# Patient Record
Sex: MALE | Race: WHITE | NOT HISPANIC OR LATINO | Employment: OTHER
[De-identification: names, ages, dates, MRNs, and addresses within clinical notes are randomized per-mention and may not be internally consistent; named-entity substitution may affect disease eponyms.]

---

## 2017-01-06 ENCOUNTER — SURGERY (OUTPATIENT)
Age: 72
End: 2017-01-06

## 2017-01-06 PROBLEM — G89.29 CHRONIC PAIN: Status: ACTIVE | Noted: 2017-01-06

## 2017-01-06 RX ADMIN — TRIAMCINOLONE ACETONIDE 40 MG: 40 INJECTION, SUSPENSION INTRA-ARTICULAR; INTRAMUSCULAR at 09:01

## 2017-01-06 RX ADMIN — FENTANYL CITRATE 25 MCG: 50 INJECTION, SOLUTION INTRAMUSCULAR; INTRAVENOUS at 09:01

## 2017-01-06 RX ADMIN — BUPIVACAINE HYDROCHLORIDE 10 ML: 2.5 INJECTION, SOLUTION EPIDURAL; INFILTRATION; INTRACAUDAL; PERINEURAL at 09:01

## 2017-01-06 RX ADMIN — MIDAZOLAM HYDROCHLORIDE 1 MG: 1 INJECTION, SOLUTION INTRAMUSCULAR; INTRAVENOUS at 09:01

## 2017-01-06 RX ADMIN — LIDOCAINE HYDROCHLORIDE 20 ML: 10 INJECTION, SOLUTION INFILTRATION; PERINEURAL at 09:01

## 2017-01-10 ENCOUNTER — PATIENT MESSAGE (OUTPATIENT)
Dept: HEMATOLOGY/ONCOLOGY | Facility: CLINIC | Age: 72
End: 2017-01-10

## 2017-01-10 RX ORDER — TAMSULOSIN HYDROCHLORIDE 0.4 MG/1
CAPSULE ORAL
Qty: 90 CAPSULE | Refills: 0 | Status: SHIPPED | OUTPATIENT
Start: 2017-01-10 | End: 2017-04-07 | Stop reason: SDUPTHER

## 2017-01-11 ENCOUNTER — PATIENT MESSAGE (OUTPATIENT)
Dept: HEMATOLOGY/ONCOLOGY | Facility: CLINIC | Age: 72
End: 2017-01-11

## 2017-01-11 ENCOUNTER — ANESTHESIA EVENT (OUTPATIENT)
Dept: SURGERY | Facility: HOSPITAL | Age: 72
End: 2017-01-11
Payer: MEDICARE

## 2017-01-11 DIAGNOSIS — G89.29 CHRONIC LOW BACK PAIN WITH SCIATICA, SCIATICA LATERALITY UNSPECIFIED, UNSPECIFIED BACK PAIN LATERALITY: ICD-10-CM

## 2017-01-11 DIAGNOSIS — M54.40 CHRONIC LOW BACK PAIN WITH SCIATICA, SCIATICA LATERALITY UNSPECIFIED, UNSPECIFIED BACK PAIN LATERALITY: ICD-10-CM

## 2017-01-11 RX ORDER — HYDROCODONE BITARTRATE AND ACETAMINOPHEN 5; 325 MG/1; MG/1
2 TABLET ORAL EVERY 6 HOURS PRN
Qty: 100 TABLET | Refills: 0 | Status: SHIPPED | OUTPATIENT
Start: 2017-01-11 | End: 2017-03-09 | Stop reason: DRUGHIGH

## 2017-01-12 ENCOUNTER — ANESTHESIA (OUTPATIENT)
Dept: SURGERY | Facility: HOSPITAL | Age: 72
End: 2017-01-12
Payer: MEDICARE

## 2017-01-12 PROBLEM — H25.10 SENILE NUCLEAR SCLEROSIS: Status: ACTIVE | Noted: 2017-01-12

## 2017-01-12 PROCEDURE — D9220A PRA ANESTHESIA: Mod: CRNA,,, | Performed by: NURSE ANESTHETIST, CERTIFIED REGISTERED

## 2017-01-12 PROCEDURE — 25000003 PHARM REV CODE 250: Performed by: NURSE ANESTHETIST, CERTIFIED REGISTERED

## 2017-01-12 PROCEDURE — 63600175 PHARM REV CODE 636 W HCPCS: Performed by: NURSE ANESTHETIST, CERTIFIED REGISTERED

## 2017-01-12 PROCEDURE — D9220A PRA ANESTHESIA: Mod: ANES,,, | Performed by: ANESTHESIOLOGY

## 2017-01-12 RX ORDER — MIDAZOLAM HYDROCHLORIDE 1 MG/ML
INJECTION, SOLUTION INTRAMUSCULAR; INTRAVENOUS
Status: DISCONTINUED | OUTPATIENT
Start: 2017-01-12 | End: 2017-01-12

## 2017-01-12 RX ORDER — SODIUM CHLORIDE, SODIUM LACTATE, POTASSIUM CHLORIDE, CALCIUM CHLORIDE 600; 310; 30; 20 MG/100ML; MG/100ML; MG/100ML; MG/100ML
INJECTION, SOLUTION INTRAVENOUS CONTINUOUS PRN
Status: DISCONTINUED | OUTPATIENT
Start: 2017-01-12 | End: 2017-01-12

## 2017-01-12 RX ADMIN — SODIUM CHLORIDE, SODIUM LACTATE, POTASSIUM CHLORIDE, AND CALCIUM CHLORIDE: .6; .31; .03; .02 INJECTION, SOLUTION INTRAVENOUS at 09:01

## 2017-01-12 RX ADMIN — MIDAZOLAM HYDROCHLORIDE 1 MG: 1 INJECTION, SOLUTION INTRAMUSCULAR; INTRAVENOUS at 09:01

## 2017-01-12 NOTE — ANESTHESIA PREPROCEDURE EVALUATION
01/12/2017  Royce Francis is a 71 y.o., male.    [facility] Anesthesia Evaluation    I have reviewed the Patient Summary Reports.    I have reviewed the Nursing Notes.      Review of Systems  Anesthesia Hx:  No problems with previous Anesthesia Denies Hx of Anesthetic complications  History of prior surgery of interest to airway management or planning: Denies Family Hx of Anesthesia complications.   Denies Personal Hx of Anesthesia complications.   Social:  Former Smoker, Alcohol Use    Hematology/Oncology:  Hematology Normal   Oncology Normal     EENT/Dental:EENT/Dental Normal   Cardiovascular:   Exercise tolerance: good Hypertension, well controlled CAD   CHF Stable.   Pulmonary:   Sleep Apnea    Renal/:  Renal/ Normal     Hepatic/GI:  Hepatic/GI Normal    Musculoskeletal:   Arthritis     Neurological:   Neuromuscular Disease,    Endocrine:  Endocrine Normal    Dermatological:  Skin Normal    Psych:  Psychiatric Normal           Physical Exam  General:  Well nourished    Airway/Jaw/Neck:  Airway Findings: Mouth Opening: Normal Tongue: Normal  General Airway Assessment: Adult  Mallampati: III  Improves to II with phonation.  TM Distance: Normal, at least 6 cm        Eyes/Ears/Nose:  EYES/EARS/NOSE FINDINGS: Normal   Dental:  DENTAL FINDINGS: Normal   Chest/Lungs:  Chest/Lungs Clear    Heart/Vascular:  Heart Findings: Normal Heart murmur: negative Vascular Findings: Normal    Abdomen:  Abdomen Findings: Normal    Musculoskeletal:  Musculoskeletal Findings: Normal   Skin:  Skin Findings: Normal    Mental Status:  Mental Status Findings:  Cooperative, Alert and Oriented         Anesthesia Plan  Type of Anesthesia, risks & benefits discussed:  Anesthesia Type:  MAC  Patient's Preference:   Intra-op Monitoring Plan:   Intra-op Monitoring Plan Comments:   Post Op Pain Control Plan:   Post Op Pain Control Plan  Comments:   Induction:   IV  Beta Blocker:  Patient is on a Beta-Blocker and has received one dose within the past 24 hours (No further documentation required).       Informed Consent: Patient understands risks and agrees with Anesthesia plan.  Questions answered. Anesthesia consent signed with patient.  ASA Score: 4     Day of Surgery Review of History & Physical:  There are no significant changes.  H&P update referred to the provider.     Anesthesia Plan Notes: Discussed risks and benefits with patient. Patient agrees to proceed with plan. All questions answered. The Patient is Ready For Surgery.  NPO after midnight.        Ready For Surgery From Anesthesia Perspective.

## 2017-01-12 NOTE — ANESTHESIA POSTPROCEDURE EVALUATION
"Anesthesia Post Evaluation    Patient: Royce Francis    Procedure(s) Performed: Procedure(s) (LRB):  PHACOEMULSIFICATION-ASPIRATION-CATARACT (Left)  INSERTION-INTRAOCULAR LENS (IOL) (Left)    Final Anesthesia Type: MAC  Patient location during evaluation: OPS  Patient participation: Yes- Able to Participate  Level of consciousness: awake  Post-procedure vital signs: reviewed and stable  Pain management: adequate  Airway patency: patent  PONV status at discharge: No PONV  Anesthetic complications: no      Cardiovascular status: blood pressure returned to baseline  Respiratory status: unassisted, spontaneous ventilation and room air  Hydration status: euvolemic  Follow-up not needed.        Visit Vitals    BP (!) 140/70    Pulse 64    Temp 37 °C (98.6 °F) (Oral)    Resp 14    Ht 5' 10.5" (1.791 m)    Wt 87.1 kg (192 lb)    SpO2 95%    BMI 27.16 kg/m2       Pain/Masoud Score: No Data Recorded      "

## 2017-01-12 NOTE — TRANSFER OF CARE
"Anesthesia Transfer of Care Note    Patient: Royce Francis    Procedure(s) Performed: Procedure(s) (LRB):  PHACOEMULSIFICATION-ASPIRATION-CATARACT (Left)  INSERTION-INTRAOCULAR LENS (IOL) (Left)    Patient location: [facility]    Anesthesia Type: MAC    Transport from OR: Transported from OR on room air with adequate spontaneous ventilation    Post pain: adequate analgesia    Post assessment: no apparent anesthetic complications and tolerated procedure well    Post vital signs: stable    Level of consciousness: awake, alert and oriented    Nausea/Vomiting: no nausea/vomiting    Complications: none          Last vitals:   Visit Vitals    BP (!) 140/70    Pulse 64    Temp 37 °C (98.6 °F) (Oral)    Resp 14    Ht 5' 10.5" (1.791 m)    Wt 87.1 kg (192 lb)    SpO2 95%    BMI 27.16 kg/m2     "

## 2017-01-13 ENCOUNTER — OFFICE VISIT (OUTPATIENT)
Dept: OPHTHALMOLOGY | Facility: CLINIC | Age: 72
End: 2017-01-13
Payer: MEDICARE

## 2017-01-13 VITALS — HEART RATE: 59 BPM | SYSTOLIC BLOOD PRESSURE: 144 MMHG | DIASTOLIC BLOOD PRESSURE: 79 MMHG

## 2017-01-13 DIAGNOSIS — Z96.1 PSEUDOPHAKIA: ICD-10-CM

## 2017-01-13 DIAGNOSIS — Z98.890 POST-OPERATIVE STATE: Primary | ICD-10-CM

## 2017-01-13 PROCEDURE — 99024 POSTOP FOLLOW-UP VISIT: CPT | Mod: S$GLB,,, | Performed by: OPHTHALMOLOGY

## 2017-01-13 PROCEDURE — 99999 PR PBB SHADOW E&M-EST. PATIENT-LVL II: CPT | Mod: PBBFAC,,, | Performed by: OPHTHALMOLOGY

## 2017-01-13 NOTE — MR AVS SNAPSHOT
[facility]  [address] [facility] [facility]  [place] [address]  Phone: [phone]  Fax: [phone]                  Royce Francis   2017 9:30 AM   Office Visit    Description:  Male : 1945   Provider:  Denilson Yanes MD   Department:  [facility]           Reason for Visit     Post-op Evaluation           Diagnoses this Visit        Comments    Post-operative state    -  Primary     Pseudophakia                To Do List           Future Appointments        Provider Department Dept Phone    2017 10:00 AM CELESTE Torres [facility] [place] [facility] [phone]    2017 9:00 AM Denilson Yanes MD [facility] [phone]    2017 10:30 AM Denilson Yanes MD [facility] [phone]    2017 10:30 AM Unruly Leos MD [facility] [phone]      Your Future Surgeries/Procedures     2017   Surgery with Unruly Leos MD   [facility] ([facility] [place])    [address] [place] [id number] [phone]              Goals (5 Years of Data)     None      Follow-Up and Disposition     Return in about 1 week (around 2017) for Post-op Left eye.      [facility]     [facility] Care [facility] -  Assistance  Registered nurses in the [facility] provide clinical advisement, health education, appointment booking, and other advisory services.  Call for this free service at [phone].             Medications           Message regarding Medications     Verify the changes and/or additions to your medication regime listed below are the same as discussed with your clinician today.  If any of these changes or additions are incorrect, please notify your healthcare provider.             Verify that the below list of medications is an accurate representation of the medications you are currently taking.  If none reported, the list  may be blank. If incorrect, please contact your healthcare provider. Carry this list with you in case of emergency.           Current Medications     acetaminophen (TYLENOL EXTRA STRENGTH) 500 MG tablet Take 500 mg by mouth every 6 (six) hours as needed for Pain.    ascorbic acid, vitamin C, (VITAMIN C) 1000 MG tablet Take 1,000 mg by mouth once daily.    carisoprodol (SOMA) 350 MG tablet 1 bid, prn, muscle cramps    cyanocobalamin (VITAMIN B-12) 1000 MCG tablet Take 100 mcg by mouth once daily.    difluprednate (DUREZOL) 0.05 % Drop ophthalmic solution Place 1 drop into the left eye 4 (four) times daily. For 30 days    enalapril (VASOTEC) 5 MG tablet Take 1 tablet (5 mg total) by mouth once daily.    esomeprazole (NEXIUM) 20 MG capsule Take 20 mg by mouth before breakfast.    fish oil-omega-3 fatty acids 300-1,000 mg capsule Take 2 g by mouth once daily.    flurazepam (DALMANE) 30 MG capsule Take 30 mg by mouth nightly as needed for Insomnia.    flurazepam (DALMANE) 30 MG capsule TAKE 1 CAPSULE(30 MG) BY MOUTH EVERY EVENING AS NEEDED FOR INSOMNIA    fluticasone (FLONASE) 50 mcg/actuation nasal spray SPRAY ONCE IN EACH NOSTRIL ONCE DAILY    hydrocodone-acetaminophen 5-325mg (NORCO) 5-325 mg per tablet Take 2 tablets by mouth every 6 (six) hours as needed for Pain.    ILEVRO 0.3 % DrpS Place 1 drop into both eyes once daily. For 30 days    lactobacillus rhamnosus GG (CULTURELLE) 10 billion cell capsule Take 1 capsule by mouth once daily.    lidocaine-prilocaine (EMLA) cream     loperamide (IMODIUM A-D) 2 mg Tab Take 2 mg by mouth 4 (four) times daily as needed.    metoprolol tartrate (LOPRESSOR) 50 MG tablet Take 0.5 tablets (25 mg total) by mouth 2 (two) times daily.    mirtazapine (REMERON) 15 MG tablet Take 1 tablet (15 mg total) by mouth every evening.    multivitamin capsule Take 1 capsule by mouth once daily.    NON FORMULARY MEDICATION 300 mg once daily. Desiccated Liver    ofloxacin (OCUFLOX) 0.3 % ophthalmic  solution Place 1 drop into the left eye 4 (four) times daily.    pravastatin (PRAVACHOL) 20 MG tablet Take 1 tablet (20 mg total) by mouth once daily.    tamsulosin (FLOMAX) 0.4 mg Cp24 TAKE ONE CAPSULE BY MOUTH DAILY    valacyclovir (VALTREX) 500 MG tablet Take 1 tablet (500 mg total) by mouth once daily.           Clinical Reference Information           Vital Signs - Last Recorded  Most recent update: 1/13/2017  9:26 AM by Josette Hanna MA    BP Pulse                (!) 144/79 (BP Location: Left arm, Patient Position: Sitting, BP Method: Automatic) (!) 59          Blood Pressure          Most Recent Value    BP  (!)  144/79      Allergies as of 1/13/2017     Iodine And Iodide Containing Products      Immunizations Administered on Date of Encounter - 1/13/2017     None

## 2017-01-14 NOTE — PROGRESS NOTES
Subjective:       Patient ID: Royce Francis is a 71 y.o. male.    Chief Complaint: Post-op Evaluation (1 day )    HPI  Review of Systems    Objective:      Physical Exam    Assessment:       1. Post-operative state    2. Pseudophakia        Plan:       S/p CE OS- Doing well.           CPM OS.  RTC 1 wk.

## 2017-01-16 ENCOUNTER — OFFICE VISIT (OUTPATIENT)
Dept: PAIN MEDICINE | Facility: CLINIC | Age: 72
End: 2017-01-16
Payer: MEDICARE

## 2017-01-16 VITALS
HEIGHT: 70 IN | SYSTOLIC BLOOD PRESSURE: 130 MMHG | TEMPERATURE: 98 F | HEART RATE: 69 BPM | DIASTOLIC BLOOD PRESSURE: 58 MMHG | WEIGHT: 196 LBS | BODY MASS INDEX: 28.06 KG/M2

## 2017-01-16 DIAGNOSIS — M54.16 LUMBAR RADICULOPATHY: ICD-10-CM

## 2017-01-16 DIAGNOSIS — M46.1 SACROILIAC INFLAMMATION: ICD-10-CM

## 2017-01-16 DIAGNOSIS — M47.819 FACET ARTHROPATHY: ICD-10-CM

## 2017-01-16 DIAGNOSIS — M46.1 SACROILIITIS: ICD-10-CM

## 2017-01-16 DIAGNOSIS — M17.0 PRIMARY OSTEOARTHRITIS OF BOTH KNEES: Primary | ICD-10-CM

## 2017-01-16 PROCEDURE — 1159F MED LIST DOCD IN RCRD: CPT | Mod: S$GLB,,, | Performed by: NURSE PRACTITIONER

## 2017-01-16 PROCEDURE — 3075F SYST BP GE 130 - 139MM HG: CPT | Mod: S$GLB,,, | Performed by: NURSE PRACTITIONER

## 2017-01-16 PROCEDURE — 1157F ADVNC CARE PLAN IN RCRD: CPT | Mod: S$GLB,,, | Performed by: NURSE PRACTITIONER

## 2017-01-16 PROCEDURE — 1125F AMNT PAIN NOTED PAIN PRSNT: CPT | Mod: S$GLB,,, | Performed by: NURSE PRACTITIONER

## 2017-01-16 PROCEDURE — 99499 UNLISTED E&M SERVICE: CPT | Mod: S$GLB,,, | Performed by: NURSE PRACTITIONER

## 2017-01-16 PROCEDURE — 99999 PR PBB SHADOW E&M-EST. PATIENT-LVL III: CPT | Mod: PBBFAC,,, | Performed by: NURSE PRACTITIONER

## 2017-01-16 PROCEDURE — 3078F DIAST BP <80 MM HG: CPT | Mod: S$GLB,,, | Performed by: NURSE PRACTITIONER

## 2017-01-16 PROCEDURE — 1160F RVW MEDS BY RX/DR IN RCRD: CPT | Mod: S$GLB,,, | Performed by: NURSE PRACTITIONER

## 2017-01-16 PROCEDURE — 99213 OFFICE O/P EST LOW 20 MIN: CPT | Mod: S$GLB,,, | Performed by: NURSE PRACTITIONER

## 2017-01-16 NOTE — MR AVS SNAPSHOT
[facility]  [address]  [place] [address]  Phone: [phone]  Fax: [phone]                  Royce Francis   2017 10:00 AM   Office Visit    Description:  Male : 1945   Provider:  CELESTE Torres   Department:  [facility]           Reason for Visit     Low-back Pain                To Do List           Future Appointments        Provider Department Dept Phone    2017 9:00 AM Denilson Yanes MD [facility] [phone]    2017 10:30 AM Denilson Yanes MD [facility] [phone]    2017 10:30 AM Unruly Leos MD [facility] [phone]      Your Future Surgeries/Procedures     2017   Surgery with Unruly Leos MD   [facility] ([facility])    [address] [facility] [id number] [phone]              Goals (5 Years of Data)     None      [facility]     [facility] Care [facility] -  Assistance  Registered nurses in the [facility] provide clinical advisement, health education, appointment booking, and other advisory services.  Call for this free service at [phone].             Medications           Message regarding Medications     Verify the changes and/or additions to your medication regime listed below are the same as discussed with your clinician today.  If any of these changes or additions are incorrect, please notify your healthcare provider.             Verify that the below list of medications is an accurate representation of the medications you are currently taking.  If none reported, the list may be blank. If incorrect, please contact your healthcare provider. Carry this list with you in case of emergency.           Current Medications     acetaminophen (TYLENOL EXTRA STRENGTH) 500 MG tablet Take 500 mg by mouth every 6 (six) hours as needed for Pain.    ascorbic acid,  vitamin C, (VITAMIN C) 1000 MG tablet Take 1,000 mg by mouth once daily.    carisoprodol (SOMA) 350 MG tablet 1 bid, prn, muscle cramps    cyanocobalamin (VITAMIN B-12) 1000 MCG tablet Take 100 mcg by mouth once daily.    difluprednate (DUREZOL) 0.05 % Drop ophthalmic solution Place 1 drop into the left eye 4 (four) times daily. For 30 days    enalapril (VASOTEC) 5 MG tablet Take 1 tablet (5 mg total) by mouth once daily.    esomeprazole (NEXIUM) 20 MG capsule Take 20 mg by mouth before breakfast.    fish oil-omega-3 fatty acids 300-1,000 mg capsule Take 2 g by mouth once daily.    flurazepam (DALMANE) 30 MG capsule Take 30 mg by mouth nightly as needed for Insomnia.    flurazepam (DALMANE) 30 MG capsule TAKE 1 CAPSULE(30 MG) BY MOUTH EVERY EVENING AS NEEDED FOR INSOMNIA    fluticasone (FLONASE) 50 mcg/actuation nasal spray SPRAY ONCE IN EACH NOSTRIL ONCE DAILY    hydrocodone-acetaminophen 5-325mg (NORCO) 5-325 mg per tablet Take 2 tablets by mouth every 6 (six) hours as needed for Pain.    ILEVRO 0.3 % DrpS Place 1 drop into both eyes once daily. For 30 days    lactobacillus rhamnosus GG (CULTURELLE) 10 billion cell capsule Take 1 capsule by mouth once daily.    lidocaine-prilocaine (EMLA) cream     loperamide (IMODIUM A-D) 2 mg Tab Take 2 mg by mouth 4 (four) times daily as needed.    metoprolol tartrate (LOPRESSOR) 50 MG tablet Take 0.5 tablets (25 mg total) by mouth 2 (two) times daily.    mirtazapine (REMERON) 15 MG tablet Take 1 tablet (15 mg total) by mouth every evening.    multivitamin capsule Take 1 capsule by mouth once daily.    NON FORMULARY MEDICATION 300 mg once daily. Desiccated Liver    ofloxacin (OCUFLOX) 0.3 % ophthalmic solution Place 1 drop into the left eye 4 (four) times daily.    pravastatin (PRAVACHOL) 20 MG tablet Take 1 tablet (20 mg total) by mouth once daily.    tamsulosin (FLOMAX) 0.4 mg Cp24 TAKE ONE CAPSULE BY MOUTH DAILY    valacyclovir (VALTREX) 500 MG tablet Take 1 tablet (500 mg  "total) by mouth once daily.           Clinical Reference Information           Vital Signs - Last Recorded  Most recent update: 1/16/2017  9:59 AM by Meena Zhu MA    BP Pulse Temp Ht Wt BMI    (!) 130/58 69 98 °F (36.7 °C) (Oral) 5' 10" (1.778 m) 88.9 kg (196 lb) 28.12 kg/m2      Blood Pressure          Most Recent Value    BP  (!)  130/58      Allergies as of 1/16/2017     Iodine And Iodide Containing Products      Immunizations Administered on Date of Encounter - 1/16/2017     None      "

## 2017-01-16 NOTE — PROGRESS NOTES
Chronic patient Established Note (Follow up visit)      SUBJECTIVE:    Royce Francis presents to the clinic for a follow-up appointment for lower back and bilateral knee pain.  He is s/p bilateral Synvisc One in the office on 12/14/16 with 80% pain relief.  He is also s/p left L2,3,4,5 RFA on 1/6/17 with significant relief so far.  He is scheduled for the right side on 1/27/17.  He also has chronic bilateral foot pain secondary to multiple rounds of chemotherapy resulting in peripheral neuropathy. He does not have any leg pain. He has a hx of non-Hodgkins lymphoma which has recurred appx 6 times in different locations.  He is followed by Dr. Lucia at [facility]. Since the last visit, Royce Francis states the pain has been improving.  Current pain intensity is 2/10.  The patient denies any bowel/bladder incontinence or symptoms of saddle paresthesia.        Pain Disability Index Review:  Last 3 PDI Scores 1/16/2017 11/18/2016 10/20/2016   Pain Disability Index (PDI) 17 44 26       Pain Medications:    - Opioids: Norco ( Hydrocodone/Acetaminophen)- Dr Lucia    Opioid Contract: no     report:  Reviewed and consistent with medication use as prescribed.    Pain Procedures:   1/16/17 Left L2,3,4,5 RFA- significant relief  12/14/16 Bilateral Synvisc One- significant relief  3/28/16- Bilateral L4-5 AND L5-S1 Facet injection  8/28/15- Bilateral L4-5 and L5-S1 Facet injection  6/5/15- Caudal DARIO with Racz Catheter   4/10/15- Bilateral L4-5 TF DARIO  2/20/15- Left L3-4-5 RFA  2/13/15- right L3-4-5 RFA  1/16/15- bilateral L3-4-5 MBB  9/12/14- bilateral L3-4-5 MBB  8/15/14- Bilateral L4-5 TF DARIO    Physical Therapy/Home Exercise: per self    Imaging:     Thoracic XRAY 4/21/16  Narrative   AP and lateral radiographs of the thoracic spine were obtained.  There is mild scoliosis of the thoracolumbar spine.  Posterior vertebral alignment appears satisfactory.  Vertebral body heights appear well-maintained.  There are  degenerative changes with small anterior osteophytes noted throughout the thoracic spine.  No abnormal paraspinal masses are evident.   Impression     Thoracic spondylosis.  Mild thoracolumbar scoliosis.  No evidence for acute fracture, bone destruction, or subluxation.     Lumbar MRI 7/28/14  Narrative   MRI LUMBAR SPINE    TECHNIQUE: MRI lumbar spine was performed on a 1.5T magnet. The following sequences were obtained: Localizer; sagittal T1, T2, STIR; axial T1 and T2. After administration of 20 mL Omniscan, axial and sagittal T1-weighted sequences were attained.    COMPARISON: Not available.    FINDINGS:    There are 5 lumbar vertebrae.  There is lumbar levoscoliosis.  There is grade 1 anterolisthesis of L4 on L5.  Vertebral body heights are maintained.  Degenerative endplate changes are noted.  No evidence for infiltrative process.  There is multilevel   severe, asymmetric loss of disk height. Conus terminates at L1 and appears unremarkable. Limited evaluation of posterior abdominal structures is unremarkable.  Paraspinal musculature is within normal limits.  Evaluation of sacroiliac joints is   unremarkable.  S3 Tarlov cyst noted.    L1-L2: There is mild bilateral facet arthrosis and circumferential disk bulge resulting in mild spinal canal stenosis and mild right neural foraminal narrowing.    L2-L3: There is a compression disk bulge and moderate bilateral facet arthrosis resulting in moderate spinal canal stenosis and mild bilateral neural foraminal narrowing.    L3-L4: There is mild bilateral facet arthrosis and circumferential disk bulge resulting in mild left neural foraminal narrowing.    L4-L5: There is moderate circumferential disk bulge and bilateral facet arthrosis resulting in moderate spinal canal stenosis and moderate left neural foraminal narrowing.    L5-S1: There is severe right facet arthrosis and mild circumferential disk bulge resulting in moderate right neural foraminal narrowing.    Impression    No marrow infiltrative process. Multilevel degenerative changes with moderate spinal canal stenosis as detailed above       Thoracic XRAY 4/21/16  Narrative   AP and lateral radiographs of the thoracic spine were obtained.  There is mild scoliosis of the thoracolumbar spine.  Posterior vertebral alignment appears satisfactory.  Vertebral body heights appear well-maintained.  There are degenerative changes with small anterior osteophytes noted throughout the thoracic spine.  No abnormal paraspinal masses are evident.   Impression     Thoracic spondylosis.  Mild thoracolumbar scoliosis.  No evidence for acute fracture       Bilateral Knee XRAYs 3/10/16  Narrative   AP, lateral, and sunrise views of both knees were obtained.  The bones are intact.  There is no evidence for acute fracture or bone destruction.  There is chondrocalcinosis present.  There is moderate narrowing of the medial compartments of the femorotibial joints bilaterally.  There is spurring of the tibial spines with osteophytes at the femorotibial and patellofemoral joints.  There is mild soft tissue prominence in a right suprapatellar location and a small right-sided joint effusion cannot be excluded.  Atherosclerotic calcification is present within the arteries of the distal thighs and proximal calf.   Impression     Prominent symmetric degenerative changes of the femorotibial and patellofemoral joints with moderate narrowing of the medial compartments of the femorotibial joints.  Chondrocalcinosis noted bilaterally.  Possible right suprapatellar joint effusion.           Allergies:   Review of patient's allergies indicates:   Allergen Reactions    Iodine and iodide containing products Itching     Contrast dye       Current Medications:   Current Outpatient Prescriptions   Medication Sig Dispense Refill    acetaminophen (TYLENOL EXTRA STRENGTH) 500 MG tablet Take 500 mg by mouth every 6 (six) hours as needed for Pain.      ascorbic  acid, vitamin C, (VITAMIN C) 1000 MG tablet Take 1,000 mg by mouth once daily.      carisoprodol (SOMA) 350 MG tablet 1 bid, prn, muscle cramps 60 tablet 0    cyanocobalamin (VITAMIN B-12) 1000 MCG tablet Take 100 mcg by mouth once daily.      difluprednate (DUREZOL) 0.05 % Drop ophthalmic solution Place 1 drop into the left eye 4 (four) times daily. For 30 days 5 mL 1    enalapril (VASOTEC) 5 MG tablet Take 1 tablet (5 mg total) by mouth once daily. 90 tablet 3    esomeprazole (NEXIUM) 20 MG capsule Take 20 mg by mouth before breakfast.      fish oil-omega-3 fatty acids 300-1,000 mg capsule Take 2 g by mouth once daily.      flurazepam (DALMANE) 30 MG capsule Take 30 mg by mouth nightly as needed for Insomnia.      flurazepam (DALMANE) 30 MG capsule TAKE 1 CAPSULE(30 MG) BY MOUTH EVERY EVENING AS NEEDED FOR INSOMNIA 90 capsule 1    fluticasone (FLONASE) 50 mcg/actuation nasal spray SPRAY ONCE IN EACH NOSTRIL ONCE DAILY 48 g 5    hydrocodone-acetaminophen 5-325mg (NORCO) 5-325 mg per tablet Take 2 tablets by mouth every 6 (six) hours as needed for Pain. 100 tablet 0    ILEVRO 0.3 % DrpS Place 1 drop into both eyes once daily. For 30 days 3 mL 1    lactobacillus rhamnosus GG (CULTURELLE) 10 billion cell capsule Take 1 capsule by mouth once daily.      lidocaine-prilocaine (EMLA) cream       loperamide (IMODIUM A-D) 2 mg Tab Take 2 mg by mouth 4 (four) times daily as needed.      metoprolol tartrate (LOPRESSOR) 50 MG tablet Take 0.5 tablets (25 mg total) by mouth 2 (two) times daily. 180 tablet 3    mirtazapine (REMERON) 15 MG tablet Take 1 tablet (15 mg total) by mouth every evening. 90 tablet 3    multivitamin capsule Take 1 capsule by mouth once daily.      NON FORMULARY MEDICATION 300 mg once daily. Desiccated Liver      ofloxacin (OCUFLOX) 0.3 % ophthalmic solution Place 1 drop into the left eye 4 (four) times daily. 5 mL 1    pravastatin (PRAVACHOL) 20 MG tablet Take 1 tablet (20 mg total) by  mouth once daily. 90 tablet 3    tamsulosin (FLOMAX) 0.4 mg Cp24 TAKE ONE CAPSULE BY MOUTH DAILY 90 capsule 0    valacyclovir (VALTREX) 500 MG tablet Take 1 tablet (500 mg total) by mouth once daily. 90 tablet 3     No current facility-administered medications for this visit.        REVIEW OF SYSTEMS:    GENERAL:  No weight loss, malaise or fevers.  HEENT:  Negative for frequent or significant headaches.  NECK:  Negative for lumps, goiter, pain and significant neck swelling.  RESPIRATORY:  Negative for cough, wheezing or shortness of breath.  CARDIOVASCULAR:  Negative for chest pain, leg swelling or palpitations. H/O CHF and cardiomyopathy.  GI:  Negative for abdominal discomfort, blood in stools or black stools or change in bowel habits.  MUSCULOSKELETAL:  See HPI.  SKIN:  Negative for lesions, rash, and itching.  PSYCH:  Negative for sleep disturbance, mood disorder and recent psychosocial stressors.  HEMATOLOGY/LYMPHOLOGY:  Negative for prolonged bleeding, bruising easily or swollen nodes. H/O lymphoma and squamous cell carcinoma.  NEURO:   No history of headaches, syncope, paralysis, seizures or tremors.  All other reviewed and negative other than HPI.    Past Medical History:  Past Medical History   Diagnosis Date    Allergy     Anemia     Arthritis     Basal cell carcinoma      excised from upper back    Cancer     Cardiomyopathy due to chemotherapy     Cataract     CHF (congestive heart failure)     Coronary artery disease      D1    Encounter for blood transfusion     Eye injuries 20 yrs     ou fb several x's    Hyperlipidemia     Hypertension     Hypertensive heart disease with heart failure 1/31/2013    Lymphoma 1996    Obstructive sleep apnea on CPAP     SCC (squamous cell carcinoma) excised 12/2015     R proximal forearm    Skin disease     Sleep apnea     SQUAMOUS CELL CARCINOMA excised 2/14/13     L forearm    Squamous cell carcinoma excised 8/4/2016     IN SITU, Left thigh MOHS     Squamous cell carcinoma excised 8/25/2016     left helix, MOHS       Past Surgical History:  Past Surgical History   Procedure Laterality Date    Kidney stone surgery      Small intestine surgery  09/2013    Cataract extraction       od dr lincoln    Abdominal surgery      Tonsillectomy      Eye surgery       Rt cataract       Family History:  Family History   Problem Relation Age of Onset    Cataracts Mother     Hypertension Mother     Cataracts Father     Hypertension Father     Cancer Father     Hypertension Maternal Grandmother     Hypertension Maternal Grandfather     Hypertension Paternal Grandmother     Hypertension Paternal Grandfather     No Known Problems Sister     No Known Problems Brother     No Known Problems Maternal Aunt     No Known Problems Maternal Uncle     No Known Problems Paternal Aunt     No Known Problems Paternal Uncle     Amblyopia Neg Hx     Blindness Neg Hx     Diabetes Neg Hx     Glaucoma Neg Hx     Macular degeneration Neg Hx     Retinal detachment Neg Hx     Strabismus Neg Hx     Stroke Neg Hx     Thyroid disease Neg Hx     Melanoma Neg Hx     Heart disease Neg Hx     Celiac disease Neg Hx     Cirrhosis Neg Hx     Colon cancer Neg Hx     Colon polyps Neg Hx     Crohn's disease Neg Hx     Cystic fibrosis Neg Hx     Esophageal cancer Neg Hx     Hemochromatosis Neg Hx     Inflammatory bowel disease Neg Hx     Irritable bowel syndrome Neg Hx     Liver cancer Neg Hx     Liver disease Neg Hx     Rectal cancer Neg Hx     Stomach cancer Neg Hx     Ulcerative colitis Neg Hx     Burt's disease Neg Hx        Social History:  Social History     Social History    Marital status:      Spouse name: N/A    Number of children: N/A    Years of education: N/A     Occupational History    retired      Social History Main Topics    Smoking status: Former Smoker     Quit date: 1960    Smokeless tobacco: Never Used    Alcohol use Yes       "Comment: socially    Drug use: No    Sexual activity: Yes     Partners: Female     Other Topics Concern    Not on file     Social History Narrative       OBJECTIVE:    Visit Vitals    BP (!) 130/58    Pulse 69    Temp 98 °F (36.7 °C) (Oral)    Ht 5' 10" (1.778 m)    Wt 88.9 kg (196 lb)    BMI 28.12 kg/m2       PHYSICAL EXAMINATION:    General appearance: Well appearing, in no acute distress, alert and oriented x3.  Psych:  Mood and affect appropriate.  Skin: Skin color, texture, turgor normal, no rashes or lesions, in both upper and lower body.  Head/face:  Atraumatic, normocephalic. No palpable lymph nodes  Cor: RRR  Pulm: CTA  GI: Abdomen soft and non-tender.  Back: Straight leg raising in the sitting and supine positions is negative to radicular pain. Pain with palpation to lumbar facet joints on the right.  Limited lumbar extension with pain reproduction.  Bilateral facet loading, R>L.  Painful palpation to bilateral SI joints. RAHEEM is positive bilaterally.  Extremities: Peripheral joint ROM is full and pain free without obvious instability or laxity in all four extremities. No deformities, edema, or skin discoloration. Good capillary refill.  Musculoskeletal: Medial joint line tenderness to bilateral knees.  No pain with extension of bilateral knees.  Bilateral upper and lower extremity strength is normal and symmetric.  No atrophy or tone abnormalities are noted.  Neuro: Bilateral upper and lower extremity coordination and muscle stretch reflexes are physiologic and symmetric.  Plantar response are downgoing. Decreased sensation to bilateral feet at digits 2-5.  Gait: Antalgic- ambulating with cane.    ASSESSMENT: 71 y.o. year old male with lower back and BLE pain, consistent with the following diagnoses:     1. Primary osteoarthritis of both knees     2. Facet arthropathy     3. Sacroiliac inflammation     4. Lumbar radiculopathy     5. Sacroiliitis           PLAN:     - Previous imaging was " reviewed and discussed with the patient today.     -  He has an appointment with Dr. Leos next month and would like to further discuss SCS at this time.    - He will keep appointment for right L2,3,4,5 RFA on 1/27/17.    - The patient will continue a home exercise routine to help with pain and strengthening.        The above plan and management options were discussed at length with patient. Patient is in agreement with the above and verbalized understanding.    Francisca Chen  01/16/2017

## 2017-01-20 ENCOUNTER — OFFICE VISIT (OUTPATIENT)
Dept: OPHTHALMOLOGY | Facility: CLINIC | Age: 72
End: 2017-01-20
Payer: MEDICARE

## 2017-01-20 DIAGNOSIS — Z98.890 POST-OPERATIVE STATE: Primary | ICD-10-CM

## 2017-01-20 DIAGNOSIS — Z96.1 PSEUDOPHAKIA: ICD-10-CM

## 2017-01-20 PROCEDURE — 99024 POSTOP FOLLOW-UP VISIT: CPT | Mod: S$GLB,,, | Performed by: OPHTHALMOLOGY

## 2017-01-20 PROCEDURE — 99999 PR PBB SHADOW E&M-EST. PATIENT-LVL II: CPT | Mod: PBBFAC,,, | Performed by: OPHTHALMOLOGY

## 2017-01-20 NOTE — PROGRESS NOTES
Subjective:       Patient ID: Royce Francis is a 71 y.o. male.    Chief Complaint: Post-op Evaluation (1 week po os )    HPI  Review of Systems    Objective:      Physical Exam    Assessment:       1. Post-operative state    2. Pseudophakia        Plan:       S/p CE OS- Doing well.             CPM OS.  RTC 3 wks.

## 2017-01-20 NOTE — MR AVS SNAPSHOT
[facility]  [address]  [place] [address]  Phone: [phone]  Fax: [phone]                  Royce Francis   2017 9:00 AM   Office Visit    Description:  Male : 1945   Provider:  Denilson Yanes MD   Department:  [facility]           Reason for Visit     Post-op Evaluation           Diagnoses this Visit        Comments    Post-operative state    -  Primary     Pseudophakia                To Do List           Future Appointments        Provider Department Dept Phone    2017 10:30 AM Denilson Yanes MD [facility] [phone]    2017 10:30 AM Unruly Leos MD [facility] [phone]      Your Future Surgeries/Procedures     2017   Surgery with Unruly Leos MD   [facility]    [address] [address] [address] [phone]              Goals (5 Years of Data)     None      Follow-Up and Disposition     Return in about 3 weeks (around 2/10/2017) for Post-op Left eye.      [facility]     [facility] Nurse Care [facility] -  Assistance  Registered nurses in the [facility] provide clinical advisement, health education, appointment booking, and other advisory services.  Call for this free service at [phone].             Medications           Message regarding Medications     Verify the changes and/or additions to your medication regime listed below are the same as discussed with your clinician today.  If any of these changes or additions are incorrect, please notify your healthcare provider.             Verify that the below list of medications is an accurate representation of the medications you are currently taking.  If none reported, the list may be blank. If incorrect, please contact your healthcare provider. Carry this list with you in case of emergency.           Current Medications     acetaminophen (TYLENOL  EXTRA STRENGTH) 500 MG tablet Take 500 mg by mouth every 6 (six) hours as needed for Pain.    ascorbic acid, vitamin C, (VITAMIN C) 1000 MG tablet Take 1,000 mg by mouth once daily.    carisoprodol (SOMA) 350 MG tablet 1 bid, prn, muscle cramps    cyanocobalamin (VITAMIN B-12) 1000 MCG tablet Take 100 mcg by mouth once daily.    difluprednate (DUREZOL) 0.05 % Drop ophthalmic solution Place 1 drop into the left eye 4 (four) times daily. For 30 days    enalapril (VASOTEC) 5 MG tablet Take 1 tablet (5 mg total) by mouth once daily.    esomeprazole (NEXIUM) 20 MG capsule Take 20 mg by mouth before breakfast.    fish oil-omega-3 fatty acids 300-1,000 mg capsule Take 2 g by mouth once daily.    flurazepam (DALMANE) 30 MG capsule Take 30 mg by mouth nightly as needed for Insomnia.    flurazepam (DALMANE) 30 MG capsule TAKE 1 CAPSULE(30 MG) BY MOUTH EVERY EVENING AS NEEDED FOR INSOMNIA    fluticasone (FLONASE) 50 mcg/actuation nasal spray SPRAY ONCE IN EACH NOSTRIL ONCE DAILY    hydrocodone-acetaminophen 5-325mg (NORCO) 5-325 mg per tablet Take 2 tablets by mouth every 6 (six) hours as needed for Pain.    ILEVRO 0.3 % DrpS Place 1 drop into both eyes once daily. For 30 days    lactobacillus rhamnosus GG (CULTURELLE) 10 billion cell capsule Take 1 capsule by mouth once daily.    lidocaine-prilocaine (EMLA) cream     loperamide (IMODIUM A-D) 2 mg Tab Take 2 mg by mouth 4 (four) times daily as needed.    metoprolol tartrate (LOPRESSOR) 50 MG tablet Take 0.5 tablets (25 mg total) by mouth 2 (two) times daily.    mirtazapine (REMERON) 15 MG tablet Take 1 tablet (15 mg total) by mouth every evening.    multivitamin capsule Take 1 capsule by mouth once daily.    NON FORMULARY MEDICATION 300 mg once daily. Desiccated Liver    pravastatin (PRAVACHOL) 20 MG tablet Take 1 tablet (20 mg total) by mouth once daily.    tamsulosin (FLOMAX) 0.4 mg Cp24 TAKE ONE CAPSULE BY MOUTH DAILY    valacyclovir (VALTREX) 500 MG tablet Take 1 tablet  (500 mg total) by mouth once daily.    fish oil-omega-3 fatty acids 300-1,000 mg capsule Take 2 g by mouth once daily.           Clinical Reference Information           Allergies as of 1/20/2017     Iodine And Iodide Containing Products      Immunizations Administered on Date of Encounter - 1/20/2017     None

## 2017-01-25 ENCOUNTER — PATIENT MESSAGE (OUTPATIENT)
Dept: HEMATOLOGY/ONCOLOGY | Facility: CLINIC | Age: 72
End: 2017-01-25

## 2017-01-25 DIAGNOSIS — G47.00 INSOMNIA, UNSPECIFIED TYPE: Primary | ICD-10-CM

## 2017-01-25 RX ORDER — FLURAZEPAM HYDROCHLORIDE 30 MG/1
CAPSULE ORAL
Qty: 90 CAPSULE | Refills: 1 | Status: SHIPPED | OUTPATIENT
Start: 2017-01-25 | End: 2017-07-28 | Stop reason: SDUPTHER

## 2017-01-25 RX ORDER — OMEPRAZOLE 20 MG/1
CAPSULE, DELAYED RELEASE ORAL
Qty: 180 CAPSULE | Refills: 0 | Status: SHIPPED | OUTPATIENT
Start: 2017-01-25 | End: 2017-05-18

## 2017-01-26 ENCOUNTER — PATIENT MESSAGE (OUTPATIENT)
Dept: HEMATOLOGY/ONCOLOGY | Facility: CLINIC | Age: 72
End: 2017-01-26

## 2017-01-27 ENCOUNTER — SURGERY (OUTPATIENT)
Age: 72
End: 2017-01-27

## 2017-01-27 RX ADMIN — FENTANYL CITRATE 50 MCG: 50 INJECTION, SOLUTION INTRAMUSCULAR; INTRAVENOUS at 10:01

## 2017-01-27 RX ADMIN — MIDAZOLAM HYDROCHLORIDE 1 MG: 1 INJECTION, SOLUTION INTRAMUSCULAR; INTRAVENOUS at 10:01

## 2017-01-27 RX ADMIN — BUPIVACAINE HYDROCHLORIDE 10 ML: 2.5 INJECTION, SOLUTION EPIDURAL; INFILTRATION; INTRACAUDAL; PERINEURAL at 10:01

## 2017-01-27 RX ADMIN — LIDOCAINE HYDROCHLORIDE 10 ML: 10 INJECTION, SOLUTION INFILTRATION; PERINEURAL at 10:01

## 2017-01-27 RX ADMIN — TRIAMCINOLONE ACETONIDE 40 MG: 40 INJECTION, SUSPENSION INTRA-ARTICULAR; INTRAMUSCULAR at 10:01

## 2017-01-31 ENCOUNTER — PATIENT MESSAGE (OUTPATIENT)
Dept: HEMATOLOGY/ONCOLOGY | Facility: CLINIC | Age: 72
End: 2017-01-31

## 2017-02-09 ENCOUNTER — OFFICE VISIT (OUTPATIENT)
Dept: OPHTHALMOLOGY | Facility: CLINIC | Age: 72
End: 2017-02-09
Payer: MEDICARE

## 2017-02-09 DIAGNOSIS — Z98.890 POST-OPERATIVE STATE: Primary | ICD-10-CM

## 2017-02-09 DIAGNOSIS — Z96.1 PSEUDOPHAKIA: ICD-10-CM

## 2017-02-09 PROCEDURE — 99024 POSTOP FOLLOW-UP VISIT: CPT | Mod: S$GLB,,, | Performed by: OPHTHALMOLOGY

## 2017-02-09 PROCEDURE — 99999 PR PBB SHADOW E&M-EST. PATIENT-LVL II: CPT | Mod: PBBFAC,,, | Performed by: OPHTHALMOLOGY

## 2017-02-09 NOTE — MR AVS SNAPSHOT
[facility]  [address] [facility]  [place] [address]  Phone: [phone]  Fax: [phone]                  Royce Francis   2017 10:30 AM   Office Visit    Description:  Male : 1945   Provider:  Denilson Yanes MD   Department:  [facility]           Reason for Visit     Post-op Evaluation           Diagnoses this Visit        Comments    Post-operative state    -  Primary     Pseudophakia                To Do List           Future Appointments        Provider Department Dept Phone    2017 10:30 AM Unruly Leos MD [facility] [phone]    2017 10:00 AM Francia Hart MD [facility] [phone]      Goals (5 Years of Data)     None      Follow-Up and Disposition     Return in about 6 months (around 2017) for Dr Ibarra, F/U S/P CE OS..      [facility]     [facility] -  Assistance  Registered nurses in the [facility] provide clinical advisement, health education, appointment booking, and other advisory services.  Call for this free service at [phone].             Medications           Message regarding Medications     Verify the changes and/or additions to your medication regime listed below are the same as discussed with your clinician today.  If any of these changes or additions are incorrect, please notify your healthcare provider.        STOP taking these medications     ILEVRO 0.3 % DrpS Place 1 drop into both eyes once daily. For 30 days           Verify that the below list of medications is an accurate representation of the medications you are currently taking.  If none reported, the list may be blank. If incorrect, please contact your healthcare provider. Carry this list with you in case of emergency.           Current Medications     acetaminophen (TYLENOL EXTRA STRENGTH) 500 MG tablet Take 500 mg by mouth every 6 (six) hours as needed for Pain.    ascorbic  acid, vitamin C, (VITAMIN C) 1000 MG tablet Take 1,000 mg by mouth once daily.    carisoprodol (SOMA) 350 MG tablet 1 bid, prn, muscle cramps    cyanocobalamin (VITAMIN B-12) 1000 MCG tablet Take 100 mcg by mouth once daily.    difluprednate (DUREZOL) 0.05 % Drop ophthalmic solution Place 1 drop into the left eye 4 (four) times daily. For 30 days    enalapril (VASOTEC) 5 MG tablet Take 1 tablet (5 mg total) by mouth once daily.    esomeprazole (NEXIUM) 20 MG capsule Take 20 mg by mouth before breakfast.    fish oil-omega-3 fatty acids 300-1,000 mg capsule Take 2 g by mouth once daily.    flurazepam (DALMANE) 30 MG capsule Take 30 mg by mouth nightly as needed for Insomnia.    flurazepam (DALMANE) 30 MG capsule TAKE 1 CAPSULE(30 MG) BY MOUTH EVERY EVENING AS NEEDED FOR INSOMNIA    fluticasone (FLONASE) 50 mcg/actuation nasal spray SPRAY ONCE IN EACH NOSTRIL ONCE DAILY    hydrocodone-acetaminophen 5-325mg (NORCO) 5-325 mg per tablet Take 2 tablets by mouth every 6 (six) hours as needed for Pain.    lactobacillus rhamnosus GG (CULTURELLE) 10 billion cell capsule Take 1 capsule by mouth once daily.    lidocaine-prilocaine (EMLA) cream     loperamide (IMODIUM A-D) 2 mg Tab Take 2 mg by mouth 4 (four) times daily as needed.    metoprolol tartrate (LOPRESSOR) 50 MG tablet Take 0.5 tablets (25 mg total) by mouth 2 (two) times daily.    mirtazapine (REMERON) 15 MG tablet Take 1 tablet (15 mg total) by mouth every evening.    multivitamin capsule Take 1 capsule by mouth once daily.    NON FORMULARY MEDICATION 300 mg once daily. Desiccated Liver    omeprazole (PRILOSEC) 20 MG capsule TAKE 2 CAPSULES BY MOUTH EVERY DAY AS NEEDED    pravastatin (PRAVACHOL) 20 MG tablet Take 1 tablet (20 mg total) by mouth once daily.    tamsulosin (FLOMAX) 0.4 mg Cp24 TAKE ONE CAPSULE BY MOUTH DAILY    valacyclovir (VALTREX) 500 MG tablet Take 1 tablet (500 mg total) by mouth once daily.           Clinical Reference Information            Allergies as of 2/9/2017     Iodine And Iodide Containing Products      Immunizations Administered on Date of Encounter - 2/9/2017     None      Language Assistance Services     ATTENTION: Language assistance services are available, free of charge. Please call [phone].      ATENCIÓN: Si sunitha alvarez, tiene a roberson disposición servicios gratuitos de asistencia lingüística. Llame al [phone].     CHÚ Ý: N?u b?n nói Ti?ng Vi?t, có các d?ch v? h? tr? ngôn ng? mi?n phí dành cho b?n. G?i s? [phone].         [facility] complies with applicable Federal civil rights laws and does not discriminate on the basis of race, color, national origin, age, disability, or sex.

## 2017-02-09 NOTE — PROGRESS NOTES
Subjective:       Patient ID: Royce Francis is a 71 y.o. male.    Chief Complaint: Post-op Evaluation (3 week PO OS. )    HPI  Review of Systems    Objective:      Physical Exam    Assessment:       1. Post-operative state    2. Pseudophakia        Plan:       S/p CE OS- Doing well.           Readers.  RTC Dr Ibarra in 6 mos.

## 2017-02-23 ENCOUNTER — PATIENT MESSAGE (OUTPATIENT)
Dept: PAIN MEDICINE | Facility: CLINIC | Age: 72
End: 2017-02-23

## 2017-02-23 ENCOUNTER — PATIENT MESSAGE (OUTPATIENT)
Dept: HEMATOLOGY/ONCOLOGY | Facility: CLINIC | Age: 72
End: 2017-02-23

## 2017-02-24 ENCOUNTER — PATIENT MESSAGE (OUTPATIENT)
Dept: HEMATOLOGY/ONCOLOGY | Facility: CLINIC | Age: 72
End: 2017-02-24

## 2017-02-24 DIAGNOSIS — B02.8 HERPES ZOSTER COMPLICATED: ICD-10-CM

## 2017-02-24 RX ORDER — VALACYCLOVIR HYDROCHLORIDE 500 MG/1
500 TABLET, FILM COATED ORAL DAILY
Qty: 90 TABLET | Refills: 3 | Status: SHIPPED | OUTPATIENT
Start: 2017-02-24 | End: 2017-09-05 | Stop reason: SDUPTHER

## 2017-03-09 ENCOUNTER — OFFICE VISIT (OUTPATIENT)
Dept: HEMATOLOGY/ONCOLOGY | Facility: CLINIC | Age: 72
End: 2017-03-09
Payer: MEDICARE

## 2017-03-09 ENCOUNTER — LAB VISIT (OUTPATIENT)
Dept: LAB | Facility: HOSPITAL | Age: 72
End: 2017-03-09
Attending: INTERNAL MEDICINE
Payer: MEDICARE

## 2017-03-09 VITALS
SYSTOLIC BLOOD PRESSURE: 118 MMHG | WEIGHT: 195.75 LBS | DIASTOLIC BLOOD PRESSURE: 76 MMHG | HEIGHT: 70 IN | BODY MASS INDEX: 28.02 KG/M2 | HEART RATE: 72 BPM

## 2017-03-09 DIAGNOSIS — C83.30 LARGE CELL (DIFFUSE) NON-HODGKIN'S LYMPHOMA: Primary | ICD-10-CM

## 2017-03-09 DIAGNOSIS — D46.9 MYELODYSPLASTIC SYNDROME: ICD-10-CM

## 2017-03-09 DIAGNOSIS — G89.4 CHRONIC PAIN SYNDROME: ICD-10-CM

## 2017-03-09 DIAGNOSIS — R19.7 DIARRHEA, UNSPECIFIED TYPE: ICD-10-CM

## 2017-03-09 DIAGNOSIS — C82.99 NODULAR LYMPHOMA OF EXTRANODAL AND/OR SOLID ORGAN SITE: ICD-10-CM

## 2017-03-09 LAB
ALBUMIN SERPL BCP-MCNC: 4 G/DL
ALP SERPL-CCNC: 83 U/L
ALT SERPL W/O P-5'-P-CCNC: 13 U/L
ANION GAP SERPL CALC-SCNC: 11 MMOL/L
AST SERPL-CCNC: 17 U/L
BASOPHILS # BLD AUTO: 0.01 K/UL
BASOPHILS NFR BLD: 0.2 %
BILIRUB SERPL-MCNC: 0.7 MG/DL
BUN SERPL-MCNC: 18 MG/DL
CALCIUM SERPL-MCNC: 9.6 MG/DL
CHLORIDE SERPL-SCNC: 101 MMOL/L
CO2 SERPL-SCNC: 27 MMOL/L
CREAT SERPL-MCNC: 1.1 MG/DL
DIFFERENTIAL METHOD: ABNORMAL
EOSINOPHIL # BLD AUTO: 0 K/UL
EOSINOPHIL NFR BLD: 0.9 %
ERYTHROCYTE [DISTWIDTH] IN BLOOD BY AUTOMATED COUNT: 13.6 %
EST. GFR  (AFRICAN AMERICAN): >60 ML/MIN/1.73 M^2
EST. GFR  (NON AFRICAN AMERICAN): >60 ML/MIN/1.73 M^2
GLUCOSE SERPL-MCNC: 110 MG/DL
HCT VFR BLD AUTO: 40.8 %
HGB BLD-MCNC: 13.2 G/DL
LDH SERPL L TO P-CCNC: 197 U/L
LYMPHOCYTES # BLD AUTO: 2 K/UL
LYMPHOCYTES NFR BLD: 46 %
MCH RBC QN AUTO: 30.3 PG
MCHC RBC AUTO-ENTMCNC: 32.4 %
MCV RBC AUTO: 94 FL
MONOCYTES # BLD AUTO: 0.4 K/UL
MONOCYTES NFR BLD: 9.8 %
NEUTROPHILS # BLD AUTO: 1.9 K/UL
NEUTROPHILS NFR BLD: 42.9 %
PLATELET # BLD AUTO: 106 K/UL
PMV BLD AUTO: 9.4 FL
POTASSIUM SERPL-SCNC: 4.4 MMOL/L
PROT SERPL-MCNC: 6.5 G/DL
RBC # BLD AUTO: 4.36 M/UL
SODIUM SERPL-SCNC: 139 MMOL/L
WBC # BLD AUTO: 4.41 K/UL

## 2017-03-09 PROCEDURE — 99214 OFFICE O/P EST MOD 30 MIN: CPT | Mod: S$GLB,,, | Performed by: INTERNAL MEDICINE

## 2017-03-09 PROCEDURE — 1157F ADVNC CARE PLAN IN RCRD: CPT | Mod: S$GLB,,, | Performed by: INTERNAL MEDICINE

## 2017-03-09 PROCEDURE — 85025 COMPLETE CBC W/AUTO DIFF WBC: CPT

## 2017-03-09 PROCEDURE — 99499 UNLISTED E&M SERVICE: CPT | Mod: S$GLB,,, | Performed by: INTERNAL MEDICINE

## 2017-03-09 PROCEDURE — 36415 COLL VENOUS BLD VENIPUNCTURE: CPT

## 2017-03-09 PROCEDURE — 1159F MED LIST DOCD IN RCRD: CPT | Mod: S$GLB,,, | Performed by: INTERNAL MEDICINE

## 2017-03-09 PROCEDURE — 80053 COMPREHEN METABOLIC PANEL: CPT

## 2017-03-09 PROCEDURE — 3078F DIAST BP <80 MM HG: CPT | Mod: S$GLB,,, | Performed by: INTERNAL MEDICINE

## 2017-03-09 PROCEDURE — 1126F AMNT PAIN NOTED NONE PRSNT: CPT | Mod: S$GLB,,, | Performed by: INTERNAL MEDICINE

## 2017-03-09 PROCEDURE — 83615 LACTATE (LD) (LDH) ENZYME: CPT

## 2017-03-09 PROCEDURE — 1160F RVW MEDS BY RX/DR IN RCRD: CPT | Mod: S$GLB,,, | Performed by: INTERNAL MEDICINE

## 2017-03-09 PROCEDURE — 3074F SYST BP LT 130 MM HG: CPT | Mod: S$GLB,,, | Performed by: INTERNAL MEDICINE

## 2017-03-09 PROCEDURE — 99999 PR PBB SHADOW E&M-EST. PATIENT-LVL III: CPT | Mod: PBBFAC,,, | Performed by: INTERNAL MEDICINE

## 2017-03-09 RX ORDER — HYDROCODONE BITARTRATE AND ACETAMINOPHEN 5; 325 MG/1; MG/1
2 TABLET ORAL EVERY 6 HOURS PRN
Status: DISCONTINUED | OUTPATIENT
Start: 2017-03-10 | End: 2017-03-13

## 2017-03-09 RX ORDER — DIPHENOXYLATE HYDROCHLORIDE AND ATROPINE SULFATE 2.5; .025 MG/1; MG/1
1 TABLET ORAL 4 TIMES DAILY PRN
Qty: 50 TABLET | Refills: 3 | Status: SHIPPED | OUTPATIENT
Start: 2017-03-09 | End: 2017-03-13 | Stop reason: SDUPTHER

## 2017-03-09 NOTE — PROGRESS NOTES
HISTORY OF PRESENT ILLNESS:  Mr. 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  I have recorded the chronology of these treatments in my note of   01/13/2014.    His last treatment for large cell lymphoma was in 2013, when he received   gemcitabine, Rituxan and high-dose dexamethasone.  While taking this drug   regimen, he developed signs of peritonitis and in September 2013, he had   resection of a segment of small intestine.  There was a 1.5 cm area of   ulceration, but no lymphoma was detected.  A PET scan performed after this   chemotherapy regimen showed no evidence of persistent lymphoma, although there   continued to be some enlargement of his spleen.    He had an episode of pneumonia in November 2014, and developed pancytopenia.  A   bone marrow examination at that time revealed dyserythropoiesis and   dysgranulopoiesis.  The cytogenetic analysis of the bone marrow had 10 of 20   metaphases with a 20q deletion and the FISH study confirmed the 20q deletion in   33% of cells.  In addition, 19% of the cells had 4 copies of 7q31.  These   results suggest the presence of 2 abnormal clones, a 7q clone related to   lymphoma and a 20q clone indicating an increased risk of a myelodysplastic   disorder.  Fortunately, his blood counts improved and they have remained   satisfactory, although he usually has thrombocytopenia and sometimes has mild   anemia.    Since I last saw him on 12/07/2016, he has had no infections.  He is not having   fevers or sweats.    He has a long history of diarrhea, which has been bothering him somewhat more   recently.  He takes Imodium at least twice daily.  He previously took   cholestyramine, but found that it caused bloating and so he discontinued it.  He   still has some Lomotil at home and he is going to try  that now.  I have told   him that if this becomes an increasingly severe problem, I recommend that he be   seen again by a gastroenterologist.    He continues to have chronic pains.  He has had numerous procedures in the Pain   Management Department for back pain and radicular pain in his legs.  He also has   neuropathic symptoms probably related to various chemotherapy drugs.  He has   not obtained meaningful relief with Neurontin, Lyrica, Cymbalta or amitriptyline   or they have caused troublesome side effects.  He is taking hydrocodone   5/acetaminophen 325 once or sometimes twice a day and this produces substantial   pain relief.    He has a long history of cardiomyopathy, probably related to anthracycline   therapy many years ago.  His last echocardiogram had an ejection fraction of   45%-50% and grade I diastolic dysfunction.    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  He also   frequently has muscle cramps in his legs.    Other problems include coronary artery disease, a rotator cuff tear,   hyperlipidemia, hypertension and obstructive sleep apnea.    ADDITIONAL PAST HISTORY, SYSTEM REVIEW, SOCIAL HISTORY AND FAMILY HISTORY:  Have   been reviewed with the patient and his wife and updated in the electronic   record.    PHYSICAL EXAMINATION:  GENERAL APPEARANCE:  Well-developed, well-nourished man, in no distress.  EYES:  No jaundice or pallor.  SINUSES:  No tenderness.  NOSE:  Clear nares.  MOUTH AND THROAT:  Good dentition.  No mucosal lesions.  NECK:  Faint right carotid bruit.  No thyroid abnormalities.  No masses.  LYMPH NODES:  No enlarged cervical, axillary or inguinal nodes.  CHEST AND LUNGS:  Normal respiratory effort.  Clear to auscultation and   percussion.  HEART:  Regular rate and rhythm without murmur or gallop.  ABDOMEN:  Soft without masses or tenderness.  No hepatosplenomegaly.  Ventral    hernia.  EXTREMITIES:  No edema or cyanosis.  PERIPHERAL VASCULATURE:  Good popliteal pulses.  Decreased pulses in the feet   and ankles.  SKIN:  No suspicious lesions in the areas examined.  NEUROLOGIC:  He is fully oriented.  Mental status is good.  His gait is somewhat   unsteady apparently as a consequence of back pain.    LABORATORY STUDIES:  Blood counts include hemoglobin 13.2, WBC 4410 with a   normal differential and platelets 106,000.  The entire chemistry panel has not   been reported.  The LDH is 197.    IMPRESSION:  1.  Non-Hodgkin's lymphoma, which has recurred in both follicular and diffuse   patterns.  Currently without symptoms or signs of this disease.  2.  Probable myelodysplastic disorder.  3.  Lumbar spondylosis.  4.  Peripheral neuropathy.  5.  Cardiomyopathy.  6.  Intermittent diarrhea.    RECOMMENDATIONS:  1.  I explained that he can take Imodium as often as every 3 hours and he may   want to try Lomotil again.  2.  We had a long discussion about his pain management.  Since he gets relief   with hydrocodone, I have suggested that he take this on a more frequent basis.    He may take one of two hydrocodone 5 mg as often as every 6 hours.  3.  Return visit ([facility]) appointment in 3 months with CBC, CMP and LDH.    Mr. Francis had many questions and concerns about his pain problems, diarrhea   and lymphoma and most of his 30-minute appointment today was devoted to   counseling him about these matters and discussing potential treatments.      AWB/HN  dd: 03/09/2017 08:54:30 (CST)  td: 03/09/2017 16:14:01 (CST)  Doc ID   #[id number]  Job ID #[id number]    CC:

## 2017-03-09 NOTE — MR AVS SNAPSHOT
[facility]  [address]  [place] [address]  Phone: [phone]                  Royce Francis   3/9/2017 8:30 AM   Appointment    Description:  Male : 1945   Provider:  Ye Lucia Jr., MD   Department:  [facility]                To Do List           Future Appointments        Provider Department Dept Phone    3/9/2017 8:30 AM Ye Lucia Jr., MD [facility]cology [phone]    3/22/2017 2:45 PM Unruly Leos MD [facility] Pain Management [phone]    2017 10:00 AM Francia Hart MD [facility] Sleep Clinic [phone]      Goals (5 Years of Data)     None      [facility]     [facility] On Call Nurse Care Line -  Assistance  Registered nurses in the [facility] On Call Center provide clinical advisement, health education, appointment booking, and other advisory services.  Call for this free service at [phone].             Medications           Message regarding Medications     Verify the changes and/or additions to your medication regime listed below are the same as discussed with your clinician today.  If any of these changes or additions are incorrect, please notify your healthcare provider.             Verify that the below list of medications is an accurate representation of the medications you are currently taking.  If none reported, the list may be blank. If incorrect, please contact your healthcare provider. Carry this list with you in case of emergency.           Current Medications     acetaminophen (TYLENOL EXTRA STRENGTH) 500 MG tablet Take 500 mg by mouth every 6 (six) hours as needed for Pain.    ascorbic acid, vitamin C, (VITAMIN C) 1000 MG tablet Take 1,000 mg by mouth once daily.    carisoprodol (SOMA) 350 MG tablet 1 bid, prn, muscle cramps    cyanocobalamin (VITAMIN B-12) 1000 MCG tablet Take 100 mcg by mouth once daily.    enalapril (VASOTEC) 5 MG tablet Take 1 tablet (5 mg total) by  mouth once daily.    esomeprazole (NEXIUM) 20 MG capsule Take 20 mg by mouth before breakfast.    fish oil-omega-3 fatty acids 300-1,000 mg capsule Take 2 g by mouth once daily.    flurazepam (DALMANE) 30 MG capsule Take 30 mg by mouth nightly as needed for Insomnia.    flurazepam (DALMANE) 30 MG capsule TAKE 1 CAPSULE(30 MG) BY MOUTH EVERY EVENING AS NEEDED FOR INSOMNIA    fluticasone (FLONASE) 50 mcg/actuation nasal spray SPRAY ONCE IN EACH NOSTRIL ONCE DAILY    hydrocodone-acetaminophen 5-325mg (NORCO) 5-325 mg per tablet Take 2 tablets by mouth every 6 (six) hours as needed for Pain.    lactobacillus rhamnosus GG (CULTURELLE) 10 billion cell capsule Take 1 capsule by mouth once daily.    lidocaine-prilocaine (EMLA) cream     loperamide (IMODIUM A-D) 2 mg Tab Take 2 mg by mouth 4 (four) times daily as needed.    metoprolol tartrate (LOPRESSOR) 50 MG tablet Take 0.5 tablets (25 mg total) by mouth 2 (two) times daily.    mirtazapine (REMERON) 15 MG tablet Take 1 tablet (15 mg total) by mouth every evening.    multivitamin capsule Take 1 capsule by mouth once daily.    NON FORMULARY MEDICATION 300 mg once daily. Desiccated Liver    omeprazole (PRILOSEC) 20 MG capsule TAKE 2 CAPSULES BY MOUTH EVERY DAY AS NEEDED    pravastatin (PRAVACHOL) 20 MG tablet Take 1 tablet (20 mg total) by mouth once daily.    tamsulosin (FLOMAX) 0.4 mg Cp24 TAKE ONE CAPSULE BY MOUTH DAILY    valacyclovir (VALTREX) 500 MG tablet Take 1 tablet (500 mg total) by mouth once daily.           Clinical Reference Information           Allergies as of 3/9/2017     Iodine And Iodide Containing Products      Immunizations Administered on Date of Encounter - 3/9/2017     None      Language Assistance Services     ATTENTION: Language assistance services are available, free of charge. Please call [phone].      ATENCIÓN: Si vladimirla kim, tiene a roberson disposición servicios gratuitos de asistencia lingüística. Llame al [phone].     DINESH Ý: N?u  b?n nói Ti?ng Vi?t, có các d?ch v? h? tr? ngôn ng? mi?n phí dành cho b?n. G?i s? [phone].         [facility] complies with applicable Federal civil rights laws and does not discriminate on the basis of race, color, national origin, age, disability, or sex.

## 2017-03-10 ENCOUNTER — PATIENT MESSAGE (OUTPATIENT)
Dept: HEMATOLOGY/ONCOLOGY | Facility: CLINIC | Age: 72
End: 2017-03-10

## 2017-03-13 ENCOUNTER — PATIENT MESSAGE (OUTPATIENT)
Dept: HEMATOLOGY/ONCOLOGY | Facility: CLINIC | Age: 72
End: 2017-03-13

## 2017-03-13 DIAGNOSIS — R19.7 DIARRHEA, UNSPECIFIED TYPE: ICD-10-CM

## 2017-03-13 DIAGNOSIS — M54.9 BACK PAIN, UNSPECIFIED BACK LOCATION, UNSPECIFIED BACK PAIN LATERALITY, UNSPECIFIED CHRONICITY: Primary | ICD-10-CM

## 2017-03-13 DIAGNOSIS — M54.9 BACK PAIN, UNSPECIFIED BACK LOCATION, UNSPECIFIED BACK PAIN LATERALITY, UNSPECIFIED CHRONICITY: ICD-10-CM

## 2017-03-13 RX ORDER — HYDROCODONE BITARTRATE AND ACETAMINOPHEN 5; 325 MG/1; MG/1
TABLET ORAL
Qty: 90 TABLET | Refills: 0 | Status: SHIPPED | OUTPATIENT
Start: 2017-03-13 | End: 2017-05-24 | Stop reason: SDUPTHER

## 2017-03-13 RX ORDER — HYDROCODONE BITARTRATE AND ACETAMINOPHEN 5; 325 MG/1; MG/1
TABLET ORAL
Qty: 90 TABLET | Refills: 0 | Status: SHIPPED | OUTPATIENT
Start: 2017-03-13 | End: 2017-03-13 | Stop reason: SDUPTHER

## 2017-03-13 RX ORDER — DIPHENOXYLATE HYDROCHLORIDE AND ATROPINE SULFATE 2.5; .025 MG/1; MG/1
1 TABLET ORAL 4 TIMES DAILY PRN
Qty: 50 TABLET | Refills: 3 | Status: SHIPPED | OUTPATIENT
Start: 2017-03-13 | End: 2017-03-23

## 2017-04-06 ENCOUNTER — OFFICE VISIT (OUTPATIENT)
Dept: SPINE | Facility: CLINIC | Age: 72
End: 2017-04-06
Attending: ANESTHESIOLOGY
Payer: MEDICARE

## 2017-04-06 ENCOUNTER — OFFICE VISIT (OUTPATIENT)
Dept: SLEEP MEDICINE | Facility: CLINIC | Age: 72
End: 2017-04-06
Payer: MEDICARE

## 2017-04-06 VITALS
SYSTOLIC BLOOD PRESSURE: 112 MMHG | HEART RATE: 67 BPM | WEIGHT: 195.56 LBS | HEIGHT: 70 IN | BODY MASS INDEX: 28 KG/M2 | DIASTOLIC BLOOD PRESSURE: 64 MMHG

## 2017-04-06 VITALS
TEMPERATURE: 99 F | BODY MASS INDEX: 27.92 KG/M2 | SYSTOLIC BLOOD PRESSURE: 143 MMHG | DIASTOLIC BLOOD PRESSURE: 63 MMHG | HEIGHT: 70 IN | WEIGHT: 195 LBS | HEART RATE: 63 BPM

## 2017-04-06 DIAGNOSIS — G89.4 CHRONIC PAIN SYNDROME: Primary | ICD-10-CM

## 2017-04-06 DIAGNOSIS — M48.00 SPINAL STENOSIS, UNSPECIFIED SPINAL REGION: ICD-10-CM

## 2017-04-06 DIAGNOSIS — M47.819 ARTHROPATHY OF FACET JOINTS AT MULTIPLE LEVELS: ICD-10-CM

## 2017-04-06 DIAGNOSIS — G57.90 MONONEURITIS LOWER LIMB, UNSPECIFIED LATERALITY: ICD-10-CM

## 2017-04-06 DIAGNOSIS — G47.33 OSA (OBSTRUCTIVE SLEEP APNEA): Primary | ICD-10-CM

## 2017-04-06 DIAGNOSIS — M54.15 RADICULOPATHY OF THORACOLUMBAR REGION: ICD-10-CM

## 2017-04-06 DIAGNOSIS — M47.819 SPONDYLOSIS WITHOUT MYELOPATHY: ICD-10-CM

## 2017-04-06 PROCEDURE — 99214 OFFICE O/P EST MOD 30 MIN: CPT | Mod: S$GLB,,, | Performed by: PSYCHIATRY & NEUROLOGY

## 2017-04-06 PROCEDURE — 3078F DIAST BP <80 MM HG: CPT | Mod: S$GLB,,, | Performed by: ANESTHESIOLOGY

## 2017-04-06 PROCEDURE — 99499 UNLISTED E&M SERVICE: CPT | Mod: S$GLB,,, | Performed by: ANESTHESIOLOGY

## 2017-04-06 PROCEDURE — 3074F SYST BP LT 130 MM HG: CPT | Mod: S$GLB,,, | Performed by: ANESTHESIOLOGY

## 2017-04-06 PROCEDURE — 1125F AMNT PAIN NOTED PAIN PRSNT: CPT | Mod: S$GLB,,, | Performed by: ANESTHESIOLOGY

## 2017-04-06 PROCEDURE — 3078F DIAST BP <80 MM HG: CPT | Mod: S$GLB,,, | Performed by: PSYCHIATRY & NEUROLOGY

## 2017-04-06 PROCEDURE — 1157F ADVNC CARE PLAN IN RCRD: CPT | Mod: S$GLB,,, | Performed by: ANESTHESIOLOGY

## 2017-04-06 PROCEDURE — 1160F RVW MEDS BY RX/DR IN RCRD: CPT | Mod: S$GLB,,, | Performed by: ANESTHESIOLOGY

## 2017-04-06 PROCEDURE — 99999 PR PBB SHADOW E&M-EST. PATIENT-LVL II: CPT | Mod: PBBFAC,,, | Performed by: PSYCHIATRY & NEUROLOGY

## 2017-04-06 PROCEDURE — 99999 PR PBB SHADOW E&M-EST. PATIENT-LVL III: CPT | Mod: PBBFAC,,, | Performed by: ANESTHESIOLOGY

## 2017-04-06 PROCEDURE — 1159F MED LIST DOCD IN RCRD: CPT | Mod: S$GLB,,, | Performed by: ANESTHESIOLOGY

## 2017-04-06 PROCEDURE — 1126F AMNT PAIN NOTED NONE PRSNT: CPT | Mod: S$GLB,,, | Performed by: PSYCHIATRY & NEUROLOGY

## 2017-04-06 PROCEDURE — 1157F ADVNC CARE PLAN IN RCRD: CPT | Mod: S$GLB,,, | Performed by: PSYCHIATRY & NEUROLOGY

## 2017-04-06 PROCEDURE — 99214 OFFICE O/P EST MOD 30 MIN: CPT | Mod: S$GLB,,, | Performed by: ANESTHESIOLOGY

## 2017-04-06 PROCEDURE — 3074F SYST BP LT 130 MM HG: CPT | Mod: S$GLB,,, | Performed by: PSYCHIATRY & NEUROLOGY

## 2017-04-06 PROCEDURE — 1160F RVW MEDS BY RX/DR IN RCRD: CPT | Mod: S$GLB,,, | Performed by: PSYCHIATRY & NEUROLOGY

## 2017-04-06 PROCEDURE — 99499 UNLISTED E&M SERVICE: CPT | Mod: S$GLB,,, | Performed by: PSYCHIATRY & NEUROLOGY

## 2017-04-06 PROCEDURE — 1159F MED LIST DOCD IN RCRD: CPT | Mod: S$GLB,,, | Performed by: PSYCHIATRY & NEUROLOGY

## 2017-04-06 NOTE — MR AVS SNAPSHOT
[facility]  [address], [address]  [facility] [address]  Phone: [phone]  Fax: [phone]                  Royce Francis   2017 11:15 AM   Office Visit    Description:  Male : 1945   Provider:  Unruly Leos MD   Department:  [facility]                To Do List           Goals (5 Years of Data)     None      [facility]     [facility] On Call Nurse Care Line -  Assistance  Unless otherwise directed by your provider, please contact [facility], our nurse care line that is available for  assistance.     Registered nurses in the [facility] On Call Center provide: appointment scheduling, clinical advisement, health education, and other advisory services.  Call: [phone] (toll free)               Medications           Message regarding Medications     Verify the changes and/or additions to your medication regime listed below are the same as discussed with your clinician today.  If any of these changes or additions are incorrect, please notify your healthcare provider.             Verify that the below list of medications is an accurate representation of the medications you are currently taking.  If none reported, the list may be blank. If incorrect, please contact your healthcare provider. Carry this list with you in case of emergency.           Current Medications     ascorbic acid, vitamin C, (VITAMIN C) 1000 MG tablet Take 1,000 mg by mouth once daily.    carisoprodol (SOMA) 350 MG tablet 1 bid, prn, muscle cramps    cyanocobalamin (VITAMIN B-12) 1000 MCG tablet Take 100 mcg by mouth once daily.    enalapril (VASOTEC) 5 MG tablet Take 1 tablet (5 mg total) by mouth once daily.    esomeprazole (NEXIUM) 20 MG capsule Take 20 mg by mouth before breakfast.    fish oil-omega-3 fatty acids 300-1,000 mg capsule Take 2 g by mouth once daily.    flurazepam (DALMANE) 30 MG capsule Take 30 mg by mouth nightly as needed for Insomnia.     "flurazepam (DALMANE) 30 MG capsule TAKE 1 CAPSULE(30 MG) BY MOUTH EVERY EVENING AS NEEDED FOR INSOMNIA    fluticasone (FLONASE) 50 mcg/actuation nasal spray SPRAY ONCE IN EACH NOSTRIL ONCE DAILY    hydrocodone-acetaminophen 5-325mg (NORCO) 5-325 mg per tablet 1-2 tablets every 4 hours as needed for pain    lactobacillus rhamnosus GG (CULTURELLE) 10 billion cell capsule Take 1 capsule by mouth once daily.    lidocaine-prilocaine (EMLA) cream     loperamide (IMODIUM A-D) 2 mg Tab Take 2 mg by mouth 4 (four) times daily as needed.    metoprolol tartrate (LOPRESSOR) 50 MG tablet Take 0.5 tablets (25 mg total) by mouth 2 (two) times daily.    mirtazapine (REMERON) 15 MG tablet Take 1 tablet (15 mg total) by mouth every evening.    multivitamin capsule Take 1 capsule by mouth once daily.    NON FORMULARY MEDICATION 300 mg once daily. Desiccated Liver    omeprazole (PRILOSEC) 20 MG capsule TAKE 2 CAPSULES BY MOUTH EVERY DAY AS NEEDED    pravastatin (PRAVACHOL) 20 MG tablet Take 1 tablet (20 mg total) by mouth once daily.    tamsulosin (FLOMAX) 0.4 mg Cp24 TAKE ONE CAPSULE BY MOUTH DAILY    valacyclovir (VALTREX) 500 MG tablet Take 1 tablet (500 mg total) by mouth once daily.           Clinical Reference Information           Your Vitals Were     BP Pulse Temp Height Weight BMI    143/63 63 98.5 °F (36.9 °C) 5' 10" (1.778 m) 88.5 kg (195 lb) 27.98 kg/m2      Blood Pressure          Most Recent Value    BP  (!)  143/63      Allergies as of 4/6/2017     Iodine And Iodide Containing Products      Immunizations Administered on Date of Encounter - 4/6/2017     None      Language Assistance Services     ATTENTION: Language assistance services are available, free of charge. Please call [phone].      ATENCIÓN: Si vladimirla kim, tiene a roberson disposición servicios gratuitos de asistencia lingüística. Llame al [phone].     CHÚ Ý: N?u b?n nói Ti?ng Vi?t, có các d?ch v? h? tr? ngôn ng? mi?n phí dành cho b?n. G?i s? " [phone].         [facility] complies with applicable Federal civil rights laws and does not discriminate on the basis of race, color, national origin, age, disability, or sex.

## 2017-04-06 NOTE — PROGRESS NOTES
Royce Francis a 69year-old  man returns today for the management of SALINAS diagnosed in 2003. He continues to use cpap set at 10cm. Since last visit. REduced pressure having less mask leaks. He also is shaving more often. Not as sleepy as he was at last visit.  He got a new machine and mask 4/30/15. He likes his forma mask and is using jazmine red chin strap with tape to reduce air leaks in past. He continues to report resolution of snoring , air gasping and daytime sleepiness since using therapy. Thinks he is sleepy just the past month (high ESS today) due to meds for shoulder pain .     ESS=16 ( 23)(13)     Previously he tried a nasal, nasal pillows and other full face  masaks, and found his current mask to be mostconvenient.    PSG/ SPLIT night study  in 6/04 showed severe SALINAS with the AHI of 44.9/hr/hour and SaO2 minimum of 81%. Effective control of respiratory events was achieved at 9 cm H2O.    CPAP titration in 7/2009 showed effective control of respiratory events at 12 cm H2O including supine REM sleep.(186#)        Machine condition: good , 30d avg 9:00h/n. 30/30d>4h. LIONEL 1.1, 1-2 (7-8)% leak. Manometer 12.1cm      He also mentioned difficulty falling and staying asleep since chemotherapy - currently takes dalmane/remeron    INTERVAL HISTORY:    04/06/2017:  The patient has not presented any new complaints since the previous visit. Still reports mask leak. Reports not getting good seal/ Still 12 cm. Requested.    Therapy Event Summary Date Range: 11/18/2015 - 12/17/2015     Hide      Compliance Summary  Apnea Indices  Ventilator Statistics    Days with Device Usage:  30 days  Average AHI:  1.8  Average Breath Rate:  N/A    Percentage of Days >=4 Hours:  100.0%  Average OA Index:  0.8  Average % Patient Triggered Breaths:  N/A    Average Usage (Days Used):  9 hrs. 9 mins. 49 secs.  Average CA Index:  0.2  Average Tidal Volume:  N/A    Average Usage (All Days):  9 hrs. 9 mins. 49 secs.    Average Minute Vent:   "N/A        Large Leak  Periodic Breathing     Average Time in Large Leak:  1 hrs. 3 mins. 6 secs.  Average % of Night in PB:  2.0%     Average % of Night in Large Leak:  11.5%                      REVIEW OF SYSTEMS:  REVIEW OF SYSTEMS:  Sleep related symptoms as per HPI; stable weight ; Denies nasal congestion; Positive dyspnea on exertion; Occasional palpitations; Denies acid reflux; Positive nocturnal polyuria; Denies headaches; Denies mood disturbance; Denies anemia; ;Positive for dizziness, Positive for muscle ache (muscle cramps in his thighs). Otherwise, a balance of 10 systems reviewed is negative.        PHYSICAL EXAM:  /64 (BP Location: Left arm, Patient Position: Sitting, BP Method: Automatic)  Pulse 67  Ht 5' 10" (1.778 m)  Wt 88.7 kg (195 lb 8.8 oz)  BMI 28.06 kg/m2  GENERAL: Normal development, well groomed, W/N    ASSESSMENT:    1. Obstructive sleep apnea (SALINAS), severe, remains cpap adherent, having improvement of symptoms. Meeting medicare compliance. Having mask interface issues/leaks, which have improved since last seen. Less sleepy. Doing well on emperic lower pressure 10cm. Needs new supplies.. He has medical co-morbidities of congestive heart failure and elevated blood pressure, which can be worsened by SALINAS.     2. RLS secondary to peripheral neuropathy. He is currently gertting compound cream.    3. Insomnia NEC, improved sleep latency (other provider)      PLAN:    SALINAS:    Continue CPAP 10cm  Increased to 13 cm on patients request.  Will reorder supplies + chin strap through [facility] - would like to switch from [facility] to [facility] (used their services before).      Insomnia:    continue Dolmane 30 mg and Mitrazapine 15 mg by Dr. Lucia - not feeling dizzy or drowsy.    Education:   During our discussion today, we talked about the pathophysiology of obstructive sleep apnea as well as the potential ramifications of untreated sleep apnea, which could include daytime sleepiness, hypertension, " heart disease and/or stroke.     Precautions: The patient was advised to abstain from driving should he feel sleepy or drowsy.    RTC 12-mos monitoring, sooner if needed.

## 2017-04-06 NOTE — PROGRESS NOTES
Chronic patient Established Note (Follow up visit)      SUBJECTIVE:    Royce Francis presents to the clinic for a follow-up appointment for low back pain and knee pain . Since the last visit, Royce Francis states the pain has been stable. Current pain intensity is 4/10.  Patient is here today follow-up after his last procedure  Patient states that he feel that nothing control his pain consistently and every procedure he got help limit but he is looking for sustained relief    Pain Disability Index Review:  Last 3 PDI Scores 4/6/2017 1/16/2017 11/18/2016   Pain Disability Index (PDI) 28 17 44       Pain Medications:    - Opioids: Lorcet (Hydrocodone/Acetaminophen)  - Adjuvant Medications: Lidocaine cream    Opioid Contract: no     report:  Reviewed and consistent with medication use as prescribed.    Pain Procedures:  right  L2-3-4-5 FACET MEDIAL BRANCH NERVE RADIOFREQUENCY NEUROTOMY (lumbar) 1/27/17, 1/16/17 Left L2,3,4,5 RFA- significant relief  12/14/16 Bilateral Synvisc One- significant relief  3/28/16- Bilateral L4-5 AND L5-S1 Facet injection  8/28/15- Bilateral L4-5 and L5-S1 Facet injection  6/5/15- Caudal DARIO with Racz Catheter   4/10/15- Bilateral L4-5 TF DARIO  2/20/15- Left L3-4-5 RFA  2/13/15- right L3-4-5 RFA  1/16/15- bilateral L3-4-5 MBB  9/12/14- bilateral L3-4-5 MBB  8/15/14- Bilateral L4-5 TF DARIO     Physical Therapy/Home Exercise: yes    Imaging:Thoracic XRAY 4/21/16  Narrative   AP and lateral radiographs of the thoracic spine were obtained.  There is mild scoliosis of the thoracolumbar spine.  Posterior vertebral alignment appears satisfactory.  Vertebral body heights appear well-maintained.  There are degenerative changes with small anterior osteophytes noted throughout the thoracic spine.  No abnormal paraspinal masses are evident.   Impression     Thoracic spondylosis.  Mild thoracolumbar scoliosis.  No evidence for acute fracture, bone destruction, or subluxation.      Lumbar MRI  7/28/14  Narrative   MRI LUMBAR SPINE    TECHNIQUE: MRI lumbar spine was performed on a 1.5T magnet. The following sequences were obtained: Localizer; sagittal T1, T2, STIR; axial T1 and T2. After administration of 20 mL Omniscan, axial and sagittal T1-weighted sequences were attained.    COMPARISON: Not available.    FINDINGS:    There are 5 lumbar vertebrae.  There is lumbar levoscoliosis.  There is grade 1 anterolisthesis of L4 on L5.  Vertebral body heights are maintained.  Degenerative endplate changes are noted.  No evidence for infiltrative process.  There is multilevel   severe, asymmetric loss of disk height. Conus terminates at L1 and appears unremarkable. Limited evaluation of posterior abdominal structures is unremarkable.  Paraspinal musculature is within normal limits.  Evaluation of sacroiliac joints is   unremarkable.  S3 Tarlov cyst noted.    L1-L2: There is mild bilateral facet arthrosis and circumferential disk bulge resulting in mild spinal canal stenosis and mild right neural foraminal narrowing.    L2-L3: There is a compression disk bulge and moderate bilateral facet arthrosis resulting in moderate spinal canal stenosis and mild bilateral neural foraminal narrowing.    L3-L4: There is mild bilateral facet arthrosis and circumferential disk bulge resulting in mild left neural foraminal narrowing.    L4-L5: There is moderate circumferential disk bulge and bilateral facet arthrosis resulting in moderate spinal canal stenosis and moderate left neural foraminal narrowing.    L5-S1: There is severe right facet arthrosis and mild circumferential disk bulge resulting in moderate right neural foraminal narrowing.   Impression    No marrow infiltrative process. Multilevel degenerative changes with moderate spinal canal stenosis as detailed above         Thoracic XRAY 4/21/16  Narrative   AP and lateral radiographs of the thoracic spine were obtained.  There is mild scoliosis of the thoracolumbar spine.   Posterior vertebral alignment appears satisfactory.  Vertebral body heights appear well-maintained.  There are degenerative changes with small anterior osteophytes noted throughout the thoracic spine.  No abnormal paraspinal masses are evident.   Impression     Thoracic spondylosis.  Mild thoracolumbar scoliosis.  No evidence for acute fracture         Bilateral Knee XRAYs 3/10/16  Narrative   AP, lateral, and sunrise views of both knees were obtained.  The bones are intact.  There is no evidence for acute fracture or bone destruction.  There is chondrocalcinosis present.  There is moderate narrowing of the medial compartments of the femorotibial joints bilaterally.  There is spurring of the tibial spines with osteophytes at the femorotibial and patellofemoral joints.  There is mild soft tissue prominence in a right suprapatellar location and a small right-sided joint effusion cannot be excluded.  Atherosclerotic calcification is present within the arteries of the distal thighs and proximal calf.   Impression     Prominent symmetric degenerative changes of the femorotibial and patellofemoral joints with moderate narrowing of the medial compartments of the femorotibial joints.  Chondrocalcinosis noted bilaterally.  Possible right suprapatellar joint effusion.         Allergies:   Review of patient's allergies indicates:   Allergen Reactions    Iodine and iodide containing products Itching     Contrast dye       Current Medications:   Current Outpatient Prescriptions   Medication Sig Dispense Refill    ascorbic acid, vitamin C, (VITAMIN C) 1000 MG tablet Take 1,000 mg by mouth once daily.      carisoprodol (SOMA) 350 MG tablet 1 bid, prn, muscle cramps 60 tablet 0    cyanocobalamin (VITAMIN B-12) 1000 MCG tablet Take 100 mcg by mouth once daily.      enalapril (VASOTEC) 5 MG tablet Take 1 tablet (5 mg total) by mouth once daily. 90 tablet 3    esomeprazole (NEXIUM) 20 MG capsule Take 20 mg by mouth before  breakfast.      fish oil-omega-3 fatty acids 300-1,000 mg capsule Take 2 g by mouth once daily.      flurazepam (DALMANE) 30 MG capsule Take 30 mg by mouth nightly as needed for Insomnia.      flurazepam (DALMANE) 30 MG capsule TAKE 1 CAPSULE(30 MG) BY MOUTH EVERY EVENING AS NEEDED FOR INSOMNIA 90 capsule 1    fluticasone (FLONASE) 50 mcg/actuation nasal spray SPRAY ONCE IN EACH NOSTRIL ONCE DAILY 48 g 5    hydrocodone-acetaminophen 5-325mg (NORCO) 5-325 mg per tablet 1-2 tablets every 4 hours as needed for pain 90 tablet 0    lactobacillus rhamnosus GG (CULTURELLE) 10 billion cell capsule Take 1 capsule by mouth once daily.      lidocaine-prilocaine (EMLA) cream       loperamide (IMODIUM A-D) 2 mg Tab Take 2 mg by mouth 4 (four) times daily as needed.      metoprolol tartrate (LOPRESSOR) 50 MG tablet Take 0.5 tablets (25 mg total) by mouth 2 (two) times daily. 180 tablet 3    mirtazapine (REMERON) 15 MG tablet Take 1 tablet (15 mg total) by mouth every evening. 90 tablet 3    multivitamin capsule Take 1 capsule by mouth once daily.      NON FORMULARY MEDICATION 300 mg once daily. Desiccated Liver      omeprazole (PRILOSEC) 20 MG capsule TAKE 2 CAPSULES BY MOUTH EVERY DAY AS NEEDED 180 capsule 0    pravastatin (PRAVACHOL) 20 MG tablet Take 1 tablet (20 mg total) by mouth once daily. 90 tablet 3    tamsulosin (FLOMAX) 0.4 mg Cp24 TAKE ONE CAPSULE BY MOUTH DAILY 90 capsule 0    valacyclovir (VALTREX) 500 MG tablet Take 1 tablet (500 mg total) by mouth once daily. 90 tablet 3     No current facility-administered medications for this visit.        REVIEW OF SYSTEMS:    GENERAL:  No weight loss, malaise or fevers.  HEENT:  Negative for frequent or significant headaches.  NECK:  Negative for lumps, goiter, pain and significant neck swelling.  RESPIRATORY:  Negative for cough, wheezing or shortness of breath.  CARDIOVASCULAR:  Negative for chest pain, leg swelling or palpitations.  GI:  Negative for  abdominal discomfort, blood in stools or black stools or change in bowel habits.  MUSCULOSKELETAL:  See HPI.  SKIN:  Negative for lesions, rash, and itching.  PSYCH:  Positive for sleep disturbance, mood disorder and recent psychosocial stressors.  HEMATOLOGY/LYMPHOLOGY:  Negative for prolonged bleeding, bruising easily or swollen nodes.  NEURO:   No history of headaches, syncope, paralysis, seizures or tremors.  All other reviewed and negative other than HPI.    Past Medical History:  Past Medical History:   Diagnosis Date    Allergy     Anemia     Arthritis     Basal cell carcinoma     excised from upper back    Cancer     Cardiomyopathy due to chemotherapy     Cataract     CHF (congestive heart failure)     Coronary artery disease     D1    Encounter for blood transfusion     Eye injuries 20 yrs    ou fb several x's    Hyperlipidemia     Hypertension     Hypertensive heart disease with heart failure 1/31/2013    Lymphoma 1996    Obstructive sleep apnea on CPAP     SCC (squamous cell carcinoma) excised 12/2015    R proximal forearm    Skin disease     Sleep apnea     SQUAMOUS CELL CARCINOMA excised 2/14/13    L forearm    Squamous cell carcinoma excised 8/4/2016    IN SITU, Left thigh MOHS    Squamous cell carcinoma excised 8/25/2016    left helix, MOHS       Past Surgical History:  Past Surgical History:   Procedure Laterality Date    ABDOMINAL SURGERY      CATARACT EXTRACTION      od dr yanes    CATARACT EXTRACTION W/  INTRAOCULAR LENS IMPLANT Left 01/12/2017    Dr. Yanes    EYE SURGERY      Rt cataract    KIDNEY STONE SURGERY      SMALL INTESTINE SURGERY  09/2013    TONSILLECTOMY         Family History:  Family History   Problem Relation Age of Onset    Cataracts Mother     Hypertension Mother     Cataracts Father     Hypertension Father     Cancer Father     Hypertension Maternal Grandmother     Hypertension Maternal Grandfather     Hypertension Paternal  "Grandmother     Hypertension Paternal Grandfather     No Known Problems Sister     No Known Problems Brother     No Known Problems Maternal Aunt     No Known Problems Maternal Uncle     No Known Problems Paternal Aunt     No Known Problems Paternal Uncle     Amblyopia Neg Hx     Blindness Neg Hx     Diabetes Neg Hx     Glaucoma Neg Hx     Macular degeneration Neg Hx     Retinal detachment Neg Hx     Strabismus Neg Hx     Stroke Neg Hx     Thyroid disease Neg Hx     Melanoma Neg Hx     Heart disease Neg Hx     Celiac disease Neg Hx     Cirrhosis Neg Hx     Colon cancer Neg Hx     Colon polyps Neg Hx     Crohn's disease Neg Hx     Cystic fibrosis Neg Hx     Esophageal cancer Neg Hx     Hemochromatosis Neg Hx     Inflammatory bowel disease Neg Hx     Irritable bowel syndrome Neg Hx     Liver cancer Neg Hx     Liver disease Neg Hx     Rectal cancer Neg Hx     Stomach cancer Neg Hx     Ulcerative colitis Neg Hx     Burt's disease Neg Hx        Social History:  Social History     Social History    Marital status:      Spouse name: N/A    Number of children: N/A    Years of education: N/A     Occupational History    retired      Social History Main Topics    Smoking status: Former Smoker     Quit date: 1960    Smokeless tobacco: Never Used    Alcohol use Yes      Comment: socially    Drug use: No    Sexual activity: Yes     Partners: Female     Other Topics Concern    None     Social History Narrative       OBJECTIVE:    Ht 5' 10" (1.778 m)  Wt 88.5 kg (195 lb)  BMI 27.98 kg/m2    PHYSICAL EXAMINATION:    General appearance: Well appearing, in no acute distress, alert and oriented x3.  Psych: Mood and affect appropriate.  Skin: Skin color, texture, turgor normal, no rashes or lesions, in both upper and lower body.  Head/face: Atraumatic, normocephalic. No palpable lymph nodes  Cor: RRR  Pulm: CTA  GI: Abdomen soft and non-tender.  Back: Straight leg raising in the " sitting and supine positions is negative to radicular pain. Pain with palpation to lumbar facet joints. Limited lumbar extension with pain reproduction. Bilateral facet loading. Painful palpation to bilateral SI joints. RAHEEM is positive bilaterally.  Extremities: Peripheral joint ROM is full and pain free without obvious instability or laxity in all four extremities. No deformities, edema, or skin discoloration. Good capillary refill.  Musculoskeletal: Medial joint line tenderness to bilateral knees. Painful extension of bilateral knees. Bilateral upper and lower extremity strength is normal and symmetric. No atrophy or tone abnormalities are noted.  Neuro: Bilateral upper and lower extremity coordination and muscle stretch reflexes are physiologic and symmetric. Plantar response are downgoing. Decreased sensation to bilateral feet at digits 2-5.  Gait: Antalgic- ambulating with cane.    ASSESSMENT: 71 y.o. year old male with low back and bilateral lower extremity pain, consistent with      1. Chronic pain syndrome  Ambulatory consult to Psychology   2. Mononeuritis lower limb, unspecified laterality  Ambulatory consult to Psychology   3. Radiculopathy of thoracolumbar region  Ambulatory consult to Psychology   4. Arthropathy of facet joints at multiple levels  Ambulatory consult to Psychology   5. Spondylosis without myelopathy  Ambulatory consult to Psychology   6. Spinal stenosis, unspecified spinal region  Ambulatory consult to Psychology         PLAN:     - I have stressed the importance of physical activity and a home exercise plan to help with pain and improve health.  - Patient can continue with medications for now since they are providing benefits, using them appropriately, and without side effects.  - In the future, the patient could be a candidate for Spinal Cord Stimulation to help with his pain.  - RTC 4-6 weeks   - Counseled patient regarding the importance of activity modification, constant sleeping  habits and physical therapy.    The above plan and management options were discussed at length with patient. Patient is in agreement with the above and verbalized understanding.    Unruly Leos MD  04/06/2017

## 2017-04-07 DIAGNOSIS — N40.0 BENIGN PROSTATIC HYPERPLASIA, PRESENCE OF LOWER URINARY TRACT SYMPTOMS UNSPECIFIED, UNSPECIFIED MORPHOLOGY: Primary | ICD-10-CM

## 2017-04-07 RX ORDER — TAMSULOSIN HYDROCHLORIDE 0.4 MG/1
1 CAPSULE ORAL DAILY
Qty: 90 CAPSULE | Refills: 0 | Status: SHIPPED | OUTPATIENT
Start: 2017-04-07 | End: 2017-07-26 | Stop reason: SDUPTHER

## 2017-04-07 NOTE — TELEPHONE ENCOUNTER
Message sent to patient thru portal to contact clinic to choose a new provider since Dr. Yang has retired.

## 2017-04-18 ENCOUNTER — PATIENT MESSAGE (OUTPATIENT)
Dept: SPINE | Facility: CLINIC | Age: 72
End: 2017-04-18

## 2017-04-18 ENCOUNTER — PATIENT MESSAGE (OUTPATIENT)
Dept: HEMATOLOGY/ONCOLOGY | Facility: CLINIC | Age: 72
End: 2017-04-18

## 2017-04-25 ENCOUNTER — PATIENT MESSAGE (OUTPATIENT)
Dept: SPINE | Facility: CLINIC | Age: 72
End: 2017-04-25

## 2017-04-26 ENCOUNTER — TELEPHONE (OUTPATIENT)
Dept: FAMILY MEDICINE | Facility: CLINIC | Age: 72
End: 2017-04-26

## 2017-04-26 ENCOUNTER — PATIENT MESSAGE (OUTPATIENT)
Dept: HEMATOLOGY/ONCOLOGY | Facility: CLINIC | Age: 72
End: 2017-04-26

## 2017-04-26 NOTE — TELEPHONE ENCOUNTER
----- Message from Fred Escobedo sent at 4/26/2017  3:25 PM CDT -----  Contact: Wife  REFILL: carisoprodol (SOMA) 350 MG tablet

## 2017-04-27 DIAGNOSIS — M62.838 MUSCLE SPASM: Primary | ICD-10-CM

## 2017-04-27 RX ORDER — CARISOPRODOL 350 MG/1
350 TABLET ORAL 4 TIMES DAILY PRN
Qty: 60 TABLET | Refills: 0 | Status: SHIPPED | OUTPATIENT
Start: 2017-04-27 | End: 2017-05-07

## 2017-05-08 ENCOUNTER — PATIENT MESSAGE (OUTPATIENT)
Dept: HEMATOLOGY/ONCOLOGY | Facility: CLINIC | Age: 72
End: 2017-05-08

## 2017-05-11 ENCOUNTER — TELEPHONE (OUTPATIENT)
Dept: PSYCHIATRY | Facility: CLINIC | Age: 72
End: 2017-05-11

## 2017-05-11 ENCOUNTER — TELEPHONE (OUTPATIENT)
Dept: PAIN MEDICINE | Facility: CLINIC | Age: 72
End: 2017-05-11

## 2017-05-11 ENCOUNTER — OFFICE VISIT (OUTPATIENT)
Dept: PSYCHIATRY | Facility: CLINIC | Age: 72
End: 2017-05-11
Payer: MEDICARE

## 2017-05-11 DIAGNOSIS — I25.10 CORONARY ARTERY DISEASE INVOLVING NATIVE CORONARY ARTERY OF NATIVE HEART WITHOUT ANGINA PECTORIS: Chronic | ICD-10-CM

## 2017-05-11 DIAGNOSIS — M51.36 DDD (DEGENERATIVE DISC DISEASE), LUMBAR: ICD-10-CM

## 2017-05-11 DIAGNOSIS — Z01.818 PREOP EXAMINATION: Primary | ICD-10-CM

## 2017-05-11 DIAGNOSIS — I10 ESSENTIAL HYPERTENSION: ICD-10-CM

## 2017-05-11 DIAGNOSIS — T45.1X5A CARDIOMYOPATHY DUE TO CHEMOTHERAPY: Chronic | ICD-10-CM

## 2017-05-11 DIAGNOSIS — M96.1 FAILED BACK SYNDROME: Primary | ICD-10-CM

## 2017-05-11 DIAGNOSIS — I42.7 CARDIOMYOPATHY DUE TO CHEMOTHERAPY: Chronic | ICD-10-CM

## 2017-05-11 DIAGNOSIS — G47.33 OSA ON CPAP: Chronic | ICD-10-CM

## 2017-05-11 DIAGNOSIS — E78.5 HYPERLIPIDEMIA, UNSPECIFIED HYPERLIPIDEMIA TYPE: Chronic | ICD-10-CM

## 2017-05-11 PROCEDURE — 96101 PR PSYCHOLOGIC TESTING BY PSYCH/PHYS: CPT | Mod: S$GLB,,, | Performed by: PSYCHOLOGIST

## 2017-05-11 PROCEDURE — 96102 PR PSYCHOLOGIC TESTING BY TECHNICIAN: CPT | Mod: 59,S$GLB,, | Performed by: PSYCHOLOGIST

## 2017-05-11 PROCEDURE — 90791 PSYCH DIAGNOSTIC EVALUATION: CPT | Mod: S$GLB,,, | Performed by: PSYCHOLOGIST

## 2017-05-11 NOTE — TELEPHONE ENCOUNTER
Spoke with patient regarding scheduling procedure. Procedure instructions for a SCS trial on 5/23/17 with the arrival time of at 7 AM given. Patient verbalized understanding.

## 2017-05-11 NOTE — TELEPHONE ENCOUNTER
Psychological Testing and Psychiatry Initial Visit reports are now available in the Notes tab of Chart Review in Epic.  These reports are confidential.     There are no overt psychological contraindications for the spinal cord stimulator procedure.    Please do not hesitate to contact me with any questions or concerns.      Best Regards,    Soco Brooks, Ph.D.  Clinical Psychologist

## 2017-05-11 NOTE — PSYCH TESTING
[facility] [address] [place]  [address]  [phone]    REPORT OF PSYCHOLOGICAL TESTING    NAME:   Royce Francis  OC #: [id number]  :  1945    REFERRED BY:  Unruly Leos M.D.    EVALUATED BY:  Soco Brooks, Ph.D., Clinical Psychologist  Jose A Torres M.S., Psychometrician    DATES OF EVALUATION: 05/10/2017, 2017    EVALUATION PROCEDURES AND TIMES:  Conducted by Psychologist:  Interpretation and report of test data  Integration of information from diagnostic interview, medical record, and testing data  Conducted by Technician:  [place] Multiphasic Personality Oxdtkzuve-3-Gamhqdjpdoyp Form (MMPI-2-RF)  [facility] Behavioral Medicine Diagnostic (MBMD)  [place] Depression Inventory - II (BDI-II)  CPT Codes and Time:  95455 - 2 hours; 36399 - 2 hours    EVALUATION FINDINGS:  The diagnostic interview revealed that Mr. Royce Francis is a 71-year-old White  male referred for Psychological Evaluation prior to surgical implantation of a spinal cord stimulator to address chronic pain. He reported he has chronic pain in his back and feet (due to neuropathy).  He has had six different cancers and 18 different chemotherapies.  He has tried medications, injections, and physical therapy without receiving sufficient relief.  He reports relief from back pain with pain medications; however, he does not experience relief from neuropathy.  His activities are greatly limited. 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  He considers himself a hard worker and dislikes being idle, but finds that movement irritates his back problem.  He is now interested in a trial of the spinal cord stimulator device.    Mr. Francis denied prior psychiatric illness or current adjustment problems.  He was pleasant and cooperative in interview and appeared to be in good spirits.  Initially Mr. Francis reported some irritability due to an unpleasant experience during  testing yesterday; however, he was receptive during this clinical interview and appreciated rapport-building.    The medical record also revealed the following diagnoses:  SALINAS, Peripheral neuropathy, Lumbar radiculopathy, Hearing loss, Chronic pain, Chronic bronchitis, Cardiomyopathy, Coronary artery disease, Hypertension, Lymphoma, Hyperlipidemia, DDD, Primary osteoarthritis of both knees.    TEST DATA:  Psychological Testing data revealed that he was fully cooperative and engaged in the assessment process.  Of note, Mr. Francis noted that he has difficulty reading and stated that it was challenging for him to attend to all of the test items.  Still, he reports that he provided his best possible effort.  Effort on all tests was satisfactory to produce interpretable results.    Multiple somatic complaints including vague neurological complaints  Mr. Francis produced an interpretable MMPI-2-RF profile. Of note, validity scale results suggest Mr. Francis tended to portray himself in an overly positive and well-adjusted presentation, which may indicate an underestimation of problems assessed by this test.  Still, this type of responding is not an uncommon style for candidates to assume given the demands of the testing situation.  Although caution has been noted, his responses should be considered a relatively accurate reflection of his current psychological functioning.  He is likely to experience physical health concerns in response to stress, and may report vague neurological and somatic complaints.  He reports poor health and feeling weak or tired, and may complain of sleep disturbance, fatigue, low energy, or sexual dysfunction.    The MBMD indicated that Mr. Francis is not reporting any significant psychiatric distress at this time.  He may be reluctant to disclose emotions, which may lead to tension-related ailments.  Still, he makes reasonable efforts to follow proposed treatment regimens and is open to  information.  He is generally cooperative and responsive during treatment.  He reports an inability to manage on his own (decrease ability to function independently) and pain-related problems.  He may experience a somewhat slower recovery due to these issues and inability to exercise.  (Of note, Mr. Francis reports that he does not believe he has difficulty disclosing emotions.  He did not appear to have difficulty discussing personal information or emotions during the clinical interview.)    The BDI-II revealed the presence of minimal depression with a total score of 7.     DIAGNOSTIC IMPRESSIONS:    ICD-10-CM ICD-9-CM   1. Preop examination Z01.818 V72.84   2. Cardiomyopathy due to chemotherapy I42.7 425.9    T45.1X5A E933.1   3. DDD (degenerative disc disease), lumbar M51.36 722.52   4. Coronary artery disease involving native coronary artery of native heart without angina pectoris I25.10 414.01   5. SALINAS on CPAP G47.33 327.23    Z99.89 V46.8   6. Hyperlipidemia, unspecified hyperlipidemia type E78.5 272.4   7. Essential hypertension I10 401.9     SUMMARY AND RECOMMENDATIONS:  Mr. Francis has no prior psychiatric history.  He denied current psychiatric symptoms in the interview, and test data did not suggest any psychiatric disorder.  Although he reports feeling hesitant about any back surgery, he notes that he trusts Dr. Leos and is hopeful about improving his pain.  This evaluation revealed NO contraindications to the spinal cord stimulator from a psychological perspective.        Interpretation and report and coding were completed on 05/11/2017.

## 2017-05-12 ENCOUNTER — PATIENT MESSAGE (OUTPATIENT)
Dept: HEMATOLOGY/ONCOLOGY | Facility: CLINIC | Age: 72
End: 2017-05-12

## 2017-05-12 DIAGNOSIS — F32.A DEPRESSION, UNSPECIFIED DEPRESSION TYPE: ICD-10-CM

## 2017-05-12 RX ORDER — MIRTAZAPINE 15 MG/1
15 TABLET, FILM COATED ORAL NIGHTLY
Qty: 90 TABLET | Refills: 3 | Status: SHIPPED | OUTPATIENT
Start: 2017-05-12

## 2017-05-15 ENCOUNTER — TELEPHONE (OUTPATIENT)
Dept: GASTROENTEROLOGY | Facility: CLINIC | Age: 72
End: 2017-05-15

## 2017-05-15 NOTE — TELEPHONE ENCOUNTER
----- Message from Melissa Rick sent at 5/15/2017  8:14 AM CDT -----  Contact: [place] pt wife#[phone]  Pt is experiencing diarrhea and pain under breast bone. Pt wants sooner appt. Please call

## 2017-05-15 NOTE — TELEPHONE ENCOUNTER
Returned patient's wife call. She is requesting a sooner appointment with Dr. Richard. Patient advised Dr. Richard does not have any appointments available before 5/31.     Patient's  placed on waiting list. She verbalize understanding.

## 2017-05-18 ENCOUNTER — OFFICE VISIT (OUTPATIENT)
Dept: GASTROENTEROLOGY | Facility: CLINIC | Age: 72
End: 2017-05-18
Payer: MEDICARE

## 2017-05-18 ENCOUNTER — LAB VISIT (OUTPATIENT)
Dept: LAB | Facility: HOSPITAL | Age: 72
End: 2017-05-18
Attending: INTERNAL MEDICINE
Payer: MEDICARE

## 2017-05-18 VITALS
HEART RATE: 64 BPM | BODY MASS INDEX: 27.84 KG/M2 | HEIGHT: 70 IN | SYSTOLIC BLOOD PRESSURE: 121 MMHG | WEIGHT: 194.44 LBS | DIASTOLIC BLOOD PRESSURE: 68 MMHG

## 2017-05-18 DIAGNOSIS — Z90.49 HISTORY OF RESECTION OF SMALL BOWEL: ICD-10-CM

## 2017-05-18 DIAGNOSIS — K52.9 CHRONIC DIARRHEA: Primary | ICD-10-CM

## 2017-05-18 DIAGNOSIS — K52.9 CHRONIC DIARRHEA: ICD-10-CM

## 2017-05-18 DIAGNOSIS — K58.0 IRRITABLE BOWEL SYNDROME WITH DIARRHEA: ICD-10-CM

## 2017-05-18 PROCEDURE — 1157F ADVNC CARE PLAN IN RCRD: CPT | Mod: 8P,S$GLB,, | Performed by: INTERNAL MEDICINE

## 2017-05-18 PROCEDURE — 1159F MED LIST DOCD IN RCRD: CPT | Mod: S$GLB,,, | Performed by: INTERNAL MEDICINE

## 2017-05-18 PROCEDURE — 83516 IMMUNOASSAY NONANTIBODY: CPT

## 2017-05-18 PROCEDURE — 99214 OFFICE O/P EST MOD 30 MIN: CPT | Mod: S$GLB,,, | Performed by: INTERNAL MEDICINE

## 2017-05-18 PROCEDURE — 1125F AMNT PAIN NOTED PAIN PRSNT: CPT | Mod: S$GLB,,, | Performed by: INTERNAL MEDICINE

## 2017-05-18 PROCEDURE — 99499 UNLISTED E&M SERVICE: CPT | Mod: S$GLB,,, | Performed by: INTERNAL MEDICINE

## 2017-05-18 PROCEDURE — 99999 PR PBB SHADOW E&M-EST. PATIENT-LVL III: CPT | Mod: PBBFAC,,, | Performed by: INTERNAL MEDICINE

## 2017-05-18 PROCEDURE — 36415 COLL VENOUS BLD VENIPUNCTURE: CPT

## 2017-05-18 NOTE — MR AVS SNAPSHOT
[facility]  [address]  [facility] [address]  Phone: [phone]  Fax: [phone]                  Royce Francis   2017 11:00 AM   Office Visit    Description:  Male : 1945   Provider:  Nino Richard MD   Department:  [facility]           Reason for Visit     Diarrhea           Diagnoses this Visit        Comments    Chronic diarrhea    -  Primary     Irritable bowel syndrome with diarrhea         History of resection of small bowel                To Do List           Future Appointments        Provider Department Dept Phone    2017 11:55 AM LAB, APPOINTMENT [place] [facility] [phone]    2017 9:00 AM Francisca Chen, [facility] - Pain Management [phone]    2017 2:00 PM LAB, HEMONC CANCER BLDG [facility] [phone]    2017 3:00 PM MD Sukhdev Suazo Jr.son - Hematology Oncology [phone]      Your Future Surgeries/Procedures     May 23, 2017   Surgery with Unruly Leos MD   [facility] ([facility])    [address] [place] [id number] [phone]              Goals (5 Years of Data)     None       These Medications        Disp Refills Start End    rifAXIMin (XIFAXAN) 550 mg Tab 42 tablet 0 2017    Take 1 tablet (550 mg total) by mouth 3 (three) times daily. - Oral    [facility] [address] [place] [address] AT [facility] Ph #: [phone]         [facility]     [facility] -  Assistance  Unless otherwise directed by your provider, please contact [facility], our nurse care line that is available for  assistance.     Registered nurses in the [facility] provide: appointment scheduling, clinical advisement, health education, and other advisory services.  Call: [phone] (toll free)                Medications           Message regarding Medications     Verify the changes and/or additions to your medication regime listed below are the same as discussed with your clinician today.  If any of these changes or additions are incorrect, please notify your healthcare provider.        START taking these NEW medications        Refills    rifAXIMin (XIFAXAN) 550 mg Tab 0    Sig: Take 1 tablet (550 mg total) by mouth 3 (three) times daily.    Class: Normal    Route: Oral      STOP taking these medications     pravastatin (PRAVACHOL) 20 MG tablet Take 1 tablet (20 mg total) by mouth once daily.    omeprazole (PRILOSEC) 20 MG capsule TAKE 2 CAPSULES BY MOUTH EVERY DAY AS NEEDED           Verify that the below list of medications is an accurate representation of the medications you are currently taking.  If none reported, the list may be blank. If incorrect, please contact your healthcare provider. Carry this list with you in case of emergency.           Current Medications     ascorbic acid, vitamin C, (VITAMIN C) 1000 MG tablet Take 1,000 mg by mouth once daily.    cyanocobalamin (VITAMIN B-12) 1000 MCG tablet Take 100 mcg by mouth once daily.    enalapril (VASOTEC) 5 MG tablet Take 1 tablet (5 mg total) by mouth once daily.    esomeprazole (NEXIUM) 20 MG capsule Take 20 mg by mouth before breakfast.    fish oil-omega-3 fatty acids 300-1,000 mg capsule Take 2 g by mouth once daily.    flurazepam (DALMANE) 30 MG capsule Take 30 mg by mouth nightly as needed for Insomnia.    fluticasone (FLONASE) 50 mcg/actuation nasal spray SPRAY ONCE IN EACH NOSTRIL ONCE DAILY    hydrocodone-acetaminophen 5-325mg (NORCO) 5-325 mg per tablet 1-2 tablets every 4 hours as needed for pain    lactobacillus rhamnosus GG (CULTURELLE) 10 billion cell capsule Take 1 capsule by mouth once daily.    lidocaine-prilocaine (EMLA) cream     loperamide (IMODIUM A-D) 2 mg Tab Take 2 mg by mouth 4 (four) times daily as needed.    metoprolol tartrate  "(LOPRESSOR) 50 MG tablet Take 0.5 tablets (25 mg total) by mouth 2 (two) times daily.    mirtazapine (REMERON) 15 MG tablet Take 1 tablet (15 mg total) by mouth every evening.    multivitamin capsule Take 1 capsule by mouth once daily.    NON FORMULARY MEDICATION 300 mg once daily. Desiccated Liver    tamsulosin (FLOMAX) 0.4 mg Cp24 Take 1 capsule (0.4 mg total) by mouth once daily.    valacyclovir (VALTREX) 500 MG tablet Take 1 tablet (500 mg total) by mouth once daily.    flurazepam (DALMANE) 30 MG capsule TAKE 1 CAPSULE(30 MG) BY MOUTH EVERY EVENING AS NEEDED FOR INSOMNIA    rifAXIMin (XIFAXAN) 550 mg Tab Take 1 tablet (550 mg total) by mouth 3 (three) times daily.           Clinical Reference Information           Your Vitals Were     BP Pulse Height Weight BMI    121/68 64 5' 10" (1.778 m) 88.2 kg (194 lb 7.1 oz) 27.9 kg/m2      Blood Pressure          Most Recent Value    BP  121/68      Allergies as of 5/18/2017     Iodine And Iodide Containing Products      Immunizations Administered on Date of Encounter - 5/18/2017     None      Orders Placed During Today's Visit     Future Labs/Procedures Expected by Expires    Fecal fat, qualitative  5/18/2017 7/17/2018    Pancreatic elastase, fecal  5/18/2017 7/17/2018    TISSUE TRANSGLUTAMINASE (TTG), IGA  5/18/2017 7/17/2018      Language Assistance Services     ATTENTION: Language assistance services are available, free of charge. Please call [phone].      ATENCIÓN: Si sunitha alvarez, tiene a roberson disposición servicios gratuitos de asistencia lingüística. Llame al [phone].     DINESH Ý: N?u b?n nói Ti?ng Vi?t, có các d?ch v? h? tr? ngôn ng? mi?n phí dành cho b?n. G?i s? [phone].         [facility] complies with applicable Federal civil rights laws and does not discriminate on the basis of race, color, national origin, age, disability, or sex.        "

## 2017-05-19 ENCOUNTER — TELEPHONE (OUTPATIENT)
Dept: PAIN MEDICINE | Facility: CLINIC | Age: 72
End: 2017-05-19

## 2017-05-22 ENCOUNTER — LAB VISIT (OUTPATIENT)
Dept: LAB | Facility: HOSPITAL | Age: 72
End: 2017-05-22
Attending: INTERNAL MEDICINE
Payer: MEDICARE

## 2017-05-22 DIAGNOSIS — K52.9 CHRONIC DIARRHEA: ICD-10-CM

## 2017-05-22 LAB — TTG IGA SER IA-ACNC: 2 UNITS

## 2017-05-22 PROCEDURE — 89125 SPECIMEN FAT STAIN: CPT

## 2017-05-22 PROCEDURE — 82656 EL-1 FECAL QUAL/SEMIQ: CPT

## 2017-05-23 ENCOUNTER — SURGERY (OUTPATIENT)
Age: 72
End: 2017-05-23

## 2017-05-23 ENCOUNTER — HOSPITAL ENCOUNTER (OUTPATIENT)
Facility: HOSPITAL | Age: 72
Discharge: HOME | End: 2017-05-23
Attending: ANESTHESIOLOGY | Admitting: ANESTHESIOLOGY
Payer: MEDICARE

## 2017-05-23 VITALS
DIASTOLIC BLOOD PRESSURE: 57 MMHG | SYSTOLIC BLOOD PRESSURE: 117 MMHG | WEIGHT: 194 LBS | RESPIRATION RATE: 18 BRPM | HEIGHT: 70 IN | TEMPERATURE: 98 F | HEART RATE: 56 BPM | OXYGEN SATURATION: 93 % | BODY MASS INDEX: 27.77 KG/M2

## 2017-05-23 DIAGNOSIS — G62.0 DRUG-INDUCED POLYNEUROPATHY: Primary | ICD-10-CM

## 2017-05-23 DIAGNOSIS — G89.4 CHRONIC PAIN SYNDROME: ICD-10-CM

## 2017-05-23 DIAGNOSIS — M54.16 LUMBAR RADICULOPATHY: ICD-10-CM

## 2017-05-23 DIAGNOSIS — G89.29 CHRONIC PAIN: ICD-10-CM

## 2017-05-23 DIAGNOSIS — G57.90 MONONEURITIS LOWER LIMB, UNSPECIFIED LATERALITY: ICD-10-CM

## 2017-05-23 LAB — FAT STL SUDAN IV STN: NORMAL

## 2017-05-23 PROCEDURE — C1897 LEAD, NEUROSTIM TEST KIT: HCPCS | Performed by: ANESTHESIOLOGY

## 2017-05-23 PROCEDURE — 63600175 PHARM REV CODE 636 W HCPCS: Performed by: ANESTHESIOLOGY

## 2017-05-23 PROCEDURE — 95971 ALYS SMPL SP/PN NPGT W/PRGRM: CPT | Mod: 59,,, | Performed by: ANESTHESIOLOGY

## 2017-05-23 PROCEDURE — C1778 LEAD, NEUROSTIMULATOR: HCPCS | Performed by: ANESTHESIOLOGY

## 2017-05-23 PROCEDURE — 25000003 PHARM REV CODE 250: Performed by: ANESTHESIOLOGY

## 2017-05-23 PROCEDURE — 63650 IMPLANT NEUROELECTRODES: CPT | Performed by: ANESTHESIOLOGY

## 2017-05-23 PROCEDURE — 63650 IMPLANT NEUROELECTRODES: CPT | Mod: ,,, | Performed by: ANESTHESIOLOGY

## 2017-05-23 RX ORDER — CEFAZOLIN SODIUM 2 G/50ML
2 SOLUTION INTRAVENOUS ONCE
Status: COMPLETED | OUTPATIENT
Start: 2017-05-23 | End: 2017-05-23

## 2017-05-23 RX ORDER — SODIUM CHLORIDE 9 MG/ML
500 INJECTION, SOLUTION INTRAVENOUS CONTINUOUS
Status: CANCELLED | OUTPATIENT
Start: 2017-05-23

## 2017-05-23 RX ORDER — CEFAZOLIN SODIUM 2 G/50ML
2 SOLUTION INTRAVENOUS ONCE
Status: CANCELLED | OUTPATIENT
Start: 2017-05-23

## 2017-05-23 RX ORDER — LIDOCAINE HYDROCHLORIDE 10 MG/ML
INJECTION, SOLUTION EPIDURAL; INFILTRATION; INTRACAUDAL; PERINEURAL
Status: DISCONTINUED | OUTPATIENT
Start: 2017-05-23 | End: 2017-05-23 | Stop reason: HOSPADM

## 2017-05-23 RX ORDER — FENTANYL CITRATE 50 UG/ML
INJECTION, SOLUTION INTRAMUSCULAR; INTRAVENOUS
Status: DISCONTINUED | OUTPATIENT
Start: 2017-05-23 | End: 2017-05-23 | Stop reason: HOSPADM

## 2017-05-23 RX ORDER — BUPIVACAINE HYDROCHLORIDE 2.5 MG/ML
INJECTION, SOLUTION EPIDURAL; INFILTRATION; INTRACAUDAL
Status: DISCONTINUED | OUTPATIENT
Start: 2017-05-23 | End: 2017-05-23 | Stop reason: HOSPADM

## 2017-05-23 RX ORDER — MIDAZOLAM HYDROCHLORIDE 1 MG/ML
INJECTION INTRAMUSCULAR; INTRAVENOUS
Status: DISCONTINUED | OUTPATIENT
Start: 2017-05-23 | End: 2017-05-23 | Stop reason: HOSPADM

## 2017-05-23 RX ADMIN — DEXTROSE 2 G: 50 INJECTION, SOLUTION INTRAVENOUS at 08:05

## 2017-05-23 RX ADMIN — MIDAZOLAM HYDROCHLORIDE 1 MG: 1 INJECTION INTRAMUSCULAR; INTRAVENOUS at 08:05

## 2017-05-23 RX ADMIN — BUPIVACAINE HYDROCHLORIDE 5 ML: 2.5 INJECTION, SOLUTION EPIDURAL; INFILTRATION; INTRACAUDAL; PERINEURAL at 08:05

## 2017-05-23 RX ADMIN — FENTANYL CITRATE 50 MCG: 50 INJECTION, SOLUTION INTRAMUSCULAR; INTRAVENOUS at 08:05

## 2017-05-23 RX ADMIN — LIDOCAINE HYDROCHLORIDE 5 ML: 10 INJECTION, SOLUTION EPIDURAL; INFILTRATION; INTRACAUDAL; PERINEURAL at 08:05

## 2017-05-23 NOTE — OP NOTE
Patient Name: Royce Francis  MRN: [id number]    INFORMED CONSENT: The procedure, risks, benefits and options were discussed with patient. There are no contraindications to the procedure. The patient expressed understanding and agreed to proceed. The personnel performing the procedure was discussed. I verify that I personally obtained Royce's consent prior to the start of the procedure and the signed consent can be found on the patient's chart.        Procedure Date: 5/11/2017  Anesthesia:   local  Pre Procedure diagnosis:    1. Drug-induced polyneuropathy    2. Lumbar radiculopathy    3. Chronic pain syndrome    4. Mononeuritis lower limb, unspecified laterality    5. Chronic pain      Post-Procedure diagnosis:   Same    Sedation: Yes - Fentanyl 100 mcg and Midazolam 2 mg    PROCEDURE:thoracolumbar SPINAL CORD STIMULATOR TRIAL (nevro)    PREOPERATIVE ANTIBIOTICS: 1 g ancef IV given 30 minutes prior to procedure start, see anesthesia record for time.  DESCRIPTION OF PROCEDURE:  The patient was brought to the fluoroscopy suite. IV access was obtained prior to the procedure. The patient was positioned prone on the fluoroscopy table. Continuous hemodynamic monitoring was initiated including blood pressure and pulse oximetry. IV sedation was administered incrementally to allow the patient to remain comfortable and conversant throughout the procedure. The lumbar spine area was prepped with chlorhexidine  three times and draped in a sterile fashion. Skin anesthesia was achieved using 5 cc of lidocaine 1%. A 14 3 1/2 inch Tuohy needle was inserted at the T12-L1 interspace using a paramedian approach. The needle was slowly advanced in a 45-degree angle down towards the ligamentum flavum.  Once the ligamentum flavum was reached, a loss of resistance syringe was attached.  The needle was slowly advanced into the dorsal epidural space.  After loss of resistance was noted, negative aspiration for blood or CSF was confirmed .  A 8 contact percutaneous lead was inserted through the needle and slowly advanced in the epidural space under fluoroscopy. The lead was eventually placed at tip of T8. A second lead was then placed in a similar fashion and placed immediately adjacent to the first lead with tip at T9. Both leads were then connected to allow for test impedence. The lead was sutured in place with a silk suture. A sterile dressing was applied. The patient was transferred to the postoperative area in stable condition.  The patient's postoperative neurological examination showed no evidence of lower extremity weakness with normal bowel and bladder function. no specimens collected. PATIENT was taken to the Post-block Recovery Area for further observation.       Blood Loss: Nill  Specimen: None          Discharge Diagnosis: Same as Pre and Post Procedure  Condition on Discharge: Stable.  Diet on Discharge: Same as before.  Activity: as per instruction sheet.  Discharge to: Home with a responsible adult.  Follow up: as per Discharge instructions      Unruly Leos MD

## 2017-05-23 NOTE — H&P
[facility]  History & Physical  Physical Medicine & Rehab      SUBJECTIVE:     Chief Complaint/Reason for Admission: LBP and lower extremity  pain    History of Present Illness: patient with known chronic pain syndrome and chemotherapy induced neuropathy here today for  SCS trial     PTA Medications   Medication Sig    ascorbic acid, vitamin C, (VITAMIN C) 1000 MG tablet Take 1,000 mg by mouth once daily.    cyanocobalamin (VITAMIN B-12) 1000 MCG tablet Take 100 mcg by mouth once daily.    enalapril (VASOTEC) 5 MG tablet Take 1 tablet (5 mg total) by mouth once daily.    esomeprazole (NEXIUM) 20 MG capsule Take 20 mg by mouth before breakfast.    fish oil-omega-3 fatty acids 300-1,000 mg capsule Take 2 g by mouth once daily.    flurazepam (DALMANE) 30 MG capsule Take 30 mg by mouth nightly as needed for Insomnia.    fluticasone (FLONASE) 50 mcg/actuation nasal spray SPRAY ONCE IN EACH NOSTRIL ONCE DAILY    hydrocodone-acetaminophen 5-325mg (NORCO) 5-325 mg per tablet 1-2 tablets every 4 hours as needed for pain    lactobacillus rhamnosus GG (CULTURELLE) 10 billion cell capsule Take 1 capsule by mouth once daily.    lidocaine-prilocaine (EMLA) cream     loperamide (IMODIUM A-D) 2 mg Tab Take 2 mg by mouth 4 (four) times daily as needed.    metoprolol tartrate (LOPRESSOR) 50 MG tablet Take 0.5 tablets (25 mg total) by mouth 2 (two) times daily.    mirtazapine (REMERON) 15 MG tablet Take 1 tablet (15 mg total) by mouth every evening.    multivitamin capsule Take 1 capsule by mouth once daily.    NON FORMULARY MEDICATION 300 mg once daily. Desiccated Liver    rifAXIMin (XIFAXAN) 550 mg Tab Take 1 tablet (550 mg total) by mouth 3 (three) times daily.    tamsulosin (FLOMAX) 0.4 mg Cp24 Take 1 capsule (0.4 mg total) by mouth once daily.    valacyclovir (VALTREX) 500 MG tablet Take 1 tablet (500 mg total) by mouth once daily.    flurazepam (DALMANE) 30 MG capsule TAKE 1 CAPSULE(30 MG)  BY MOUTH EVERY EVENING AS NEEDED FOR INSOMNIA       Review of patient's allergies indicates:   Allergen Reactions    Iodine and iodide containing products Itching     Contrast dye        Past Medical History:   Diagnosis Date    Allergy     Anemia     Arthritis     Basal cell carcinoma     excised from upper back    Cancer     Cardiomyopathy due to chemotherapy     Cataract     CHF (congestive heart failure)     Coronary artery disease     D1    Encounter for blood transfusion     Eye injuries 20 yrs    ou fb several x's    Hyperlipidemia     Hypertension     Hypertensive heart disease with heart failure 1/31/2013    Lymphoma 1996    Obstructive sleep apnea on CPAP     SCC (squamous cell carcinoma) excised 12/2015    R proximal forearm    Skin disease     Sleep apnea     SQUAMOUS CELL CARCINOMA excised 2/14/13    L forearm    Squamous cell carcinoma excised 8/4/2016    IN SITU, Left thigh MOHS    Squamous cell carcinoma excised 8/25/2016    left helix, MOHS     Past Surgical History:   Procedure Laterality Date    ABDOMINAL SURGERY      CATARACT EXTRACTION      od dr yanes    CATARACT EXTRACTION W/  INTRAOCULAR LENS IMPLANT Left 01/12/2017    Dr. Yanes    EYE SURGERY      Rt cataract    KIDNEY STONE SURGERY      SMALL INTESTINE SURGERY  09/2013    TONSILLECTOMY       Family History   Problem Relation Age of Onset    Cataracts Mother     Hypertension Mother     Cataracts Father     Hypertension Father     Cancer Father     Hypertension Maternal Grandmother     Hypertension Maternal Grandfather     Hypertension Paternal Grandmother     Hypertension Paternal Grandfather     No Known Problems Sister     No Known Problems Brother     No Known Problems Maternal Aunt     No Known Problems Maternal Uncle     No Known Problems Paternal Aunt     No Known Problems Paternal Uncle     Amblyopia Neg Hx     Blindness Neg Hx     Diabetes Neg Hx     Glaucoma Neg Hx      Macular degeneration Neg Hx     Retinal detachment Neg Hx     Strabismus Neg Hx     Stroke Neg Hx     Thyroid disease Neg Hx     Melanoma Neg Hx     Heart disease Neg Hx     Celiac disease Neg Hx     Cirrhosis Neg Hx     Colon cancer Neg Hx     Colon polyps Neg Hx     Crohn's disease Neg Hx     Cystic fibrosis Neg Hx     Esophageal cancer Neg Hx     Hemochromatosis Neg Hx     Inflammatory bowel disease Neg Hx     Irritable bowel syndrome Neg Hx     Liver cancer Neg Hx     Liver disease Neg Hx     Rectal cancer Neg Hx     Stomach cancer Neg Hx     Ulcerative colitis Neg Hx     Burt's disease Neg Hx      Social History   Substance Use Topics    Smoking status: Former Smoker     Quit date: 1960    Smokeless tobacco: Never Used    Alcohol use Yes      Comment: socially       Review of Systems:  GENERAL:  No weight loss, malaise or fevers.  HEENT:  Negative for frequent or significant headaches.  NECK:  Negative for lumps, goiter, pain and significant neck swelling.  RESPIRATORY:  Negative for cough, wheezing or shortness of breath.  CARDIOVASCULAR:  Negative for chest pain, leg swelling or palpitations.  GI:  Negative for abdominal discomfort, blood in stools or black stools or change in bowel habits.  MUSCULOSKELETAL:  See HPI.  SKIN:  Negative for lesions, rash, and itching.  PSYCH:  Positive for sleep disturbance, mood disorder and recent psychosocial stressors.  HEMATOLOGY/LYMPHOLOGY:  Negative for prolonged bleeding, bruising easily or swollen nodes.  NEURO:   No history of headaches, syncope, paralysis, seizures or tremors.  All other reviewed and negative other than HPI.    OBJECTIVE:     Vital Signs (Most Recent):  Temp: 97.9 °F (36.6 °C) (05/23/17 0724)  Pulse: 66 (05/23/17 0724)  Resp: 18 (05/23/17 0724)  BP: 123/60 (05/23/17 0724)  SpO2: (!) 94 % (05/23/17 0724)    Physical Exam:  General appearance: Well appearing, in no acute distress, alert and oriented x3.  Psych: Mood and  affect appropriate.  Skin: Skin color, texture, turgor normal, no rashes or lesions, in both upper and lower body.  Head/face: Atraumatic, normocephalic. No palpable lymph nodes  Cor: RRR  Pulm: CTA  GI: Abdomen soft and non-tender.  Back: Straight leg raising in the sitting and supine positions is negative to radicular pain. Pain with palpation to lumbar facet joints. Limited lumbar extension with pain reproduction. Bilateral facet loading. Painful palpation to bilateral SI joints. RAHEEM is positive bilaterally.  Extremities: Peripheral joint ROM is full and pain free without obvious instability or laxity in all four extremities. No deformities, edema, or skin discoloration. Good capillary refill.  Musculoskeletal: Medial joint line tenderness to bilateral knees. Painful extension of bilateral knees. Bilateral upper and lower extremity strength is normal and symmetric. No atrophy or tone abnormalities are noted.  Neuro: Bilateral upper and lower extremity coordination and muscle stretch reflexes are physiologic and symmetric. Plantar response are downgoing. Decreased sensation to bilateral feet at digits 2-5.  Gait: Antalgic- ambulating with cane.    ASSESSMENT/PLAN:     There are no hospital problems to display for this patient.          Plan: proceed with SCS trial   Unruly Leos MD

## 2017-05-23 NOTE — IP AVS SNAPSHOT
\[facility] [place] [facility]\""  [address]  [place] [address]  Phone: [phone]           Patient Discharge Instructions   Our goal is to set you up for success. This packet includes information on your condition, medications, and your home care.  It will help you care for yourself to prevent having to return to the hospital.     Please ask your nurse if you have any questions.      There are many details to remember when preparing to leave the hospital. Here is what you will need to do:    1. Take your medicine. If you are prescribed medications, review your Medication List on the following pages. You may have new medications to  at the pharmacy and others that you'll need to stop taking. Review the instructions for how and when to take your medications. Talk with your doctor or nurses if you are unsure of what to do.     2. Go to your follow-up appointments. Specific follow-up information is listed in the following pages. Your may be contacted by a nurse or clinical provider about future appointments. Be sure we have all of the phone numbers to reach you. Please contact your provider's office if you are unable to make an appointment.     3. Watch for warning signs. Your doctor or nurse will give you detailed warning signs to watch for and when to call for assistance. These instructions may also include educational information about your condition. If you experience any of warning signs to your health, call your doctor.           [facility]  Unless otherwise directed by your provider, please   contact [facility], our nurse care line   that is available for 24/7 assistance.     [phone] (toll-free)     Registered nurses in the [facility]   provide: appointment scheduling, clinical advisement, health education, and other advisory services.                  ** Verify the list of medication(s) below is accurate and up to date. Carry this with you in case of emergency. If your  medications have changed, please notify your healthcare provider.             Medication List      Notice     Cannot display discharge medications because the patient has not yet been admitted.               Please bring to all follow up appointments:    1. A copy of your discharge instructions.  2. All medicines you are currently taking in their original bottles.  3. Identification and insurance card.    Please arrive 15 minutes ahead of scheduled appointment time.    Please call 24 hours in advance if you must reschedule your appointment and/or time.        Your Scheduled Appointments     May 30, 2017  9:00 AM CDT   Established Patient Visit with CELESTE Torres   [facility] - [facility] ([facility])    [address]  [facility] [address]   [phone]            Jun 01, 2017  2:00 PM CDT   Non-Fasting Lab with LAB, HEMONC CANCER BLDG [facility] ([facility])    [address]  [place] [address]   [phone]            Jun 01, 2017  3:00 PM CDT   Established Patient Visit with Ye Lucia Jr., MD   [place] - Hematology Oncology ([facility])    [address]  [place] [address]   [phone]              Your Future Surgeries/Procedures     May 23, 2017   Surgery with Unruly eLos MD   [facility] ([facility])    [address] [facility] [place] [address]   [phone]                  Discharge Instructions       Home Care Instructions Pain Management    1. DIET:    You resume your normal diet today.    2.  BATHING:    Sponge bath until follow-up, but keep area dry.    3.DRESSING:    Do not remove dressing, only reinforce; Keep area dry.    4. ACTIVITY LEVEL:    You may resume your normal activities 24 hours after your procedure, with the exception of bending, twisting, stretching or heavy lifting.    5. MEDICATIONS:    You may resume normal medications today.  No blood thinner until follow-up.    6. SPECIAL INSTRUCTIONS:    Turn off machine when driving.    No soaking in hot tub, can use dry cold pack.    No heat applied to site.    Expect soreness at site for 24-48 hours.    Slight reddish/pink drainage is normal; if heavy bleeding occurs, please .    Call representative with any further questions.    Please return all equipment to follow-up appointment.    Stimulation may increase or decrease, depending on body position.    Run stimulator at lowest possible intensity, while maintaining pain control.    Objective of the spinal cord stimulator is to receive 50% or better pain relief.    PLEASE CALL YOUR DOCTOR FOR THE FOLLOWIN.  Redness or swelling around the injection site.  2.  Fever 101 degree or greater.  3.  Drainage (pus) from the injection site.  4.  If any continuous bleeding occurs (some dried blood over the incision is normal)    FOR EMERGENCIES:    If any unusual problems or difficulties occur during clinic hours, call [phone] or dial 911.    Follow up with your physician in 5 days (follow up appointment).        Admission Information     Date & Time Provider Department [facility]    2017  8:00 AM Unruly Leos MD [facility] [id number]      Care Providers     Provider Role Specialty Primary office phone    Unruly Leos MD Attending Provider Pain Medicine [phone]      Recent Lab Values        2006                           1:26 PM           A1C 5.5                       Allergies as of 2017        Reactions    Iodine And Iodide Containing Products Itching    Contrast dye      Advance Directives     An advance directive is a document which, in the event you are no longer able to make decisions for yourself, tells your healthcare team what kind of treatment you do or do not want to receive, or who you would like to make those decisions for you.  If you do not currently have an advance  directive, [facility] encourages you to create one.  For more information call:  [phone]), [phone] ([phone]),  or log on to [email or web address].        Smoking Cessation     If you would like to quit smoking:   You may be eligible for free services if you are a [place] resident and started smoking cigarettes before September 1, 1988.  Call the Smoking Cessation Trust (SCT) toll free at (068) 584-8196 or (240) 651-6483.   Call 3-684-QUIT-NOW if you do not meet the above criteria.   Contact us via email: [email or web address]   View our website for more information: [email or web address]        Language Assistance Services     ATTENTION: Language assistance services are available, free of charge. Please call [phone].      ATENCIÓN: Si habla español, tiene a roberson disposición servicios gratuitos de asistencia lingüística. Llame al [phone].     CHÚ Ý: N?u b?n nói Ti?ng Vi?t, có các d?ch v? h? tr? ngôn ng? mi?n phí dành cho b?n. G?i s? [phone].        Pneumonmia Discharge Instructions                 [facility] complies with applicable Federal civil rights laws and does not discriminate on the basis of race, color, national origin, age, disability, or sex.

## 2017-05-24 ENCOUNTER — PATIENT MESSAGE (OUTPATIENT)
Dept: HEMATOLOGY/ONCOLOGY | Facility: CLINIC | Age: 72
End: 2017-05-24

## 2017-05-24 DIAGNOSIS — M54.9 BACK PAIN, UNSPECIFIED BACK LOCATION, UNSPECIFIED BACK PAIN LATERALITY, UNSPECIFIED CHRONICITY: Primary | ICD-10-CM

## 2017-05-24 RX ORDER — HYDROCODONE BITARTRATE AND ACETAMINOPHEN 5; 325 MG/1; MG/1
TABLET ORAL
Qty: 50 TABLET | Refills: 0 | Status: SHIPPED | OUTPATIENT
Start: 2017-05-24 | End: 2017-07-10 | Stop reason: SDUPTHER

## 2017-05-26 ENCOUNTER — PATIENT MESSAGE (OUTPATIENT)
Dept: GASTROENTEROLOGY | Facility: CLINIC | Age: 72
End: 2017-05-26

## 2017-05-28 NOTE — PROGRESS NOTES
[facility] Consultation Note    Reason for Consult:    Chief Complaint   Patient presents with    Diarrhea       PCP:   Len Yang (Inactive)       Referring MD:  Aaareferral Self  No address on file    HPI:  Royce Francis is a 71 y.o. male here for evaluation of diarrhea and abdominal pain.  He was seen in our clinic 4 years ago, in July 2013, for iron deficiency anemia with heme positive stools. He underwent EGD and colonoscopy at that time and was supposed to get a VCE, but it was not done.  His current complaint has been present ever since abdominal surgery for an ulcer in the small bowel that had a contained perforation.  A 30 cm segment of small bowel was removed.  It was thought that this was due to lymphoma.  He gets random episodes of diarrhea, about once weekly.  Feels weak and drained after the diarrhea.  His stools alternate hard and loose.  He thinks meds are related to the hard stools.  Occasional cramping which can be severe.  Some bloating.  No food triggers.  No pain on a regular basis.  He thinks he is lactose intolerant.  Imodium helps, but needs to take a lot.  Cholestyramine cause severe bloating.  Lomotil did not help.  No pain with eating.  Denies blood in stools.  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        Endoscopic History:  EGD - 5/27/13 by Dr. Graham for MONSE; fundic gland polyp; 6 mm antral ulcer (reactive gastritis, HP stain negative); normal duodenal (biopsies normal).    COLONOSCOPY - 5/27/13 by Dr. rGaham for MONSE; to cecum, excellent prep; normal exam.        ROS:  Constitutional: No fevers, chills, No weight loss, normal appetite  ENT: No congestion, rhinorrhea, or chronic sinus problems  CV: No chest pain or palpitations  Pulm: No cough, No shortness of breath  Ophtho: No vision changes or pain  GI: see HPI  Derm: No rash or lesions  Heme: No lymphadenopathy, No bruising  MSK: No arthritis or joint swelling  : No dysuria, No  frequent urination      Medical History:  has a past medical history of Allergy; Anemia; Arthritis; Basal cell carcinoma; Cancer; Cardiomyopathy due to chemotherapy; Cataract; CHF (congestive heart failure); Coronary artery disease; Encounter for blood transfusion; Eye injuries (20 yrs); Hyperlipidemia; Hypertension; Hypertensive heart disease with heart failure (1/31/2013); Lymphoma (1996); Obstructive sleep apnea on CPAP; SCC (squamous cell carcinoma) (excised 12/2015); Skin disease; Sleep apnea; SQUAMOUS CELL CARCINOMA (excised 2/14/13); Squamous cell carcinoma (excised 8/4/2016); and Squamous cell carcinoma (excised 8/25/2016).    Surgical History:  has a past surgical history that includes Kidney stone surgery; Small intestine surgery (09/2013); Abdominal surgery; Tonsillectomy; Eye surgery; Cataract extraction; and Cataract extraction w/  intraocular lens implant (Left, 01/12/2017).    Family History: family history includes Cancer in his father; Cataracts in his father and mother; Hypertension in his father, maternal grandfather, maternal grandmother, mother, paternal grandfather, and paternal grandmother; No Known Problems in his brother, maternal aunt, maternal uncle, paternal aunt, paternal uncle, and sister..     Social History:  reports that he quit smoking about 57 years ago. He has never used smokeless tobacco. He reports that he drinks alcohol. He reports that he does not use drugs.    Review of patient's allergies indicates:   Allergen Reactions    Iodine and iodide containing products Itching     Contrast dye       Prior to Admission medications    Medication Sig Start Date End Date Taking? Authorizing Provider   ascorbic acid, vitamin C, (VITAMIN C) 1000 MG tablet Take 1,000 mg by mouth once daily.   Yes Historical Provider, MD   cyanocobalamin (VITAMIN B-12) 1000 MCG tablet Take 100 mcg by mouth once daily.   Yes Historical Provider, MD   enalapril (VASOTEC) 5 MG tablet Take 1 tablet (5 mg total)  by mouth once daily. 5/27/16  Yes Boston Dent MD   esomeprazole (NEXIUM) 20 MG capsule Take 20 mg by mouth before breakfast.   Yes Historical Provider, MD   fish oil-omega-3 fatty acids 300-1,000 mg capsule Take 2 g by mouth once daily.   Yes Historical Provider, MD   flurazepam (DALMANE) 30 MG capsule Take 30 mg by mouth nightly as needed for Insomnia.   Yes Historical Provider, MD   fluticasone (FLONASE) 50 mcg/actuation nasal spray SPRAY ONCE IN EACH NOSTRIL ONCE DAILY 8/12/15  Yes Marry Coles, FNP-C   lactobacillus rhamnosus GG (CULTURELLE) 10 billion cell capsule Take 1 capsule by mouth once daily.   Yes Historical Provider, MD   lidocaine-prilocaine (EMLA) cream  8/5/14  Yes Historical Provider, MD   loperamide (IMODIUM A-D) 2 mg Tab Take 2 mg by mouth 4 (four) times daily as needed.   Yes Historical Provider, MD   metoprolol tartrate (LOPRESSOR) 50 MG tablet Take 0.5 tablets (25 mg total) by mouth 2 (two) times daily. 9/16/16  Yes Boston Dent MD   mirtazapine (REMERON) 15 MG tablet Take 1 tablet (15 mg total) by mouth every evening. 5/12/17  Yes Ye Lucia Jr., MD   multivitamin capsule Take 1 capsule by mouth once daily.   Yes Historical Provider, MD   NON FORMULARY MEDICATION 300 mg once daily. Desiccated Liver   Yes Historical Provider, MD   tamsulosin (FLOMAX) 0.4 mg Cp24 Take 1 capsule (0.4 mg total) by mouth once daily. 4/7/17  Yes Denilson Cardoso, NP   valacyclovir (VALTREX) 500 MG tablet Take 1 tablet (500 mg total) by mouth once daily. 2/24/17  Yes Sixto Hernandez MD   flurazepam (DALMANE) 30 MG capsule TAKE 1 CAPSULE(30 MG) BY MOUTH EVERY EVENING AS NEEDED FOR INSOMNIA 1/25/17   Ye Lucia Jr., MD   hydrocodone-acetaminophen 5-325mg (NORCO) 5-325 mg per tablet One to two tablets every 6 hours as needed for pain 5/24/17   Ye Lucia Jr., MD   rifAXIMin (XIFAXAN) 550 mg Tab Take 1 tablet (550 mg total) by mouth 3 (three) times daily. 5/18/17 6/1/17  Nino Richard MD  "      Objective Findings:  Vital Signs:  /68   Pulse 64   Ht 5' 10" (1.778 m)   Wt 88.2 kg (194 lb 7.1 oz)   BMI 27.90 kg/m²   Body mass index is 27.9 kg/m².    Physical Exam:  General Appearance:  Well appearing in no acute distress, appears stated age  Head:  Normocephalic, atraumatic  Eyes:  No scleral icterus or pallor, EOMI  ENT:  Neck supple, moist mucosa; OP clear  Lungs:  CTA bilaterally in anterior and posterior fields, no wheezes, no crackles; no dullness  Heart:  Regular rate and rhythm, S1, S2 normal, no murmurs heard  Abdomen:  Soft, non tender, non distended with positive bowel sounds in all four quadrants. No hepatosplenomegaly, ascites, or mass        Labs:  Lab Results   Component Value Date    WBC 4.41 03/09/2017    HGB 13.2 (L) 03/09/2017    HCT 40.8 03/09/2017    MCV 94 03/09/2017     (L) 03/09/2017     Lab Results   Component Value Date     03/09/2017    K 4.4 03/09/2017     03/09/2017    CO2 27 03/09/2017     03/09/2017    BUN 18 03/09/2017    CREATININE 1.1 03/09/2017    CALCIUM 9.6 03/09/2017    PROT 6.5 03/09/2017    ALBUMIN 4.0 03/09/2017    BILITOT 0.7 03/09/2017    ALKPHOS 83 03/09/2017    AST 17 03/09/2017    ALT 13 03/09/2017             Assessment:  Royce Francis is a 71 y.o. male with recurrent diarrhea associated with abdominal cramping and bloating.  Intermittent constipation as well.  History of partial small bowel resection (30 cm) for perforated ulcer thought secondary to lymphoma.  He has been treated with chemotherapy and has neuropathy.  Has tried Imodium which provides some relief.  Cholestyramine caused bloating.  Lomotil was no help.  Consider SIBO or anastomotic stricture.  Also consider IBS with diarrhea.         Recommendations/Plan:  1. I will give him Rifaximin  2. Check stool for fecal fats and elastase.  3. I will check him for Celiac disease  4. If the above unrevealing, and Rifaximin does not work, then consider CT scan and " EGD with small bowel biopsies.          Order summary:  Orders Placed This Encounter    Pancreatic elastase, fecal    Fecal fat, qualitative    TISSUE TRANSGLUTAMINASE (TTG), IGA    rifAXIMin (XIFAXAN) 550 mg Tab         Thank you so much for allowing me to participate in the care of Royce Richard MD

## 2017-05-30 ENCOUNTER — TELEPHONE (OUTPATIENT)
Dept: PAIN MEDICINE | Facility: CLINIC | Age: 72
End: 2017-05-30

## 2017-05-30 ENCOUNTER — OFFICE VISIT (OUTPATIENT)
Dept: PAIN MEDICINE | Facility: CLINIC | Age: 72
End: 2017-05-30
Payer: MEDICARE

## 2017-05-30 ENCOUNTER — TELEPHONE (OUTPATIENT)
Dept: PAIN MEDICINE | Facility: HOSPITAL | Age: 72
End: 2017-05-30

## 2017-05-30 VITALS
WEIGHT: 196.19 LBS | HEART RATE: 64 BPM | TEMPERATURE: 98 F | BODY MASS INDEX: 28.09 KG/M2 | DIASTOLIC BLOOD PRESSURE: 52 MMHG | SYSTOLIC BLOOD PRESSURE: 105 MMHG | RESPIRATION RATE: 20 BRPM | HEIGHT: 70 IN

## 2017-05-30 DIAGNOSIS — M54.16 LUMBAR RADICULOPATHY: ICD-10-CM

## 2017-05-30 DIAGNOSIS — M46.1 SACROILIAC INFLAMMATION: ICD-10-CM

## 2017-05-30 DIAGNOSIS — M54.16 LUMBAR RADICULOPATHY: Primary | ICD-10-CM

## 2017-05-30 DIAGNOSIS — M96.1 FAILED BACK SYNDROME: ICD-10-CM

## 2017-05-30 DIAGNOSIS — G89.4 CHRONIC PAIN SYNDROME: Primary | ICD-10-CM

## 2017-05-30 DIAGNOSIS — G89.4 CHRONIC PAIN SYNDROME: ICD-10-CM

## 2017-05-30 PROCEDURE — 99024 POSTOP FOLLOW-UP VISIT: CPT | Mod: S$GLB,,, | Performed by: NURSE PRACTITIONER

## 2017-05-30 PROCEDURE — 99499 UNLISTED E&M SERVICE: CPT | Mod: S$GLB,,, | Performed by: NURSE PRACTITIONER

## 2017-05-30 PROCEDURE — 99999 PR PBB SHADOW E&M-EST. PATIENT-LVL III: CPT | Mod: PBBFAC,,, | Performed by: NURSE PRACTITIONER

## 2017-05-30 RX ORDER — SULFAMETHOXAZOLE AND TRIMETHOPRIM 800; 160 MG/1; MG/1
1 TABLET ORAL 2 TIMES DAILY
Qty: 20 TABLET | Refills: 0 | Status: SHIPPED | OUTPATIENT
Start: 2017-05-30 | End: 2017-06-09

## 2017-05-30 NOTE — PROGRESS NOTES
Chronic patient Established Note (Follow up visit)      SUBJECTIVE:    Royce Francis presents to the clinic for a follow-up appointment for lower back and bilateral knee pain.  He is s/p Nevro lumbar SCS trial on 5/23/17 with 75% benefit of his back and leg pain.  He has had pain at the surgical sites which has been severe over the past few days.  He does report that he performed a lot of outdoor activity during the trial period.  He reports sweating to the area as well.  He denies any fever or night sweats.  He has a hx of non-Hodgkins lymphoma which has recurred appx 6 times in different locations.  He is followed by Dr. Lucia at [facility].  Since the last visit, Royce Francis states the pain has been improving.  Current pain intensity is 10/10 (at surgical sites).  The patient denies any bowel/bladder incontinence or symptoms of saddle paresthesia.        Pain Disability Index Review:  Last 3 PDI Scores 5/30/2017 4/6/2017 1/16/2017   Pain Disability Index (PDI) 70 28 17       Pain Medications:    - Opioids: Norco (Hydrocodone/Acetaminophen)- Dr Lucia    Opioid Contract: no     report:  Reviewed and consistent with medication use as prescribed.    Pain Procedures:   5/23/17 Lumbar SCS trial- significant benefit of back and leg pain  1/16/17 Left L2,3,4,5 RFA- significant relief  12/14/16 Bilateral Synvisc One- significant relief  3/28/16- Bilateral L4-5 AND L5-S1 Facet injection  8/28/15- Bilateral L4-5 and L5-S1 Facet injection  6/5/15- Caudal DARIO with Racz Catheter   4/10/15- Bilateral L4-5 TF DARIO  2/20/15- Left L3-4-5 RFA  2/13/15- right L3-4-5 RFA  1/16/15- bilateral L3-4-5 MBB  9/12/14- bilateral L3-4-5 MBB  8/15/14- Bilateral L4-5 TF DARIO    Physical Therapy/Home Exercise: per self    Imaging:     Thoracic XRAY 4/21/16  Narrative   AP and lateral radiographs of the thoracic spine were obtained.  There is mild scoliosis of the thoracolumbar spine.  Posterior vertebral alignment appears  satisfactory.  Vertebral body heights appear well-maintained.  There are degenerative changes with small anterior osteophytes noted throughout the thoracic spine.  No abnormal paraspinal masses are evident.   Impression     Thoracic spondylosis.  Mild thoracolumbar scoliosis.  No evidence for acute fracture, bone destruction, or subluxation.     Lumbar MRI 7/28/14  Narrative   MRI LUMBAR SPINE    TECHNIQUE: MRI lumbar spine was performed on a 1.5T magnet. The following sequences were obtained: Localizer; sagittal T1, T2, STIR; axial T1 and T2. After administration of 20 mL Omniscan, axial and sagittal T1-weighted sequences were attained.    COMPARISON: Not available.    FINDINGS:    There are 5 lumbar vertebrae.  There is lumbar levoscoliosis.  There is grade 1 anterolisthesis of L4 on L5.  Vertebral body heights are maintained.  Degenerative endplate changes are noted.  No evidence for infiltrative process.  There is multilevel   severe, asymmetric loss of disk height. Conus terminates at L1 and appears unremarkable. Limited evaluation of posterior abdominal structures is unremarkable.  Paraspinal musculature is within normal limits.  Evaluation of sacroiliac joints is   unremarkable.  S3 Tarlov cyst noted.    L1-L2: There is mild bilateral facet arthrosis and circumferential disk bulge resulting in mild spinal canal stenosis and mild right neural foraminal narrowing.    L2-L3: There is a compression disk bulge and moderate bilateral facet arthrosis resulting in moderate spinal canal stenosis and mild bilateral neural foraminal narrowing.    L3-L4: There is mild bilateral facet arthrosis and circumferential disk bulge resulting in mild left neural foraminal narrowing.    L4-L5: There is moderate circumferential disk bulge and bilateral facet arthrosis resulting in moderate spinal canal stenosis and moderate left neural foraminal narrowing.    L5-S1: There is severe right facet arthrosis and mild circumferential  disk bulge resulting in moderate right neural foraminal narrowing.   Impression    No marrow infiltrative process. Multilevel degenerative changes with moderate spinal canal stenosis as detailed above       Thoracic XRAY 4/21/16  Narrative   AP and lateral radiographs of the thoracic spine were obtained.  There is mild scoliosis of the thoracolumbar spine.  Posterior vertebral alignment appears satisfactory.  Vertebral body heights appear well-maintained.  There are degenerative changes with small anterior osteophytes noted throughout the thoracic spine.  No abnormal paraspinal masses are evident.   Impression     Thoracic spondylosis.  Mild thoracolumbar scoliosis.  No evidence for acute fracture       Bilateral Knee XRAYs 3/10/16  Narrative   AP, lateral, and sunrise views of both knees were obtained.  The bones are intact.  There is no evidence for acute fracture or bone destruction.  There is chondrocalcinosis present.  There is moderate narrowing of the medial compartments of the femorotibial joints bilaterally.  There is spurring of the tibial spines with osteophytes at the femorotibial and patellofemoral joints.  There is mild soft tissue prominence in a right suprapatellar location and a small right-sided joint effusion cannot be excluded.  Atherosclerotic calcification is present within the arteries of the distal thighs and proximal calf.   Impression     Prominent symmetric degenerative changes of the femorotibial and patellofemoral joints with moderate narrowing of the medial compartments of the femorotibial joints.  Chondrocalcinosis noted bilaterally.  Possible right suprapatellar joint effusion.           Allergies:   Review of patient's allergies indicates:   Allergen Reactions    Iodine and iodide containing products Itching     Contrast dye       Current Medications:   Current Outpatient Prescriptions   Medication Sig Dispense Refill    ascorbic acid, vitamin C, (VITAMIN C) 1000 MG tablet Take  1,000 mg by mouth once daily.      cyanocobalamin (VITAMIN B-12) 1000 MCG tablet Take 100 mcg by mouth once daily.      enalapril (VASOTEC) 5 MG tablet Take 1 tablet (5 mg total) by mouth once daily. 90 tablet 3    esomeprazole (NEXIUM) 20 MG capsule Take 20 mg by mouth before breakfast.      fish oil-omega-3 fatty acids 300-1,000 mg capsule Take 2 g by mouth once daily.      flurazepam (DALMANE) 30 MG capsule Take 30 mg by mouth nightly as needed for Insomnia.      flurazepam (DALMANE) 30 MG capsule TAKE 1 CAPSULE(30 MG) BY MOUTH EVERY EVENING AS NEEDED FOR INSOMNIA 90 capsule 1    fluticasone (FLONASE) 50 mcg/actuation nasal spray SPRAY ONCE IN EACH NOSTRIL ONCE DAILY 48 g 5    hydrocodone-acetaminophen 5-325mg (NORCO) 5-325 mg per tablet One to two tablets every 6 hours as needed for pain 50 tablet 0    lactobacillus rhamnosus GG (CULTURELLE) 10 billion cell capsule Take 1 capsule by mouth once daily.      lidocaine-prilocaine (EMLA) cream       loperamide (IMODIUM A-D) 2 mg Tab Take 2 mg by mouth 4 (four) times daily as needed.      metoprolol tartrate (LOPRESSOR) 50 MG tablet Take 0.5 tablets (25 mg total) by mouth 2 (two) times daily. 180 tablet 3    mirtazapine (REMERON) 15 MG tablet Take 1 tablet (15 mg total) by mouth every evening. 90 tablet 3    multivitamin capsule Take 1 capsule by mouth once daily.      NON FORMULARY MEDICATION 300 mg once daily. Desiccated Liver      rifAXIMin (XIFAXAN) 550 mg Tab Take 1 tablet (550 mg total) by mouth 3 (three) times daily. 42 tablet 0    tamsulosin (FLOMAX) 0.4 mg Cp24 Take 1 capsule (0.4 mg total) by mouth once daily. 90 capsule 0    valacyclovir (VALTREX) 500 MG tablet Take 1 tablet (500 mg total) by mouth once daily. 90 tablet 3    sulfamethoxazole-trimethoprim 800-160mg (BACTRIM DS) 800-160 mg Tab Take 1 tablet by mouth 2 (two) times daily. 20 tablet 0     No current facility-administered medications for this visit.        REVIEW OF  SYSTEMS:    GENERAL:  No weight loss, malaise or fevers.  HEENT:  Negative for frequent or significant headaches.  NECK:  Negative for lumps, goiter, pain and significant neck swelling.  RESPIRATORY:  Negative for cough, wheezing or shortness of breath.  CARDIOVASCULAR:  Negative for chest pain, leg swelling or palpitations. H/O CHF and cardiomyopathy.  GI:  Negative for abdominal discomfort, blood in stools or black stools or change in bowel habits.  MUSCULOSKELETAL:  See HPI.  SKIN:  Negative for lesions, rash, and itching.  PSYCH:  Negative for sleep disturbance, mood disorder and recent psychosocial stressors.  HEMATOLOGY/LYMPHOLOGY:  Negative for prolonged bleeding, bruising easily or swollen nodes. H/O lymphoma and squamous cell carcinoma.  NEURO:   No history of headaches, syncope, paralysis, seizures or tremors.  All other reviewed and negative other than HPI.    Past Medical History:  Past Medical History:   Diagnosis Date    Allergy     Anemia     Arthritis     Basal cell carcinoma     excised from upper back    Cancer     Cardiomyopathy due to chemotherapy     Cataract     CHF (congestive heart failure)     Coronary artery disease     D1    Encounter for blood transfusion     Eye injuries 20 yrs    ou fb several x's    Hyperlipidemia     Hypertension     Hypertensive heart disease with heart failure 1/31/2013    Lymphoma 1996    Obstructive sleep apnea on CPAP     SCC (squamous cell carcinoma) excised 12/2015    R proximal forearm    Skin disease     Sleep apnea     SQUAMOUS CELL CARCINOMA excised 2/14/13    L forearm    Squamous cell carcinoma excised 8/4/2016    IN SITU, Left thigh MOHS    Squamous cell carcinoma excised 8/25/2016    left helix, MOHS       Past Surgical History:  Past Surgical History:   Procedure Laterality Date    ABDOMINAL SURGERY      CATARACT EXTRACTION      od dr lincoln    CATARACT EXTRACTION W/  INTRAOCULAR LENS IMPLANT Left 01/12/2017      [place]    EYE SURGERY      Rt cataract    KIDNEY STONE SURGERY      SMALL INTESTINE SURGERY  09/2013    TONSILLECTOMY         Family History:  Family History   Problem Relation Age of Onset    Cataracts Mother     Hypertension Mother     Cataracts Father     Hypertension Father     Cancer Father     Hypertension Maternal Grandmother     Hypertension Maternal Grandfather     Hypertension Paternal Grandmother     Hypertension Paternal Grandfather     No Known Problems Sister     No Known Problems Brother     No Known Problems Maternal Aunt     No Known Problems Maternal Uncle     No Known Problems Paternal Aunt     No Known Problems Paternal Uncle     Amblyopia Neg Hx     Blindness Neg Hx     Diabetes Neg Hx     Glaucoma Neg Hx     Macular degeneration Neg Hx     Retinal detachment Neg Hx     Strabismus Neg Hx     Stroke Neg Hx     Thyroid disease Neg Hx     Melanoma Neg Hx     Heart disease Neg Hx     Celiac disease Neg Hx     Cirrhosis Neg Hx     Colon cancer Neg Hx     Colon polyps Neg Hx     Crohn's disease Neg Hx     Cystic fibrosis Neg Hx     Esophageal cancer Neg Hx     Hemochromatosis Neg Hx     Inflammatory bowel disease Neg Hx     Irritable bowel syndrome Neg Hx     Liver cancer Neg Hx     Liver disease Neg Hx     Rectal cancer Neg Hx     Stomach cancer Neg Hx     Ulcerative colitis Neg Hx     Burt's disease Neg Hx        Social History:  Social History     Social History    Marital status:      Spouse name: N/A    Number of children: N/A    Years of education: N/A     Occupational History    retired      Social History Main Topics    Smoking status: Former Smoker     Quit date: 1960    Smokeless tobacco: Never Used    Alcohol use Yes      Comment: socially    Drug use: No    Sexual activity: Yes     Partners: Female     Other Topics Concern    None     Social History Narrative    None       OBJECTIVE:    BP (!) 105/52   Pulse 64   Temp  "98 °F (36.7 °C)   Resp 20   Ht 5' 10" (1.778 m)   Wt 89 kg (196 lb 3.2 oz)   BMI 28.15 kg/m²     PHYSICAL EXAMINATION:    General appearance: Well appearing, in no acute distress, alert and oriented x3.  Psych:  Mood and affect appropriate.  Skin:  Leads removed without difficulty with tips intact.  Area cleaned with alcohol and Bacitracin applied.  There is slight swelling at lead sites with redness.  No active drainage.  Head/face:  Atraumatic, normocephalic. No palpable lymph nodes  Cor: RRR  Pulm: CTA  GI: Abdomen soft and non-tender.  Extremities: Peripheral joint ROM is full and pain free without obvious instability or laxity in all four extremities. No deformities, edema, or skin discoloration. Good capillary refill.  Neuro: Bilateral upper and lower extremity coordination and muscle stretch reflexes are physiologic and symmetric.  Plantar response are downgoing.  Decreased sensation to bilateral feet.  Gait: Antalgic.    ASSESSMENT: 71 y.o. year old male with lower back and BLE pain, consistent with the following diagnoses:     1. Lumbar radiculopathy     2. Chronic pain syndrome     3. Failed back syndrome     4. Sacroiliac inflammation           PLAN:     - Previous imaging was reviewed and discussed with the patient today.     - The patient is s/p lumbar SCS trial with significant benefit of usual pain.  He has been having increased pain to the surgical sites.  His sites today have some swelling and redness.  He also has some redness from the tape.  I will start him on PO Bactrim DS BID for 10 days in case of any infection.    - He would like to move forward with scheduling implant, which will be at least one month out.    - The patient will continue a home exercise routine to help with pain and strengthening.        The above plan and management options were discussed at length with patient. Patient is in agreement with the above and verbalized understanding.    Francisca Chen  05/30/2017  "

## 2017-05-31 LAB
ELASTASE 1, FECAL: >500 UG E/G STOOL
ELASTASE INTERPRETATION: NORMAL

## 2017-06-01 ENCOUNTER — OFFICE VISIT (OUTPATIENT)
Dept: HEMATOLOGY/ONCOLOGY | Facility: CLINIC | Age: 72
End: 2017-06-01
Payer: MEDICARE

## 2017-06-01 ENCOUNTER — LAB VISIT (OUTPATIENT)
Dept: LAB | Facility: HOSPITAL | Age: 72
End: 2017-06-01
Attending: INTERNAL MEDICINE
Payer: MEDICARE

## 2017-06-01 VITALS
BODY MASS INDEX: 26.69 KG/M2 | WEIGHT: 197.06 LBS | SYSTOLIC BLOOD PRESSURE: 122 MMHG | HEART RATE: 68 BPM | HEIGHT: 72 IN | DIASTOLIC BLOOD PRESSURE: 68 MMHG

## 2017-06-01 DIAGNOSIS — I42.7 CARDIOMYOPATHY DUE TO CHEMOTHERAPY: Chronic | ICD-10-CM

## 2017-06-01 DIAGNOSIS — T45.1X5A CARDIOMYOPATHY DUE TO CHEMOTHERAPY: Chronic | ICD-10-CM

## 2017-06-01 DIAGNOSIS — R19.7 DIARRHEA, UNSPECIFIED TYPE: ICD-10-CM

## 2017-06-01 DIAGNOSIS — G89.4 CHRONIC PAIN SYNDROME: ICD-10-CM

## 2017-06-01 DIAGNOSIS — C82.99 NODULAR LYMPHOMA OF EXTRANODAL AND/OR SOLID ORGAN SITE: ICD-10-CM

## 2017-06-01 DIAGNOSIS — D46.9 MYELODYSPLASTIC SYNDROME: ICD-10-CM

## 2017-06-01 DIAGNOSIS — C83.30 LARGE CELL (DIFFUSE) NON-HODGKIN'S LYMPHOMA: Primary | ICD-10-CM

## 2017-06-01 LAB
ALBUMIN SERPL BCP-MCNC: 3.7 G/DL
ALP SERPL-CCNC: 68 U/L
ALT SERPL W/O P-5'-P-CCNC: 14 U/L
ANION GAP SERPL CALC-SCNC: 10 MMOL/L
AST SERPL-CCNC: 20 U/L
BASOPHILS # BLD AUTO: 0.01 K/UL
BASOPHILS NFR BLD: 0.2 %
BILIRUB SERPL-MCNC: 0.3 MG/DL
BUN SERPL-MCNC: 14 MG/DL
CALCIUM SERPL-MCNC: 9.4 MG/DL
CHLORIDE SERPL-SCNC: 103 MMOL/L
CO2 SERPL-SCNC: 25 MMOL/L
CREAT SERPL-MCNC: 1.3 MG/DL
DIFFERENTIAL METHOD: ABNORMAL
EOSINOPHIL # BLD AUTO: 0.1 K/UL
EOSINOPHIL NFR BLD: 1 %
ERYTHROCYTE [DISTWIDTH] IN BLOOD BY AUTOMATED COUNT: 12.8 %
EST. GFR  (AFRICAN AMERICAN): >60 ML/MIN/1.73 M^2
EST. GFR  (NON AFRICAN AMERICAN): 54.1 ML/MIN/1.73 M^2
GLUCOSE SERPL-MCNC: 106 MG/DL
HCT VFR BLD AUTO: 38.3 %
HGB BLD-MCNC: 12.7 G/DL
LDH SERPL L TO P-CCNC: 172 U/L
LYMPHOCYTES # BLD AUTO: 2.1 K/UL
LYMPHOCYTES NFR BLD: 42.2 %
MCH RBC QN AUTO: 30.2 PG
MCHC RBC AUTO-ENTMCNC: 33.2 %
MCV RBC AUTO: 91 FL
MONOCYTES # BLD AUTO: 0.6 K/UL
MONOCYTES NFR BLD: 12.6 %
NEUTROPHILS # BLD AUTO: 2.2 K/UL
NEUTROPHILS NFR BLD: 44 %
PLATELET # BLD AUTO: 105 K/UL
PMV BLD AUTO: 8.7 FL
POTASSIUM SERPL-SCNC: 5 MMOL/L
PROT SERPL-MCNC: 6.2 G/DL
RBC # BLD AUTO: 4.21 M/UL
SODIUM SERPL-SCNC: 138 MMOL/L
WBC # BLD AUTO: 4.91 K/UL

## 2017-06-01 PROCEDURE — 99214 OFFICE O/P EST MOD 30 MIN: CPT | Mod: S$GLB,,, | Performed by: INTERNAL MEDICINE

## 2017-06-01 PROCEDURE — 1159F MED LIST DOCD IN RCRD: CPT | Mod: S$GLB,,, | Performed by: INTERNAL MEDICINE

## 2017-06-01 PROCEDURE — 99499 UNLISTED E&M SERVICE: CPT | Mod: S$GLB,,, | Performed by: INTERNAL MEDICINE

## 2017-06-01 PROCEDURE — 99999 PR PBB SHADOW E&M-EST. PATIENT-LVL III: CPT | Mod: PBBFAC,,, | Performed by: INTERNAL MEDICINE

## 2017-06-01 PROCEDURE — 1126F AMNT PAIN NOTED NONE PRSNT: CPT | Mod: S$GLB,,, | Performed by: INTERNAL MEDICINE

## 2017-06-01 RX ORDER — DIPHENOXYLATE HYDROCHLORIDE AND ATROPINE SULFATE 2.5; .025 MG/1; MG/1
TABLET ORAL
Qty: 60 TABLET | Refills: 3 | Status: ON HOLD
Start: 2017-06-01 | End: 2017-11-02 | Stop reason: HOSPADM

## 2017-06-01 NOTE — PROGRESS NOTES
HISTORY OF PRESENT ILLNESS:  Mr. Francis is a 71-year-old man who has been   treated many times for non-Hodgkin's lymphoma, which was follicular when it was   first diagnosed in 1996, and subsequently transformed into large cell lymphoma   in 2009.  The chronology of many treatments is recorded in my note of   01/13/2014.    His last treatment for large cell lymphoma was in 2013, when he received   gemcitabine, Rituxan and high-dose dexamethasone.  While taking this regimen, he   developed signs of peritonitis and in September 2013, had resection of a   segment of small intestine.  There was a 1.5 cm area of ulceration, but no   lymphoma was found.  A PET scan performed after the chemotherapy regimen showed   no evidence of persistent lymphoma, although there has continued to be some   enlargement of his spleen.    He experienced an episode of pneumonia in November 2014, and during that   hospitalization developed pancytopenia.  A bone marrow examination disclosed   dyserythropoiesis and dysgranulopoiesis.  The cytogenetic analysis had 10 of 20   metaphases with a 20q deletion and the FISH studies confirmed the 20q deletion   in 33% of cells.  In addition, 19% of cells had 4 copies of 7q31.  These results   suggested the presence of 2 abnormal clones, a 7q clone related to lymphoma and   a 20q clone indicating an increased risk of a myelodysplastic disorder.    Fortunately, his blood counts improved and they have remained stable for the   last 2 years, although he usually has mild thrombocytopenia and often has mild   anemia.    Since I last saw him, he has not had the fevers and sweats that have usually   accompanied lymphoma recurrences.  He has had no infections.  His energy and   activity levels have remained stable.    He has a long history of persistent low back pain along with radicular pain   in his legs and some neuropathic pain that is likely related to prior   chemotherapy drugs.  He is currently taking  hydrocodone 5/acetaminophen 325 once   or twice daily and that provides a moderate amount of relief of the back pain.    It has little effect on the neuropathic symptoms.  He has had a temporary nerve   stimulator device implanted and he tells me that he is to have a permanent   apparatus implanted on July 5, 2017.  He thought there was some improvement in his   pain during the week that he used the temporary apparatus.    He also has a long history of diarrhea.  He has tried cholestyramine, but   stopped it because of abdominal bloating.  He took Imodium for a lengthy period   of time and recently concluded that it was not of much value.  When I last saw   him in early March 2017, I recommended that he resume Lomotil and he feels that   has been much more effective.  It seems to stop the diarrhea for at least 24 and   sometimes 36 hours.  I have suggested that he take this at least daily and can   take it as often as every 8 hours.    He has long had cardiomyopathy, probably related to anthracycline therapy many   years ago.  He also has coronary artery disease.    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  He continues to have some muscle   cramps.  He also has a rotator cuff tear, hypertension and obstructive sleep   apnea.    ADDITIONAL PAST HISTORY, SYSTEM REVIEW, SOCIAL HISTORY AND FAMILY HISTORY:  Have   been reviewed with the patient and his wife and updated in the electronic   record.    PHYSICAL EXAMINATION:  GENERAL APPEARANCE:  Well-developed, well-nourished man, in no distress.  EYES:  No jaundice or pallor.  SINUSES:  No tenderness.  NOSE:  Clear nares.  MOUTH AND THROAT:  Good dentition.  No mucosal lesions.  EARS:  Hearing aids. Adequate auditory acuity.  NECK:  No thyroid abnormalities.  No masses.  Faint right carotid bruit.  LYMPH NODES:  No enlarged cervical, axillary or inguinal nodes.  CHEST AND LUNGS:  Normal  respiratory effort.  Clear to auscultation and   percussion.  HEART:  Regular rate and rhythm without murmur or gallop.  ABDOMEN:  Soft without masses or tenderness.  No hepatosplenomegaly.  Ventral   hernia.  EXTREMITIES:  No edema or cyanosis.  PERIPHERAL VASCULATURE:  Diminished pulses in the feet and ankles.  Good   popliteal pulses.  NEUROLOGIC:  He is fully oriented.  Mental status is good.  Gait is unsteady.  SKIN:  No suspicious lesions in the areas examined.    LABORATORY STUDIES:  Blood counts today include hemoglobin 12.7, platelets   105,000 and WBC 4910.  Comprehensive metabolic profile is entirely normal.  LDH   is 172.    IMPRESSION:  1.  Non-Hodgkin's lymphoma, which has recurred in both follicular and diffuse   patterns.  He is currently without symptoms or signs of this disease.  2.  Probable myelodysplastic disorder.  3.  Peripheral neuropathy.  4.  Lumbar spondylosis.  5.  Chronic diarrhea.  6.  Cardiomyopathy.    RECOMMENDATIONS:  1.  As noted above, he can take Lomotil on a regular basis.  2.  He will continue hydrocodone 5/APAP 325 as often as every 6 hours.  3.  Return visit in 4 months ([facility] appointment) with CBC, CMP and LDH.    Mr. Francis had numerous questions and concerns about not only lymphoma, but   also his chronic pain condition, his diarrhea and some other issues.  Most of   his 35-minute visit today was devoted to reviewing those matters with him and   counseling him about them.      MICHAELB/HN  dd: 06/01/2017 15:39:15 (CDT)  td: 06/02/2017 12:51:14 (CDT)  Doc ID   #[id number]  Job ID #[id number]    CC:

## 2017-06-02 ENCOUNTER — PATIENT MESSAGE (OUTPATIENT)
Dept: GASTROENTEROLOGY | Facility: CLINIC | Age: 72
End: 2017-06-02

## 2017-06-02 ENCOUNTER — TELEPHONE (OUTPATIENT)
Dept: GASTROENTEROLOGY | Facility: CLINIC | Age: 72
End: 2017-06-02

## 2017-06-02 NOTE — TELEPHONE ENCOUNTER
Spoke with patient's wife. She was informed Pa for Xifaxan was approved, but Mr. Francis is going to have to pay 339.00 for the medication.    She verbalized understanding.

## 2017-06-17 ENCOUNTER — PATIENT MESSAGE (OUTPATIENT)
Dept: SURGERY | Facility: OTHER | Age: 72
End: 2017-06-17

## 2017-06-19 NOTE — PLAN OF CARE
06/19/17 1654   ED Admissions Case Approval   ED Admissions Case Approval (!) CM Approved  (OP )

## 2017-06-20 ENCOUNTER — PATIENT MESSAGE (OUTPATIENT)
Dept: PAIN MEDICINE | Facility: CLINIC | Age: 72
End: 2017-06-20

## 2017-06-20 RX ORDER — ENALAPRIL MALEATE 5 MG/1
5 TABLET ORAL DAILY
Qty: 90 TABLET | Refills: 3 | Status: SHIPPED | OUTPATIENT
Start: 2017-06-20 | End: 2018-01-25 | Stop reason: SDUPTHER

## 2017-06-23 ENCOUNTER — ANESTHESIA EVENT (OUTPATIENT)
Dept: SURGERY | Facility: OTHER | Age: 72
End: 2017-06-23
Payer: MEDICARE

## 2017-06-23 ENCOUNTER — HOSPITAL ENCOUNTER (OUTPATIENT)
Dept: PREADMISSION TESTING | Facility: OTHER | Age: 72
Discharge: HOME | End: 2017-06-23
Attending: ANESTHESIOLOGY
Payer: MEDICARE

## 2017-06-23 ENCOUNTER — TELEPHONE (OUTPATIENT)
Dept: PAIN MEDICINE | Facility: CLINIC | Age: 72
End: 2017-06-23

## 2017-06-23 VITALS
HEART RATE: 67 BPM | TEMPERATURE: 98 F | SYSTOLIC BLOOD PRESSURE: 139 MMHG | OXYGEN SATURATION: 95 % | BODY MASS INDEX: 27.16 KG/M2 | HEIGHT: 71 IN | WEIGHT: 194 LBS | DIASTOLIC BLOOD PRESSURE: 75 MMHG

## 2017-06-23 RX ORDER — ACETAMINOPHEN 500 MG
500 TABLET ORAL
Status: ON HOLD | COMMUNITY
End: 2017-11-02 | Stop reason: HOSPADM

## 2017-06-23 RX ORDER — CARISOPRODOL 350 MG/1
350 TABLET ORAL
Status: ON HOLD | COMMUNITY
End: 2017-11-02 | Stop reason: HOSPADM

## 2017-06-23 RX ORDER — FAMOTIDINE 20 MG/1
20 TABLET, FILM COATED ORAL
Status: CANCELLED | OUTPATIENT
Start: 2017-06-23 | End: 2017-06-23

## 2017-06-23 RX ORDER — SODIUM CHLORIDE, SODIUM LACTATE, POTASSIUM CHLORIDE, CALCIUM CHLORIDE 600; 310; 30; 20 MG/100ML; MG/100ML; MG/100ML; MG/100ML
INJECTION, SOLUTION INTRAVENOUS CONTINUOUS
Status: CANCELLED | OUTPATIENT
Start: 2017-06-23

## 2017-06-23 NOTE — DISCHARGE INSTRUCTIONS
PRE-ADMIT TESTING -  [phone]    [address]  [facility]        OUTPATIENT SURGERY UNIT - [phone]    Your surgery has been scheduled at [facility]. We are pleased to have the opportunity to serve you. For Further Information please call [phone].    On the day of surgery please report to the Information Desk on the 1st floor.    · CONTACT YOUR PHYSICIAN'S OFFICE THE DAY PRIOR TO YOUR SURGERY TO OBTAIN YOUR ARRIVAL TIME.     · The evening before surgery do not eat anything after 9 p.m. ( this includes hard candy, chewing gum and mints).  You may only have GATORADE, POWERADE AND WATER  from 9 p.m. until you leave your home.   DO NOT DRINK ANY LIQUIDS ON THE WAY TO THE HOSPITAL.      SPECIAL MEDICATION INSTRUCTIONS: TAKE medications checked off by the Anesthesiologist on your Medication List.    Angiogram Patients: Take medications as instructed by your physician, including aspirin.     Surgery Patients:    If you take ASPIRIN - Your PHYSICIAN/SURGEON will need to inform you IF/OR when you need to stop taking aspirin prior to your surgery.     Do Not take any medications containing IBUPROFEN.  Do Not Wear any make-up or dark nail polish   (especially eye make-up) to surgery. If you come to surgery with makeup on you will be required to remove the makeup or nail polish.    Do not shave your surgical area at least 5 days prior to your surgery. The surgical prep will be performed at the hospital according to Infection Control regulations.    Leave all valuables at home.   Do Not wear any jewelry or watches, including any metal in body piercings.  Contact Lens must be removed before surgery. Either do not wear the contact lens or bring a case and solution for storage.  Please bring a container for eyeglasses or dentures as required.  Bring any paperwork your physician has provided, such as consent forms,  history and physicals, doctor's orders, etc.   Bring comfortable clothes  that are loose fitting to wear upon discharge. Take into consideration the type of surgery being performed.  Maintain your diet as advised per your physician the day prior to surgery.      Adequate rest the night before surgery is advised.   Park in the Parking lot behind the hospital or in the [facility] across the street from the parking lot. Parking is complimentary.  If you will be discharged the same day as your procedure, please arrange for a responsible adult to drive you home or to accompany you if traveling by taxi.   YOU WILL NOT BE PERMITTED TO DRIVE OR TO LEAVE THE HOSPITAL ALONE AFTER SURGERY.   It is strongly recommended that you arrange for someone to remain with you for the first 24 hrs following your surgery.       Thank you for your cooperation.  The Staff of [facility].        Bathing Instructions                                                                 Please shower the evening before and morning of your procedure with    ANTIBACTERIAL SOAP. ( DIAL, etc )  Concentrate on the surgical area   for at least 3 minutes and rinse completely. Dry off as usual.   Do not use any deodorant, powder, body lotions, perfume, after shave or    cologne.

## 2017-06-23 NOTE — ANESTHESIA PREPROCEDURE EVALUATION
06/23/2017  Royce Francis is a 71 y.o., male.    Anesthesia Evaluation    I have reviewed the Patient Summary Reports.    I have reviewed the Nursing Notes.   I have reviewed the Medications.     Review of Systems  Anesthesia Hx:  No problems with previous Anesthesia  Denies Family Hx of Anesthesia complications.   Denies Personal Hx of Anesthesia complications.   Social:  Non-Smoker    Hematology/Oncology:         -- Anemia: Denies Current/Recent Cancer --  Cancer in past history:  Other (see Oncology comments) Oncology Comments: Lymphoma in remission, last 2013     Cardiovascular:   Hypertension CHF Chemo induced heart cardiomyopathy   Pulmonary:   Sleep Apnea, CPAP    Renal/:   renal calculi    Hepatic/GI:   GERD    Musculoskeletal:  Spine Disorders: lumbar Chronic Pain    Neurological:   Chemo neuropathy of feet   Endocrine:  Endocrine Normal        Physical Exam  General:  Well nourished    Airway/Jaw/Neck:  Airway Findings: Mouth Opening: Normal Tongue: Normal  General Airway Assessment: Adult  Mallampati: II  TM Distance: Normal, at least 6 cm         Dental:  Dental Findings: In tact             Anesthesia Plan  Type of Anesthesia, risks & benefits discussed:  Anesthesia Type:  general  Patient's Preference:   Intra-op Monitoring Plan: standard ASA monitors  Intra-op Monitoring Plan Comments:   Post Op Pain Control Plan:   Post Op Pain Control Plan Comments:   Induction:   IV  Beta Blocker:         Informed Consent: Patient understands risks and agrees with Anesthesia plan.  Questions answered. Anesthesia consent signed with patient.  ASA Score: 3     Day of Surgery Review of History & Physical:    H&P update referred to the surgeon.     Anesthesia Plan Notes: Plan GA w JARROD        Ready For Surgery From Anesthesia Perspective.

## 2017-06-23 NOTE — TELEPHONE ENCOUNTER
----- Message from Rozina Lemus sent at 6/23/2017  9:30 AM CDT -----  Contact: Randell  X _1st Request  _  2nd Request  _  3rd Request    Who:Randell pre op registration     Why:Randell called to see if the patient was in a clinical trial for his procedure on 06/29/2017    What Number to Call Back: [phone]    When to Expect a call back: (Before the end of the day)   -- if call after 3:00 call back will be tomorrow.

## 2017-06-26 ENCOUNTER — PATIENT MESSAGE (OUTPATIENT)
Dept: SURGERY | Facility: OTHER | Age: 72
End: 2017-06-26

## 2017-06-29 ENCOUNTER — HOSPITAL ENCOUNTER (OUTPATIENT)
Facility: OTHER | Age: 72
Discharge: HOME | End: 2017-06-29
Attending: ANESTHESIOLOGY | Admitting: ANESTHESIOLOGY
Payer: MEDICARE

## 2017-06-29 ENCOUNTER — ANESTHESIA (OUTPATIENT)
Dept: SURGERY | Facility: OTHER | Age: 72
End: 2017-06-29
Payer: MEDICARE

## 2017-06-29 ENCOUNTER — SURGERY (OUTPATIENT)
Age: 72
End: 2017-06-29

## 2017-06-29 VITALS
BODY MASS INDEX: 27.16 KG/M2 | RESPIRATION RATE: 16 BRPM | TEMPERATURE: 99 F | HEART RATE: 74 BPM | WEIGHT: 194 LBS | HEIGHT: 71 IN | OXYGEN SATURATION: 98 % | SYSTOLIC BLOOD PRESSURE: 131 MMHG | DIASTOLIC BLOOD PRESSURE: 63 MMHG

## 2017-06-29 DIAGNOSIS — G89.4 CHRONIC PAIN SYNDROME: ICD-10-CM

## 2017-06-29 DIAGNOSIS — G57.90 MONONEURITIS LOWER LIMB, UNSPECIFIED LATERALITY: ICD-10-CM

## 2017-06-29 DIAGNOSIS — G62.0 DRUG-INDUCED POLYNEUROPATHY: Primary | ICD-10-CM

## 2017-06-29 PROCEDURE — C1822 GEN, NEURO, HF, RECHG BAT: HCPCS | Performed by: ANESTHESIOLOGY

## 2017-06-29 PROCEDURE — C2631 REP DEV, URINARY, W/O SLING: HCPCS | Performed by: ANESTHESIOLOGY

## 2017-06-29 PROCEDURE — 63600175 PHARM REV CODE 636 W HCPCS: Performed by: NURSE ANESTHETIST, CERTIFIED REGISTERED

## 2017-06-29 PROCEDURE — 63685 INS/RPLC SPI NPG/RCVR POCKET: CPT | Mod: 59,,, | Performed by: ANESTHESIOLOGY

## 2017-06-29 PROCEDURE — 25000003 PHARM REV CODE 250: Performed by: NURSE ANESTHETIST, CERTIFIED REGISTERED

## 2017-06-29 PROCEDURE — 25000003 PHARM REV CODE 250: Performed by: ANESTHESIOLOGY

## 2017-06-29 PROCEDURE — 27800903 OPTIME MED/SURG SUP & DEVICES OTHER IMPLANTS: Performed by: ANESTHESIOLOGY

## 2017-06-29 PROCEDURE — 63650 IMPLANT NEUROELECTRODES: CPT | Mod: ,,, | Performed by: ANESTHESIOLOGY

## 2017-06-29 PROCEDURE — 71000033 HC RECOVERY, INTIAL HOUR: Performed by: ANESTHESIOLOGY

## 2017-06-29 PROCEDURE — 37000008 HC ANESTHESIA 1ST 15 MINUTES: Performed by: ANESTHESIOLOGY

## 2017-06-29 PROCEDURE — C1787 PATIENT PROGR, NEUROSTIM: HCPCS | Performed by: ANESTHESIOLOGY

## 2017-06-29 PROCEDURE — 27100025 HC TUBING, SET FLUID WARMER: Performed by: NURSE ANESTHETIST, CERTIFIED REGISTERED

## 2017-06-29 PROCEDURE — 36000706: Performed by: ANESTHESIOLOGY

## 2017-06-29 PROCEDURE — 71000039 HC RECOVERY, EACH ADD'L HOUR: Performed by: ANESTHESIOLOGY

## 2017-06-29 PROCEDURE — 95971 ALYS SMPL SP/PN NPGT W/PRGRM: CPT | Mod: 59,,, | Performed by: ANESTHESIOLOGY

## 2017-06-29 PROCEDURE — 71000015 HC POSTOP RECOV 1ST HR: Performed by: ANESTHESIOLOGY

## 2017-06-29 PROCEDURE — 71000016 HC POSTOP RECOV ADDL HR: Performed by: ANESTHESIOLOGY

## 2017-06-29 PROCEDURE — 37000009 HC ANESTHESIA EA ADD 15 MINS: Performed by: ANESTHESIOLOGY

## 2017-06-29 PROCEDURE — C1778 LEAD, NEUROSTIMULATOR: HCPCS | Performed by: ANESTHESIOLOGY

## 2017-06-29 PROCEDURE — 36000707: Performed by: ANESTHESIOLOGY

## 2017-06-29 DEVICE — KIT CHARGER: Type: IMPLANTABLE DEVICE | Site: BACK | Status: FUNCTIONAL

## 2017-06-29 DEVICE — KIT PATIENT REMOTE: Type: IMPLANTABLE DEVICE | Site: BACK | Status: FUNCTIONAL

## 2017-06-29 DEVICE — IPG SENZA 1500: Type: IMPLANTABLE DEVICE | Site: BACK | Status: FUNCTIONAL

## 2017-06-29 DEVICE — KIT LEAD 70CM: Type: IMPLANTABLE DEVICE | Site: BACK | Status: FUNCTIONAL

## 2017-06-29 DEVICE — KIT LEAD ANCHOR N3000: Type: IMPLANTABLE DEVICE | Site: BACK | Status: FUNCTIONAL

## 2017-06-29 RX ORDER — KETOROLAC TROMETHAMINE 30 MG/ML
INJECTION, SOLUTION INTRAMUSCULAR; INTRAVENOUS
Status: DISCONTINUED | OUTPATIENT
Start: 2017-06-29 | End: 2017-06-29

## 2017-06-29 RX ORDER — PHENYLEPHRINE HYDROCHLORIDE 10 MG/ML
INJECTION INTRAVENOUS
Status: DISCONTINUED | OUTPATIENT
Start: 2017-06-29 | End: 2017-06-29

## 2017-06-29 RX ORDER — ACETAMINOPHEN 10 MG/ML
INJECTION, SOLUTION INTRAVENOUS
Status: DISCONTINUED | OUTPATIENT
Start: 2017-06-29 | End: 2017-06-29

## 2017-06-29 RX ORDER — DEXAMETHASONE SODIUM PHOSPHATE 4 MG/ML
INJECTION, SOLUTION INTRA-ARTICULAR; INTRALESIONAL; INTRAMUSCULAR; INTRAVENOUS; SOFT TISSUE
Status: DISCONTINUED | OUTPATIENT
Start: 2017-06-29 | End: 2017-06-29

## 2017-06-29 RX ORDER — OXYCODONE HYDROCHLORIDE 5 MG/1
5 TABLET ORAL
Status: DISCONTINUED | OUTPATIENT
Start: 2017-06-29 | End: 2017-06-30 | Stop reason: HOSPADM

## 2017-06-29 RX ORDER — ONDANSETRON 2 MG/ML
INJECTION INTRAMUSCULAR; INTRAVENOUS
Status: DISCONTINUED | OUTPATIENT
Start: 2017-06-29 | End: 2017-06-29

## 2017-06-29 RX ORDER — SODIUM CHLORIDE 0.9 % (FLUSH) 0.9 %
3 SYRINGE (ML) INJECTION EVERY 8 HOURS
Status: DISCONTINUED | OUTPATIENT
Start: 2017-06-29 | End: 2017-06-30 | Stop reason: HOSPADM

## 2017-06-29 RX ORDER — SODIUM CHLORIDE 0.9 % (FLUSH) 0.9 %
3 SYRINGE (ML) INJECTION
Status: DISCONTINUED | OUTPATIENT
Start: 2017-06-29 | End: 2017-06-30 | Stop reason: HOSPADM

## 2017-06-29 RX ORDER — GLYCOPYRROLATE 0.2 MG/ML
INJECTION INTRAMUSCULAR; INTRAVENOUS
Status: DISCONTINUED | OUTPATIENT
Start: 2017-06-29 | End: 2017-06-29

## 2017-06-29 RX ORDER — HYDROMORPHONE HYDROCHLORIDE 2 MG/ML
0.4 INJECTION, SOLUTION INTRAMUSCULAR; INTRAVENOUS; SUBCUTANEOUS EVERY 5 MIN PRN
Status: DISCONTINUED | OUTPATIENT
Start: 2017-06-29 | End: 2017-06-30 | Stop reason: HOSPADM

## 2017-06-29 RX ORDER — ONDANSETRON 2 MG/ML
4 INJECTION INTRAMUSCULAR; INTRAVENOUS ONCE AS NEEDED
Status: DISCONTINUED | OUTPATIENT
Start: 2017-06-29 | End: 2017-06-30 | Stop reason: HOSPADM

## 2017-06-29 RX ORDER — PROPOFOL 10 MG/ML
VIAL (ML) INTRAVENOUS
Status: DISCONTINUED | OUTPATIENT
Start: 2017-06-29 | End: 2017-06-29

## 2017-06-29 RX ORDER — LIDOCAINE HYDROCHLORIDE 10 MG/ML
INJECTION, SOLUTION INTRAVENOUS
Status: DISCONTINUED | OUTPATIENT
Start: 2017-06-29 | End: 2017-06-29

## 2017-06-29 RX ORDER — BUPIVACAINE HYDROCHLORIDE AND EPINEPHRINE 2.5; 5 MG/ML; UG/ML
INJECTION, SOLUTION EPIDURAL; INFILTRATION; INTRACAUDAL; PERINEURAL
Status: DISCONTINUED | OUTPATIENT
Start: 2017-06-29 | End: 2017-06-29 | Stop reason: HOSPADM

## 2017-06-29 RX ORDER — FAMOTIDINE 20 MG/1
20 TABLET, FILM COATED ORAL
Status: COMPLETED | OUTPATIENT
Start: 2017-06-29 | End: 2017-06-29

## 2017-06-29 RX ORDER — SODIUM CHLORIDE, SODIUM LACTATE, POTASSIUM CHLORIDE, CALCIUM CHLORIDE 600; 310; 30; 20 MG/100ML; MG/100ML; MG/100ML; MG/100ML
INJECTION, SOLUTION INTRAVENOUS CONTINUOUS
Status: DISCONTINUED | OUTPATIENT
Start: 2017-06-29 | End: 2017-06-30 | Stop reason: HOSPADM

## 2017-06-29 RX ORDER — FENTANYL CITRATE 50 UG/ML
25 INJECTION, SOLUTION INTRAMUSCULAR; INTRAVENOUS EVERY 5 MIN PRN
Status: DISCONTINUED | OUTPATIENT
Start: 2017-06-29 | End: 2017-06-30 | Stop reason: HOSPADM

## 2017-06-29 RX ORDER — BACITRACIN 50000 [IU]/1
INJECTION, POWDER, FOR SOLUTION INTRAMUSCULAR
Status: DISCONTINUED | OUTPATIENT
Start: 2017-06-29 | End: 2017-06-29 | Stop reason: HOSPADM

## 2017-06-29 RX ORDER — BACITRACIN 500 [USP'U]/G
OINTMENT TOPICAL
Status: DISCONTINUED | OUTPATIENT
Start: 2017-06-29 | End: 2017-06-29 | Stop reason: HOSPADM

## 2017-06-29 RX ORDER — NEOSTIGMINE METHYLSULFATE 1 MG/ML
INJECTION, SOLUTION INTRAVENOUS
Status: DISCONTINUED | OUTPATIENT
Start: 2017-06-29 | End: 2017-06-29

## 2017-06-29 RX ORDER — MEPERIDINE HYDROCHLORIDE 50 MG/ML
12.5 INJECTION INTRAMUSCULAR; INTRAVENOUS; SUBCUTANEOUS ONCE AS NEEDED
Status: DISCONTINUED | OUTPATIENT
Start: 2017-06-29 | End: 2017-06-30 | Stop reason: HOSPADM

## 2017-06-29 RX ORDER — SODIUM CHLORIDE, SODIUM LACTATE, POTASSIUM CHLORIDE, CALCIUM CHLORIDE 600; 310; 30; 20 MG/100ML; MG/100ML; MG/100ML; MG/100ML
INJECTION, SOLUTION INTRAVENOUS CONTINUOUS PRN
Status: DISCONTINUED | OUTPATIENT
Start: 2017-06-29 | End: 2017-06-29

## 2017-06-29 RX ORDER — OXYCODONE AND ACETAMINOPHEN 10; 325 MG/1; MG/1
1 TABLET ORAL EVERY 8 HOURS PRN
Qty: 45 TABLET | Refills: 0 | Status: SHIPPED | OUTPATIENT
Start: 2017-06-29 | End: 2017-07-10

## 2017-06-29 RX ORDER — FENTANYL CITRATE 50 UG/ML
INJECTION, SOLUTION INTRAMUSCULAR; INTRAVENOUS
Status: DISCONTINUED | OUTPATIENT
Start: 2017-06-29 | End: 2017-06-29

## 2017-06-29 RX ORDER — ROCURONIUM BROMIDE 10 MG/ML
INJECTION, SOLUTION INTRAVENOUS
Status: DISCONTINUED | OUTPATIENT
Start: 2017-06-29 | End: 2017-06-29

## 2017-06-29 RX ORDER — CEPHALEXIN 500 MG/1
500 CAPSULE ORAL 4 TIMES DAILY
Qty: 28 CAPSULE | Refills: 0 | Status: SHIPPED | OUTPATIENT
Start: 2017-06-29 | End: 2017-07-06

## 2017-06-29 RX ORDER — CLINDAMYCIN PHOSPHATE 900 MG/50ML
900 INJECTION, SOLUTION INTRAVENOUS ONCE
Status: DISCONTINUED | OUTPATIENT
Start: 2017-06-29 | End: 2017-06-30 | Stop reason: HOSPADM

## 2017-06-29 RX ORDER — SODIUM CHLORIDE 9 MG/ML
INJECTION, SOLUTION INTRAVENOUS CONTINUOUS
Status: DISCONTINUED | OUTPATIENT
Start: 2017-06-29 | End: 2017-06-30 | Stop reason: HOSPADM

## 2017-06-29 RX ADMIN — OXYCODONE HYDROCHLORIDE 5 MG: 5 TABLET ORAL at 09:06

## 2017-06-29 RX ADMIN — EPHEDRINE SULFATE 10 MG: 50 INJECTION INTRAMUSCULAR; INTRAVENOUS; SUBCUTANEOUS at 06:06

## 2017-06-29 RX ADMIN — SODIUM CHLORIDE, SODIUM LACTATE, POTASSIUM CHLORIDE, AND CALCIUM CHLORIDE: 600; 310; 30; 20 INJECTION, SOLUTION INTRAVENOUS at 05:06

## 2017-06-29 RX ADMIN — BACITRACIN 50000 UNITS: 50000 INJECTION, POWDER, FOR SOLUTION INTRAMUSCULAR at 06:06

## 2017-06-29 RX ADMIN — BUPIVACAINE HYDROCHLORIDE AND EPINEPHRINE BITARTRATE 30 ML: 2.5; .0091 INJECTION, SOLUTION EPIDURAL; INFILTRATION; INTRACAUDAL; PERINEURAL at 06:06

## 2017-06-29 RX ADMIN — NEOSTIGMINE METHYLSULFATE 3 MG: 1 INJECTION INTRAVENOUS at 07:06

## 2017-06-29 RX ADMIN — PHENYLEPHRINE HYDROCHLORIDE 200 MCG: 10 INJECTION INTRAVENOUS at 05:06

## 2017-06-29 RX ADMIN — DEXAMETHASONE SODIUM PHOSPHATE 4 MG: 4 INJECTION, SOLUTION INTRAMUSCULAR; INTRAVENOUS at 05:06

## 2017-06-29 RX ADMIN — ROCURONIUM BROMIDE 50 MG: 10 INJECTION, SOLUTION INTRAVENOUS at 05:06

## 2017-06-29 RX ADMIN — KETOROLAC TROMETHAMINE 30 MG: 30 INJECTION, SOLUTION INTRAMUSCULAR; INTRAVENOUS at 07:06

## 2017-06-29 RX ADMIN — PHENYLEPHRINE HYDROCHLORIDE 100 MCG: 10 INJECTION INTRAVENOUS at 06:06

## 2017-06-29 RX ADMIN — FENTANYL CITRATE 50 MCG: 50 INJECTION, SOLUTION INTRAMUSCULAR; INTRAVENOUS at 06:06

## 2017-06-29 RX ADMIN — PHENYLEPHRINE HYDROCHLORIDE 100 MCG: 10 INJECTION INTRAVENOUS at 07:06

## 2017-06-29 RX ADMIN — FENTANYL CITRATE 50 MCG: 50 INJECTION, SOLUTION INTRAMUSCULAR; INTRAVENOUS at 07:06

## 2017-06-29 RX ADMIN — FENTANYL CITRATE 100 MCG: 50 INJECTION, SOLUTION INTRAMUSCULAR; INTRAVENOUS at 05:06

## 2017-06-29 RX ADMIN — PROPOFOL 200 MG: 10 INJECTION, EMULSION INTRAVENOUS at 05:06

## 2017-06-29 RX ADMIN — PHENYLEPHRINE HYDROCHLORIDE 100 MCG: 10 INJECTION INTRAVENOUS at 05:06

## 2017-06-29 RX ADMIN — ONDANSETRON 4 MG: 2 INJECTION INTRAMUSCULAR; INTRAVENOUS at 06:06

## 2017-06-29 RX ADMIN — BACITRACIN 1 G: 500 OINTMENT TOPICAL at 07:06

## 2017-06-29 RX ADMIN — PROPOFOL 30 MG: 10 INJECTION, EMULSION INTRAVENOUS at 07:06

## 2017-06-29 RX ADMIN — FAMOTIDINE 20 MG: 20 TABLET, FILM COATED ORAL at 01:06

## 2017-06-29 RX ADMIN — ACETAMINOPHEN 1000 MG: 10 INJECTION, SOLUTION INTRAVENOUS at 05:06

## 2017-06-29 RX ADMIN — GLYCOPYRROLATE 0.6 MG: 0.2 INJECTION, SOLUTION INTRAMUSCULAR; INTRAVENOUS at 07:06

## 2017-06-29 RX ADMIN — DEXTROSE 2 G: 50 INJECTION, SOLUTION INTRAVENOUS at 05:06

## 2017-06-29 RX ADMIN — LIDOCAINE HYDROCHLORIDE 50 MG: 10 INJECTION, SOLUTION INTRAVENOUS at 05:06

## 2017-06-29 NOTE — INTERVAL H&P NOTE
The patient has been examined and the H&P has been reviewed:    I concur with the findings and no changes have occurred since H&P was written.    Anesthesia/Surgery risks, benefits and alternative options discussed and understood by patient/family.          Active Hospital Problems    Diagnosis  POA    Chronic pain syndrome [G89.4]  Yes      Resolved Hospital Problems    Diagnosis Date Resolved POA   No resolved problems to display.

## 2017-06-29 NOTE — PLAN OF CARE
Patient prefers to have  [place] spouse present for discharge teaching. Please contact them @ [phone].

## 2017-06-29 NOTE — DISCHARGE INSTRUCTIONS
Spinal Cord Stimulation: Your Experience  Pain messages travel over nerve pathways to the spinal cord, inside the spine. The spinal cord carries the messages to the brain. Constant pain messages can cause long-term pain that is hard to treat. This is known as chronic pain. Spinal cord stimulation uses a medical device to send signals to the nerve pathways inside your spinal cord. These signals help block the pain.  Your healthcare provider does a stimulator placement in two stages. He or she does a test (trial) stage to see how well spinal cord stimulation works for you. If the trial stage is a success, the permanent stimulator system is put into place.     Placing the permanent system  If the trial stimulator works well for you, a permanent system might be indicated. This must be done in the hospital. Prepare for it as instructed. The  or the power source is implanted under the skin on your abdomen or buttocks. The power source is small, so it wont show under your clothing. Some devices are rechargeable After the system is in place, the settings are checked to make sure they are at the right level for you. If necessary, the spinal cord stimulation device can be removed at any time. Not all these systems are MRI compatible. Find out from your doctor if you still can have an MRI once the system is installed.    After the procedures  You may stay in the hospital overnight. The implant site will be sore for a few days. The leads need some time to become fixed so they dont move around. Your doctor will tell you what activities to avoid for the next month or so.    When to call your doctor  Call your doctor right away if you:    · Have fever over 100.4°F (38°C)  · Have chills  · Have pain, drainage, or increased redness at the implant site  · If you don't feel the stimulation anymore    Also call your doctor if the pain symptoms return.     Anesthesia: Monitored Anesthesia Care (MAC)    Anesthesia  Safety  · Have an adult family member or friend drive you home after the procedure.  · For the first 24 hours after your surgery:  ¨ Do not drive or use heavy equipment.  ¨ Do not make important decisions or sign documents.  ¨ Avoid alcohol.  ¨ Have someone stay with you, if possible. They can watch for problems and help keep you safe.    PLEASE FOLLOW ANY OTHER INSTRUCTIONS PROVIDED TO YOU BY DR. RAM!

## 2017-06-29 NOTE — H&P (VIEW-ONLY)
Chronic patient Established Note (Follow up visit)      SUBJECTIVE:    Royce Francis presents to the clinic for a follow-up appointment for lower back and bilateral knee pain.  He is s/p Nevro lumbar SCS trial on 5/23/17 with 75% benefit of his back and leg pain.  He has had pain at the surgical sites which has been severe over the past few days.  He does report that he performed a lot of outdoor activity during the trial period.  He reports sweating to the area as well.  He denies any fever or night sweats.  He has a hx of non-Hodgkins lymphoma which has recurred appx 6 times in different locations.  He is followed by Dr. Lucia at [facility].  Since the last visit, Royce Francis states the pain has been improving.  Current pain intensity is 10/10 (at surgical sites).  The patient denies any bowel/bladder incontinence or symptoms of saddle paresthesia.        Pain Disability Index Review:  Last 3 PDI Scores 5/30/2017 4/6/2017 1/16/2017   Pain Disability Index (PDI) 70 28 17       Pain Medications:    - Opioids: Norco (Hydrocodone/Acetaminophen)- Dr Lucia    Opioid Contract: no     report:  Reviewed and consistent with medication use as prescribed.    Pain Procedures:   5/23/17 Lumbar SCS trial- significant benefit of back and leg pain  1/16/17 Left L2,3,4,5 RFA- significant relief  12/14/16 Bilateral Synvisc One- significant relief  3/28/16- Bilateral L4-5 AND L5-S1 Facet injection  8/28/15- Bilateral L4-5 and L5-S1 Facet injection  6/5/15- Caudal DARIO with Racz Catheter   4/10/15- Bilateral L4-5 TF DARIO  2/20/15- Left L3-4-5 RFA  2/13/15- right L3-4-5 RFA  1/16/15- bilateral L3-4-5 MBB  9/12/14- bilateral L3-4-5 MBB  8/15/14- Bilateral L4-5 TF DARIO    Physical Therapy/Home Exercise: per self    Imaging:     Thoracic XRAY 4/21/16  Narrative   AP and lateral radiographs of the thoracic spine were obtained.  There is mild scoliosis of the thoracolumbar spine.  Posterior vertebral alignment appears  satisfactory.  Vertebral body heights appear well-maintained.  There are degenerative changes with small anterior osteophytes noted throughout the thoracic spine.  No abnormal paraspinal masses are evident.   Impression     Thoracic spondylosis.  Mild thoracolumbar scoliosis.  No evidence for acute fracture, bone destruction, or subluxation.     Lumbar MRI 7/28/14  Narrative   MRI LUMBAR SPINE    TECHNIQUE: MRI lumbar spine was performed on a 1.5T magnet. The following sequences were obtained: Localizer; sagittal T1, T2, STIR; axial T1 and T2. After administration of 20 mL Omniscan, axial and sagittal T1-weighted sequences were attained.    COMPARISON: Not available.    FINDINGS:    There are 5 lumbar vertebrae.  There is lumbar levoscoliosis.  There is grade 1 anterolisthesis of L4 on L5.  Vertebral body heights are maintained.  Degenerative endplate changes are noted.  No evidence for infiltrative process.  There is multilevel   severe, asymmetric loss of disk height. Conus terminates at L1 and appears unremarkable. Limited evaluation of posterior abdominal structures is unremarkable.  Paraspinal musculature is within normal limits.  Evaluation of sacroiliac joints is   unremarkable.  S3 Tarlov cyst noted.    L1-L2: There is mild bilateral facet arthrosis and circumferential disk bulge resulting in mild spinal canal stenosis and mild right neural foraminal narrowing.    L2-L3: There is a compression disk bulge and moderate bilateral facet arthrosis resulting in moderate spinal canal stenosis and mild bilateral neural foraminal narrowing.    L3-L4: There is mild bilateral facet arthrosis and circumferential disk bulge resulting in mild left neural foraminal narrowing.    L4-L5: There is moderate circumferential disk bulge and bilateral facet arthrosis resulting in moderate spinal canal stenosis and moderate left neural foraminal narrowing.    L5-S1: There is severe right facet arthrosis and mild circumferential  disk bulge resulting in moderate right neural foraminal narrowing.   Impression    No marrow infiltrative process. Multilevel degenerative changes with moderate spinal canal stenosis as detailed above       Thoracic XRAY 4/21/16  Narrative   AP and lateral radiographs of the thoracic spine were obtained.  There is mild scoliosis of the thoracolumbar spine.  Posterior vertebral alignment appears satisfactory.  Vertebral body heights appear well-maintained.  There are degenerative changes with small anterior osteophytes noted throughout the thoracic spine.  No abnormal paraspinal masses are evident.   Impression     Thoracic spondylosis.  Mild thoracolumbar scoliosis.  No evidence for acute fracture       Bilateral Knee XRAYs 3/10/16  Narrative   AP, lateral, and sunrise views of both knees were obtained.  The bones are intact.  There is no evidence for acute fracture or bone destruction.  There is chondrocalcinosis present.  There is moderate narrowing of the medial compartments of the femorotibial joints bilaterally.  There is spurring of the tibial spines with osteophytes at the femorotibial and patellofemoral joints.  There is mild soft tissue prominence in a right suprapatellar location and a small right-sided joint effusion cannot be excluded.  Atherosclerotic calcification is present within the arteries of the distal thighs and proximal calf.   Impression     Prominent symmetric degenerative changes of the femorotibial and patellofemoral joints with moderate narrowing of the medial compartments of the femorotibial joints.  Chondrocalcinosis noted bilaterally.  Possible right suprapatellar joint effusion.           Allergies:   Review of patient's allergies indicates:   Allergen Reactions    Iodine and iodide containing products Itching     Contrast dye       Current Medications:   Current Outpatient Prescriptions   Medication Sig Dispense Refill    ascorbic acid, vitamin C, (VITAMIN C) 1000 MG tablet Take  1,000 mg by mouth once daily.      cyanocobalamin (VITAMIN B-12) 1000 MCG tablet Take 100 mcg by mouth once daily.      enalapril (VASOTEC) 5 MG tablet Take 1 tablet (5 mg total) by mouth once daily. 90 tablet 3    esomeprazole (NEXIUM) 20 MG capsule Take 20 mg by mouth before breakfast.      fish oil-omega-3 fatty acids 300-1,000 mg capsule Take 2 g by mouth once daily.      flurazepam (DALMANE) 30 MG capsule Take 30 mg by mouth nightly as needed for Insomnia.      flurazepam (DALMANE) 30 MG capsule TAKE 1 CAPSULE(30 MG) BY MOUTH EVERY EVENING AS NEEDED FOR INSOMNIA 90 capsule 1    fluticasone (FLONASE) 50 mcg/actuation nasal spray SPRAY ONCE IN EACH NOSTRIL ONCE DAILY 48 g 5    hydrocodone-acetaminophen 5-325mg (NORCO) 5-325 mg per tablet One to two tablets every 6 hours as needed for pain 50 tablet 0    lactobacillus rhamnosus GG (CULTURELLE) 10 billion cell capsule Take 1 capsule by mouth once daily.      lidocaine-prilocaine (EMLA) cream       loperamide (IMODIUM A-D) 2 mg Tab Take 2 mg by mouth 4 (four) times daily as needed.      metoprolol tartrate (LOPRESSOR) 50 MG tablet Take 0.5 tablets (25 mg total) by mouth 2 (two) times daily. 180 tablet 3    mirtazapine (REMERON) 15 MG tablet Take 1 tablet (15 mg total) by mouth every evening. 90 tablet 3    multivitamin capsule Take 1 capsule by mouth once daily.      NON FORMULARY MEDICATION 300 mg once daily. Desiccated Liver      rifAXIMin (XIFAXAN) 550 mg Tab Take 1 tablet (550 mg total) by mouth 3 (three) times daily. 42 tablet 0    tamsulosin (FLOMAX) 0.4 mg Cp24 Take 1 capsule (0.4 mg total) by mouth once daily. 90 capsule 0    valacyclovir (VALTREX) 500 MG tablet Take 1 tablet (500 mg total) by mouth once daily. 90 tablet 3    sulfamethoxazole-trimethoprim 800-160mg (BACTRIM DS) 800-160 mg Tab Take 1 tablet by mouth 2 (two) times daily. 20 tablet 0     No current facility-administered medications for this visit.        REVIEW OF  SYSTEMS:    GENERAL:  No weight loss, malaise or fevers.  HEENT:  Negative for frequent or significant headaches.  NECK:  Negative for lumps, goiter, pain and significant neck swelling.  RESPIRATORY:  Negative for cough, wheezing or shortness of breath.  CARDIOVASCULAR:  Negative for chest pain, leg swelling or palpitations. H/O CHF and cardiomyopathy.  GI:  Negative for abdominal discomfort, blood in stools or black stools or change in bowel habits.  MUSCULOSKELETAL:  See HPI.  SKIN:  Negative for lesions, rash, and itching.  PSYCH:  Negative for sleep disturbance, mood disorder and recent psychosocial stressors.  HEMATOLOGY/LYMPHOLOGY:  Negative for prolonged bleeding, bruising easily or swollen nodes. H/O lymphoma and squamous cell carcinoma.  NEURO:   No history of headaches, syncope, paralysis, seizures or tremors.  All other reviewed and negative other than HPI.    Past Medical History:  Past Medical History:   Diagnosis Date    Allergy     Anemia     Arthritis     Basal cell carcinoma     excised from upper back    Cancer     Cardiomyopathy due to chemotherapy     Cataract     CHF (congestive heart failure)     Coronary artery disease     D1    Encounter for blood transfusion     Eye injuries 20 yrs    ou fb several x's    Hyperlipidemia     Hypertension     Hypertensive heart disease with heart failure 1/31/2013    Lymphoma 1996    Obstructive sleep apnea on CPAP     SCC (squamous cell carcinoma) excised 12/2015    R proximal forearm    Skin disease     Sleep apnea     SQUAMOUS CELL CARCINOMA excised 2/14/13    L forearm    Squamous cell carcinoma excised 8/4/2016    IN SITU, Left thigh MOHS    Squamous cell carcinoma excised 8/25/2016    left helix, MOHS       Past Surgical History:  Past Surgical History:   Procedure Laterality Date    ABDOMINAL SURGERY      CATARACT EXTRACTION      od dr lincoln    CATARACT EXTRACTION W/  INTRAOCULAR LENS IMPLANT Left 01/12/2017      [place]    EYE SURGERY      Rt cataract    KIDNEY STONE SURGERY      SMALL INTESTINE SURGERY  09/2013    TONSILLECTOMY         Family History:  Family History   Problem Relation Age of Onset    Cataracts Mother     Hypertension Mother     Cataracts Father     Hypertension Father     Cancer Father     Hypertension Maternal Grandmother     Hypertension Maternal Grandfather     Hypertension Paternal Grandmother     Hypertension Paternal Grandfather     No Known Problems Sister     No Known Problems Brother     No Known Problems Maternal Aunt     No Known Problems Maternal Uncle     No Known Problems Paternal Aunt     No Known Problems Paternal Uncle     Amblyopia Neg Hx     Blindness Neg Hx     Diabetes Neg Hx     Glaucoma Neg Hx     Macular degeneration Neg Hx     Retinal detachment Neg Hx     Strabismus Neg Hx     Stroke Neg Hx     Thyroid disease Neg Hx     Melanoma Neg Hx     Heart disease Neg Hx     Celiac disease Neg Hx     Cirrhosis Neg Hx     Colon cancer Neg Hx     Colon polyps Neg Hx     Crohn's disease Neg Hx     Cystic fibrosis Neg Hx     Esophageal cancer Neg Hx     Hemochromatosis Neg Hx     Inflammatory bowel disease Neg Hx     Irritable bowel syndrome Neg Hx     Liver cancer Neg Hx     Liver disease Neg Hx     Rectal cancer Neg Hx     Stomach cancer Neg Hx     Ulcerative colitis Neg Hx     Burt's disease Neg Hx        Social History:  Social History     Social History    Marital status:      Spouse name: N/A    Number of children: N/A    Years of education: N/A     Occupational History    retired      Social History Main Topics    Smoking status: Former Smoker     Quit date: 1960    Smokeless tobacco: Never Used    Alcohol use Yes      Comment: socially    Drug use: No    Sexual activity: Yes     Partners: Female     Other Topics Concern    None     Social History Narrative    None       OBJECTIVE:    BP (!) 105/52   Pulse 64   Temp  "98 °F (36.7 °C)   Resp 20   Ht 5' 10" (1.778 m)   Wt 89 kg (196 lb 3.2 oz)   BMI 28.15 kg/m²     PHYSICAL EXAMINATION:    General appearance: Well appearing, in no acute distress, alert and oriented x3.  Psych:  Mood and affect appropriate.  Skin:  Leads removed without difficulty with tips intact.  Area cleaned with alcohol and Bacitracin applied.  There is slight swelling at lead sites with redness.  No active drainage.  Head/face:  Atraumatic, normocephalic. No palpable lymph nodes  Cor: RRR  Pulm: CTA  GI: Abdomen soft and non-tender.  Extremities: Peripheral joint ROM is full and pain free without obvious instability or laxity in all four extremities. No deformities, edema, or skin discoloration. Good capillary refill.  Neuro: Bilateral upper and lower extremity coordination and muscle stretch reflexes are physiologic and symmetric.  Plantar response are downgoing.  Decreased sensation to bilateral feet.  Gait: Antalgic.    ASSESSMENT: 71 y.o. year old male with lower back and BLE pain, consistent with the following diagnoses:     1. Lumbar radiculopathy     2. Chronic pain syndrome     3. Failed back syndrome     4. Sacroiliac inflammation           PLAN:     - Previous imaging was reviewed and discussed with the patient today.     - The patient is s/p lumbar SCS trial with significant benefit of usual pain.  He has been having increased pain to the surgical sites.  His sites today have some swelling and redness.  He also has some redness from the tape.  I will start him on PO Bactrim DS BID for 10 days in case of any infection.    - He would like to move forward with scheduling implant, which will be at least one month out.    - The patient will continue a home exercise routine to help with pain and strengthening.        The above plan and management options were discussed at length with patient. Patient is in agreement with the above and verbalized understanding.    Francisca Chen  05/30/2017  "

## 2017-06-30 NOTE — TRANSFER OF CARE
"Anesthesia Transfer of Care Note    Patient: Royce Francis    Procedure(s) Performed: Procedure(s) (LRB):  INSERTION-STIMULATOR-DORSAL COLUMN (N/A)    Patient location: PACU    Anesthesia Type: general    Transport from OR: Transported from OR on 2-3 L/min O2 by NC with adequate spontaneous ventilation    Post pain: adequate analgesia    Post assessment: no apparent anesthetic complications    Post vital signs: stable    Level of consciousness: awake    Nausea/Vomiting: no nausea/vomiting    Complications: none    Transfer of care protocol was followed      Last vitals:   Visit Vitals  BP (!) 158/69 (BP Location: Left arm, Patient Position: Lying, BP Method: Automatic)   Pulse 63   Temp 36.6 °C (97.8 °F) (Oral)   Resp 16   Ht 5' 10.5" (1.791 m)   Wt 88 kg (194 lb)   SpO2 95%   BMI 27.44 kg/m²     "

## 2017-06-30 NOTE — OP NOTE
Patient Name: Royce Francis  MRN: [id number]    INFORMED CONSENT: The procedure, risks, benefits and options were discussed with patient. There are no contraindications to the procedure. The patient expressed understanding and agreed to proceed. The personnel performing the procedure was discussed. I verify that I personally obtained Royce's consent prior to the start of the procedure and the signed consent can be found on the patient's chart.        Procedure Date: 6/29/2017  Surgeon(s) and Role:     * Unruly Leos MD - Primary     * Nean Hall MD - Assisting  Anesthesia: GA  Procedure(s) (LRB):  INSERTION-STIMULATOR-DORSAL COLUMN (N/A)    Pre Procedure diagnosis: Lumbar radiculopathy [M54.16]  Chronic pain syndrome [G89.4]  Post-Procedure diagnosis: Lumbar radiculopathy [M54.16]  Chronic pain syndrome [G89.4]        PROCEDURE: SPINAL CORD STIMULATOR IMPLANT    OPERATION: Percutaneous SCS lead implantation x 2 and Nevro system implantable pulse generator.  ANESTHESIA: GA  PREOPERATIVE ANTIBIOTICS: 2 g ancef IV given 30 minutes prior to incision, see anesthesia record for time.  OPERATIVE PROCEDURE: PATIENT was brought to the operating room and was placed in the prone position. She was prepped and draped in the usual sterile fashion with chlorhexidine and chlorhexidine three times. With fluoroscopic guidance the location of the desired site for placement of the percutaneous leads was identified and local anesthetic consisting of 1% Lidocaine,0.25% buipivicaine and  epinephrine was injected subcutaneously over the area. An approximately 5 cm longitudinal incision was made in the midline. The incision was dissected down to the supraspinous ligament. Hemostasis was established and a self-retaining retractor was used to allow adequate visualization.  A 14 gauge touhy was then introduced with the paraspinous approach under fluoroscopic guidance into the T12-L1 interlaminar space. The entry of the Tuohy  into the epidural space was verified by using loss of resistance to air with a glass 5 mL syringe. The lead was passed through the needle and advanced up to mid T8 with fluoroscopic guidance. A second lead was then placed in identical fashion and placed adjacent to the first with tip at top T10. The leads were then anchored to the supraspinous ligaments with using the FIX-IT anchoring devices. The leads were then reconnected to the screening cable and retested for position and placement.  A pocket site was identified just above the right iliac crest.  Local anesthetic was administered in the region and a 5 centimeter incision was made.  A subcutaneous pocket was then created with a blunt dissection technique for placement of the nevro implantable pulse generator. The tunneling was then done between the lead and the pocket site after the administration of additional local anesthetic subcutaneously.  The tunneling tool with a wedged tip attachment was introduced subcutaneously from the lead incision and guided to the pocket. The leads were inserted into the tunneling catheter and advanced to the pocket site. The leads were then inserted into the implantable pulse generator and the screws were tightened with the hexwrench. A test stimulation was performed for impedence. The generator was then introduced into the pocket and remote tested to ensure adequate placement of the leads into the IPG. Both surgical wounds were then irrigated with antibiotic solution followed by administration of vancomycin powder 1 g The wounds were closed with #2-0 Vicryl suture. This was followed-up with staples. A sterile dressing was applied. No specimens collected. Patient was extubated and taken to recovery in stable condition.      COMPLICATIONS: There were no complications.      Blood Loss: Nill  Specimen: None    Unruly Leos MD

## 2017-06-30 NOTE — ANESTHESIA POSTPROCEDURE EVALUATION
"Anesthesia Post Evaluation    Patient: Royce Francis    Procedure(s) Performed: Procedure(s) (LRB):  INSERTION-STIMULATOR-DORSAL COLUMN (N/A)    Final Anesthesia Type: general  Patient location during evaluation: PACU  Patient participation: Yes- Able to Participate  Level of consciousness: awake and alert  Post-procedure vital signs: reviewed and stable  Pain management: adequate  Airway patency: patent  PONV status at discharge: No PONV  Anesthetic complications: no      Cardiovascular status: hemodynamically stable  Respiratory status: unassisted and spontaneous ventilation  Hydration status: euvolemic  Follow-up not needed.        Visit Vitals  /60   Pulse 78   Temp 37.2 °C (98.9 °F) (Oral)   Resp 16   Ht 5' 10.5" (1.791 m)   Wt 88 kg (194 lb)   SpO2 (!) 94%   BMI 27.44 kg/m²       Pain/Masoud Score: Pain Assessment Performed: Yes (6/29/2017  7:52 PM)  Presence of Pain: denies (6/29/2017  7:52 PM)  Masoud Score: 9 (6/29/2017  7:52 PM)      "

## 2017-06-30 NOTE — PLAN OF CARE
Royce Francis has met all discharge criteria from Phase II. Vital Signs are stable, ambulating  without difficulty. Discharge instructions given, patient verbalized understanding. Discharged from facility via wheelchair in stable condition.

## 2017-06-30 NOTE — DISCHARGE SUMMARY
Discharge Note  Short Stay      SUMMARY     Admit Date: 6/29/2017    Attending Physician: Unruly Leos      Discharge Physician: Unruly Leos      Discharge Date: 6/29/2017 7:46 PM    Final Diagnosis:   1. Drug-induced polyneuropathy    2. Chronic pain syndrome    3. Mononeuritis lower limb, unspecified laterality        Disposition: Home or self care    Patient Instructions:   Current Discharge Medication List      CONTINUE these medications which have NOT CHANGED    Details   acetaminophen (TYLENOL) 500 MG tablet Take 500 mg by mouth as needed for Pain.      ascorbic acid, vitamin C, (VITAMIN C) 1000 MG tablet Take 1,000 mg by mouth once daily. liposomal      carisoprodol (SOMA) 350 MG tablet Take 350 mg by mouth as needed for Muscle spasms.      cyanocobalamin (VITAMIN B-12) 1000 MCG tablet Take 100 mcg by mouth once daily.      diphenoxylate-atropine 2.5-0.025 mg (LOMOTIL) 2.5-0.025 mg per tablet One tablet every 8 hours as needed for diarrhea  Qty: 60 tablet, Refills: 3    Associated Diagnoses: Diarrhea, unspecified type      enalapril (VASOTEC) 5 MG tablet Take 1 tablet (5 mg total) by mouth once daily.  Qty: 90 tablet, Refills: 3      esomeprazole (NEXIUM) 20 MG capsule Take 20 mg by mouth before breakfast.      fish oil-omega-3 fatty acids 300-1,000 mg capsule Take 2 g by mouth once daily.      !! flurazepam (DALMANE) 30 MG capsule Take 30 mg by mouth nightly as needed for Insomnia.      fluticasone (FLONASE) 50 mcg/actuation nasal spray SPRAY ONCE IN EACH NOSTRIL ONCE DAILY  Qty: 48 g, Refills: 5    Comments: **Patient requests 90 days supply**      hydrocodone-acetaminophen 5-325mg (NORCO) 5-325 mg per tablet One to two tablets every 6 hours as needed for pain  Qty: 50 tablet, Refills: 0    Associated Diagnoses: Back pain, unspecified back location, unspecified back pain laterality, unspecified chronicity      lactobacillus rhamnosus GG (CULTURELLE) 10 billion cell capsule Take 1 capsule by mouth  once daily.      lidocaine-prilocaine (EMLA) cream       loperamide (IMODIUM A-D) 2 mg Tab Take 2 mg by mouth once daily at 6am.       metoprolol tartrate (LOPRESSOR) 50 MG tablet Take 0.5 tablets (25 mg total) by mouth 2 (two) times daily.  Qty: 180 tablet, Refills: 3      mirtazapine (REMERON) 15 MG tablet Take 1 tablet (15 mg total) by mouth every evening.  Qty: 90 tablet, Refills: 3    Comments: **Patient requests 90 day supply**  Associated Diagnoses: Depression, unspecified depression type      multivitamin capsule Take 1 capsule by mouth once daily.      NON FORMULARY MEDICATION 300 mg once daily. Desiccated Liver      tamsulosin (FLOMAX) 0.4 mg Cp24 Take 1 capsule (0.4 mg total) by mouth once daily.  Qty: 90 capsule, Refills: 0    Associated Diagnoses: Benign prostatic hyperplasia, presence of lower urinary tract symptoms unspecified, unspecified morphology      valacyclovir (VALTREX) 500 MG tablet Take 1 tablet (500 mg total) by mouth once daily.  Qty: 90 tablet, Refills: 3    Associated Diagnoses: Herpes zoster complicated      !! flurazepam (DALMANE) 30 MG capsule TAKE 1 CAPSULE(30 MG) BY MOUTH EVERY EVENING AS NEEDED FOR INSOMNIA  Qty: 90 capsule, Refills: 1    Associated Diagnoses: Insomnia, unspecified type       !! - Potential duplicate medications found. Please discuss with provider.              Discharge Diagnosis: Same as Pre and Post Procedure  Condition on Discharge: Stable.  Diet on Discharge: Same as before.  Activity: as per instruction sheet.  Discharge to: Home with a responsible adult.  Follow up: as per Discharge instructions.

## 2017-07-06 ENCOUNTER — OFFICE VISIT (OUTPATIENT)
Dept: PAIN MEDICINE | Facility: CLINIC | Age: 72
End: 2017-07-06
Payer: MEDICARE

## 2017-07-06 VITALS
HEART RATE: 74 BPM | TEMPERATURE: 98 F | SYSTOLIC BLOOD PRESSURE: 112 MMHG | HEIGHT: 71 IN | RESPIRATION RATE: 18 BRPM | BODY MASS INDEX: 27.47 KG/M2 | WEIGHT: 196.19 LBS | DIASTOLIC BLOOD PRESSURE: 63 MMHG

## 2017-07-06 DIAGNOSIS — M54.16 LUMBAR RADICULOPATHY: ICD-10-CM

## 2017-07-06 DIAGNOSIS — M46.1 SACROILIAC INFLAMMATION: ICD-10-CM

## 2017-07-06 DIAGNOSIS — G89.4 CHRONIC PAIN SYNDROME: Primary | ICD-10-CM

## 2017-07-06 DIAGNOSIS — M17.0 PRIMARY OSTEOARTHRITIS OF BOTH KNEES: ICD-10-CM

## 2017-07-06 DIAGNOSIS — M96.1 FAILED BACK SYNDROME: ICD-10-CM

## 2017-07-06 PROCEDURE — 99499 UNLISTED E&M SERVICE: CPT | Mod: S$GLB,,, | Performed by: NURSE PRACTITIONER

## 2017-07-06 PROCEDURE — 99999 PR PBB SHADOW E&M-EST. PATIENT-LVL III: CPT | Mod: PBBFAC,,, | Performed by: NURSE PRACTITIONER

## 2017-07-06 PROCEDURE — 99024 POSTOP FOLLOW-UP VISIT: CPT | Mod: S$GLB,,, | Performed by: NURSE PRACTITIONER

## 2017-07-06 NOTE — PROGRESS NOTES
Chronic patient Established Note (Follow up visit)      SUBJECTIVE:    Royce Francis presents to the clinic for a follow-up appointment for lower back and bilateral knee pain.  He is s/p Nevro successful lumbar SCS implant on 6/29/17.  His device is not yet on.  He is here today for wound check.  He denies any fever or night sweats.  He has a hx of non-Hodgkins lymphoma which has recurred appx 6 times in different locations.  He is followed by Dr. Lucia at [facility].  Since the last visit, Royce Francis states the pain has been improving.  The patient denies any bowel/bladder incontinence or symptoms of saddle paresthesia.        Pain Disability Index Review:  Last 3 PDI Scores 5/30/2017 4/6/2017 1/16/2017   Pain Disability Index (PDI) 70 28 17       Pain Medications:    - Opioids: Norco (Hydrocodone/Acetaminophen)- Dr Lucia    Opioid Contract: no     report:  Reviewed and consistent with medication use as prescribed.    Pain Procedures:   6/29/17 Lumbar SCS implant Nevro-   5/23/17 Lumbar SCS trial- significant benefit of back and leg pain  1/16/17 Left L2,3,4,5 RFA- significant relief  12/14/16 Bilateral Synvisc One- significant relief  3/28/16- Bilateral L4-5 AND L5-S1 Facet injection  8/28/15- Bilateral L4-5 and L5-S1 Facet injection  6/5/15- Caudal DARIO with Racz Catheter   4/10/15- Bilateral L4-5 TF DARIO  2/20/15- Left L3-4-5 RFA  2/13/15- right L3-4-5 RFA  1/16/15- bilateral L3-4-5 MBB  9/12/14- bilateral L3-4-5 MBB  8/15/14- Bilateral L4-5 TF DARIO    Physical Therapy/Home Exercise: per self    Imaging:     Thoracic XRAY 4/21/16  Narrative   AP and lateral radiographs of the thoracic spine were obtained.  There is mild scoliosis of the thoracolumbar spine.  Posterior vertebral alignment appears satisfactory.  Vertebral body heights appear well-maintained.  There are degenerative changes with small anterior osteophytes noted throughout the thoracic spine.  No abnormal paraspinal masses are evident.    Impression     Thoracic spondylosis.  Mild thoracolumbar scoliosis.  No evidence for acute fracture, bone destruction, or subluxation.     Lumbar MRI 7/28/14  Narrative   MRI LUMBAR SPINE    TECHNIQUE: MRI lumbar spine was performed on a 1.5T magnet. The following sequences were obtained: Localizer; sagittal T1, T2, STIR; axial T1 and T2. After administration of 20 mL Omniscan, axial and sagittal T1-weighted sequences were attained.    COMPARISON: Not available.    FINDINGS:    There are 5 lumbar vertebrae.  There is lumbar levoscoliosis.  There is grade 1 anterolisthesis of L4 on L5.  Vertebral body heights are maintained.  Degenerative endplate changes are noted.  No evidence for infiltrative process.  There is multilevel   severe, asymmetric loss of disk height. Conus terminates at L1 and appears unremarkable. Limited evaluation of posterior abdominal structures is unremarkable.  Paraspinal musculature is within normal limits.  Evaluation of sacroiliac joints is   unremarkable.  S3 Tarlov cyst noted.    L1-L2: There is mild bilateral facet arthrosis and circumferential disk bulge resulting in mild spinal canal stenosis and mild right neural foraminal narrowing.    L2-L3: There is a compression disk bulge and moderate bilateral facet arthrosis resulting in moderate spinal canal stenosis and mild bilateral neural foraminal narrowing.    L3-L4: There is mild bilateral facet arthrosis and circumferential disk bulge resulting in mild left neural foraminal narrowing.    L4-L5: There is moderate circumferential disk bulge and bilateral facet arthrosis resulting in moderate spinal canal stenosis and moderate left neural foraminal narrowing.    L5-S1: There is severe right facet arthrosis and mild circumferential disk bulge resulting in moderate right neural foraminal narrowing.   Impression    No marrow infiltrative process. Multilevel degenerative changes with moderate spinal canal stenosis as detailed above        Thoracic XRAY 4/21/16  Narrative   AP and lateral radiographs of the thoracic spine were obtained.  There is mild scoliosis of the thoracolumbar spine.  Posterior vertebral alignment appears satisfactory.  Vertebral body heights appear well-maintained.  There are degenerative changes with small anterior osteophytes noted throughout the thoracic spine.  No abnormal paraspinal masses are evident.   Impression     Thoracic spondylosis.  Mild thoracolumbar scoliosis.  No evidence for acute fracture       Bilateral Knee XRAYs 3/10/16  Narrative   AP, lateral, and sunrise views of both knees were obtained.  The bones are intact.  There is no evidence for acute fracture or bone destruction.  There is chondrocalcinosis present.  There is moderate narrowing of the medial compartments of the femorotibial joints bilaterally.  There is spurring of the tibial spines with osteophytes at the femorotibial and patellofemoral joints.  There is mild soft tissue prominence in a right suprapatellar location and a small right-sided joint effusion cannot be excluded.  Atherosclerotic calcification is present within the arteries of the distal thighs and proximal calf.   Impression     Prominent symmetric degenerative changes of the femorotibial and patellofemoral joints with moderate narrowing of the medial compartments of the femorotibial joints.  Chondrocalcinosis noted bilaterally.  Possible right suprapatellar joint effusion.           Allergies:   Review of patient's allergies indicates:   Allergen Reactions    Iodine and iodide containing products Itching     Contrast dye       Current Medications:   Current Outpatient Prescriptions   Medication Sig Dispense Refill    acetaminophen (TYLENOL) 500 MG tablet Take 500 mg by mouth as needed for Pain.      ascorbic acid, vitamin C, (VITAMIN C) 1000 MG tablet Take 1,000 mg by mouth once daily. liposomal      carisoprodol (SOMA) 350 MG tablet Take 350 mg by mouth as needed for  Muscle spasms.      cephALEXin (KEFLEX) 500 MG capsule Take 1 capsule (500 mg total) by mouth 4 (four) times daily. 28 capsule 0    cyanocobalamin (VITAMIN B-12) 1000 MCG tablet Take 100 mcg by mouth once daily.      diphenoxylate-atropine 2.5-0.025 mg (LOMOTIL) 2.5-0.025 mg per tablet One tablet every 8 hours as needed for diarrhea 60 tablet 3    enalapril (VASOTEC) 5 MG tablet Take 1 tablet (5 mg total) by mouth once daily. 90 tablet 3    esomeprazole (NEXIUM) 20 MG capsule Take 20 mg by mouth before breakfast.      fish oil-omega-3 fatty acids 300-1,000 mg capsule Take 2 g by mouth once daily.      flurazepam (DALMANE) 30 MG capsule Take 30 mg by mouth nightly as needed for Insomnia.      flurazepam (DALMANE) 30 MG capsule TAKE 1 CAPSULE(30 MG) BY MOUTH EVERY EVENING AS NEEDED FOR INSOMNIA 90 capsule 1    fluticasone (FLONASE) 50 mcg/actuation nasal spray SPRAY ONCE IN EACH NOSTRIL ONCE DAILY 48 g 5    hydrocodone-acetaminophen 5-325mg (NORCO) 5-325 mg per tablet One to two tablets every 6 hours as needed for pain 50 tablet 0    lactobacillus rhamnosus GG (CULTURELLE) 10 billion cell capsule Take 1 capsule by mouth once daily.      lidocaine-prilocaine (EMLA) cream       loperamide (IMODIUM A-D) 2 mg Tab Take 2 mg by mouth once daily at 6am.       metoprolol tartrate (LOPRESSOR) 50 MG tablet Take 0.5 tablets (25 mg total) by mouth 2 (two) times daily. 180 tablet 3    mirtazapine (REMERON) 15 MG tablet Take 1 tablet (15 mg total) by mouth every evening. 90 tablet 3    multivitamin capsule Take 1 capsule by mouth once daily.      NON FORMULARY MEDICATION 300 mg once daily. Desiccated Liver      oxycodone-acetaminophen (PERCOCET)  mg per tablet Take 1 tablet by mouth every 8 (eight) hours as needed for Pain. 45 tablet 0    tamsulosin (FLOMAX) 0.4 mg Cp24 Take 1 capsule (0.4 mg total) by mouth once daily. 90 capsule 0    valacyclovir (VALTREX) 500 MG tablet Take 1 tablet (500 mg total) by  mouth once daily. 90 tablet 3     No current facility-administered medications for this visit.        REVIEW OF SYSTEMS:    GENERAL:  No weight loss, malaise or fevers.  HEENT:  Negative for frequent or significant headaches.  NECK:  Negative for lumps, goiter, pain and significant neck swelling.  RESPIRATORY:  Negative for cough, wheezing or shortness of breath.  CARDIOVASCULAR:  Negative for chest pain, leg swelling or palpitations. H/O CHF and cardiomyopathy.  GI:  Negative for abdominal discomfort, blood in stools or black stools or change in bowel habits.  MUSCULOSKELETAL:  See HPI.  SKIN:  Negative for lesions, rash, and itching.  PSYCH:  Negative for sleep disturbance, mood disorder and recent psychosocial stressors.  HEMATOLOGY/LYMPHOLOGY:  Negative for prolonged bleeding, bruising easily or swollen nodes. H/O lymphoma and squamous cell carcinoma.  NEURO:   No history of headaches, syncope, paralysis, seizures or tremors.  All other reviewed and negative other than HPI.    Past Medical History:  Past Medical History:   Diagnosis Date    Allergy     Anemia     Arthritis     Basal cell carcinoma     excised from upper back    Cancer     Cardiomyopathy due to chemotherapy     Cataract     CHF (congestive heart failure)     Coronary artery disease     D1    Encounter for blood transfusion     Eye injuries 20 yrs    ou fb several x's    Hyperlipidemia     Hypertension     Hypertensive heart disease with heart failure 1/31/2013    Lymphoma 1996    Obstructive sleep apnea on CPAP     SCC (squamous cell carcinoma) excised 12/2015    R proximal forearm    Skin disease     Sleep apnea     SQUAMOUS CELL CARCINOMA excised 2/14/13    L forearm    Squamous cell carcinoma excised 8/4/2016    IN SITU, Left thigh MOHS    Squamous cell carcinoma excised 8/25/2016    left helix, MOHS       Past Surgical History:  Past Surgical History:   Procedure Laterality Date    ABDOMINAL SURGERY      CATARACT  EXTRACTION      od dr yanes    CATARACT EXTRACTION W/  INTRAOCULAR LENS IMPLANT Left 01/12/2017    Dr. Yanes    EYE SURGERY      Rt cataract    KIDNEY STONE SURGERY      SMALL INTESTINE SURGERY  09/2013    TONSILLECTOMY         Family History:  Family History   Problem Relation Age of Onset    Cataracts Mother     Hypertension Mother     Cataracts Father     Hypertension Father     Cancer Father     Hypertension Maternal Grandmother     Hypertension Maternal Grandfather     Hypertension Paternal Grandmother     Hypertension Paternal Grandfather     No Known Problems Sister     No Known Problems Brother     No Known Problems Maternal Aunt     No Known Problems Maternal Uncle     No Known Problems Paternal Aunt     No Known Problems Paternal Uncle     Amblyopia Neg Hx     Blindness Neg Hx     Diabetes Neg Hx     Glaucoma Neg Hx     Macular degeneration Neg Hx     Retinal detachment Neg Hx     Strabismus Neg Hx     Stroke Neg Hx     Thyroid disease Neg Hx     Melanoma Neg Hx     Heart disease Neg Hx     Celiac disease Neg Hx     Cirrhosis Neg Hx     Colon cancer Neg Hx     Colon polyps Neg Hx     Crohn's disease Neg Hx     Cystic fibrosis Neg Hx     Esophageal cancer Neg Hx     Hemochromatosis Neg Hx     Inflammatory bowel disease Neg Hx     Irritable bowel syndrome Neg Hx     Liver cancer Neg Hx     Liver disease Neg Hx     Rectal cancer Neg Hx     Stomach cancer Neg Hx     Ulcerative colitis Neg Hx     Burt's disease Neg Hx        Social History:  Social History     Social History    Marital status:      Spouse name: N/A    Number of children: N/A    Years of education: N/A     Occupational History    retired      Social History Main Topics    Smoking status: Former Smoker     Packs/day: 1.50     Years: 1.00     Quit date: 1960    Smokeless tobacco: Never Used    Alcohol use 1.2 oz/week     1 Glasses of wine, 1 Shots of liquor per week       "Comment: socially    Drug use: No    Sexual activity: Yes     Partners: Female     Other Topics Concern    Not on file     Social History Narrative    No narrative on file       OBJECTIVE:    /63 (BP Location: Left arm, Patient Position: Sitting)   Pulse 74   Temp 97.6 °F (36.4 °C) (Oral)   Resp 18   Ht 5' 10.5" (1.791 m)   Wt 89 kg (196 lb 3.4 oz)   BMI 27.76 kg/m²     PHYSICAL EXAMINATION:    General appearance: Well appearing, in no acute distress, alert and oriented x3.  Psych:  Mood and affect appropriate.  Skin:  SCS incisions without signs of infections.  Staples secure with edges well approximated.  Area cleaned with alcohol and Bacitracin applied.    Head/face:  Atraumatic, normocephalic. No palpable lymph nodes  Cor: RRR  Pulm: CTA  GI: Abdomen soft and non-tender.  Extremities: Peripheral joint ROM is full and pain free without obvious instability or laxity in all four extremities. No deformities, edema, or skin discoloration. Good capillary refill.  Neuro: Bilateral upper and lower extremity coordination and muscle stretch reflexes are physiologic and symmetric.  Plantar response are downgoing.  Decreased sensation to bilateral feet.  Gait: Antalgic.    ASSESSMENT: 71 y.o. year old male with lower back and BLE pain, consistent with the following diagnoses:     1. Chronic pain syndrome     2. Lumbar radiculopathy     3. Failed back syndrome     4. Sacroiliac inflammation     5. Primary osteoarthritis of both knees           PLAN:     - Previous imaging was reviewed and discussed with the patient today.     - The patient is s/p lumbar SCS implant.  Haseeb will contact him today and turn the device on.    - Can continue Percocet as needed.    - The patient will continue a home exercise routine to help with pain and strengthening.      - RTC in 4 days for staple removal and wound check.    - Dr. Leos was consulted on the patient and agrees with this plan.        The above plan and " management options were discussed at length with patient. Patient is in agreement with the above and verbalized understanding.    Francisca Chen  07/06/2017

## 2017-07-10 ENCOUNTER — OFFICE VISIT (OUTPATIENT)
Dept: PAIN MEDICINE | Facility: CLINIC | Age: 72
End: 2017-07-10
Payer: MEDICARE

## 2017-07-10 VITALS
SYSTOLIC BLOOD PRESSURE: 124 MMHG | DIASTOLIC BLOOD PRESSURE: 64 MMHG | HEART RATE: 61 BPM | WEIGHT: 196.63 LBS | BODY MASS INDEX: 28.15 KG/M2 | TEMPERATURE: 98 F | HEIGHT: 70 IN

## 2017-07-10 DIAGNOSIS — G89.4 CHRONIC PAIN SYNDROME: Primary | ICD-10-CM

## 2017-07-10 DIAGNOSIS — M54.16 LUMBAR RADICULOPATHY: ICD-10-CM

## 2017-07-10 DIAGNOSIS — M54.9 BACK PAIN, UNSPECIFIED BACK LOCATION, UNSPECIFIED BACK PAIN LATERALITY, UNSPECIFIED CHRONICITY: ICD-10-CM

## 2017-07-10 DIAGNOSIS — M96.1 FAILED BACK SYNDROME: ICD-10-CM

## 2017-07-10 DIAGNOSIS — M46.1 SACROILIAC INFLAMMATION: ICD-10-CM

## 2017-07-10 PROCEDURE — 99999 PR PBB SHADOW E&M-EST. PATIENT-LVL IV: CPT | Mod: PBBFAC,,, | Performed by: NURSE PRACTITIONER

## 2017-07-10 PROCEDURE — 99499 UNLISTED E&M SERVICE: CPT | Mod: S$GLB,,, | Performed by: NURSE PRACTITIONER

## 2017-07-10 PROCEDURE — 99213 OFFICE O/P EST LOW 20 MIN: CPT | Mod: S$GLB,,, | Performed by: NURSE PRACTITIONER

## 2017-07-10 RX ORDER — HYDROCODONE BITARTRATE AND ACETAMINOPHEN 5; 325 MG/1; MG/1
1 TABLET ORAL EVERY 8 HOURS PRN
Qty: 45 TABLET | Refills: 0 | Status: SHIPPED | OUTPATIENT
Start: 2017-07-10 | End: 2017-08-09 | Stop reason: SDUPTHER

## 2017-07-10 RX ORDER — TAMSULOSIN HYDROCHLORIDE 0.4 MG/1
1 CAPSULE ORAL DAILY
Qty: 90 CAPSULE | Refills: 0 | OUTPATIENT
Start: 2017-07-10

## 2017-07-10 NOTE — PROGRESS NOTES
Chronic patient Established Note (Follow up visit)      SUBJECTIVE:    Royce Francis presents to the clinic for a follow-up appointment for lower back and bilateral knee pain.  He is s/p Nevro successful lumbar SCS implant on 6/29/17.  His device is not yet on.  Hsaeeb is here today to turn him on.  He is here today for wound check and staple removal.  He denies any fever or night sweats.  He is still taking Percocet 2-3 times as needed.  He still feels as though he needs pain medication but would like to switch back to Norco.  He has a hx of non-Hodgkins lymphoma which has recurred appx 6 times in different locations.  He is followed by Dr. Lucia at [facility].  Since the last visit, Royce Francis states the pain has been improving.  The patient denies any bowel/bladder incontinence or symptoms of saddle paresthesia.        Pain Disability Index Review:  Last 3 PDI Scores 7/10/2017 7/6/2017 5/30/2017   Pain Disability Index (PDI) 23 23 70       Pain Medications:    - Opioids: Norco (Hydrocodone/Acetaminophen)- Dr Lucia    Opioid Contract: no     report:  Reviewed and consistent with medication use as prescribed.    Pain Procedures:   6/29/17 Lumbar SCS implant Nevro-   5/23/17 Lumbar SCS trial- significant benefit of back and leg pain  1/16/17 Left L2,3,4,5 RFA- significant relief  12/14/16 Bilateral Synvisc One- significant relief  3/28/16- Bilateral L4-5 AND L5-S1 Facet injection  8/28/15- Bilateral L4-5 and L5-S1 Facet injection  6/5/15- Caudal DARIO with Racz Catheter   4/10/15- Bilateral L4-5 TF DARIO  2/20/15- Left L3-4-5 RFA  2/13/15- right L3-4-5 RFA  1/16/15- bilateral L3-4-5 MBB  9/12/14- bilateral L3-4-5 MBB  8/15/14- Bilateral L4-5 TF DARIO    Physical Therapy/Home Exercise: per self    Imaging:     Thoracic XRAY 4/21/16  Narrative   AP and lateral radiographs of the thoracic spine were obtained.  There is mild scoliosis of the thoracolumbar spine.  Posterior vertebral alignment appears  satisfactory.  Vertebral body heights appear well-maintained.  There are degenerative changes with small anterior osteophytes noted throughout the thoracic spine.  No abnormal paraspinal masses are evident.   Impression     Thoracic spondylosis.  Mild thoracolumbar scoliosis.  No evidence for acute fracture, bone destruction, or subluxation.     Lumbar MRI 7/28/14  Narrative   MRI LUMBAR SPINE    TECHNIQUE: MRI lumbar spine was performed on a 1.5T magnet. The following sequences were obtained: Localizer; sagittal T1, T2, STIR; axial T1 and T2. After administration of 20 mL Omniscan, axial and sagittal T1-weighted sequences were attained.    COMPARISON: Not available.    FINDINGS:    There are 5 lumbar vertebrae.  There is lumbar levoscoliosis.  There is grade 1 anterolisthesis of L4 on L5.  Vertebral body heights are maintained.  Degenerative endplate changes are noted.  No evidence for infiltrative process.  There is multilevel   severe, asymmetric loss of disk height. Conus terminates at L1 and appears unremarkable. Limited evaluation of posterior abdominal structures is unremarkable.  Paraspinal musculature is within normal limits.  Evaluation of sacroiliac joints is   unremarkable.  S3 Tarlov cyst noted.    L1-L2: There is mild bilateral facet arthrosis and circumferential disk bulge resulting in mild spinal canal stenosis and mild right neural foraminal narrowing.    L2-L3: There is a compression disk bulge and moderate bilateral facet arthrosis resulting in moderate spinal canal stenosis and mild bilateral neural foraminal narrowing.    L3-L4: There is mild bilateral facet arthrosis and circumferential disk bulge resulting in mild left neural foraminal narrowing.    L4-L5: There is moderate circumferential disk bulge and bilateral facet arthrosis resulting in moderate spinal canal stenosis and moderate left neural foraminal narrowing.    L5-S1: There is severe right facet arthrosis and mild circumferential  disk bulge resulting in moderate right neural foraminal narrowing.   Impression    No marrow infiltrative process. Multilevel degenerative changes with moderate spinal canal stenosis as detailed above       Thoracic XRAY 4/21/16  Narrative   AP and lateral radiographs of the thoracic spine were obtained.  There is mild scoliosis of the thoracolumbar spine.  Posterior vertebral alignment appears satisfactory.  Vertebral body heights appear well-maintained.  There are degenerative changes with small anterior osteophytes noted throughout the thoracic spine.  No abnormal paraspinal masses are evident.   Impression     Thoracic spondylosis.  Mild thoracolumbar scoliosis.  No evidence for acute fracture       Bilateral Knee XRAYs 3/10/16  Narrative   AP, lateral, and sunrise views of both knees were obtained.  The bones are intact.  There is no evidence for acute fracture or bone destruction.  There is chondrocalcinosis present.  There is moderate narrowing of the medial compartments of the femorotibial joints bilaterally.  There is spurring of the tibial spines with osteophytes at the femorotibial and patellofemoral joints.  There is mild soft tissue prominence in a right suprapatellar location and a small right-sided joint effusion cannot be excluded.  Atherosclerotic calcification is present within the arteries of the distal thighs and proximal calf.   Impression     Prominent symmetric degenerative changes of the femorotibial and patellofemoral joints with moderate narrowing of the medial compartments of the femorotibial joints.  Chondrocalcinosis noted bilaterally.  Possible right suprapatellar joint effusion.           Allergies:   Review of patient's allergies indicates:   Allergen Reactions    Iodine and iodide containing products Itching     Contrast dye       Current Medications:   Current Outpatient Prescriptions   Medication Sig Dispense Refill    acetaminophen (TYLENOL) 500 MG tablet Take 500 mg by mouth as  needed for Pain.      ascorbic acid, vitamin C, (VITAMIN C) 1000 MG tablet Take 1,000 mg by mouth once daily. liposomal      carisoprodol (SOMA) 350 MG tablet Take 350 mg by mouth as needed for Muscle spasms.      cyanocobalamin (VITAMIN B-12) 1000 MCG tablet Take 100 mcg by mouth once daily.      diphenoxylate-atropine 2.5-0.025 mg (LOMOTIL) 2.5-0.025 mg per tablet One tablet every 8 hours as needed for diarrhea 60 tablet 3    enalapril (VASOTEC) 5 MG tablet Take 1 tablet (5 mg total) by mouth once daily. 90 tablet 3    esomeprazole (NEXIUM) 20 MG capsule Take 20 mg by mouth before breakfast.      fish oil-omega-3 fatty acids 300-1,000 mg capsule Take 2 g by mouth once daily.      flurazepam (DALMANE) 30 MG capsule Take 30 mg by mouth nightly as needed for Insomnia.      flurazepam (DALMANE) 30 MG capsule TAKE 1 CAPSULE(30 MG) BY MOUTH EVERY EVENING AS NEEDED FOR INSOMNIA 90 capsule 1    fluticasone (FLONASE) 50 mcg/actuation nasal spray SPRAY ONCE IN EACH NOSTRIL ONCE DAILY 48 g 5    hydrocodone-acetaminophen 5-325mg (NORCO) 5-325 mg per tablet One to two tablets every 6 hours as needed for pain 50 tablet 0    lactobacillus rhamnosus GG (CULTURELLE) 10 billion cell capsule Take 1 capsule by mouth once daily.      lidocaine-prilocaine (EMLA) cream       loperamide (IMODIUM A-D) 2 mg Tab Take 2 mg by mouth once daily at 6am.       metoprolol tartrate (LOPRESSOR) 50 MG tablet Take 0.5 tablets (25 mg total) by mouth 2 (two) times daily. 180 tablet 3    mirtazapine (REMERON) 15 MG tablet Take 1 tablet (15 mg total) by mouth every evening. 90 tablet 3    multivitamin capsule Take 1 capsule by mouth once daily.      NON FORMULARY MEDICATION 300 mg once daily. Desiccated Liver      oxycodone-acetaminophen (PERCOCET)  mg per tablet Take 1 tablet by mouth every 8 (eight) hours as needed for Pain. 45 tablet 0    tamsulosin (FLOMAX) 0.4 mg Cp24 Take 1 capsule (0.4 mg total) by mouth once daily. 90  capsule 0    valacyclovir (VALTREX) 500 MG tablet Take 1 tablet (500 mg total) by mouth once daily. 90 tablet 3     No current facility-administered medications for this visit.        REVIEW OF SYSTEMS:    GENERAL:  No weight loss, malaise or fevers.  HEENT:  Negative for frequent or significant headaches.  NECK:  Negative for lumps, goiter, pain and significant neck swelling.  RESPIRATORY:  Negative for cough, wheezing or shortness of breath.  CARDIOVASCULAR:  Negative for chest pain, leg swelling or palpitations. H/O CHF and cardiomyopathy.  GI:  Negative for abdominal discomfort, blood in stools or black stools or change in bowel habits.  MUSCULOSKELETAL:  See HPI.  SKIN:  Negative for lesions, rash, and itching.  PSYCH:  Negative for sleep disturbance, mood disorder and recent psychosocial stressors.  HEMATOLOGY/LYMPHOLOGY:  Negative for prolonged bleeding, bruising easily or swollen nodes. H/O lymphoma and squamous cell carcinoma.  NEURO:   No history of headaches, syncope, paralysis, seizures or tremors.  All other reviewed and negative other than HPI.    Past Medical History:  Past Medical History:   Diagnosis Date    Allergy     Anemia     Arthritis     Basal cell carcinoma     excised from upper back    Cancer     Cardiomyopathy due to chemotherapy     Cataract     CHF (congestive heart failure)     Coronary artery disease     D1    Encounter for blood transfusion     Eye injuries 20 yrs    ou fb several x's    Hyperlipidemia     Hypertension     Hypertensive heart disease with heart failure 1/31/2013    Lymphoma 1996    Obstructive sleep apnea on CPAP     SCC (squamous cell carcinoma) excised 12/2015    R proximal forearm    Skin disease     Sleep apnea     SQUAMOUS CELL CARCINOMA excised 2/14/13    L forearm    Squamous cell carcinoma excised 8/4/2016    IN SITU, Left thigh MOHS    Squamous cell carcinoma excised 8/25/2016    left helix, MOHS       Past Surgical History:  Past  Surgical History:   Procedure Laterality Date    ABDOMINAL SURGERY      CATARACT EXTRACTION      od dr yanes    CATARACT EXTRACTION W/  INTRAOCULAR LENS IMPLANT Left 01/12/2017    Dr. Yanes    EYE SURGERY      Rt cataract    KIDNEY STONE SURGERY      SMALL INTESTINE SURGERY  09/2013    TONSILLECTOMY         Family History:  Family History   Problem Relation Age of Onset    Cataracts Mother     Hypertension Mother     Cataracts Father     Hypertension Father     Cancer Father     Hypertension Maternal Grandmother     Hypertension Maternal Grandfather     Hypertension Paternal Grandmother     Hypertension Paternal Grandfather     No Known Problems Sister     No Known Problems Brother     No Known Problems Maternal Aunt     No Known Problems Maternal Uncle     No Known Problems Paternal Aunt     No Known Problems Paternal Uncle     Amblyopia Neg Hx     Blindness Neg Hx     Diabetes Neg Hx     Glaucoma Neg Hx     Macular degeneration Neg Hx     Retinal detachment Neg Hx     Strabismus Neg Hx     Stroke Neg Hx     Thyroid disease Neg Hx     Melanoma Neg Hx     Heart disease Neg Hx     Celiac disease Neg Hx     Cirrhosis Neg Hx     Colon cancer Neg Hx     Colon polyps Neg Hx     Crohn's disease Neg Hx     Cystic fibrosis Neg Hx     Esophageal cancer Neg Hx     Hemochromatosis Neg Hx     Inflammatory bowel disease Neg Hx     Irritable bowel syndrome Neg Hx     Liver cancer Neg Hx     Liver disease Neg Hx     Rectal cancer Neg Hx     Stomach cancer Neg Hx     Ulcerative colitis Neg Hx     Burt's disease Neg Hx        Social History:  Social History     Social History    Marital status:      Spouse name: N/A    Number of children: N/A    Years of education: N/A     Occupational History    retired      Social History Main Topics    Smoking status: Former Smoker     Packs/day: 1.50     Years: 1.00     Quit date: 1960    Smokeless tobacco: Never Used     "Alcohol use 1.2 oz/week     1 Glasses of wine, 1 Shots of liquor per week      Comment: socially    Drug use: No    Sexual activity: Yes     Partners: Female     Other Topics Concern    None     Social History Narrative    None       OBJECTIVE:    /64   Pulse 61   Temp 98.3 °F (36.8 °C)   Ht 5' 10" (1.778 m)   Wt 89.2 kg (196 lb 10.4 oz)   BMI 28.22 kg/m²     PHYSICAL EXAMINATION:    General appearance: Well appearing, in no acute distress, alert and oriented x3.  Psych:  Mood and affect appropriate.  Skin:  SCS incisions without signs of infections.  Staples removed without difficulty. Area cleaned with alcohol and Bacitracin applied.  One steristrip applied to battery site.  Head/face:  Atraumatic, normocephalic. No palpable lymph nodes  Cor: RRR  Pulm: CTA  GI: Abdomen soft and non-tender.  Extremities: Peripheral joint ROM is full and pain free without obvious instability or laxity in all four extremities. No deformities, edema, or skin discoloration. Good capillary refill.  Neuro: Bilateral upper and lower extremity coordination and muscle stretch reflexes are physiologic and symmetric.  Plantar response are downgoing.  Decreased sensation to bilateral feet.  Gait: Antalgic.    ASSESSMENT: 71 y.o. year old male with lower back and BLE pain, consistent with the following diagnoses:     1. Chronic pain syndrome     2. Lumbar radiculopathy     3. Failed back syndrome     4. Sacroiliac inflammation     5. Back pain, unspecified back location, unspecified back pain laterality, unspecified chronicity  hydrocodone-acetaminophen 5-325mg (NORCO) 5-325 mg per tablet         PLAN:     - Previous imaging was reviewed and discussed with the patient today.     - The patient is s/p lumbar SCS implant.  Staples removed without difficulty.  No signs of infection or complication. Haseeb is here today to turn the device on.    - D/C Percocet and change to Norco 5/325 mg Q8h PRN pain, #45.    - Continue activity " restrictions.  He can shower but not bathe.  Will reassess restrictions at next visit.    - RTC in 2 weeks for f/u.        The above plan and management options were discussed at length with patient. Patient is in agreement with the above and verbalized understanding.    Francisca Chen  07/10/2017

## 2017-07-12 NOTE — TELEPHONE ENCOUNTER
Spoke with patient wife she stated she will call back to schedule a appointment to establish care with a provider of their chose.

## 2017-07-21 DIAGNOSIS — C82.90 FOLLICULAR LYMPHOMA: Primary | ICD-10-CM

## 2017-07-24 ENCOUNTER — OFFICE VISIT (OUTPATIENT)
Dept: PAIN MEDICINE | Facility: CLINIC | Age: 72
End: 2017-07-24
Payer: MEDICARE

## 2017-07-24 ENCOUNTER — HOSPITAL ENCOUNTER (OUTPATIENT)
Dept: RADIOLOGY | Facility: OTHER | Age: 72
Discharge: HOME | End: 2017-07-24
Attending: NURSE PRACTITIONER
Payer: MEDICARE

## 2017-07-24 VITALS
SYSTOLIC BLOOD PRESSURE: 102 MMHG | WEIGHT: 194.44 LBS | HEIGHT: 70 IN | BODY MASS INDEX: 27.84 KG/M2 | TEMPERATURE: 97 F | DIASTOLIC BLOOD PRESSURE: 58 MMHG | HEART RATE: 61 BPM

## 2017-07-24 DIAGNOSIS — G57.90 MONONEURITIS LOWER LIMB, UNSPECIFIED LATERALITY: ICD-10-CM

## 2017-07-24 DIAGNOSIS — M54.16 LUMBAR RADICULOPATHY: ICD-10-CM

## 2017-07-24 DIAGNOSIS — M96.1 FAILED BACK SYNDROME: ICD-10-CM

## 2017-07-24 DIAGNOSIS — G89.4 CHRONIC PAIN SYNDROME: ICD-10-CM

## 2017-07-24 DIAGNOSIS — M46.1 SACROILIITIS: ICD-10-CM

## 2017-07-24 DIAGNOSIS — M47.819 FACET ARTHROPATHY: ICD-10-CM

## 2017-07-24 DIAGNOSIS — M54.16 LUMBAR RADICULOPATHY: Primary | ICD-10-CM

## 2017-07-24 PROCEDURE — 99999 PR PBB SHADOW E&M-EST. PATIENT-LVL III: CPT | Mod: PBBFAC,,, | Performed by: NURSE PRACTITIONER

## 2017-07-24 PROCEDURE — 1159F MED LIST DOCD IN RCRD: CPT | Mod: S$GLB,,, | Performed by: NURSE PRACTITIONER

## 2017-07-24 PROCEDURE — 99213 OFFICE O/P EST LOW 20 MIN: CPT | Mod: S$GLB,,, | Performed by: NURSE PRACTITIONER

## 2017-07-24 PROCEDURE — 1125F AMNT PAIN NOTED PAIN PRSNT: CPT | Mod: S$GLB,,, | Performed by: NURSE PRACTITIONER

## 2017-07-24 PROCEDURE — 99499 UNLISTED E&M SERVICE: CPT | Mod: S$GLB,,, | Performed by: NURSE PRACTITIONER

## 2017-07-24 PROCEDURE — 72070 X-RAY EXAM THORAC SPINE 2VWS: CPT | Mod: 26,,, | Performed by: RADIOLOGY

## 2017-07-24 PROCEDURE — 1157F ADVNC CARE PLAN IN RCRD: CPT | Mod: S$GLB,,, | Performed by: NURSE PRACTITIONER

## 2017-07-24 PROCEDURE — 72070 X-RAY EXAM THORAC SPINE 2VWS: CPT | Mod: TC

## 2017-07-24 NOTE — PROGRESS NOTES
Chronic patient Established Note (Follow up visit)      SUBJECTIVE:    Royce Francis presents to the clinic for a follow-up appointment for chronic back pain.  He is s/p Nevro lumbar SCS implant on 6/29/17.  He reports that he has not yet felt benefit from the device.  He has been reprogrammed over the phone multiple times by Haseeb.  He thinks that his foot pain is slightly improved, but not his back pain.  He continues to report that his worst pain is in the morning and he has a hard time getting out of bed.  He has a hx of non-Hodgkins lymphoma which has recurred appx 6 times in different locations.  He is followed by Dr. Lucia at [facility].  Since the last visit, Royce Francis states the pain has been persistent.  The patient denies any bowel/bladder incontinence or symptoms of saddle paresthesia.        Pain Disability Index Review:  Last 3 PDI Scores 7/24/2017 7/10/2017 7/6/2017   Pain Disability Index (PDI) 30 23 23       Pain Medications:    - Opioids: Norco (Hydrocodone/Acetaminophen)    Opioid Contract: no     report:  Reviewed and consistent with medication use as prescribed.    Pain Procedures:   6/29/17 Lumbar SCS implant Nevro-   5/23/17 Lumbar SCS trial- significant benefit of back and leg pain  1/16/17 Left L2,3,4,5 RFA- significant relief  12/14/16 Bilateral Synvisc One- significant relief  3/28/16- Bilateral L4-5 AND L5-S1 Facet injection  8/28/15- Bilateral L4-5 and L5-S1 Facet injection  6/5/15- Caudal DARIO with Racz Catheter   4/10/15- Bilateral L4-5 TF DARIO  2/20/15- Left L3-4-5 RFA  2/13/15- right L3-4-5 RFA  1/16/15- bilateral L3-4-5 MBB  9/12/14- bilateral L3-4-5 MBB  8/15/14- Bilateral L4-5 TF DARIO    Physical Therapy/Home Exercise: per self    Imaging:     Thoracic XRAY 4/21/16  Narrative   AP and lateral radiographs of the thoracic spine were obtained.  There is mild scoliosis of the thoracolumbar spine.  Posterior vertebral alignment appears satisfactory.  Vertebral body  heights appear well-maintained.  There are degenerative changes with small anterior osteophytes noted throughout the thoracic spine.  No abnormal paraspinal masses are evident.   Impression     Thoracic spondylosis.  Mild thoracolumbar scoliosis.  No evidence for acute fracture, bone destruction, or subluxation.     Lumbar MRI 7/28/14  Narrative   MRI LUMBAR SPINE    TECHNIQUE: MRI lumbar spine was performed on a 1.5T magnet. The following sequences were obtained: Localizer; sagittal T1, T2, STIR; axial T1 and T2. After administration of 20 mL Omniscan, axial and sagittal T1-weighted sequences were attained.    COMPARISON: Not available.    FINDINGS:    There are 5 lumbar vertebrae.  There is lumbar levoscoliosis.  There is grade 1 anterolisthesis of L4 on L5.  Vertebral body heights are maintained.  Degenerative endplate changes are noted.  No evidence for infiltrative process.  There is multilevel   severe, asymmetric loss of disk height. Conus terminates at L1 and appears unremarkable. Limited evaluation of posterior abdominal structures is unremarkable.  Paraspinal musculature is within normal limits.  Evaluation of sacroiliac joints is   unremarkable.  S3 Tarlov cyst noted.    L1-L2: There is mild bilateral facet arthrosis and circumferential disk bulge resulting in mild spinal canal stenosis and mild right neural foraminal narrowing.    L2-L3: There is a compression disk bulge and moderate bilateral facet arthrosis resulting in moderate spinal canal stenosis and mild bilateral neural foraminal narrowing.    L3-L4: There is mild bilateral facet arthrosis and circumferential disk bulge resulting in mild left neural foraminal narrowing.    L4-L5: There is moderate circumferential disk bulge and bilateral facet arthrosis resulting in moderate spinal canal stenosis and moderate left neural foraminal narrowing.    L5-S1: There is severe right facet arthrosis and mild circumferential disk bulge resulting in moderate  right neural foraminal narrowing.   Impression    No marrow infiltrative process. Multilevel degenerative changes with moderate spinal canal stenosis as detailed above       Thoracic XRAY 4/21/16  Narrative   AP and lateral radiographs of the thoracic spine were obtained.  There is mild scoliosis of the thoracolumbar spine.  Posterior vertebral alignment appears satisfactory.  Vertebral body heights appear well-maintained.  There are degenerative changes with small anterior osteophytes noted throughout the thoracic spine.  No abnormal paraspinal masses are evident.   Impression     Thoracic spondylosis.  Mild thoracolumbar scoliosis.  No evidence for acute fracture       Bilateral Knee XRAYs 3/10/16  Narrative   AP, lateral, and sunrise views of both knees were obtained.  The bones are intact.  There is no evidence for acute fracture or bone destruction.  There is chondrocalcinosis present.  There is moderate narrowing of the medial compartments of the femorotibial joints bilaterally.  There is spurring of the tibial spines with osteophytes at the femorotibial and patellofemoral joints.  There is mild soft tissue prominence in a right suprapatellar location and a small right-sided joint effusion cannot be excluded.  Atherosclerotic calcification is present within the arteries of the distal thighs and proximal calf.   Impression     Prominent symmetric degenerative changes of the femorotibial and patellofemoral joints with moderate narrowing of the medial compartments of the femorotibial joints.  Chondrocalcinosis noted bilaterally.  Possible right suprapatellar joint effusion.           Allergies:   Review of patient's allergies indicates:   Allergen Reactions    Iodine and iodide containing products Itching     Contrast dye       Current Medications:   Current Outpatient Prescriptions   Medication Sig Dispense Refill    acetaminophen (TYLENOL) 500 MG tablet Take 500 mg by mouth as needed for Pain.      ascorbic  acid, vitamin C, (VITAMIN C) 1000 MG tablet Take 1,000 mg by mouth once daily. liposomal      carisoprodol (SOMA) 350 MG tablet Take 350 mg by mouth as needed for Muscle spasms.      cyanocobalamin (VITAMIN B-12) 1000 MCG tablet Take 100 mcg by mouth once daily.      diphenoxylate-atropine 2.5-0.025 mg (LOMOTIL) 2.5-0.025 mg per tablet One tablet every 8 hours as needed for diarrhea 60 tablet 3    enalapril (VASOTEC) 5 MG tablet Take 1 tablet (5 mg total) by mouth once daily. 90 tablet 3    esomeprazole (NEXIUM) 20 MG capsule Take 20 mg by mouth before breakfast.      fish oil-omega-3 fatty acids 300-1,000 mg capsule Take 2 g by mouth once daily.      flurazepam (DALMANE) 30 MG capsule Take 30 mg by mouth nightly as needed for Insomnia.      flurazepam (DALMANE) 30 MG capsule TAKE 1 CAPSULE(30 MG) BY MOUTH EVERY EVENING AS NEEDED FOR INSOMNIA 90 capsule 1    fluticasone (FLONASE) 50 mcg/actuation nasal spray SPRAY ONCE IN EACH NOSTRIL ONCE DAILY 48 g 5    hydrocodone-acetaminophen 5-325mg (NORCO) 5-325 mg per tablet Take 1 tablet by mouth every 8 (eight) hours as needed for Pain. One to two tablets every 6 hours as needed for pain 45 tablet 0    lactobacillus rhamnosus GG (CULTURELLE) 10 billion cell capsule Take 1 capsule by mouth once daily.      lidocaine-prilocaine (EMLA) cream       loperamide (IMODIUM A-D) 2 mg Tab Take 2 mg by mouth once daily at 6am.       metoprolol tartrate (LOPRESSOR) 50 MG tablet Take 0.5 tablets (25 mg total) by mouth 2 (two) times daily. 180 tablet 3    mirtazapine (REMERON) 15 MG tablet Take 1 tablet (15 mg total) by mouth every evening. 90 tablet 3    multivitamin capsule Take 1 capsule by mouth once daily.      NON FORMULARY MEDICATION 300 mg once daily. Desiccated Liver      tamsulosin (FLOMAX) 0.4 mg Cp24 Take 1 capsule (0.4 mg total) by mouth once daily. 90 capsule 0    valacyclovir (VALTREX) 500 MG tablet Take 1 tablet (500 mg total) by mouth once daily. 90  tablet 3     No current facility-administered medications for this visit.        REVIEW OF SYSTEMS:    GENERAL:  No weight loss, malaise or fevers.  HEENT:  Negative for frequent or significant headaches.  NECK:  Negative for lumps, goiter, pain and significant neck swelling.  RESPIRATORY:  Negative for cough, wheezing or shortness of breath.  CARDIOVASCULAR:  Negative for chest pain, leg swelling or palpitations. H/O CHF and cardiomyopathy.  GI:  Negative for abdominal discomfort, blood in stools or black stools or change in bowel habits.  MUSCULOSKELETAL:  See HPI.  SKIN:  Negative for lesions, rash, and itching.  PSYCH:  Negative for sleep disturbance, mood disorder and recent psychosocial stressors.  HEMATOLOGY/LYMPHOLOGY:  Negative for prolonged bleeding, bruising easily or swollen nodes. H/O lymphoma and squamous cell carcinoma.  NEURO:   No history of headaches, syncope, paralysis, seizures or tremors.  All other reviewed and negative other than HPI.    Past Medical History:  Past Medical History:   Diagnosis Date    Allergy     Anemia     Arthritis     Basal cell carcinoma     excised from upper back    Cancer     Cardiomyopathy due to chemotherapy     Cataract     CHF (congestive heart failure)     Coronary artery disease     D1    Encounter for blood transfusion     Eye injuries 20 yrs    ou fb several x's    Hyperlipidemia     Hypertension     Hypertensive heart disease with heart failure 1/31/2013    Lymphoma 1996    Obstructive sleep apnea on CPAP     SCC (squamous cell carcinoma) excised 12/2015    R proximal forearm    Skin disease     Sleep apnea     SQUAMOUS CELL CARCINOMA excised 2/14/13    L forearm    Squamous cell carcinoma excised 8/4/2016    IN SITU, Left thigh MOHS    Squamous cell carcinoma excised 8/25/2016    left helix, MOHS       Past Surgical History:  Past Surgical History:   Procedure Laterality Date    ABDOMINAL SURGERY      CATARACT EXTRACTION      od   salazar    CATARACT EXTRACTION W/  INTRAOCULAR LENS IMPLANT Left 01/12/2017    Dr. Yanes    EYE SURGERY      Rt cataract    KIDNEY STONE SURGERY      SMALL INTESTINE SURGERY  09/2013    TONSILLECTOMY         Family History:  Family History   Problem Relation Age of Onset    Cataracts Mother     Hypertension Mother     Cataracts Father     Hypertension Father     Cancer Father     Hypertension Maternal Grandmother     Hypertension Maternal Grandfather     Hypertension Paternal Grandmother     Hypertension Paternal Grandfather     No Known Problems Sister     No Known Problems Brother     No Known Problems Maternal Aunt     No Known Problems Maternal Uncle     No Known Problems Paternal Aunt     No Known Problems Paternal Uncle     Amblyopia Neg Hx     Blindness Neg Hx     Diabetes Neg Hx     Glaucoma Neg Hx     Macular degeneration Neg Hx     Retinal detachment Neg Hx     Strabismus Neg Hx     Stroke Neg Hx     Thyroid disease Neg Hx     Melanoma Neg Hx     Heart disease Neg Hx     Celiac disease Neg Hx     Cirrhosis Neg Hx     Colon cancer Neg Hx     Colon polyps Neg Hx     Crohn's disease Neg Hx     Cystic fibrosis Neg Hx     Esophageal cancer Neg Hx     Hemochromatosis Neg Hx     Inflammatory bowel disease Neg Hx     Irritable bowel syndrome Neg Hx     Liver cancer Neg Hx     Liver disease Neg Hx     Rectal cancer Neg Hx     Stomach cancer Neg Hx     Ulcerative colitis Neg Hx     Burt's disease Neg Hx        Social History:  Social History     Social History    Marital status:      Spouse name: N/A    Number of children: N/A    Years of education: N/A     Occupational History    retired      Social History Main Topics    Smoking status: Former Smoker     Packs/day: 1.50     Years: 1.00     Quit date: 1960    Smokeless tobacco: Never Used    Alcohol use 1.2 oz/week     1 Glasses of wine, 1 Shots of liquor per week      Comment: socially    Drug  "use: No    Sexual activity: Yes     Partners: Female     Other Topics Concern    Not on file     Social History Narrative    No narrative on file       OBJECTIVE:    BP (!) 102/58   Pulse 61   Temp 97.3 °F (36.3 °C)   Ht 5' 10" (1.778 m)   Wt 88.2 kg (194 lb 7.1 oz)   BMI 27.90 kg/m²     PHYSICAL EXAMINATION:    General appearance: Well appearing, in no acute distress, alert and oriented x3.  Psych:  Mood and affect appropriate.  Skin:  SCS incisions well healing without signs of infection.  Head/face:  Atraumatic, normocephalic. No palpable lymph nodes  Cor: RRR  Pulm: CTA  GI: Abdomen soft and non-tender.  Back: There is pain to palpation of lumbar facet joints.  There is limited extension and flexion with pain.  Positive facet loading bilaterally.  Negative SLR.  Extremities: Peripheral joint ROM is full and pain free without obvious instability or laxity in all four extremities. No deformities, edema, or skin discoloration. Good capillary refill.  Neuro: Bilateral upper and lower extremity coordination and muscle stretch reflexes are physiologic and symmetric.  Plantar response are downgoing.    Gait: Antalgic.    ASSESSMENT: 71 y.o. year old male with lower back and BLE pain, consistent with the following diagnoses:     1. Lumbar radiculopathy  X-Ray Thoracic Spine AP Lateral   2. Failed back syndrome  X-Ray Thoracic Spine AP Lateral   3. Mononeuritis lower limb, unspecified laterality  X-Ray Thoracic Spine AP Lateral         PLAN:     - Previous imaging was reviewed and discussed with the patient today.     - The patient is s/p lumbar SCS implant.  He is not yet having benefit.  I will obtain thoracic XRAYs today to R/O lead migration.  Haseeb will meet with the patient today for programming.    - Can continue Norco 5/325 mg Q8h PRN pain.    - Continue activity restrictions.    - RTC in 2 weeks for f/u.    - Dr. Leos was consulted on the patient and agrees with this plan.      The above plan and " management options were discussed at length with patient. Patient is in agreement with the above and verbalized understanding.    Francisca Chen  07/24/2017

## 2017-07-26 ENCOUNTER — PATIENT MESSAGE (OUTPATIENT)
Dept: FAMILY MEDICINE | Facility: CLINIC | Age: 72
End: 2017-07-26

## 2017-07-26 ENCOUNTER — OFFICE VISIT (OUTPATIENT)
Dept: FAMILY MEDICINE | Facility: CLINIC | Age: 72
End: 2017-07-26
Payer: MEDICARE

## 2017-07-26 VITALS
BODY MASS INDEX: 27.87 KG/M2 | TEMPERATURE: 98 F | WEIGHT: 194.69 LBS | SYSTOLIC BLOOD PRESSURE: 130 MMHG | DIASTOLIC BLOOD PRESSURE: 70 MMHG | HEIGHT: 70 IN | HEART RATE: 85 BPM | OXYGEN SATURATION: 95 %

## 2017-07-26 DIAGNOSIS — G89.4 CHRONIC PAIN SYNDROME: ICD-10-CM

## 2017-07-26 DIAGNOSIS — I70.0 ATHEROSCLEROSIS OF AORTA: ICD-10-CM

## 2017-07-26 DIAGNOSIS — C82.00 FOLLICULAR LYMPHOMA GRADE I, UNSPECIFIED BODY REGION: ICD-10-CM

## 2017-07-26 DIAGNOSIS — Z00.00 ROUTINE MEDICAL EXAM: Primary | ICD-10-CM

## 2017-07-26 DIAGNOSIS — I10 ESSENTIAL HYPERTENSION: Chronic | ICD-10-CM

## 2017-07-26 DIAGNOSIS — I42.7 CARDIOMYOPATHY DUE TO CHEMOTHERAPY: Chronic | ICD-10-CM

## 2017-07-26 DIAGNOSIS — R53.83 FATIGUE, UNSPECIFIED TYPE: ICD-10-CM

## 2017-07-26 DIAGNOSIS — N40.0 BPH WITHOUT OBSTRUCTION/LOWER URINARY TRACT SYMPTOMS: ICD-10-CM

## 2017-07-26 DIAGNOSIS — T45.1X5A CARDIOMYOPATHY DUE TO CHEMOTHERAPY: Chronic | ICD-10-CM

## 2017-07-26 DIAGNOSIS — G47.33 OSA ON CPAP: Chronic | ICD-10-CM

## 2017-07-26 DIAGNOSIS — J30.89 NON-SEASONAL ALLERGIC RHINITIS, UNSPECIFIED CHRONICITY, UNSPECIFIED TRIGGER: ICD-10-CM

## 2017-07-26 DIAGNOSIS — I25.10 CORONARY ARTERY DISEASE INVOLVING NATIVE CORONARY ARTERY OF NATIVE HEART WITHOUT ANGINA PECTORIS: Chronic | ICD-10-CM

## 2017-07-26 DIAGNOSIS — Z23 NEED FOR TDAP VACCINATION: ICD-10-CM

## 2017-07-26 DIAGNOSIS — K52.9 CHRONIC DIARRHEA: Chronic | ICD-10-CM

## 2017-07-26 DIAGNOSIS — E78.5 HYPERLIPIDEMIA, UNSPECIFIED HYPERLIPIDEMIA TYPE: Chronic | ICD-10-CM

## 2017-07-26 PROBLEM — Z98.890 POST-OPERATIVE STATE: Status: RESOLVED | Noted: 2017-01-13 | Resolved: 2017-07-26

## 2017-07-26 PROCEDURE — 99499 UNLISTED E&M SERVICE: CPT | Mod: S$GLB,,, | Performed by: INTERNAL MEDICINE

## 2017-07-26 PROCEDURE — 99397 PER PM REEVAL EST PAT 65+ YR: CPT | Mod: S$GLB,,, | Performed by: INTERNAL MEDICINE

## 2017-07-26 PROCEDURE — 99999 PR PBB SHADOW E&M-EST. PATIENT-LVL III: CPT | Mod: PBBFAC,,, | Performed by: INTERNAL MEDICINE

## 2017-07-26 RX ORDER — TAMSULOSIN HYDROCHLORIDE 0.4 MG/1
1 CAPSULE ORAL DAILY
Qty: 90 CAPSULE | Refills: 3 | Status: SHIPPED | OUTPATIENT
Start: 2017-07-26

## 2017-07-26 NOTE — ASSESSMENT & PLAN NOTE
Restart flonase; I have discussed the common side effects of this medication and black box warnings (if applicable to this medication) with the patient and answered all of the questions they had at the time of this visit regarding this medication. I taught the patient the correct technique for nasal spray use.

## 2017-07-26 NOTE — PROGRESS NOTES
Chief Complaint  Chief Complaint   Patient presents with    Establish Care     new to pcp       HPI  Royce Francis is a 71 y.o. male with multiple medical diagnoses as listed in the medical history and problem list that presents for establish care.  Pt is new to me but is known to this clinic with his last appointment being 07/2016.  The patient was previously followed by one of my partners that has since retired .  I have reviewed their most recent previous OV with their previous PCP, most recent labs and most recent imaging studies and interpreted them myself.  He is also followed by several specialists including pain management, Heme/Onc, GI and I have reviewed the most recent OVs from these as well.  No acute complaints today.       PAST MEDICAL HISTORY:  Past Medical History:   Diagnosis Date    Allergy     Anemia     Arthritis     Basal cell carcinoma     excised from upper back    Cancer     Cardiomyopathy due to chemotherapy     Cataract     CHF (congestive heart failure)     Coronary artery disease     D1    Encounter for blood transfusion     Eye injuries 20 yrs    ou fb several x's    Hyperlipidemia     Hypertension     Hypertensive heart disease with heart failure 1/31/2013    Lymphoma 1996    Obstructive sleep apnea on CPAP     SCC (squamous cell carcinoma) excised 12/2015    R proximal forearm    Skin disease     Sleep apnea     SQUAMOUS CELL CARCINOMA excised 2/14/13    L forearm    Squamous cell carcinoma excised 8/4/2016    IN SITU, Left thigh MOHS    Squamous cell carcinoma excised 8/25/2016    left helix, MOHS       PAST SURGICAL HISTORY:  Past Surgical History:   Procedure Laterality Date    CATARACT EXTRACTION      od dr yanes    CATARACT EXTRACTION W/  INTRAOCULAR LENS IMPLANT Left 01/12/2017    Dr. Yanes    EYE SURGERY      Rt cataract    KIDNEY STONE SURGERY      SMALL INTESTINE SURGERY  09/2013    TONSILLECTOMY         SOCIAL HISTORY:  Social  History     Social History    Marital status:      Spouse name: N/A    Number of children: N/A    Years of education: N/A     Occupational History    retired      Social History Main Topics    Smoking status: Former Smoker     Packs/day: 1.50     Years: 1.00     Quit date: 1960    Smokeless tobacco: Never Used    Alcohol use 1.2 oz/week     1 Glasses of wine, 1 Shots of liquor per week      Comment: socially    Drug use: No    Sexual activity: Yes     Partners: Female      Comment:      Other Topics Concern    Not on file     Social History Narrative    No narrative on file       FAMILY HISTORY:  Family History   Problem Relation Age of Onset    Cataracts Mother     Hypertension Mother     Cataracts Father     Hypertension Father     Cancer Father      lung    Hypertension Maternal Grandmother     Hypertension Maternal Grandfather     Hypertension Paternal Grandmother     Hypertension Paternal Grandfather     No Known Problems Sister     No Known Problems Brother     No Known Problems Maternal Aunt     No Known Problems Maternal Uncle     No Known Problems Paternal Aunt     No Known Problems Paternal Uncle     Amblyopia Neg Hx     Blindness Neg Hx     Diabetes Neg Hx     Glaucoma Neg Hx     Macular degeneration Neg Hx     Retinal detachment Neg Hx     Strabismus Neg Hx     Stroke Neg Hx     Thyroid disease Neg Hx     Melanoma Neg Hx     Heart disease Neg Hx     Celiac disease Neg Hx     Cirrhosis Neg Hx     Colon cancer Neg Hx     Colon polyps Neg Hx     Crohn's disease Neg Hx     Cystic fibrosis Neg Hx     Esophageal cancer Neg Hx     Hemochromatosis Neg Hx     Inflammatory bowel disease Neg Hx     Irritable bowel syndrome Neg Hx     Liver cancer Neg Hx     Liver disease Neg Hx     Rectal cancer Neg Hx     Stomach cancer Neg Hx     Ulcerative colitis Neg Hx     Burt's disease Neg Hx        ALLERGIES AND MEDICATIONS: updated and reviewed.  Review  of patient's allergies indicates:   Allergen Reactions    Iodine and iodide containing products Itching     Contrast dye     Current Outpatient Prescriptions   Medication Sig Dispense Refill    acetaminophen (TYLENOL) 500 MG tablet Take 500 mg by mouth as needed for Pain.      ascorbic acid, vitamin C, (VITAMIN C) 1000 MG tablet Take 1,000 mg by mouth once daily. liposomal      carisoprodol (SOMA) 350 MG tablet Take 350 mg by mouth as needed for Muscle spasms.      cyanocobalamin (VITAMIN B-12) 1000 MCG tablet Take 100 mcg by mouth once daily.      enalapril (VASOTEC) 5 MG tablet Take 1 tablet (5 mg total) by mouth once daily. 90 tablet 3    esomeprazole (NEXIUM) 20 MG capsule Take 20 mg by mouth before breakfast.      fish oil-omega-3 fatty acids 300-1,000 mg capsule Take 2 g by mouth once daily.      flurazepam (DALMANE) 30 MG capsule Take 30 mg by mouth nightly as needed for Insomnia.      flurazepam (DALMANE) 30 MG capsule TAKE 1 CAPSULE(30 MG) BY MOUTH EVERY EVENING AS NEEDED FOR INSOMNIA 90 capsule 1    fluticasone (FLONASE) 50 mcg/actuation nasal spray SPRAY ONCE IN EACH NOSTRIL ONCE DAILY 48 g 5    lactobacillus rhamnosus GG (CULTURELLE) 10 billion cell capsule Take 1 capsule by mouth once daily.      lidocaine-prilocaine (EMLA) cream       loperamide (IMODIUM A-D) 2 mg Tab Take 2 mg by mouth once daily at 6am.       metoprolol tartrate (LOPRESSOR) 50 MG tablet Take 0.5 tablets (25 mg total) by mouth 2 (two) times daily. 180 tablet 3    mirtazapine (REMERON) 15 MG tablet Take 1 tablet (15 mg total) by mouth every evening. 90 tablet 3    multivitamin capsule Take 1 capsule by mouth once daily.      NON FORMULARY MEDICATION 300 mg once daily. Desiccated Liver      tamsulosin (FLOMAX) 0.4 mg Cp24 Take 1 capsule (0.4 mg total) by mouth once daily. 90 capsule 3    valacyclovir (VALTREX) 500 MG tablet Take 1 tablet (500 mg total) by mouth once daily. 90 tablet 3    diphenoxylate-atropine  2.5-0.025 mg (LOMOTIL) 2.5-0.025 mg per tablet One tablet every 8 hours as needed for diarrhea 60 tablet 3     No current facility-administered medications for this visit.          ROS  Review of Systems   Constitutional: Negative for appetite change, chills, diaphoresis, fatigue, fever and unexpected weight change.   HENT: Positive for hearing loss and rhinorrhea. Negative for congestion, dental problem, ear discharge, ear pain, postnasal drip, sinus pressure, sore throat and trouble swallowing.    Eyes: Positive for visual disturbance (followed by Ophtho). Negative for pain, discharge, redness and itching.   Respiratory: Negative for cough, chest tightness, shortness of breath and wheezing.    Cardiovascular: Negative for chest pain, palpitations and leg swelling.   Gastrointestinal: Positive for diarrhea. Negative for abdominal distention, abdominal pain, blood in stool, constipation, nausea and vomiting.        No melena   Endocrine: Negative for cold intolerance, heat intolerance, polydipsia, polyphagia and polyuria.        No nocturia   Genitourinary: Positive for frequency. Negative for decreased urine volume, difficulty urinating, discharge, dysuria, flank pain, hematuria, penile pain, penile swelling, scrotal swelling, testicular pain and urgency.   Musculoskeletal: Positive for arthralgias (hands\) and back pain. Negative for gait problem, joint swelling, myalgias, neck pain and neck stiffness.   Skin: Negative for color change, pallor and rash.   Neurological: Positive for weakness. Negative for dizziness, tremors, seizures, syncope, light-headedness and headaches.   Hematological: Negative for adenopathy. Does not bruise/bleed easily.   Psychiatric/Behavioral: Negative for confusion, decreased concentration, dysphoric mood, sleep disturbance and suicidal ideas. The patient is not nervous/anxious.         No depression         Physical Exam  Vitals:    07/26/17 0940   BP: 130/70   BP Location: Right arm  "  Patient Position: Sitting   BP Method: Manual   Pulse: 85   Temp: 97.7 °F (36.5 °C)   SpO2: 95%   Weight: 88.3 kg (194 lb 10.7 oz)   Height: 5' 10" (1.778 m)    Body mass index is 27.93 kg/m².  Weight: 88.3 kg (194 lb 10.7 oz)   Height: 5' 10" (177.8 cm)   General appearance: alert, appears stated age, cooperative and no distress  Head: Normocephalic, without obvious abnormality, atraumatic  Eyes: PERRL EOMI conjunctiva clear  Neck: no adenopathy, no carotid bruit, no JVD, supple, symmetrical, trachea midline and thyroid not enlarged, symmetric, no tenderness/mass/nodules  Lungs: clear to auscultation bilaterally  Heart: regular rate and rhythm, S1, S2 normal, no murmur, click, rub or gallop  Abdomen: soft, non-tender; bowel sounds normal; no masses,  no organomegaly  Extremities: extremities normal, atraumatic, no cyanosis or edema  Pulses: 2+ and symmetric  Neurologic: Grossly normal      Health Maintenance       Date Due Completion Date    TETANUS VACCINE 08/30/1961 ---    Zoster Vaccine 08/30/2003 ---    Influenza Vaccine 08/01/2017 10/28/2016    Lipid Panel 08/08/2019 8/8/2014    Colonoscopy 05/27/2020 5/27/2013            Assessment & Plan  Problem List Items Addressed This Visit        Neuro    SALINAS on CPAP (Chronic)    Overview     CPAP 14 FFM         Current Assessment & Plan     Having some mask leak.  He is followed by specialist at [facility].  ? Need for nasal with chin strap.             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 Monitor         Coronary artery disease (Chronic)    Overview     D1         Current Assessment & Plan     Followed by cardiology and statin stopped.  Will recheck FLP with next lab draw         Relevant Orders    Lipid panel    Essential hypertension (Chronic)    Current Assessment & Plan     The current medical regimen is effective;  continue present plan and medications.          Atherosclerosis of aorta (Chronic)    " Overview     CT 2014         Current Assessment & Plan     Stable, asymptomatic chronic condition.  Will continue to maximize risk factor reduction and adjust medication as needed.             Renal    BPH without obstruction/lower urinary tract symptoms (Chronic)    Current Assessment & Plan     The current medical regimen is effective;  continue present plan and medications.          Relevant Medications    tamsulosin (FLOMAX) 0.4 mg Cp24       Hem/Onc    Lymphoma, follicular    Overview     Multiple relapse         Current Assessment & Plan     Followed by Heme/Onc.  Doing well overall.  Monitor             Fluids/Electrolytes/Nutrition/GI    Hyperlipidemia (Chronic)    Current Assessment & Plan     Recheck FLP off of statin         Chronic diarrhea (Chronic)    Current Assessment & Plan     Using Lomotil PRN            Other    Chronic pain syndrome (Chronic)    Current Assessment & Plan     Followed by pain management regarding SCS           Other Visit Diagnoses     Routine medical exam    -  Primary  -    Discussed healthy diet, regular exercise, necessary labs, age appropriate cancer screening, and routine vaccinations.       Need for Tdap vaccination    -  Hand written Rx for Tdap provided    Fatigue, unspecified type    -  Check TSH with next lab drawn    Relevant Orders    TSH            Health Maintenance reviewed, as above.    Follow-up: Return in about 6 months (around 1/26/2018) for follow up for chronic conditions.

## 2017-07-26 NOTE — PROGRESS NOTES
Patient, Royce Francis (MRN #[id number]), presented with a recent Platelet count less than 150 K/uL consistent with the definition of thrombocytopenia (ICD10 - D69.6).    Platelets   Date Value Ref Range Status   06/01/2017 105 (L) 150 - 350 K/uL Final     The patient's thrombocytopenia was monitored, evaluated, addressed and/or treated. This addendum to the medical record is made on 07/26/2017.

## 2017-07-26 NOTE — ASSESSMENT & PLAN NOTE
Having some mask leak.  He is followed by specialist at [facility].  ? Need for nasal with chin strap.

## 2017-07-26 NOTE — PATIENT INSTRUCTIONS
Cetirizine 10mg daily as needed for sinus/allergies    Use pre-bottled nasal saline at least once a day but as often as desired for comfort (if you are mixing your own solution, you MUST use either distilled water or boiled water that has been allowed to cool).  Blow your nose after you spray each nostril.  AFTER using the nasal saline, use the nasal steroid in the following manner:    Place the tip of the bottle into your nostril aiming towards the outside corner of each eye.  You may find it beneficial to use the opposite hand for the nostril that you are spraying. Do not aim the bottle straight up your nose. McClure the medicine but do not perform a hard sniff when you do.  If you can taste the medication, then you have sniffed too hard.  Dab any medication that drips out of your nose but do not blow your nose as this will expel the medication.

## 2017-07-27 RX ORDER — FLUTICASONE PROPIONATE 50 MCG
SPRAY, SUSPENSION (ML) NASAL
Qty: 48 G | Refills: 3 | Status: SHIPPED | OUTPATIENT
Start: 2017-07-27

## 2017-07-28 ENCOUNTER — PATIENT MESSAGE (OUTPATIENT)
Dept: HEMATOLOGY/ONCOLOGY | Facility: CLINIC | Age: 72
End: 2017-07-28

## 2017-07-28 DIAGNOSIS — G47.00 INSOMNIA, UNSPECIFIED TYPE: ICD-10-CM

## 2017-07-28 RX ORDER — FLURAZEPAM HYDROCHLORIDE 30 MG/1
CAPSULE ORAL
Qty: 90 CAPSULE | Refills: 1 | Status: SHIPPED | OUTPATIENT
Start: 2017-07-28 | End: 2017-08-25 | Stop reason: SDUPTHER

## 2017-08-02 ENCOUNTER — OFFICE VISIT (OUTPATIENT)
Dept: FAMILY MEDICINE | Facility: CLINIC | Age: 72
End: 2017-08-02
Payer: MEDICARE

## 2017-08-02 VITALS
OXYGEN SATURATION: 96 % | DIASTOLIC BLOOD PRESSURE: 70 MMHG | WEIGHT: 193.81 LBS | HEIGHT: 70 IN | TEMPERATURE: 98 F | HEART RATE: 71 BPM | SYSTOLIC BLOOD PRESSURE: 128 MMHG | BODY MASS INDEX: 27.75 KG/M2

## 2017-08-02 DIAGNOSIS — K52.9 CHRONIC DIARRHEA: Chronic | ICD-10-CM

## 2017-08-02 DIAGNOSIS — R10.9 ABDOMINAL PAIN, UNSPECIFIED LOCATION: Primary | ICD-10-CM

## 2017-08-02 PROCEDURE — 1159F MED LIST DOCD IN RCRD: CPT | Mod: S$GLB,,, | Performed by: INTERNAL MEDICINE

## 2017-08-02 PROCEDURE — 1125F AMNT PAIN NOTED PAIN PRSNT: CPT | Mod: S$GLB,,, | Performed by: INTERNAL MEDICINE

## 2017-08-02 PROCEDURE — 1157F ADVNC CARE PLAN IN RCRD: CPT | Mod: S$GLB,,, | Performed by: INTERNAL MEDICINE

## 2017-08-02 PROCEDURE — 99999 PR PBB SHADOW E&M-EST. PATIENT-LVL III: CPT | Mod: PBBFAC,,, | Performed by: INTERNAL MEDICINE

## 2017-08-02 PROCEDURE — 99214 OFFICE O/P EST MOD 30 MIN: CPT | Mod: S$GLB,,, | Performed by: INTERNAL MEDICINE

## 2017-08-02 NOTE — PROGRESS NOTES
Chief Complaint  Chief Complaint   Patient presents with    Abdominal Pain       HPI  Royce Francis is a 71 y.o. male with multiple medical diagnoses as listed in the medical history and problem list that presents for abdominal pain for months.  Pt is known to me with his last appointment 7/26/2017.  It is intermittent.  Typically has symptoms 30-60 minutes after he eats. The pain is centrally located.  No emesis or type of foods that are a clear trigger.  He has a h/o PUD and gastric polyp on EGD in 2013.  No pain with eating.  Has drunk coffee and wine without any symptoms.  No reflux.  History is complicated by the fact that he has a h/o bowel resection.  Has regular diarrhea that he takes imodium for.  When diarrhea worsens he will take 2 additional imodium that then causes him to be unable to have a BM for several days.  Abdominal pain symptoms don't seem to be related to these events.  No BRBRPR, melena.  Abdominal pain not relieved with defecation.        PAST MEDICAL HISTORY:  Past Medical History:   Diagnosis Date    Allergy     Anemia     Arthritis     Basal cell carcinoma     excised from upper back    Cancer     Cardiomyopathy due to chemotherapy     Cataract     CHF (congestive heart failure)     Coronary artery disease     D1    Encounter for blood transfusion     Eye injuries 20 yrs    ou fb several x's    Hyperlipidemia     Hypertension     Hypertensive heart disease with heart failure 1/31/2013    Lymphoma 1996    Obstructive sleep apnea on CPAP     SCC (squamous cell carcinoma) excised 12/2015    R proximal forearm    Skin disease     Sleep apnea     SQUAMOUS CELL CARCINOMA excised 2/14/13    L forearm    Squamous cell carcinoma excised 8/4/2016    IN SITU, Left thigh MOHS    Squamous cell carcinoma excised 8/25/2016    left helix, MOHS       PAST SURGICAL HISTORY:  Past Surgical History:   Procedure Laterality Date    CATARACT EXTRACTION      od dr [place]     CATARACT EXTRACTION W/  INTRAOCULAR LENS IMPLANT Left 01/12/2017    Dr. Yanes    EYE SURGERY      Rt cataract    KIDNEY STONE SURGERY      SMALL INTESTINE SURGERY  09/2013    TONSILLECTOMY         SOCIAL HISTORY:  Social History     Social History    Marital status:      Spouse name: N/A    Number of children: N/A    Years of education: N/A     Occupational History    retired      Social History Main Topics    Smoking status: Former Smoker     Packs/day: 1.50     Years: 1.00     Quit date: 1960    Smokeless tobacco: Never Used    Alcohol use 1.2 oz/week     1 Glasses of wine, 1 Shots of liquor per week      Comment: socially    Drug use: No    Sexual activity: Yes     Partners: Female      Comment:      Other Topics Concern    Not on file     Social History Narrative    No narrative on file       FAMILY HISTORY:  Family History   Problem Relation Age of Onset    Cataracts Mother     Hypertension Mother     Cataracts Father     Hypertension Father     Cancer Father      lung    Hypertension Maternal Grandmother     Hypertension Maternal Grandfather     Hypertension Paternal Grandmother     Hypertension Paternal Grandfather     No Known Problems Sister     No Known Problems Brother     No Known Problems Maternal Aunt     No Known Problems Maternal Uncle     No Known Problems Paternal Aunt     No Known Problems Paternal Uncle     Amblyopia Neg Hx     Blindness Neg Hx     Diabetes Neg Hx     Glaucoma Neg Hx     Macular degeneration Neg Hx     Retinal detachment Neg Hx     Strabismus Neg Hx     Stroke Neg Hx     Thyroid disease Neg Hx     Melanoma Neg Hx     Heart disease Neg Hx     Celiac disease Neg Hx     Cirrhosis Neg Hx     Colon cancer Neg Hx     Colon polyps Neg Hx     Crohn's disease Neg Hx     Cystic fibrosis Neg Hx     Esophageal cancer Neg Hx     Hemochromatosis Neg Hx     Inflammatory bowel disease Neg Hx     Irritable bowel syndrome Neg Hx      Liver cancer Neg Hx     Liver disease Neg Hx     Rectal cancer Neg Hx     Stomach cancer Neg Hx     Ulcerative colitis Neg Hx     Burt's disease Neg Hx        ALLERGIES AND MEDICATIONS: updated and reviewed.  Review of patient's allergies indicates:   Allergen Reactions    Iodine and iodide containing products Itching     Contrast dye     Current Outpatient Prescriptions   Medication Sig Dispense Refill    acetaminophen (TYLENOL) 500 MG tablet Take 500 mg by mouth as needed for Pain.      ascorbic acid, vitamin C, (VITAMIN C) 1000 MG tablet Take 1,000 mg by mouth once daily. liposomal      carisoprodol (SOMA) 350 MG tablet Take 350 mg by mouth as needed for Muscle spasms.      cyanocobalamin (VITAMIN B-12) 1000 MCG tablet Take 100 mcg by mouth once daily.      diphenoxylate-atropine 2.5-0.025 mg (LOMOTIL) 2.5-0.025 mg per tablet One tablet every 8 hours as needed for diarrhea 60 tablet 3    enalapril (VASOTEC) 5 MG tablet Take 1 tablet (5 mg total) by mouth once daily. 90 tablet 3    esomeprazole (NEXIUM) 20 MG capsule Take 20 mg by mouth before breakfast.      fish oil-omega-3 fatty acids 300-1,000 mg capsule Take 2 g by mouth once daily.      flurazepam (DALMANE) 30 MG capsule Take 30 mg by mouth nightly as needed for Insomnia.      flurazepam (DALMANE) 30 MG capsule TAKE 1 CAPSULE(30 MG) BY MOUTH EVERY EVENING AS NEEDED FOR INSOMNIA 90 capsule 1    fluticasone (FLONASE) 50 mcg/actuation nasal spray SPRAY ONCE IN EACH NOSTRIL ONCE DAILY 48 g 3    lactobacillus rhamnosus GG (CULTURELLE) 10 billion cell capsule Take 1 capsule by mouth once daily.      lidocaine-prilocaine (EMLA) cream       loperamide (IMODIUM A-D) 2 mg Tab Take 2 mg by mouth once daily at 6am.       metoprolol tartrate (LOPRESSOR) 50 MG tablet Take 0.5 tablets (25 mg total) by mouth 2 (two) times daily. 180 tablet 3    mirtazapine (REMERON) 15 MG tablet Take 1 tablet (15 mg total) by mouth every evening. 90 tablet 3     "multivitamin capsule Take 1 capsule by mouth once daily.      NON FORMULARY MEDICATION 300 mg once daily. Desiccated Liver      tamsulosin (FLOMAX) 0.4 mg Cp24 Take 1 capsule (0.4 mg total) by mouth once daily. 90 capsule 3    valacyclovir (VALTREX) 500 MG tablet Take 1 tablet (500 mg total) by mouth once daily. 90 tablet 3     No current facility-administered medications for this visit.          ROS  Review of Systems   Constitutional: Negative for chills, fatigue and fever.   Respiratory: Negative for cough and shortness of breath.    Cardiovascular: Negative for chest pain and palpitations.   Gastrointestinal: Positive for abdominal pain, constipation and diarrhea. Negative for abdominal distention, blood in stool, nausea and vomiting.   Genitourinary: Negative for dysuria, flank pain, hematuria and urgency.           Physical Exam  Vitals:    08/02/17 1046   BP: 128/70   BP Location: Left arm   Patient Position: Sitting   BP Method: Manual   Pulse: 71   Temp: 97.9 °F (36.6 °C)   SpO2: 96%   Weight: 87.9 kg (193 lb 12.6 oz)   Height: 5' 10" (1.778 m)    Body mass index is 27.81 kg/m².  Weight: 87.9 kg (193 lb 12.6 oz)   Height: 5' 10" (177.8 cm)   General appearance: alert, appears stated age, cooperative and no distress  Head: Normocephalic, without obvious abnormality, atraumatic  Eyes: PERRL EOMI conjunctiva clear  Lungs: regular rate and pattern symmetric excursion  Heart: regular rate and rhythm  Abdomen: soft, non-tender; bowel sounds normal; no masses,  no organomegaly and negative Giang's sign  Extremities: extremities normal, atraumatic, no cyanosis or edema  Pulses: 2+ and symmetric  Neurologic: Mental status: Alert, oriented, thought content appropriate  Coordination: normal  Gait: Normal        Health Maintenance       Date Due Completion Date    TETANUS VACCINE 08/30/1961 ---    Influenza Vaccine 08/01/2017 10/28/2016    Lipid Panel 08/08/2019 8/8/2014    Colonoscopy 05/27/2020 5/27/2013    "         Assessment & Plan  Problem List Items Addressed This Visit        Fluids/Electrolytes/Nutrition/GI    Chronic diarrhea (Chronic)    Current Assessment & Plan     Counseled to only take 1 additional imodium when needed for worsening symptoms.                      Problem List Items Addressed This Visit     None      Visit Diagnoses     Abdominal pain, unspecified location    -  Primary  -    Recheck US to look for progression of cholelithiasis.  No signs or symptoms of obstruction currently.  He would prefer to avoid surgery is possible.  Will consider trial of dicyclomine or actigall pending US findings. GERD is also on the differential diagnosis    Relevant Orders    US Abdomen Complete            Health Maintenance reviewed, see previous note.    Follow-up: Return if symptoms worsen or fail to improve.

## 2017-08-04 ENCOUNTER — PATIENT MESSAGE (OUTPATIENT)
Dept: FAMILY MEDICINE | Facility: CLINIC | Age: 72
End: 2017-08-04

## 2017-08-04 DIAGNOSIS — R10.9 ABDOMINAL CRAMPING: Primary | ICD-10-CM

## 2017-08-05 ENCOUNTER — PATIENT MESSAGE (OUTPATIENT)
Dept: FAMILY MEDICINE | Facility: CLINIC | Age: 72
End: 2017-08-05

## 2017-08-06 ENCOUNTER — PATIENT MESSAGE (OUTPATIENT)
Dept: FAMILY MEDICINE | Facility: CLINIC | Age: 72
End: 2017-08-06

## 2017-08-06 RX ORDER — DICYCLOMINE HYDROCHLORIDE 20 MG/1
20 TABLET ORAL EVERY 6 HOURS
Qty: 120 TABLET | Refills: 0 | Status: SHIPPED | OUTPATIENT
Start: 2017-08-06 | End: 2017-09-05

## 2017-08-08 ENCOUNTER — OFFICE VISIT (OUTPATIENT)
Dept: OPTOMETRY | Facility: CLINIC | Age: 72
End: 2017-08-08
Payer: MEDICARE

## 2017-08-08 DIAGNOSIS — Z13.5 SCREENING FOR GLAUCOMA: ICD-10-CM

## 2017-08-08 DIAGNOSIS — H26.491 PCO (POSTERIOR CAPSULAR OPACIFICATION), RIGHT: Primary | ICD-10-CM

## 2017-08-08 DIAGNOSIS — H02.055 TRICHIASIS OF LEFT LOWER EYELID: ICD-10-CM

## 2017-08-08 PROCEDURE — 92014 COMPRE OPH EXAM EST PT 1/>: CPT | Mod: S$GLB,,, | Performed by: OPTOMETRIST

## 2017-08-08 PROCEDURE — 99999 PR PBB SHADOW E&M-EST. PATIENT-LVL I: CPT | Mod: PBBFAC,,, | Performed by: OPTOMETRIST

## 2017-08-08 NOTE — PROGRESS NOTES
HPI     DSL- 2/9/17     Pt states Va is doing well after CE OU.   Pt has dry and teary eyes.   Pt denies floaters and flashes.     Eyemeds  AT's OU PRN     Last edited by Boston Ibarra, OD on 8/8/2017 10:11 AM. (History)            Assessment /Plan     For exam results, see Encounter Report.    PCO (posterior capsular opacification), right  -Borderline visually significant  -monitor at annual    Trichiasis of left lower eyelid  -Epilated 6 lashes    Screening for glaucoma  -Monitor with annual eye exam and IOP check      RTC 1  yr

## 2017-08-09 ENCOUNTER — OFFICE VISIT (OUTPATIENT)
Dept: SPINE | Facility: CLINIC | Age: 72
End: 2017-08-09
Attending: ANESTHESIOLOGY
Payer: MEDICARE

## 2017-08-09 VITALS
HEART RATE: 76 BPM | WEIGHT: 193 LBS | SYSTOLIC BLOOD PRESSURE: 117 MMHG | TEMPERATURE: 98 F | DIASTOLIC BLOOD PRESSURE: 72 MMHG | BODY MASS INDEX: 27.63 KG/M2 | HEIGHT: 70 IN

## 2017-08-09 DIAGNOSIS — G62.0 CHEMOTHERAPY-INDUCED NEUROPATHY: ICD-10-CM

## 2017-08-09 DIAGNOSIS — M47.819 SPONDYLOSIS WITHOUT MYELOPATHY: ICD-10-CM

## 2017-08-09 DIAGNOSIS — M54.16 LUMBAR RADICULOPATHY: ICD-10-CM

## 2017-08-09 DIAGNOSIS — G89.4 CHRONIC PAIN SYNDROME: Primary | ICD-10-CM

## 2017-08-09 DIAGNOSIS — M46.1 SACROILIITIS: ICD-10-CM

## 2017-08-09 DIAGNOSIS — T45.1X5A CHEMOTHERAPY-INDUCED NEUROPATHY: ICD-10-CM

## 2017-08-09 DIAGNOSIS — M47.819 ARTHROPATHY OF FACET JOINTS AT MULTIPLE LEVELS: ICD-10-CM

## 2017-08-09 PROCEDURE — 1159F MED LIST DOCD IN RCRD: CPT | Mod: S$GLB,,, | Performed by: ANESTHESIOLOGY

## 2017-08-09 PROCEDURE — 99214 OFFICE O/P EST MOD 30 MIN: CPT | Mod: S$GLB,,, | Performed by: ANESTHESIOLOGY

## 2017-08-09 PROCEDURE — 1157F ADVNC CARE PLAN IN RCRD: CPT | Mod: S$GLB,,, | Performed by: ANESTHESIOLOGY

## 2017-08-09 PROCEDURE — 99999 PR PBB SHADOW E&M-EST. PATIENT-LVL II: CPT | Mod: PBBFAC,,, | Performed by: ANESTHESIOLOGY

## 2017-08-09 PROCEDURE — 3008F BODY MASS INDEX DOCD: CPT | Mod: S$GLB,,, | Performed by: ANESTHESIOLOGY

## 2017-08-09 PROCEDURE — 99499 UNLISTED E&M SERVICE: CPT | Mod: S$GLB,,, | Performed by: ANESTHESIOLOGY

## 2017-08-09 PROCEDURE — 3074F SYST BP LT 130 MM HG: CPT | Mod: S$GLB,,, | Performed by: ANESTHESIOLOGY

## 2017-08-09 PROCEDURE — 3078F DIAST BP <80 MM HG: CPT | Mod: S$GLB,,, | Performed by: ANESTHESIOLOGY

## 2017-08-09 PROCEDURE — 1125F AMNT PAIN NOTED PAIN PRSNT: CPT | Mod: S$GLB,,, | Performed by: ANESTHESIOLOGY

## 2017-08-09 RX ORDER — HYDROCODONE BITARTRATE AND ACETAMINOPHEN 5; 325 MG/1; MG/1
1 TABLET ORAL EVERY 12 HOURS PRN
Qty: 60 TABLET | Refills: 0 | Status: SHIPPED | OUTPATIENT
Start: 2017-08-09 | End: 2017-09-08

## 2017-08-09 RX ORDER — AMOXICILLIN 500 MG/1
CAPSULE ORAL
COMMUNITY
Start: 2017-08-03 | End: 2017-08-25

## 2017-08-09 NOTE — PROGRESS NOTES
Chronic patient Established Note (Follow up visit)      SUBJECTIVE:    Royce Francis presents to the clinic for a follow-up appointment for lower back pain. Since the last visit, Royce Francis states the pain has been worsening. Current pain intensity is 5/10.  States had significant relief from SCS for his feet but very little relief for the back     Pain Disability Index Review:  Last 3 PDI Scores 8/9/2017 7/24/2017 7/10/2017   Pain Disability Index (PDI) 58 30 23       Pain Medications:    - Opioids: Vicodin ( Hydrocodone/Acetaminophen)  - Adjuvant Medications: Soma  Compound (Carisoprodol) and LIdocaine Cream  - Anti-Coagulants: None  - Others: See med list    Opioid Contract: yes     report:  Reviewed and consistent with medication use as prescribed.    Pain Procedures:.   6/29/17 Lumbar SCS implant Nevro-   5/23/17 Lumbar SCS trial- significant benefit of back and leg pain  1/16/17 Left L2,3,4,5 RFA- significant relief  12/14/16 Bilateral Synvisc One- significant relief  3/28/16- Bilateral L4-5 AND L5-S1 Facet injection  8/28/15- Bilateral L4-5 and L5-S1 Facet injection  6/5/15- Caudal DARIO with Racz Catheter   4/10/15- Bilateral L4-5 TF DARIO  2/20/15- Left L3-4-5 RFA  2/13/15- right L3-4-5 RFA  1/16/15- bilateral L3-4-5 MBB  9/12/14- bilateral L3-4-5 MBB  8/15/14- Bilateral L4-5 TF DARIO     Physical Therapy/Home Exercise: no    Imaging: Thoracic XRAY 4/21/16  Narrative   AP and lateral radiographs of the thoracic spine were obtained.  There is mild scoliosis of the thoracolumbar spine.  Posterior vertebral alignment appears satisfactory.  Vertebral body heights appear well-maintained.  There are degenerative changes with small anterior osteophytes noted throughout the thoracic spine.  No abnormal paraspinal masses are evident.   Impression     Thoracic spondylosis.  Mild thoracolumbar scoliosis.  No evidence for acute fracture, bone destruction, or subluxation.      Lumbar MRI 7/28/14  Narrative    MRI LUMBAR SPINE    TECHNIQUE: MRI lumbar spine was performed on a 1.5T magnet. The following sequences were obtained: Localizer; sagittal T1, T2, STIR; axial T1 and T2. After administration of 20 mL Omniscan, axial and sagittal T1-weighted sequences were attained.    COMPARISON: Not available.    FINDINGS:    There are 5 lumbar vertebrae.  There is lumbar levoscoliosis.  There is grade 1 anterolisthesis of L4 on L5.  Vertebral body heights are maintained.  Degenerative endplate changes are noted.  No evidence for infiltrative process.  There is multilevel   severe, asymmetric loss of disk height. Conus terminates at L1 and appears unremarkable. Limited evaluation of posterior abdominal structures is unremarkable.  Paraspinal musculature is within normal limits.  Evaluation of sacroiliac joints is   unremarkable.  S3 Tarlov cyst noted.    L1-L2: There is mild bilateral facet arthrosis and circumferential disk bulge resulting in mild spinal canal stenosis and mild right neural foraminal narrowing.    L2-L3: There is a compression disk bulge and moderate bilateral facet arthrosis resulting in moderate spinal canal stenosis and mild bilateral neural foraminal narrowing.    L3-L4: There is mild bilateral facet arthrosis and circumferential disk bulge resulting in mild left neural foraminal narrowing.    L4-L5: There is moderate circumferential disk bulge and bilateral facet arthrosis resulting in moderate spinal canal stenosis and moderate left neural foraminal narrowing.    L5-S1: There is severe right facet arthrosis and mild circumferential disk bulge resulting in moderate right neural foraminal narrowing.   Impression    No marrow infiltrative process. Multilevel degenerative changes with moderate spinal canal stenosis as detailed above         Thoracic XRAY 4/21/16  Narrative   AP and lateral radiographs of the thoracic spine were obtained.  There is mild scoliosis of the thoracolumbar spine.  Posterior vertebral  alignment appears satisfactory.  Vertebral body heights appear well-maintained.  There are degenerative changes with small anterior osteophytes noted throughout the thoracic spine.  No abnormal paraspinal masses are evident.   Impression     Thoracic spondylosis.  Mild thoracolumbar scoliosis.  No evidence for acute fracture         Bilateral Knee XRAYs 3/10/16  Narrative   AP, lateral, and sunrise views of both knees were obtained.  The bones are intact.  There is no evidence for acute fracture or bone destruction.  There is chondrocalcinosis present.  There is moderate narrowing of the medial compartments of the femorotibial joints bilaterally.  There is spurring of the tibial spines with osteophytes at the femorotibial and patellofemoral joints.  There is mild soft tissue prominence in a right suprapatellar location and a small right-sided joint effusion cannot be excluded.  Atherosclerotic calcification is present within the arteries of the distal thighs and proximal calf.   Impression     Prominent symmetric degenerative changes of the femorotibial and patellofemoral joints with moderate narrowing of the medial compartments of the femorotibial joints.  Chondrocalcinosis noted bilaterally.  Possible right suprapatellar joint effusion.         Allergies:   Review of patient's allergies indicates:   Allergen Reactions    Iodine and iodide containing products Itching     Contrast dye       Current Medications:   Current Outpatient Prescriptions   Medication Sig Dispense Refill    acetaminophen (TYLENOL) 500 MG tablet Take 500 mg by mouth as needed for Pain.      ascorbic acid, vitamin C, (VITAMIN C) 1000 MG tablet Take 1,000 mg by mouth once daily. liposomal      carisoprodol (SOMA) 350 MG tablet Take 350 mg by mouth as needed for Muscle spasms.      cyanocobalamin (VITAMIN B-12) 1000 MCG tablet Take 100 mcg by mouth once daily.      dicyclomine (BENTYL) 20 mg tablet Take 1 tablet (20 mg total) by mouth every  6 (six) hours. 120 tablet 0    diphenoxylate-atropine 2.5-0.025 mg (LOMOTIL) 2.5-0.025 mg per tablet One tablet every 8 hours as needed for diarrhea 60 tablet 3    enalapril (VASOTEC) 5 MG tablet Take 1 tablet (5 mg total) by mouth once daily. 90 tablet 3    esomeprazole (NEXIUM) 20 MG capsule Take 20 mg by mouth before breakfast.      fish oil-omega-3 fatty acids 300-1,000 mg capsule Take 2 g by mouth once daily.      flurazepam (DALMANE) 30 MG capsule Take 30 mg by mouth nightly as needed for Insomnia.      flurazepam (DALMANE) 30 MG capsule TAKE 1 CAPSULE(30 MG) BY MOUTH EVERY EVENING AS NEEDED FOR INSOMNIA 90 capsule 1    fluticasone (FLONASE) 50 mcg/actuation nasal spray SPRAY ONCE IN EACH NOSTRIL ONCE DAILY 48 g 3    lactobacillus rhamnosus GG (CULTURELLE) 10 billion cell capsule Take 1 capsule by mouth once daily.      lidocaine-prilocaine (EMLA) cream       loperamide (IMODIUM A-D) 2 mg Tab Take 2 mg by mouth once daily at 6am.       metoprolol tartrate (LOPRESSOR) 50 MG tablet Take 0.5 tablets (25 mg total) by mouth 2 (two) times daily. 180 tablet 3    mirtazapine (REMERON) 15 MG tablet Take 1 tablet (15 mg total) by mouth every evening. 90 tablet 3    multivitamin capsule Take 1 capsule by mouth once daily.      NON FORMULARY MEDICATION 300 mg once daily. Desiccated Liver      tamsulosin (FLOMAX) 0.4 mg Cp24 Take 1 capsule (0.4 mg total) by mouth once daily. 90 capsule 3    valacyclovir (VALTREX) 500 MG tablet Take 1 tablet (500 mg total) by mouth once daily. 90 tablet 3     No current facility-administered medications for this visit.        REVIEW OF SYSTEMS:    GENERAL:  No weight loss, malaise or fevers.  HEENT:  Negative for frequent or significant headaches.  NECK:  Negative for lumps, goiter, pain and significant neck swelling.  RESPIRATORY:  Negative for cough, wheezing or shortness of breath.  CARDIOVASCULAR:  Negative for chest pain, leg swelling or palpitations.  GI:  Negative for  abdominal discomfort, blood in stools or black stools or change in bowel habits.  MUSCULOSKELETAL:  See HPI.  SKIN:  + for lesions, rash, and itching.  PSYCH:  Negative for sleep disturbance, mood disorder and recent psychosocial stressors.  HEMATOLOGY/LYMPHOLOGY:  Negative for prolonged bleeding, bruising easily or swollen nodes.  NEURO:   No history of headaches, syncope, paralysis, seizures or tremors.  All other reviewed and negative other than HPI.    Past Medical History:  Past Medical History:   Diagnosis Date    Allergy     Anemia     Arthritis     Basal cell carcinoma     excised from upper back    Cancer     Cardiomyopathy due to chemotherapy     Cataract     CHF (congestive heart failure)     Coronary artery disease     D1    Encounter for blood transfusion     Eye injuries 20 yrs    ou fb several x's    Hyperlipidemia     Hypertension     Hypertensive heart disease with heart failure 1/31/2013    Lymphoma 1996    Obstructive sleep apnea on CPAP     SCC (squamous cell carcinoma) excised 12/2015    R proximal forearm    Skin disease     Sleep apnea     SQUAMOUS CELL CARCINOMA excised 2/14/13    L forearm    Squamous cell carcinoma excised 8/4/2016    IN SITU, Left thigh MOHS    Squamous cell carcinoma excised 8/25/2016    left helix, MOHS       Past Surgical History:  Past Surgical History:   Procedure Laterality Date    CATARACT EXTRACTION      od dr yanes    CATARACT EXTRACTION W/  INTRAOCULAR LENS IMPLANT Left 01/12/2017    Dr. Yanes    EYE SURGERY      Rt cataract    KIDNEY STONE SURGERY      SMALL INTESTINE SURGERY  09/2013    TONSILLECTOMY         Family History:  Family History   Problem Relation Age of Onset    Cataracts Mother     Hypertension Mother     Cataracts Father     Hypertension Father     Cancer Father      lung    Hypertension Maternal Grandmother     Hypertension Maternal Grandfather     Hypertension Paternal Grandmother     Hypertension  "Paternal Grandfather     No Known Problems Sister     No Known Problems Brother     No Known Problems Maternal Aunt     No Known Problems Maternal Uncle     No Known Problems Paternal Aunt     No Known Problems Paternal Uncle     Amblyopia Neg Hx     Blindness Neg Hx     Diabetes Neg Hx     Glaucoma Neg Hx     Macular degeneration Neg Hx     Retinal detachment Neg Hx     Strabismus Neg Hx     Stroke Neg Hx     Thyroid disease Neg Hx     Melanoma Neg Hx     Heart disease Neg Hx     Celiac disease Neg Hx     Cirrhosis Neg Hx     Colon cancer Neg Hx     Colon polyps Neg Hx     Crohn's disease Neg Hx     Cystic fibrosis Neg Hx     Esophageal cancer Neg Hx     Hemochromatosis Neg Hx     Inflammatory bowel disease Neg Hx     Irritable bowel syndrome Neg Hx     Liver cancer Neg Hx     Liver disease Neg Hx     Rectal cancer Neg Hx     Stomach cancer Neg Hx     Ulcerative colitis Neg Hx     Burt's disease Neg Hx        Social History:  Social History     Social History    Marital status:      Spouse name: N/A    Number of children: N/A    Years of education: N/A     Occupational History    retired      Social History Main Topics    Smoking status: Former Smoker     Packs/day: 1.50     Years: 1.00     Quit date: 1960    Smokeless tobacco: Never Used    Alcohol use 1.2 oz/week     1 Glasses of wine, 1 Shots of liquor per week      Comment: socially    Drug use: No    Sexual activity: Yes     Partners: Female      Comment:      Other Topics Concern    Not on file     Social History Narrative    No narrative on file       OBJECTIVE:    /72   Pulse 76   Temp 98.3 °F (36.8 °C)   Ht 5' 10" (1.778 m)   Wt 87.5 kg (193 lb)   BMI 27.69 kg/m²     PHYSICAL EXAMINATION:    General appearance: Well appearing, in no acute distress, alert and oriented x3.  Psych: Mood and affect appropriate.  Skin: Skin color, texture, turgor normal, no rashes or lesions, in both upper " and lower body.  Head/face: Atraumatic, normocephalic. No palpable lymph nodes  Cor: RRR  Pulm: CTA  GI: Abdomen soft and non-tender.  Back: Straight leg raising in the sitting and supine positions is negative to radicular pain.  Mild Pain with palpation to lumbar facet joints. Limited lumbar extension with pain reproduction. Bilateral facet loading.  Extremities: Peripheral joint ROM is full and pain free without obvious instability or laxity in all four extremities. No deformities, edema, or skin discoloration. Good capillary refill.  Musculoskeletal: Shoulder, hip, and knee provocative maneuvers are negative. Bilateral upper and lower extremity strength is normal and symmetric. No atrophy or tone abnormalities are noted.  Neuro: Bilateral upper and lower extremity coordination and muscle stretch reflexes are physiologic and symmetric. Plantar response are downgoing. Decreased sensation to bilateral feet at digits 2-5.  Gait: Antalgic- ambulating with cane.    ASSESSMENT: 71 y.o. year old male with low back and bilateral feet pain, consistent with      1. Chronic pain syndrome  hydrocodone-acetaminophen 5-325mg (NORCO) 5-325 mg per tablet   2. Lumbar radiculopathy  hydrocodone-acetaminophen 5-325mg (NORCO) 5-325 mg per tablet   3. Spondylosis without myelopathy  hydrocodone-acetaminophen 5-325mg (NORCO) 5-325 mg per tablet   4. Sacroiliitis  hydrocodone-acetaminophen 5-325mg (NORCO) 5-325 mg per tablet   5. 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 Chemotherapy-induced neuropathy  hydrocodone-acetaminophen 5-325mg (NORCO) 5-325 mg per tablet         PLAN:     - I have stressed the importance of physical activity and a home exercise plan to help with pain and improve health.  - Patient can continue with medications for now since they are providing benefits, using them appropriately, and without side effects.  - scheduled to meet with nev[employer] rep  for reprogramming   - RTC 4 weeks  - Counseled patient regarding the importance of activity modification, constant sleeping habits and physical therapy.    The above plan and management options were discussed at length with patient. Patient is in agreement with the above and verbalized understanding.    Unruly Leos MD  08/09/2017

## 2017-08-10 ENCOUNTER — PATIENT MESSAGE (OUTPATIENT)
Dept: HEMATOLOGY/ONCOLOGY | Facility: CLINIC | Age: 72
End: 2017-08-10

## 2017-08-10 ENCOUNTER — PATIENT MESSAGE (OUTPATIENT)
Dept: FAMILY MEDICINE | Facility: CLINIC | Age: 72
End: 2017-08-10

## 2017-08-10 ENCOUNTER — HOSPITAL ENCOUNTER (OUTPATIENT)
Dept: RADIOLOGY | Facility: HOSPITAL | Age: 72
Discharge: HOME | End: 2017-08-10
Attending: INTERNAL MEDICINE
Payer: MEDICARE

## 2017-08-10 DIAGNOSIS — R10.9 ABDOMINAL PAIN, UNSPECIFIED LOCATION: ICD-10-CM

## 2017-08-10 DIAGNOSIS — R10.9 RECURRENT ABDOMINAL PAIN: Primary | ICD-10-CM

## 2017-08-10 PROCEDURE — 76700 US EXAM ABDOM COMPLETE: CPT | Mod: 26,,, | Performed by: RADIOLOGY

## 2017-08-10 PROCEDURE — 76700 US EXAM ABDOM COMPLETE: CPT | Mod: TC

## 2017-08-10 NOTE — PROGRESS NOTES
Your ultrasound results showed that there are no gallstone.  Some benign appearing cysts were noted but these would not cause pain.  In addition, there was some fatty infiltration of the liver, which can be associated with pain.  There is no medication for fatty liver; good weight and cholesterol control is the main treatement.  Please continue your current medications and doses.  Please feel free to contact me with any questions or concerns.    Sincerely,  Rupert Díaz  [email or web address]

## 2017-08-15 ENCOUNTER — PATIENT MESSAGE (OUTPATIENT)
Dept: FAMILY MEDICINE | Facility: CLINIC | Age: 72
End: 2017-08-15

## 2017-08-16 ENCOUNTER — PATIENT MESSAGE (OUTPATIENT)
Dept: FAMILY MEDICINE | Facility: CLINIC | Age: 72
End: 2017-08-16

## 2017-08-25 ENCOUNTER — OFFICE VISIT (OUTPATIENT)
Dept: GASTROENTEROLOGY | Facility: CLINIC | Age: 72
End: 2017-08-25
Payer: MEDICARE

## 2017-08-25 VITALS
HEART RATE: 89 BPM | HEIGHT: 70 IN | SYSTOLIC BLOOD PRESSURE: 141 MMHG | WEIGHT: 191.81 LBS | DIASTOLIC BLOOD PRESSURE: 71 MMHG | BODY MASS INDEX: 27.46 KG/M2

## 2017-08-25 DIAGNOSIS — K76.0 FATTY LIVER: ICD-10-CM

## 2017-08-25 DIAGNOSIS — K76.89 HEPATIC CYST: ICD-10-CM

## 2017-08-25 DIAGNOSIS — R10.9 CENTRAL ABDOMINAL PAIN: Primary | ICD-10-CM

## 2017-08-25 DIAGNOSIS — K52.9 CHRONIC DIARRHEA: ICD-10-CM

## 2017-08-25 PROCEDURE — 3008F BODY MASS INDEX DOCD: CPT | Mod: S$GLB,,, | Performed by: NURSE PRACTITIONER

## 2017-08-25 PROCEDURE — 99999 PR PBB SHADOW E&M-EST. PATIENT-LVL V: CPT | Mod: PBBFAC,,, | Performed by: NURSE PRACTITIONER

## 2017-08-25 PROCEDURE — 1159F MED LIST DOCD IN RCRD: CPT | Mod: S$GLB,,, | Performed by: NURSE PRACTITIONER

## 2017-08-25 PROCEDURE — 1157F ADVNC CARE PLAN IN RCRD: CPT | Mod: S$GLB,,, | Performed by: NURSE PRACTITIONER

## 2017-08-25 PROCEDURE — 3077F SYST BP >= 140 MM HG: CPT | Mod: S$GLB,,, | Performed by: NURSE PRACTITIONER

## 2017-08-25 PROCEDURE — 3078F DIAST BP <80 MM HG: CPT | Mod: S$GLB,,, | Performed by: NURSE PRACTITIONER

## 2017-08-25 PROCEDURE — 1126F AMNT PAIN NOTED NONE PRSNT: CPT | Mod: S$GLB,,, | Performed by: NURSE PRACTITIONER

## 2017-08-25 PROCEDURE — 99214 OFFICE O/P EST MOD 30 MIN: CPT | Mod: S$GLB,,, | Performed by: NURSE PRACTITIONER

## 2017-08-25 RX ORDER — DIPHENHYDRAMINE HCL 25 MG
25 CAPSULE ORAL ONCE
Qty: 2 CAPSULE | Refills: 0 | Status: SHIPPED | OUTPATIENT
Start: 2017-08-25 | End: 2017-08-25

## 2017-08-25 RX ORDER — PREDNISONE 50 MG/1
50 TABLET ORAL SEE ADMIN INSTRUCTIONS
Qty: 3 TABLET | Refills: 0 | Status: SHIPPED | OUTPATIENT
Start: 2017-08-25 | End: 2017-09-05

## 2017-08-25 NOTE — PROGRESS NOTES
[facility] Established Patient Visit    Reason for Visit:  The primary encounter diagnosis was Central abdominal pain. Diagnoses of Chronic diarrhea, Fatty liver, and Hepatic cyst were also pertinent to this visit.    PCP: Rupert Díaz    HPI:  This is a 71 y.o. male here for f/u abd pain and diarrhea.  Here with wife  Seen by Dr. Richard in 5/2017. Could not get Xifaxan as rx d/t cost.  Still with abd pain and occ diarrhea   20 mg Dicyclomine (q 6 waking hours) x 4 weeks helps abdominal pain but makes diarrhea worse, and makes him sleepy.  Recent abd us with liver cysts and fatty liver.  Takes hydrocodone every am for back pain.   Usually gets steroid/benadryl prep for CT d/t itching-tolerates well.     Abdominal pain   ONSET: chronic since s/p abd surgery for SB ulcer w perf (30 cm small bowel removed) with worsening in the last 3 months  LOCATION:central abd  DURATION:intermitent daily if not on bentyl  CHARACTER:cramping  ASSOCIATED/ALLEVIATING/AGGRAVAITING:mild, rare nausea. Vomited mucus once. No fevers. Worse after meals jennie a few hours after dinner. Hx lactose intolerance-avoids.  No pattern to meal type. Improvement with bentyl. Sometimes better after BM  RADIATION:none  TEMPORAL:worse in the evening after dinner. Pain can last for several hours if not on bentyl.  SEVERITY:2-3/10 if on bentyl. If not on bentyl can be 10/10    Reflux - no  Dysphagia/odynophagia - no   Bowel habits - 3 loose stools per day on imodium daily. Can lead to constipation. loose stools since 2013 after treatment for CA (h.o lymphoma and skin CA). Drinks Gatorade; avoids sugar substitutes and diet drinks.   GI bleeding - denies hematochezia, hematemesis, melena, BRBPR, black/tarry stools, and coffee ground emesis  NSAID usage - none    ROS:  Constitutional: No fevers, no chills, No unintentional weight loss, no fatigue,   ENT: No allergies  CV: No chest pain, no palpitations, no perif. edema, no sob on exertion  Pulm:  No cough, No shortness of breath, no wheezes, no sputum  Ophtho: No vision changes  GI: see HPI; also no change in appetite  Derm: No rash  Heme: No lymphadenopathy, No bruising  MSK: No arthritis, no muscle pain, no muscle weakness  : No dysuria, No hematuria  Endo: No hot or cold intolerance  Neuro: No syncope, No seizure,     PMHX:  has a past medical history of Allergy; Anemia; Arthritis; Basal cell carcinoma; Cancer; Cardiomyopathy due to chemotherapy; Cataract; CHF (congestive heart failure); Coronary artery disease; Encounter for blood transfusion; Eye injuries (20 yrs); Hyperlipidemia; Hypertension; Hypertensive heart disease with heart failure (1/31/2013); Lymphoma (1996); Obstructive sleep apnea on CPAP; SCC (squamous cell carcinoma) (excised 12/2015); Skin disease; Sleep apnea; SQUAMOUS CELL CARCINOMA (excised 2/14/13); Squamous cell carcinoma (excised 8/4/2016); and Squamous cell carcinoma (excised 8/25/2016).    PSHX:  has a past surgical history that includes Kidney stone surgery; Small intestine surgery (09/2013); Tonsillectomy; Eye surgery; Cataract extraction; and Cataract extraction w/  intraocular lens implant (Left, 01/12/2017).    The patient's social and family histories were reviewed by me and updated in the appropriate section of the electronic medical record.    Review of patient's allergies indicates:   Allergen Reactions    Iodine and iodide containing products Itching     Contrast dye       Current Outpatient Prescriptions   Medication Sig    acetaminophen (TYLENOL) 500 MG tablet Take 500 mg by mouth as needed for Pain.    ascorbic acid, vitamin C, (VITAMIN C) 1000 MG tablet Take 1,000 mg by mouth once daily. liposomal    carisoprodol (SOMA) 350 MG tablet Take 350 mg by mouth as needed for Muscle spasms.    cyanocobalamin (VITAMIN B-12) 1000 MCG tablet Take 100 mcg by mouth once daily.    dicyclomine (BENTYL) 20 mg tablet Take 1 tablet (20 mg total) by mouth every 6 (six) hours.  "   diphenoxylate-atropine 2.5-0.025 mg (LOMOTIL) 2.5-0.025 mg per tablet One tablet every 8 hours as needed for diarrhea    enalapril (VASOTEC) 5 MG tablet Take 1 tablet (5 mg total) by mouth once daily.    esomeprazole (NEXIUM) 20 MG capsule Take 20 mg by mouth before breakfast.    fish oil-omega-3 fatty acids 300-1,000 mg capsule Take 2 g by mouth once daily.    flurazepam (DALMANE) 30 MG capsule Take 30 mg by mouth nightly as needed for Insomnia.    fluticasone (FLONASE) 50 mcg/actuation nasal spray SPRAY ONCE IN EACH NOSTRIL ONCE DAILY    hydrocodone-acetaminophen 5-325mg (NORCO) 5-325 mg per tablet Take 1 tablet by mouth every 12 (twelve) hours as needed for Pain. One to two tablets every 6 hours as needed for pain    lactobacillus rhamnosus GG (CULTURELLE) 10 billion cell capsule Take 1 capsule by mouth once daily.    lidocaine-prilocaine (EMLA) cream     loperamide (IMODIUM A-D) 2 mg Tab Take 2 mg by mouth once daily at 6am.     metoprolol tartrate (LOPRESSOR) 50 MG tablet Take 0.5 tablets (25 mg total) by mouth 2 (two) times daily.    mirtazapine (REMERON) 15 MG tablet Take 1 tablet (15 mg total) by mouth every evening.    multivitamin capsule Take 1 capsule by mouth once daily.    NON FORMULARY MEDICATION 300 mg once daily. Desiccated Liver    tamsulosin (FLOMAX) 0.4 mg Cp24 Take 1 capsule (0.4 mg total) by mouth once daily.    valacyclovir (VALTREX) 500 MG tablet Take 1 tablet (500 mg total) by mouth once daily.    predniSONE (DELTASONE) 50 MG Tab Take 1 tablet (50 mg total) by mouth As instructed. Take 1 pill 13 hrs before CT scan  Take 1 pill 7 hrs before CT scan  Take 1 pill 1 hr before CT scan     No current facility-administered medications for this visit.          Objective Findings:    Vital Signs:  BP (!) 141/71   Pulse 89   Ht 5' 10" (1.778 m)   Wt 87 kg (191 lb 12.8 oz)   BMI 27.52 kg/m²  Body mass index is 27.52 kg/m².    Physical Exam:  General Appearance: Well appearing in " no acute distress  Head:   Normocephalic, without obvious abnormality  Eyes:    No scleral icterus, EOMI  Throat: Lips, mucosa, and tongue normal; teeth and gums normal  Lungs: CTA bilaterally in anterior and posterior fields, no wheezes, no crackles.  Heart:  Regular rate and rhythm, S1, S2 normal, no murmurs heard  Abdomen: Soft, non tender, non distended with positive bowel sounds in all four quadrants. No hepatosplenomegaly, ascites, or mass  Extremities:    2+ radial pulses, no clubbing, cyanosis or edema  Skin: No rash to exposed areas  Neurologic: A&Ox4      Labs:  Lab Results   Component Value Date    WBC 4.91 06/01/2017    HGB 12.7 (L) 06/01/2017    HCT 38.3 (L) 06/01/2017     (L) 06/01/2017    CHOL 124 08/08/2014    TRIG 200 (H) 08/08/2014    HDL 24 (L) 08/08/2014    ALT 14 06/01/2017    AST 20 06/01/2017     06/01/2017    K 5.0 06/01/2017     06/01/2017    CREATININE 1.3 06/01/2017    BUN 14 06/01/2017    CO2 25 06/01/2017    TSH 0.806 07/11/2016    PSA 1.37 05/02/2013    INR 1.0 10/06/2014    GLUF 96 04/16/2007    HGBA1C 5.5 11/21/2006       Endoscopy:   EGD - 5/27/13 by Dr. Graham for MONSE; fundic gland polyp; 6 mm antral ulcer (reactive gastritis, HP stain negative); normal duodenal (biopsies normal).    COLONOSCOPY - 5/27/13 by Dr. Graham for MONSE; to cecum, excellent prep; normal exam.    Assessment:    1. Central abdominal pain    2. Chronic diarrhea    3. Fatty liver    4. Hepatic cyst          Recommendations:  1. abd pain-EGD r/o  PUD, gastritis, h pylori. CT abd/pelvis w/ steroid/benadryl prep as RX for contrast related itching.   2. Chronic diarrhea- egd for SB bx.  3.  fatty liver w/ hepatic cyst- ambulatory referral to hepatology.    F/u with Dr Richard.    Order summary:  Orders Placed This Encounter    CT Abdomen Pelvis W Wo Contrast    Ambulatory Referral to Hepatology    diphenhydrAMINE (BENADRYL) 25 mg capsule    predniSONE (DELTASONE) 50 MG Tab    Case request GI:  ESOPHAGOGASTRODUODENOSCOPY (EGD)       Thank you for allowing me to participate in the care of FAWAD Salvador, FNP-C

## 2017-08-25 NOTE — LETTER
August 27, 2017      Rupert Díaz MD  [address]  [place] [address]           [facility]  [address]  [place] [address]  Phone: [phone]  Fax: [phone]          Patient: Royce Francis   MR Number: [id number]   YOB: 1945   Date of Visit: 8/25/2017       Dear Dr. Rupert Díaz:    Thank you for referring Royce Francis to me for evaluation. Attached you will find relevant portions of my assessment and plan of care.    If you have questions, please do not hesitate to call me. I look forward to following Royce Francis along with you.    Sincerely,    Annita Sylvester, TESS    Enclosure  CC:  No Recipients    If you would like to receive this communication electronically, please contact [email or web address] or [phone] to request more information on [facility] [facility] Link access.    For providers and/or their staff who would like to refer a patient to [facility], please contact us through our one-stop-shop provider referral line, [facility], at [phone].    If you feel you have received this communication in error or would no longer like to receive these types of communications, please e-mail [email or web address]

## 2017-08-26 ENCOUNTER — PATIENT MESSAGE (OUTPATIENT)
Dept: GASTROENTEROLOGY | Facility: CLINIC | Age: 72
End: 2017-08-26

## 2017-08-28 ENCOUNTER — PATIENT MESSAGE (OUTPATIENT)
Dept: HEMATOLOGY/ONCOLOGY | Facility: CLINIC | Age: 72
End: 2017-08-28

## 2017-08-28 ENCOUNTER — PATIENT MESSAGE (OUTPATIENT)
Dept: GASTROENTEROLOGY | Facility: CLINIC | Age: 72
End: 2017-08-28

## 2017-08-30 ENCOUNTER — PATIENT MESSAGE (OUTPATIENT)
Dept: FAMILY MEDICINE | Facility: CLINIC | Age: 72
End: 2017-08-30

## 2017-09-01 ENCOUNTER — PATIENT MESSAGE (OUTPATIENT)
Dept: HEPATOLOGY | Facility: CLINIC | Age: 72
End: 2017-09-01

## 2017-09-01 ENCOUNTER — OFFICE VISIT (OUTPATIENT)
Dept: HEPATOLOGY | Facility: CLINIC | Age: 72
End: 2017-09-01
Payer: MEDICARE

## 2017-09-01 ENCOUNTER — TELEPHONE (OUTPATIENT)
Dept: GASTROENTEROLOGY | Facility: CLINIC | Age: 72
End: 2017-09-01

## 2017-09-01 ENCOUNTER — HOSPITAL ENCOUNTER (OUTPATIENT)
Dept: RADIOLOGY | Facility: HOSPITAL | Age: 72
Discharge: HOME | End: 2017-09-01
Attending: NURSE PRACTITIONER
Payer: MEDICARE

## 2017-09-01 ENCOUNTER — PROCEDURE VISIT (OUTPATIENT)
Dept: HEPATOLOGY | Facility: CLINIC | Age: 72
End: 2017-09-01
Attending: NURSE PRACTITIONER
Payer: MEDICARE

## 2017-09-01 VITALS
HEIGHT: 70 IN | DIASTOLIC BLOOD PRESSURE: 65 MMHG | WEIGHT: 190.5 LBS | BODY MASS INDEX: 27.27 KG/M2 | SYSTOLIC BLOOD PRESSURE: 136 MMHG | TEMPERATURE: 98 F | HEART RATE: 63 BPM

## 2017-09-01 DIAGNOSIS — Z11.59 NEED FOR HEPATITIS C SCREENING TEST: ICD-10-CM

## 2017-09-01 DIAGNOSIS — R93.3 ABNORMAL CT SCAN, GASTROINTESTINAL TRACT: Primary | ICD-10-CM

## 2017-09-01 DIAGNOSIS — B02.8 HERPES ZOSTER COMPLICATED: ICD-10-CM

## 2017-09-01 DIAGNOSIS — K76.0 NAFLD (NONALCOHOLIC FATTY LIVER DISEASE): ICD-10-CM

## 2017-09-01 DIAGNOSIS — R10.9 CENTRAL ABDOMINAL PAIN: ICD-10-CM

## 2017-09-01 DIAGNOSIS — K76.0 NAFLD (NONALCOHOLIC FATTY LIVER DISEASE): Primary | ICD-10-CM

## 2017-09-01 LAB
CREAT SERPL-MCNC: 1 MG/DL (ref 0.5–1.4)
SAMPLE: NORMAL

## 2017-09-01 PROCEDURE — 74177 CT ABD & PELVIS W/CONTRAST: CPT | Mod: 26,,, | Performed by: RADIOLOGY

## 2017-09-01 PROCEDURE — 99499 UNLISTED E&M SERVICE: CPT | Mod: S$GLB,,, | Performed by: NURSE PRACTITIONER

## 2017-09-01 PROCEDURE — 3008F BODY MASS INDEX DOCD: CPT | Mod: S$GLB,,, | Performed by: NURSE PRACTITIONER

## 2017-09-01 PROCEDURE — 3078F DIAST BP <80 MM HG: CPT | Mod: S$GLB,,, | Performed by: NURSE PRACTITIONER

## 2017-09-01 PROCEDURE — 99214 OFFICE O/P EST MOD 30 MIN: CPT | Mod: S$GLB,,, | Performed by: NURSE PRACTITIONER

## 2017-09-01 PROCEDURE — 74177 CT ABD & PELVIS W/CONTRAST: CPT | Mod: TC

## 2017-09-01 PROCEDURE — 91200 LIVER ELASTOGRAPHY: CPT | Mod: S$GLB,,, | Performed by: NURSE PRACTITIONER

## 2017-09-01 PROCEDURE — 1157F ADVNC CARE PLAN IN RCRD: CPT | Mod: S$GLB,,, | Performed by: NURSE PRACTITIONER

## 2017-09-01 PROCEDURE — 99999 PR PBB SHADOW E&M-EST. PATIENT-LVL V: CPT | Mod: PBBFAC,,, | Performed by: NURSE PRACTITIONER

## 2017-09-01 PROCEDURE — 25500020 PHARM REV CODE 255: Performed by: NURSE PRACTITIONER

## 2017-09-01 PROCEDURE — 3075F SYST BP GE 130 - 139MM HG: CPT | Mod: S$GLB,,, | Performed by: NURSE PRACTITIONER

## 2017-09-01 PROCEDURE — 1126F AMNT PAIN NOTED NONE PRSNT: CPT | Mod: S$GLB,,, | Performed by: NURSE PRACTITIONER

## 2017-09-01 PROCEDURE — 1159F MED LIST DOCD IN RCRD: CPT | Mod: S$GLB,,, | Performed by: NURSE PRACTITIONER

## 2017-09-01 RX ORDER — VALACYCLOVIR HYDROCHLORIDE 500 MG/1
TABLET, FILM COATED ORAL
Qty: 90 TABLET | Refills: 2 | Status: ON HOLD | OUTPATIENT
Start: 2017-09-01 | End: 2017-11-02 | Stop reason: HOSPADM

## 2017-09-01 RX ORDER — METOPROLOL TARTRATE 50 MG/1
25 TABLET ORAL 2 TIMES DAILY
Qty: 180 TABLET | Refills: 3 | Status: SHIPPED | OUTPATIENT
Start: 2017-09-01 | End: 2017-10-04 | Stop reason: SDUPTHER

## 2017-09-01 RX ADMIN — IOHEXOL 15 ML: 350 INJECTION, SOLUTION INTRAVENOUS at 10:09

## 2017-09-01 RX ADMIN — IOHEXOL 100 ML: 350 INJECTION, SOLUTION INTRAVENOUS at 12:09

## 2017-09-01 RX ADMIN — IOHEXOL 15 ML: 350 INJECTION, SOLUTION INTRAVENOUS at 11:09

## 2017-09-01 NOTE — LETTER
September 1, 2017      Annita Sylvester NP  [address]  [facility] [address]           Donte Marifer - Hepatology  [address]  [facility] [address]  Phone: [phone]  Fax: [phone]          Patient: Royce Francis   MR Number: [id number]   YOB: 1945   Date of Visit: 9/1/2017       Dear Annita Sylvester:    Thank you for referring Royce Francis to me for evaluation. Attached you will find relevant portions of my assessment and plan of care.    If you have questions, please do not hesitate to call me. I look forward to following Royce Francis along with you.    Sincerely,    Mireille James NP    Enclosure  CC:  No Recipients    If you would like to receive this communication electronically, please contact [email or web address] or [phone] to request more information on [facility] access.    For providers and/or their staff who would like to refer a patient to [facility], please contact us through our one-stop-shop provider referral line, [facility], at [phone].    If you feel you have received this communication in error or would no longer like to receive these types of communications, please e-mail [email or web address]

## 2017-09-01 NOTE — PROCEDURES
Procedures   Fibroscan Procedure     Name: Royce Francis  Date of Procedure : 2017   :: Mireille James NP  Diagnosis: NAFLD    Probe: M    Fibroscan reading: 3.6 KPa    Fibrosis:F 0-1     CAP readin dB/m    Steatosis: :S2

## 2017-09-01 NOTE — PROGRESS NOTES
"HEPATOLOGY CONSULTATION    Referring Physician: Annita Sylvester NP  Current Corresponding Physician: Annita Sylvester NP    Reason for Consultation: Consultation for evaluation of Fatty Liver    History of Present Illness: Royce Francis is a 72 y.o. malewho presents for evaluation of   Chief Complaint   Patient presents with    Fatty Liver     Mr. Francis is a 71 yo male with recently noted hepatic steatosis on imaging performed for evaluation of abdominal pain.  Also noted was a R lobe simple cyst.  LFTs are normal with normal synthetic liver function.  He has additional PMH HTN, HLD, non-Hodgkin's lymphoma s/p chemo, SCC s/p Mohs' of L ear    1996, dx lymphoma, treated with chemo w/ relapse x 18  Currently in remission, followed by Dr. Lucia    Denies previously known liver disease or abnormal serologies  Denies symptoms of decompensation   Denies IVDU, intranasal drug use, tattoos, blood transfusions  But patient states that when attempting to donate blood told he had "hepatitis"  Alcohol use, rare  Denies family hx of liver disease    Review of available records reveal:  Hep B cAb neg  Hep B sAg neg    Results for orders placed during the hospital encounter of 08/10/17   US Abdomen Complete    Narrative Complete abdominal ultrasound dated August 10, 2017    Clinical history: R. 10.9    Comparison: CT of the abdomen and pelvis dated October 1, 2014 and abdominal ultrasound dated January 16, 2013    Technique: Sonographic imaging of the abdominal structures were performed    Findings:  The liver is increased in echogenicity which could cyst fatty infiltration.  Incidental note is made of a 9 mm cyst within the right lobe of the liver.  Liver is otherwise normal in size and contour without evidence of focal lesions. There is no evidence of intra-or extrahepatic bile duct  dilatation.  The gallbladder unremarkable. No evidence of gallbladder wall thickening, pericholecystic fluid or gallstones. Common bile " duct measures 4.4 mm.    The partially visualized aorta, IVC, pancreas, spleen and bilateral kidneys demonstrate no sonographic abnormality.  Incidental note is made of a 1 cm cyst within the superior pole of the right kidney. Right kidney measures 11.7 x 4.9 x 4.7 cm.  Left kidney measures 10.7 x 6.0 x 5.6 cm.    No ascites is seen.    Impression 1. Single hepatic and renal cyst.  2. Fatty infiltration of the liver      Electronically signed by: ARACELIS DALTON MD  Date:     08/10/17  Time:    09:05      ]    Other noted hx: HLD, HTN, CAD, CM, BPH, OA, DDD  Cancer: large cell non Hodgkin's lymphoma  Past Medical History:   Diagnosis Date    Allergy     Anemia     Arthritis     Basal cell carcinoma     excised from upper back    Cancer     Cardiomyopathy due to chemotherapy     Cataract     CHF (congestive heart failure)     Coronary artery disease     D1    Encounter for blood transfusion     Eye injuries 20 yrs    ou fb several x's    Hyperlipidemia     Hypertension     Hypertensive heart disease with heart failure 1/31/2013    Lymphoma 1996    Obstructive sleep apnea on CPAP     SCC (squamous cell carcinoma) excised 12/2015    R proximal forearm    Skin disease     Sleep apnea     SQUAMOUS CELL CARCINOMA excised 2/14/13    L forearm    Squamous cell carcinoma excised 8/4/2016    IN SITU, Left thigh MOHS    Squamous cell carcinoma excised 8/25/2016    left helix, MOHS     Outpatient Encounter Prescriptions as of 9/1/2017   Medication Sig Dispense Refill    acetaminophen (TYLENOL) 500 MG tablet Take 500 mg by mouth as needed for Pain.      ascorbic acid, vitamin C, (VITAMIN C) 1000 MG tablet Take 1,000 mg by mouth once daily. liposomal      carisoprodol (SOMA) 350 MG tablet Take 350 mg by mouth as needed for Muscle spasms.      cyanocobalamin (VITAMIN B-12) 1000 MCG tablet Take 100 mcg by mouth once daily.      dicyclomine (BENTYL) 20 mg tablet Take 1 tablet (20 mg total) by mouth every 6  (six) hours. 120 tablet 0    diphenoxylate-atropine 2.5-0.025 mg (LOMOTIL) 2.5-0.025 mg per tablet One tablet every 8 hours as needed for diarrhea 60 tablet 3    enalapril (VASOTEC) 5 MG tablet Take 1 tablet (5 mg total) by mouth once daily. 90 tablet 3    esomeprazole (NEXIUM) 20 MG capsule Take 20 mg by mouth before breakfast.      fish oil-omega-3 fatty acids 300-1,000 mg capsule Take 2 g by mouth once daily.      flurazepam (DALMANE) 30 MG capsule Take 30 mg by mouth nightly as needed for Insomnia.      fluticasone (FLONASE) 50 mcg/actuation nasal spray SPRAY ONCE IN EACH NOSTRIL ONCE DAILY 48 g 3    hydrocodone-acetaminophen 5-325mg (NORCO) 5-325 mg per tablet Take 1 tablet by mouth every 12 (twelve) hours as needed for Pain. One to two tablets every 6 hours as needed for pain 60 tablet 0    lactobacillus rhamnosus GG (CULTURELLE) 10 billion cell capsule Take 1 capsule by mouth once daily.      lidocaine-prilocaine (EMLA) cream       loperamide (IMODIUM A-D) 2 mg Tab Take 2 mg by mouth once daily at 6am.       metoprolol tartrate (LOPRESSOR) 50 MG tablet Take 0.5 tablets (25 mg total) by mouth 2 (two) times daily. 180 tablet 3    mirtazapine (REMERON) 15 MG tablet Take 1 tablet (15 mg total) by mouth every evening. 90 tablet 3    multivitamin capsule Take 1 capsule by mouth once daily.      NON FORMULARY MEDICATION 300 mg once daily. Desiccated Liver      predniSONE (DELTASONE) 50 MG Tab Take 1 tablet (50 mg total) by mouth As instructed. Take 1 pill 13 hrs before CT scan  Take 1 pill 7 hrs before CT scan  Take 1 pill 1 hr before CT scan 3 tablet 0    tamsulosin (FLOMAX) 0.4 mg Cp24 Take 1 capsule (0.4 mg total) by mouth once daily. 90 capsule 3    valacyclovir (VALTREX) 500 MG tablet Take 1 tablet (500 mg total) by mouth once daily. 90 tablet 3     No facility-administered encounter medications on file as of 9/1/2017.      Review of patient's allergies indicates:   Allergen Reactions     Iodine and iodide containing products Itching     Contrast dye     Family History   Problem Relation Age of Onset    Cataracts Mother     Hypertension Mother     Cataracts Father     Hypertension Father     Cancer Father      lung    Hypertension Maternal Grandmother     Hypertension Maternal Grandfather     Hypertension Paternal Grandmother     Hypertension Paternal Grandfather     No Known Problems Sister     No Known Problems Brother     No Known Problems Maternal Aunt     No Known Problems Maternal Uncle     No Known Problems Paternal Aunt     No Known Problems Paternal Uncle     Amblyopia Neg Hx     Blindness Neg Hx     Diabetes Neg Hx     Glaucoma Neg Hx     Macular degeneration Neg Hx     Retinal detachment Neg Hx     Strabismus Neg Hx     Stroke Neg Hx     Thyroid disease Neg Hx     Melanoma Neg Hx     Heart disease Neg Hx     Celiac disease Neg Hx     Cirrhosis Neg Hx     Colon cancer Neg Hx     Colon polyps Neg Hx     Crohn's disease Neg Hx     Cystic fibrosis Neg Hx     Esophageal cancer Neg Hx     Hemochromatosis Neg Hx     Inflammatory bowel disease Neg Hx     Irritable bowel syndrome Neg Hx     Liver cancer Neg Hx     Liver disease Neg Hx     Rectal cancer Neg Hx     Stomach cancer Neg Hx     Ulcerative colitis Neg Hx     Burt's disease Neg Hx        Social History     Social History    Marital status:      Spouse name: N/A    Number of children: N/A    Years of education: N/A     Occupational History    retired      Social History Main Topics    Smoking status: Former Smoker     Packs/day: 1.50     Years: 1.00     Quit date: 1960    Smokeless tobacco: Never Used    Alcohol use 1.2 oz/week     1 Glasses of wine, 1 Shots of liquor per week      Comment: socially    Drug use: No    Sexual activity: Yes     Partners: Female      Comment:      Other Topics Concern    Not on file     Social History Narrative    No narrative on file      Review of Systems   Constitutional: Negative for activity change, appetite change, chills, diaphoresis, fatigue, fever and unexpected weight change.   HENT: Negative for facial swelling.    Respiratory: Negative for cough, chest tightness and shortness of breath.    Cardiovascular: Negative for chest pain, palpitations and leg swelling.   Gastrointestinal: Negative for abdominal distention, abdominal pain, blood in stool, constipation, diarrhea, nausea and vomiting.   Musculoskeletal: Negative.  Negative for neck pain and neck stiffness.   Skin: Negative for color change, rash and wound.   Neurological: Negative for dizziness, tremors, weakness and light-headedness.   Hematological: Negative for adenopathy. Does not bruise/bleed easily.   Psychiatric/Behavioral: Negative for agitation and decreased concentration. The patient is not nervous/anxious.      Vitals:    09/01/17 0755   BP: 136/65   Pulse: 63   Temp: 97.7 °F (36.5 °C)       Physical Exam   Constitutional: He is oriented to person, place, and time. He appears well-developed and well-nourished. No distress.   HENT:   Head: Normocephalic and atraumatic.   Eyes: Conjunctivae are normal. No scleral icterus.   Neck: Normal range of motion. Neck supple.   Cardiovascular: Normal rate.    Pulmonary/Chest: Effort normal.   Abdominal: Soft. He exhibits no distension and no mass. There is no tenderness. There is no rebound and no guarding.   Musculoskeletal: Normal range of motion.   Neurological: He is alert and oriented to person, place, and time. Coordination normal.   No asterixis   Skin: Skin is warm and dry. No rash noted. He is not diaphoretic. No erythema.   Psychiatric: He has a normal mood and affect. His behavior is normal. Judgment and thought content normal.       Computed MELD-Na score unavailable. Necessary lab results were not found in the last year.  Computed MELD score unavailable. Necessary lab results were not found in the last year.    Lab  Results   Component Value Date     06/01/2017    BUN 14 06/01/2017    CREATININE 1.3 06/01/2017    CALCIUM 9.4 06/01/2017     06/01/2017    K 5.0 06/01/2017     06/01/2017    PROT 6.2 06/01/2017    CO2 25 06/01/2017    ANIONGAP 10 06/01/2017    WBC 4.91 06/01/2017    RBC 4.21 (L) 06/01/2017    HGB 12.7 (L) 06/01/2017    HCT 38.3 (L) 06/01/2017    HCT 21 (L) 09/25/2013    MCV 91 06/01/2017    MCH 30.2 06/01/2017    MCHC 33.2 06/01/2017     Lab Results   Component Value Date    RDW 12.8 06/01/2017     (L) 06/01/2017    MPV 8.7 (L) 06/01/2017    GRAN 2.2 06/01/2017    GRAN 44.0 06/01/2017    LYMPH 2.1 06/01/2017    LYMPH 42.2 06/01/2017    MONO 0.6 06/01/2017    MONO 12.6 06/01/2017    EOSINOPHIL 1.0 06/01/2017    BASOPHIL 0.2 06/01/2017    EOS 0.1 06/01/2017    BASO 0.01 06/01/2017    APTT 21.9 10/06/2014    GROUPTRH O POS 11/01/2013    GROUPTRH O POS 08/06/2013    BNP 22 12/09/2015    CHOL 124 08/08/2014    TRIG 200 (H) 08/08/2014    HDL 24 (L) 08/08/2014    CHOLHDL 19.4 (L) 08/08/2014    TOTALCHOLEST 5.2 (H) 08/08/2014    ALBUMIN 3.7 06/01/2017    BILIDIR 0.3 12/03/2008    AST 20 06/01/2017    ALT 14 06/01/2017    ALKPHOS 68 06/01/2017    MG 1.7 07/11/2016    LABPROT 10.5 10/06/2014    INR 1.0 10/06/2014    PSA 1.37 05/02/2013       Assessment and Plan:    NAFLD: w/o evidence of advanced liver disease  -discussed diet and exercise  -fibroscan today  -HCV screening per guidelines    EDUCATION:   -We discussed the manifestations of NAFLD. At this time there is no FDA approved therapy for NAFLD.   -The patient and I discussed the importance of diet, exercise, and weight loss for management of NAFLD. We discussed a low fat, low carb/low sugar, high fiber diet, and a goal of 30 minutes of exercise 5 times per week.   -optimal medical management of HTN, HLD, DM which is known to be risk factors for fatty liver  -Pt is aware that fatty liver can progress to steatohepatitis and possibly to  cirrhosis, putting one at increased risk for liver cancer, liver failure, and death.         Patient Active Problem List   Diagnosis    Lymphoma, follicular    Peripheral neuropathy    Hyperlipidemia    Cardiomyopathy due to chemotherapy    Coronary artery disease    Nuclear sclerosis - Both Eyes    Large cell (diffuse) non-Hodgkin's lymphoma    Chronic diarrhea    Spinal stenosis of lumbar region with neurogenic claudication    Spondylosis without myelopathy    Lumbar radiculopathy    Chronic bronchitis    Immunocompromised state    Myelodysplastic syndrome    Sacroiliitis    Hearing loss, sensorineural    Rotator cuff tear    Arthropathy of facet joints at multiple levels    Primary osteoarthritis of both knees    Chronic pain syndrome    DDD (degenerative disc disease), lumbar    Mononeuritis lower limb    Pseudophakia    Refractive error    Essential hypertension    SALINAS on CPAP    Chronic pain    Senile nuclear sclerosis    Atherosclerosis of aorta    BPH without obstruction/lower urinary tract symptoms    Non-seasonal allergic rhinitis     Royce Francis is a 72 y.o. male withFatty Liver

## 2017-09-01 NOTE — TELEPHONE ENCOUNTER
S/w pt and wife. Informed I discussed and reviewed CT results with Dr. Richard. He advises referral to pt's heme/onc provider as well as surgical/onc consult for SB Mass seen on CT. referrals placed. Message sent to heme/onc and surgical/onc. Questions answered to pt /wife satisfaction.    TESS Santa

## 2017-09-02 NOTE — TELEPHONE ENCOUNTER
Called and spoke with patient's wife, she was expecting my call. appt schedule for  with her over the phone for 9/5/17 at 7 am with Dr Lucia.

## 2017-09-05 ENCOUNTER — PATIENT MESSAGE (OUTPATIENT)
Dept: HEMATOLOGY/ONCOLOGY | Facility: CLINIC | Age: 72
End: 2017-09-05

## 2017-09-05 ENCOUNTER — OFFICE VISIT (OUTPATIENT)
Dept: HEMATOLOGY/ONCOLOGY | Facility: CLINIC | Age: 72
End: 2017-09-05
Payer: MEDICARE

## 2017-09-05 ENCOUNTER — LAB VISIT (OUTPATIENT)
Dept: LAB | Facility: HOSPITAL | Age: 72
End: 2017-09-05
Attending: INTERNAL MEDICINE
Payer: MEDICARE

## 2017-09-05 VITALS
SYSTOLIC BLOOD PRESSURE: 110 MMHG | HEART RATE: 80 BPM | BODY MASS INDEX: 26.98 KG/M2 | WEIGHT: 192.69 LBS | DIASTOLIC BLOOD PRESSURE: 72 MMHG | HEIGHT: 71 IN

## 2017-09-05 DIAGNOSIS — C83.39 DIFFUSE LARGE B-CELL LYMPHOMA OF EXTRANODAL SITE EXCLUDING SPLEEN AND OTHER SOLID ORGANS: Primary | ICD-10-CM

## 2017-09-05 DIAGNOSIS — B02.8 HERPES ZOSTER COMPLICATED: ICD-10-CM

## 2017-09-05 DIAGNOSIS — C83.39 DIFFUSE LARGE B-CELL LYMPHOMA OF EXTRANODAL SITE EXCLUDING SPLEEN AND OTHER SOLID ORGANS: ICD-10-CM

## 2017-09-05 DIAGNOSIS — C83.30 LARGE CELL (DIFFUSE) NON-HODGKIN'S LYMPHOMA: Primary | ICD-10-CM

## 2017-09-05 DIAGNOSIS — Z11.4 ENCOUNTER FOR HIV (HUMAN IMMUNODEFICIENCY VIRUS) TEST: ICD-10-CM

## 2017-09-05 DIAGNOSIS — T45.1X5A CARDIOMYOPATHY DUE TO CHEMOTHERAPY: Chronic | ICD-10-CM

## 2017-09-05 DIAGNOSIS — C83.30 LARGE CELL (DIFFUSE) NON-HODGKIN'S LYMPHOMA: ICD-10-CM

## 2017-09-05 DIAGNOSIS — D84.9 IMMUNOCOMPROMISED STATE: ICD-10-CM

## 2017-09-05 DIAGNOSIS — I42.7 CARDIOMYOPATHY DUE TO CHEMOTHERAPY: Chronic | ICD-10-CM

## 2017-09-05 DIAGNOSIS — G62.0 DRUG-INDUCED POLYNEUROPATHY: ICD-10-CM

## 2017-09-05 LAB
ALBUMIN SERPL BCP-MCNC: 3.3 G/DL
ALP SERPL-CCNC: 91 U/L
ALT SERPL W/O P-5'-P-CCNC: 11 U/L
ANION GAP SERPL CALC-SCNC: 9 MMOL/L
AST SERPL-CCNC: 20 U/L
BASOPHILS # BLD AUTO: 0.01 K/UL
BASOPHILS NFR BLD: 0.2 %
BILIRUB SERPL-MCNC: 0.5 MG/DL
BUN SERPL-MCNC: 11 MG/DL
CALCIUM SERPL-MCNC: 9.8 MG/DL
CHLORIDE SERPL-SCNC: 99 MMOL/L
CO2 SERPL-SCNC: 29 MMOL/L
CREAT SERPL-MCNC: 1.1 MG/DL
DIFFERENTIAL METHOD: ABNORMAL
EOSINOPHIL # BLD AUTO: 0.1 K/UL
EOSINOPHIL NFR BLD: 1.6 %
ERYTHROCYTE [DISTWIDTH] IN BLOOD BY AUTOMATED COUNT: 13.2 %
EST. GFR  (AFRICAN AMERICAN): >60 ML/MIN/1.73 M^2
EST. GFR  (NON AFRICAN AMERICAN): >60 ML/MIN/1.73 M^2
GLUCOSE SERPL-MCNC: 107 MG/DL
HCT VFR BLD AUTO: 39.2 %
HGB BLD-MCNC: 12.9 G/DL
HIV 1+2 AB+HIV1 P24 AG SERPL QL IA: NEGATIVE
LDH SERPL L TO P-CCNC: 367 U/L
LYMPHOCYTES # BLD AUTO: 1.4 K/UL
LYMPHOCYTES NFR BLD: 24.3 %
MCH RBC QN AUTO: 27.6 PG
MCHC RBC AUTO-ENTMCNC: 32.9 G/DL
MCV RBC AUTO: 84 FL
MONOCYTES # BLD AUTO: 0.8 K/UL
MONOCYTES NFR BLD: 13.7 %
NEUTROPHILS # BLD AUTO: 3.3 K/UL
NEUTROPHILS NFR BLD: 60 %
PHOSPHATE SERPL-MCNC: 3.1 MG/DL
PLATELET # BLD AUTO: 127 K/UL
PMV BLD AUTO: 8.4 FL
POTASSIUM SERPL-SCNC: 4.5 MMOL/L
PROT SERPL-MCNC: 6.2 G/DL
RBC # BLD AUTO: 4.67 M/UL
SODIUM SERPL-SCNC: 137 MMOL/L
URATE SERPL-MCNC: 7 MG/DL
WBC # BLD AUTO: 5.56 K/UL

## 2017-09-05 PROCEDURE — 99215 OFFICE O/P EST HI 40 MIN: CPT | Mod: S$GLB,,, | Performed by: INTERNAL MEDICINE

## 2017-09-05 PROCEDURE — 85025 COMPLETE CBC W/AUTO DIFF WBC: CPT

## 2017-09-05 PROCEDURE — 99999 PR PBB SHADOW E&M-EST. PATIENT-LVL III: CPT | Mod: PBBFAC,,, | Performed by: INTERNAL MEDICINE

## 2017-09-05 PROCEDURE — 80053 COMPREHEN METABOLIC PANEL: CPT

## 2017-09-05 PROCEDURE — 86703 HIV-1/HIV-2 1 RESULT ANTBDY: CPT

## 2017-09-05 PROCEDURE — 1157F ADVNC CARE PLAN IN RCRD: CPT | Mod: S$GLB,,, | Performed by: INTERNAL MEDICINE

## 2017-09-05 PROCEDURE — 83615 LACTATE (LD) (LDH) ENZYME: CPT

## 2017-09-05 PROCEDURE — 84550 ASSAY OF BLOOD/URIC ACID: CPT

## 2017-09-05 PROCEDURE — 84100 ASSAY OF PHOSPHORUS: CPT

## 2017-09-05 PROCEDURE — 99499 UNLISTED E&M SERVICE: CPT | Mod: S$GLB,,, | Performed by: INTERNAL MEDICINE

## 2017-09-05 PROCEDURE — 3008F BODY MASS INDEX DOCD: CPT | Mod: S$GLB,,, | Performed by: INTERNAL MEDICINE

## 2017-09-05 PROCEDURE — 3078F DIAST BP <80 MM HG: CPT | Mod: S$GLB,,, | Performed by: INTERNAL MEDICINE

## 2017-09-05 PROCEDURE — 3074F SYST BP LT 130 MM HG: CPT | Mod: S$GLB,,, | Performed by: INTERNAL MEDICINE

## 2017-09-05 PROCEDURE — 1159F MED LIST DOCD IN RCRD: CPT | Mod: S$GLB,,, | Performed by: INTERNAL MEDICINE

## 2017-09-05 PROCEDURE — 36415 COLL VENOUS BLD VENIPUNCTURE: CPT

## 2017-09-05 RX ORDER — PREDNISONE 50 MG/1
TABLET ORAL
Qty: 10 TABLET | Refills: 6 | Status: ON HOLD | OUTPATIENT
Start: 2017-09-05 | End: 2017-11-02 | Stop reason: HOSPADM

## 2017-09-05 RX ORDER — ALLOPURINOL 300 MG/1
TABLET ORAL
Qty: 12 TABLET | Refills: 0 | Status: SHIPPED | OUTPATIENT
Start: 2017-09-05 | End: 2017-09-05 | Stop reason: SDUPTHER

## 2017-09-05 RX ORDER — VALACYCLOVIR HYDROCHLORIDE 500 MG/1
500 TABLET, FILM COATED ORAL DAILY
Qty: 90 TABLET | Refills: 3 | Status: SHIPPED | OUTPATIENT
Start: 2017-09-05

## 2017-09-05 RX ORDER — ALLOPURINOL 300 MG/1
TABLET ORAL
Qty: 90 TABLET | Refills: 0 | Status: ON HOLD | OUTPATIENT
Start: 2017-09-05 | End: 2017-10-31 | Stop reason: ALTCHOICE

## 2017-09-05 NOTE — PROGRESS NOTES
HISTORY OF PRESENT ILLNESS:  Mr. Francis is a 72-year-old man who has been   treated numerous times since 1996 for B-cell lymphoma.  After biopsy of a   retroperitoneal lymph node in 1996, he received six courses of CHOP.  In October 2002, he developed lymphadenopathy in his neck.  Although a biopsy of a lymph   node showed atypical lymphoid hyperplasia, he subsequently developed generalized   lymphadenopathy and focal lesions in his spleen.  He was treated with four   weekly doses of Rituxan, following which all of these areas of abnormality   resolved.    In 2003, he developed symptoms of congestive heart failure with his ejection   fraction falling to 31%.  With medical therapy, his cardiac function has   improved and the last echocardiogram in December 2005 included an ejection   fraction of 45% and grade 1 diastolic dysfunction.    In 2008, while he was being evaluated for fever and fatigue, a lymph node biopsy   revealed grade 2 follicular lymphoma with some areas of progression to grade 3.    He was treated with Zevalin, following which the signs and symptoms of   lymphoma resolved.    In May 2009, biopsy of a cervical lymph node showed diffuse large B-cell   lymphoma.  A PET scan did not reveal signs of disease elsewhere.  After a   discussion of various treatment alternatives and consultation with a transplant   physician, who felt that he was not a suitable candidate for autologous stem   cell transplantation, he was treated with six courses of ifosfamide, etoposide   and carboplatinum.  Rituxan was not used because of concern about his cardiac   function.  He has not been given oxaliplatin or cisplatin because he has severe  peripheral neuropathy.  He achieved a complete remission after six courses of   the therapy with ifosfamide, etoposide and carboplatinum.    In early 2012, needle biopsy of a preauricular lymph node showed a population of   kappa restricted B cells with CD20 expression.  A PET  scan showed no signs of   disease elsewhere and he was treated with six courses of bendamustine, the last   of which was administered in October 2012.    There was further evidence of recurrent lymphoma in the summer of 2013.  A PET   scan showed hypermetabolic activity throughout the spleen with an SUV max of   15.3 and abnormal lymph nodes in the celiac axis, right pelvis and   retroperitoneum with SUV readings up to 14.4.  There was an area of abnormality   in the right lower quadrant, which represented either small bowel disease and/or   lymphatic disease adjacent to the small bowel.  This had an SUV max of 20.    After a lengthy discussion, he began therapy with gemcitabine, Rituxan and   high-dose dexamethasone.  Following the third course of this therapy, on   09/17/2013, he developed severe right lower quadrant pain and underwent surgical   removal of 30 cm of his small intestine.  This contained a 1.5 cm ulcer with   perforation.  The pathology studies did not show any lymphoma.  He completed a   total of six courses of gemcitabine, Rituxan and high-dose dexamethasone.    About one month ago, he began to have abdominal pain, which is mostly cramping   in nature.  He has not had vomiting.  He has anorexia and yesterday he began to   have persistent diarrhea, although that has been an intermittent problem for a   month or more.  There has been no evidence of bleeding.  He is not having   fevers or sweats.  The pain is gradually worsening.    He has peripheral neuropathy with numbness and burning of his feet and toes.    His gait is unsteady.  He also has severe lumbar spine disease and had a spinal   stimulator implanted about six weeks ago.  So far, he has not had significant   benefit from that.    He had an episode of pneumonia in November 2014, and during a   hospitalization, he developed pancytopenia.  A bone marrow examination showed   dyserythropoiesis and dysgranulopoiesis.  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  In addition, 19% of cells had four copies of 7q31.  These   results suggested the presence of two abnormal clones, a 7q clone related to   lymphoma and a 20q clone indicating an increased risk of a myelodysplastic   disorder.  Fortunately, after this examination his blood counts improved   substantially, although he often has mild thrombocytopenia and anemia.    ADDITIONAL PAST HISTORY, SYSTEM REVIEW, SOCIAL HISTORY AND FAMILY HISTORY:  Have   been reviewed and updated in the electronic record.    PHYSICAL EXAMINATION:  GENERAL APPEARANCE:  Well-developed, well-nourished man who is sitting in a   wheelchair, but can easily climb onto an examination table without assistance.  EYES:  No jaundice or pallor.  NOSE:  Clear nares.  SINUSES:  No tenderness.  EARS:  Clear canals and membranes.  MOUTH AND THROAT:  Good dentition.  No mucosal lesions.  NECK:  No masses or bruits.  No thyroid abnormalities.  LYMPH NODES:  No enlarged cervical, axillary or inguinal nodes.  CHEST AND LUNGS:  Normal respiratory effort.  Clear to auscultation and   percussion.  HEART:  Regular rate and rhythm without murmur or gallop.  ABDOMEN:  Slight distention.  Ventral hernia.  Mild diffuse tenderness.  No   definite masses.  EXTREMITIES:  No edema or cyanosis.  PERIPHERAL VASCULATURE:  Diminished pulses in the feet and ankles.  Good   popliteal pulses.  NEUROLOGIC:  Mental status is good.  He is fully oriented.  His gait is   unsteady.  SKIN:  No suspicious lesions in the areas examined.    LABORATORY STUDIES:  A computed tomography examination of the abdomen and pelvis   performed on 09/01/2017 shows enlarged mesenteric lymph nodes and a large mass   in the right lower portion of the abdomen, which is encasing the distal ileum   and extending into the anterior abdominal wall.  There is narrowing of the bowel   lumen without  complete obstruction.  There is no ascites or free air.    IMPRESSION:  1.  Diffuse large B-cell lymphoma, which has recurred in both diffuse and   follicular patterns, but recently has been diffuse.  2.  Large right lower abdominal mass and enlarged mesenteric lymph nodes.  This   almost certainly represents a recurrence of lymphoma.  3.  Cardiomyopathy.  4.  Peripheral neuropathy.  5.  Lumbar disk disease with severe spinal stenosis.    RECOMMENDATIONS:  1.  I had a long discussion with Mr. Francis and his wife.  Basic options at   the moment are either to begin a chemotherapy program (with or without another   biopsy) or to have surgical excision of the lesion that is causing   partial obstruction of his intestine.  There are major risks associated with   either of these approaches.  Chemotherapy may not be effective or he may have   the same problem he had in the past with perforation of the bowel while   receiving chemotherapy.  Surgery would likely relieve the symptoms of   obstruction, but would delay institution of systemic therapy.  2.  It will be a challenge to formulate a treatment plan because of all the   drugs that the patient has received in the past.  I might consider a dose   adjusted EPOCH-Rituxan regimen, but it would be necessary to omit the   vincristine and Adriamycin.  Alternatively, I may advise bendamustine and  Rituxan, since it has been five years since he has had bendamustine.  Any   systemic therapy is clearly palliative in nature as I do not think there is   a regimen that has any curative potential.  3.  Blood will be obtained today for CBC, CMP, LDH, uric acid, phosphorus.    Almost all of his one-hour visit today was devoted to discussing the matters   noted above.    ADDENDUM: See my other note from today. After reviewing the case with  Dr. Edwin Lucia in Surgical Oncology, I advised Mr. Francis to begin  chemotherapy with bendamustine and Rituxan.    MICHAELB/HN  dd: 09/05/2017  07:49:09 ([facility])  td: 09/05/2017 09:08:30 ([facility])  Doc ID   #[id number]  Job ID #[id number]    CC:

## 2017-09-05 NOTE — PROGRESS NOTES
Discussed case with Dr. LUH Lucia in Surgical Oncology.  I absence of perforation or complete obstruction, he does not advise surgery now.    Therefore, plan to start chemotherapy soon with:  bendamustine 100/m2 daily x2 days (may increase of decrease later depending on myelosuppression)  Rituxan 375/m2 day 1  Prednisone 100 mg daily for 5 days.  Neulasta day 3    Side effects reviewed in detail with patient and wife, especially possibility of perforation.  Will obtain PET scan if can schedule soon.    Cbc every Mon and Thurs  Allopurinol 300 mg for 12 days starting 2-3 days before chemotherapy.      Want to start on Sept 6 or 7.  EPA 9/20 or 21 with cbc, cmp to assess status (I will be away 9/22-10/6).    Ye Lucia MD

## 2017-09-06 ENCOUNTER — PATIENT MESSAGE (OUTPATIENT)
Dept: HEMATOLOGY/ONCOLOGY | Facility: CLINIC | Age: 72
End: 2017-09-06

## 2017-09-06 RX ORDER — MEPERIDINE HYDROCHLORIDE 50 MG/ML
25 INJECTION INTRAMUSCULAR; INTRAVENOUS; SUBCUTANEOUS
Status: CANCELLED | OUTPATIENT
Start: 2017-09-07

## 2017-09-06 RX ORDER — SODIUM CHLORIDE 0.9 % (FLUSH) 0.9 %
10 SYRINGE (ML) INJECTION
Status: CANCELLED | OUTPATIENT
Start: 2017-09-07

## 2017-09-06 RX ORDER — ACETAMINOPHEN 325 MG/1
650 TABLET ORAL
Status: CANCELLED | OUTPATIENT
Start: 2017-09-07

## 2017-09-06 RX ORDER — FAMOTIDINE 10 MG/ML
20 INJECTION INTRAVENOUS
Status: CANCELLED | OUTPATIENT
Start: 2017-09-07

## 2017-09-06 RX ORDER — HEPARIN 100 UNIT/ML
500 SYRINGE INTRAVENOUS
Status: CANCELLED | OUTPATIENT
Start: 2017-09-07

## 2017-09-06 RX ORDER — SODIUM CHLORIDE 0.9 % (FLUSH) 0.9 %
10 SYRINGE (ML) INJECTION
Status: CANCELLED | OUTPATIENT
Start: 2017-09-13

## 2017-09-06 RX ORDER — HEPARIN 100 UNIT/ML
500 SYRINGE INTRAVENOUS
Status: CANCELLED | OUTPATIENT
Start: 2017-09-13

## 2017-09-06 NOTE — PROGRESS NOTES
It is URGENT that this patient be treated for lymphoma immediately.  I have ordered therapy for tomorrow.  If we cannot obtain insurance approval for it, give him the option of signing a waiver stating that he may be responsible for the charges, and then he can fight with his insurance company about this. If he is not willing to do this, then he will have to wait until the insurance people approve his therapy.

## 2017-09-06 NOTE — Clinical Note
Please see my above note regarding this man's therapy for lymphoma. I know of no other way to handle this matter

## 2017-09-07 ENCOUNTER — PATIENT MESSAGE (OUTPATIENT)
Dept: CARDIOLOGY | Facility: CLINIC | Age: 72
End: 2017-09-07

## 2017-09-07 ENCOUNTER — PATIENT MESSAGE (OUTPATIENT)
Dept: GASTROENTEROLOGY | Facility: CLINIC | Age: 72
End: 2017-09-07

## 2017-09-07 ENCOUNTER — PATIENT MESSAGE (OUTPATIENT)
Dept: HEMATOLOGY/ONCOLOGY | Facility: CLINIC | Age: 72
End: 2017-09-07

## 2017-09-07 DIAGNOSIS — C83.33 DIFFUSE LARGE B-CELL LYMPHOMA OF INTRA-ABDOMINAL LYMPH NODES: Primary | ICD-10-CM

## 2017-09-07 RX ORDER — PREDNISONE 50 MG/1
TABLET ORAL
Qty: 10 TABLET | Refills: 6 | Status: SHIPPED | OUTPATIENT
Start: 2017-09-07 | End: 2018-01-25

## 2017-09-07 NOTE — PROGRESS NOTES
He is having chills at night according to his wife and there is a delay in getting his insurance carrier to approve his chemotherapy with I had wanted to start today.  So, will begin prednisone 100 mg daily for 5 days now and plan to repeat it with each cycle of chemotherapy.

## 2017-09-08 ENCOUNTER — HOSPITAL ENCOUNTER (OUTPATIENT)
Dept: RADIOLOGY | Facility: HOSPITAL | Age: 72
Discharge: HOME | End: 2017-09-08
Attending: INTERNAL MEDICINE
Payer: MEDICARE

## 2017-09-08 DIAGNOSIS — C83.39 DIFFUSE LARGE B-CELL LYMPHOMA OF EXTRANODAL SITE EXCLUDING SPLEEN AND OTHER SOLID ORGANS: ICD-10-CM

## 2017-09-08 PROCEDURE — 78815 PET IMAGE W/CT SKULL-THIGH: CPT | Mod: 26,PS,, | Performed by: RADIOLOGY

## 2017-09-08 PROCEDURE — A9552 F18 FDG: HCPCS

## 2017-09-10 ENCOUNTER — PATIENT MESSAGE (OUTPATIENT)
Dept: HEMATOLOGY/ONCOLOGY | Facility: CLINIC | Age: 72
End: 2017-09-10

## 2017-09-11 ENCOUNTER — PATIENT MESSAGE (OUTPATIENT)
Dept: HEMATOLOGY/ONCOLOGY | Facility: CLINIC | Age: 72
End: 2017-09-11

## 2017-09-11 DIAGNOSIS — C82.90 FOLLICULAR LYMPHOMA: Primary | ICD-10-CM

## 2017-09-12 ENCOUNTER — INFUSION (OUTPATIENT)
Dept: INFUSION THERAPY | Facility: HOSPITAL | Age: 72
End: 2017-09-12
Attending: INTERNAL MEDICINE
Payer: MEDICARE

## 2017-09-12 VITALS
DIASTOLIC BLOOD PRESSURE: 61 MMHG | TEMPERATURE: 98 F | HEART RATE: 62 BPM | SYSTOLIC BLOOD PRESSURE: 138 MMHG | RESPIRATION RATE: 16 BRPM

## 2017-09-12 DIAGNOSIS — Z51.11 ENCOUNTER FOR ANTINEOPLASTIC CHEMOTHERAPY: ICD-10-CM

## 2017-09-12 DIAGNOSIS — C83.30 LARGE CELL (DIFFUSE) NON-HODGKIN'S LYMPHOMA: Primary | ICD-10-CM

## 2017-09-12 PROCEDURE — 96367 TX/PROPH/DG ADDL SEQ IV INF: CPT

## 2017-09-12 PROCEDURE — 63600175 PHARM REV CODE 636 W HCPCS: Performed by: INTERNAL MEDICINE

## 2017-09-12 PROCEDURE — S0028 INJECTION, FAMOTIDINE, 20 MG: HCPCS | Performed by: INTERNAL MEDICINE

## 2017-09-12 PROCEDURE — 25000003 PHARM REV CODE 250: Performed by: INTERNAL MEDICINE

## 2017-09-12 PROCEDURE — 96375 TX/PRO/DX INJ NEW DRUG ADDON: CPT

## 2017-09-12 PROCEDURE — 96415 CHEMO IV INFUSION ADDL HR: CPT

## 2017-09-12 PROCEDURE — 96411 CHEMO IV PUSH ADDL DRUG: CPT

## 2017-09-12 PROCEDURE — 96413 CHEMO IV INFUSION 1 HR: CPT

## 2017-09-12 RX ORDER — ACETAMINOPHEN 325 MG/1
650 TABLET ORAL
Status: COMPLETED | OUTPATIENT
Start: 2017-09-12 | End: 2017-09-12

## 2017-09-12 RX ORDER — HEPARIN 100 UNIT/ML
500 SYRINGE INTRAVENOUS
Status: DISCONTINUED | OUTPATIENT
Start: 2017-09-12 | End: 2017-09-12 | Stop reason: HOSPADM

## 2017-09-12 RX ORDER — SODIUM CHLORIDE 0.9 % (FLUSH) 0.9 %
10 SYRINGE (ML) INJECTION
Status: DISCONTINUED | OUTPATIENT
Start: 2017-09-12 | End: 2017-09-12 | Stop reason: HOSPADM

## 2017-09-12 RX ORDER — MEPERIDINE HYDROCHLORIDE 50 MG/ML
25 INJECTION INTRAMUSCULAR; INTRAVENOUS; SUBCUTANEOUS
Status: DISCONTINUED | OUTPATIENT
Start: 2017-09-12 | End: 2017-09-12 | Stop reason: HOSPADM

## 2017-09-12 RX ORDER — FAMOTIDINE 10 MG/ML
20 INJECTION INTRAVENOUS
Status: COMPLETED | OUTPATIENT
Start: 2017-09-12 | End: 2017-09-12

## 2017-09-12 RX ADMIN — ACETAMINOPHEN 650 MG: 325 TABLET ORAL at 08:09

## 2017-09-12 RX ADMIN — FAMOTIDINE 20 MG: 10 INJECTION INTRAVENOUS at 08:09

## 2017-09-12 RX ADMIN — DIPHENHYDRAMINE HYDROCHLORIDE 50 MG: 50 INJECTION, SOLUTION INTRAMUSCULAR; INTRAVENOUS at 08:09

## 2017-09-12 RX ADMIN — BENDAMUSTINE HYDROCHLORIDE 210 MG: 25 INJECTION, SOLUTION INTRAVENOUS at 12:09

## 2017-09-12 RX ADMIN — DEXAMETHASONE SODIUM PHOSPHATE: 4 INJECTION, SOLUTION INTRAMUSCULAR; INTRAVENOUS at 08:09

## 2017-09-12 RX ADMIN — RITUXIMAB 785 MG: 10 INJECTION, SOLUTION INTRAVENOUS at 09:09

## 2017-09-12 RX ADMIN — SODIUM CHLORIDE: 0.9 INJECTION, SOLUTION INTRAVENOUS at 08:09

## 2017-09-12 NOTE — PLAN OF CARE
Problem: Patient Care Overview  Goal: Plan of Care Review  Outcome: Ongoing (interventions implemented as appropriate)  Pt arrived to infusion center for Rituxan/Bendeka IV infusion. Pt reports receiving this chemo in 2012. Education provided on chemos. Pt understands signs of infection to look for. PIV started to right forearm. Blankets and snacks offered. VSS; NAD. Will monitor.

## 2017-09-12 NOTE — PLAN OF CARE
Problem: Patient Care Overview  Goal: Plan of Care Review  Outcome: Ongoing (interventions implemented as appropriate)  Pt completed Rituxan/Bendeka IV infusion; tolerated without complication. PIV removed. Pt to RTC tomorrow for day 2 Bendeka. VSS; NAD. Discharging unassisted with wife at side.

## 2017-09-13 ENCOUNTER — INFUSION (OUTPATIENT)
Dept: INFUSION THERAPY | Facility: HOSPITAL | Age: 72
End: 2017-09-13
Attending: INTERNAL MEDICINE
Payer: MEDICARE

## 2017-09-13 VITALS
HEART RATE: 63 BPM | OXYGEN SATURATION: 97 % | SYSTOLIC BLOOD PRESSURE: 159 MMHG | TEMPERATURE: 98 F | RESPIRATION RATE: 20 BRPM | DIASTOLIC BLOOD PRESSURE: 75 MMHG

## 2017-09-13 DIAGNOSIS — C83.30 LARGE CELL (DIFFUSE) NON-HODGKIN'S LYMPHOMA: Primary | ICD-10-CM

## 2017-09-13 PROCEDURE — 96409 CHEMO IV PUSH SNGL DRUG: CPT

## 2017-09-13 PROCEDURE — 25000003 PHARM REV CODE 250: Performed by: INTERNAL MEDICINE

## 2017-09-13 PROCEDURE — 63600175 PHARM REV CODE 636 W HCPCS: Performed by: INTERNAL MEDICINE

## 2017-09-13 RX ORDER — SODIUM CHLORIDE 0.9 % (FLUSH) 0.9 %
10 SYRINGE (ML) INJECTION
Status: DISCONTINUED | OUTPATIENT
Start: 2017-09-13 | End: 2017-09-13 | Stop reason: HOSPADM

## 2017-09-13 RX ORDER — HEPARIN 100 UNIT/ML
500 SYRINGE INTRAVENOUS
Status: DISCONTINUED | OUTPATIENT
Start: 2017-09-13 | End: 2017-09-13 | Stop reason: HOSPADM

## 2017-09-13 RX ADMIN — SODIUM CHLORIDE: 9 INJECTION, SOLUTION INTRAVENOUS at 08:09

## 2017-09-13 RX ADMIN — BENDAMUSTINE HYDROCHLORIDE 210 MG: 25 INJECTION, SOLUTION INTRAVENOUS at 08:09

## 2017-09-13 NOTE — PLAN OF CARE
Problem: Patient Care Overview  Goal: Individualization & Mutuality  Outcome: Ongoing (interventions implemented as appropriate)  Patient present in clinic,no s/s distress noted. Patient c/o pain and that took vicodin and is helping(back and abdomen) patient pending Bendeka infusion.

## 2017-09-13 NOTE — PLAN OF CARE
Problem: Patient Care Overview  Goal: Plan of Care Review  Outcome: Ongoing (interventions implemented as appropriate)  Liset tolerated Bendeka infusion well,patient and wife given AVS,instructed patient to contact MD for any concerns;see nursing note:informed Martha MCCLURE of patient's abdominal pain,stated she would call wife to discuss.

## 2017-09-13 NOTE — NURSING
Informed Martha,patient c/o pain to abdomen 5/10 and was 10/10 this morning prior to taking vicodin. MarthaRN said that she is going to call patient's wife to discuss pain.

## 2017-09-13 NOTE — NURSING
Reported abnormal labs to KAMI Frey via messaging and asked if MD wanted any electrolyte replacements and also if ok to proceed with Bendeka,cbc from 9/5/17  No new MD orders given;just to proceed with chemo.

## 2017-09-14 ENCOUNTER — INFUSION (OUTPATIENT)
Dept: INFUSION THERAPY | Facility: HOSPITAL | Age: 72
End: 2017-09-14
Attending: INTERNAL MEDICINE
Payer: MEDICARE

## 2017-09-14 DIAGNOSIS — C83.30 LARGE CELL (DIFFUSE) NON-HODGKIN'S LYMPHOMA: Primary | ICD-10-CM

## 2017-09-14 PROCEDURE — 63600175 PHARM REV CODE 636 W HCPCS: Performed by: INTERNAL MEDICINE

## 2017-09-14 PROCEDURE — 96372 THER/PROPH/DIAG INJ SC/IM: CPT

## 2017-09-14 RX ADMIN — PEGFILGRASTIM 6 MG: 6 INJECTION SUBCUTANEOUS at 12:09

## 2017-09-14 NOTE — NURSING
Pt arrived for neulasta (last received in 2013).  Medication information reviewed with pt, states understanding.  Pt tolerated injection SQ to left arm.  Discharged home using his cane with his wife and appt calendar.

## 2017-09-17 ENCOUNTER — PATIENT MESSAGE (OUTPATIENT)
Dept: HEMATOLOGY/ONCOLOGY | Facility: CLINIC | Age: 72
End: 2017-09-17

## 2017-09-18 ENCOUNTER — PATIENT MESSAGE (OUTPATIENT)
Dept: HEMATOLOGY/ONCOLOGY | Facility: CLINIC | Age: 72
End: 2017-09-18

## 2017-09-18 ENCOUNTER — TELEPHONE (OUTPATIENT)
Dept: HEMATOLOGY/ONCOLOGY | Facility: CLINIC | Age: 72
End: 2017-09-18

## 2017-09-18 DIAGNOSIS — R60.9 EDEMA, UNSPECIFIED TYPE: Primary | ICD-10-CM

## 2017-09-18 RX ORDER — FUROSEMIDE 40 MG/1
TABLET ORAL
Qty: 30 TABLET | Refills: 11 | Status: SHIPPED | OUTPATIENT
Start: 2017-09-18 | End: 2020-09-18

## 2017-09-18 NOTE — TELEPHONE ENCOUNTER
----- Message from Naveed Bhandari sent at 9/18/2017  3:22 PM CDT -----  Contact: Spouse  Pt is now in chemo therapy and is feeling really bad. Appt is on tomorrow @ [facility], but will like to know can he go to [facility]?    Pt will come to [facility] if possible     Contact::[phone]    Appointment schedule for [place] [facility].

## 2017-09-19 ENCOUNTER — LAB VISIT (OUTPATIENT)
Dept: LAB | Facility: HOSPITAL | Age: 72
End: 2017-09-19
Attending: INTERNAL MEDICINE
Payer: MEDICARE

## 2017-09-19 ENCOUNTER — PATIENT MESSAGE (OUTPATIENT)
Dept: HEMATOLOGY/ONCOLOGY | Facility: CLINIC | Age: 72
End: 2017-09-19

## 2017-09-19 DIAGNOSIS — C82.90 FOLLICULAR LYMPHOMA: ICD-10-CM

## 2017-09-19 LAB
BASOPHILS # BLD AUTO: 0.02 K/UL
BASOPHILS NFR BLD: 0.3 %
DIFFERENTIAL METHOD: ABNORMAL
EOSINOPHIL # BLD AUTO: 0.1 K/UL
EOSINOPHIL NFR BLD: 1.1 %
ERYTHROCYTE [DISTWIDTH] IN BLOOD BY AUTOMATED COUNT: 14.4 %
HCT VFR BLD AUTO: 36.5 %
HGB BLD-MCNC: 11.3 G/DL
HYPOCHROMIA BLD QL SMEAR: ABNORMAL
LYMPHOCYTES # BLD AUTO: 1 K/UL
LYMPHOCYTES NFR BLD: 12.3 %
MCH RBC QN AUTO: 27.2 PG
MCHC RBC AUTO-ENTMCNC: 31 G/DL
MCV RBC AUTO: 88 FL
MONOCYTES # BLD AUTO: 0.9 K/UL
MONOCYTES NFR BLD: 11.5 %
NEUTROPHILS # BLD AUTO: 5.9 K/UL
NEUTROPHILS NFR BLD: 74.8 %
PLATELET # BLD AUTO: 96 K/UL
PLATELET BLD QL SMEAR: ABNORMAL
PMV BLD AUTO: 8.1 FL
RBC # BLD AUTO: 4.15 M/UL
WBC # BLD AUTO: 7.91 K/UL

## 2017-09-19 PROCEDURE — 85025 COMPLETE CBC W/AUTO DIFF WBC: CPT | Mod: PO

## 2017-09-19 PROCEDURE — 36415 COLL VENOUS BLD VENIPUNCTURE: CPT | Mod: PO

## 2017-09-20 ENCOUNTER — PATIENT MESSAGE (OUTPATIENT)
Dept: HEMATOLOGY/ONCOLOGY | Facility: CLINIC | Age: 72
End: 2017-09-20

## 2017-09-20 DIAGNOSIS — R50.9 FEVER, UNSPECIFIED FEVER CAUSE: ICD-10-CM

## 2017-09-20 DIAGNOSIS — C85.80 LARGE CELL LYMPHOMA: Primary | ICD-10-CM

## 2017-09-20 RX ORDER — LEVOFLOXACIN 500 MG/1
500 TABLET, FILM COATED ORAL DAILY
Qty: 10 TABLET | Refills: 2 | Status: SHIPPED | OUTPATIENT
Start: 2017-09-20 | End: 2017-09-30

## 2017-09-21 ENCOUNTER — PATIENT MESSAGE (OUTPATIENT)
Dept: HEMATOLOGY/ONCOLOGY | Facility: CLINIC | Age: 72
End: 2017-09-21

## 2017-09-25 ENCOUNTER — PATIENT MESSAGE (OUTPATIENT)
Dept: HEMATOLOGY/ONCOLOGY | Facility: CLINIC | Age: 72
End: 2017-09-25

## 2017-09-25 ENCOUNTER — PATIENT MESSAGE (OUTPATIENT)
Dept: PAIN MEDICINE | Facility: CLINIC | Age: 72
End: 2017-09-25

## 2017-09-26 ENCOUNTER — CLINICAL SUPPORT (OUTPATIENT)
Dept: FAMILY MEDICINE | Facility: CLINIC | Age: 72
End: 2017-09-26
Payer: MEDICARE

## 2017-09-26 DIAGNOSIS — Z23 NEED FOR INFLUENZA VACCINATION: Primary | ICD-10-CM

## 2017-09-26 PROCEDURE — 90662 IIV NO PRSV INCREASED AG IM: CPT | Mod: S$GLB,,, | Performed by: INTERNAL MEDICINE

## 2017-09-26 PROCEDURE — 99499 UNLISTED E&M SERVICE: CPT | Mod: S$GLB,,, | Performed by: INTERNAL MEDICINE

## 2017-09-26 PROCEDURE — G0008 ADMIN INFLUENZA VIRUS VAC: HCPCS | Mod: S$GLB,,, | Performed by: INTERNAL MEDICINE

## 2017-09-26 RX ORDER — HYDROCODONE BITARTRATE AND ACETAMINOPHEN 5; 325 MG/1; MG/1
1 TABLET ORAL EVERY 12 HOURS PRN
Qty: 60 TABLET | Refills: 0 | Status: SHIPPED | OUTPATIENT
Start: 2017-09-26 | End: 2017-10-26

## 2017-09-27 ENCOUNTER — TELEPHONE (OUTPATIENT)
Dept: HEMATOLOGY/ONCOLOGY | Facility: CLINIC | Age: 72
End: 2017-09-27

## 2017-09-27 DIAGNOSIS — R26.9 ALTERED GAIT: ICD-10-CM

## 2017-09-27 DIAGNOSIS — G89.4 CHRONIC PAIN SYNDROME: Primary | ICD-10-CM

## 2017-09-27 DIAGNOSIS — R53.1 WEAKNESS: ICD-10-CM

## 2017-09-27 DIAGNOSIS — M54.9 BACK PAIN, UNSPECIFIED BACK LOCATION, UNSPECIFIED BACK PAIN LATERALITY, UNSPECIFIED CHRONICITY: ICD-10-CM

## 2017-09-27 NOTE — TELEPHONE ENCOUNTER
----- Message from Sukhdeep Bhandari MD sent at 9/26/2017  1:10 PM CDT -----  This patient is scheduled to see me next week at 11:20 AM. I am rounding in the hospital. Can we see if he is amenable to coming later in the day? Thanks.

## 2017-09-29 ENCOUNTER — PATIENT MESSAGE (OUTPATIENT)
Dept: PAIN MEDICINE | Facility: CLINIC | Age: 72
End: 2017-09-29

## 2017-10-02 ENCOUNTER — OFFICE VISIT (OUTPATIENT)
Dept: HEMATOLOGY/ONCOLOGY | Facility: CLINIC | Age: 72
End: 2017-10-02
Payer: MEDICARE

## 2017-10-02 ENCOUNTER — TELEPHONE (OUTPATIENT)
Dept: PAIN MEDICINE | Facility: CLINIC | Age: 72
End: 2017-10-02

## 2017-10-02 ENCOUNTER — TELEPHONE (OUTPATIENT)
Dept: HEMATOLOGY/ONCOLOGY | Facility: CLINIC | Age: 72
End: 2017-10-02

## 2017-10-02 ENCOUNTER — PATIENT MESSAGE (OUTPATIENT)
Dept: PAIN MEDICINE | Facility: CLINIC | Age: 72
End: 2017-10-02

## 2017-10-02 ENCOUNTER — LAB VISIT (OUTPATIENT)
Dept: LAB | Facility: HOSPITAL | Age: 72
End: 2017-10-02
Attending: INTERNAL MEDICINE
Payer: MEDICARE

## 2017-10-02 VITALS
TEMPERATURE: 99 F | BODY MASS INDEX: 26.91 KG/M2 | HEIGHT: 71 IN | WEIGHT: 192.25 LBS | SYSTOLIC BLOOD PRESSURE: 164 MMHG | HEART RATE: 92 BPM | DIASTOLIC BLOOD PRESSURE: 88 MMHG

## 2017-10-02 DIAGNOSIS — R53.1 WEAKNESS: ICD-10-CM

## 2017-10-02 DIAGNOSIS — G89.4 CHRONIC PAIN SYNDROME: Primary | ICD-10-CM

## 2017-10-02 DIAGNOSIS — R26.9 ALTERED GAIT: ICD-10-CM

## 2017-10-02 DIAGNOSIS — C82.90 FOLLICULAR LYMPHOMA: ICD-10-CM

## 2017-10-02 DIAGNOSIS — G89.4 CHRONIC PAIN SYNDROME: Chronic | ICD-10-CM

## 2017-10-02 DIAGNOSIS — C83.30 LARGE CELL (DIFFUSE) NON-HODGKIN'S LYMPHOMA: Primary | ICD-10-CM

## 2017-10-02 DIAGNOSIS — I10 ESSENTIAL HYPERTENSION: Chronic | ICD-10-CM

## 2017-10-02 DIAGNOSIS — N40.0 BPH WITHOUT OBSTRUCTION/LOWER URINARY TRACT SYMPTOMS: Chronic | ICD-10-CM

## 2017-10-02 LAB
ALBUMIN SERPL BCP-MCNC: 3.1 G/DL
ALP SERPL-CCNC: 105 U/L
ALT SERPL W/O P-5'-P-CCNC: 21 U/L
ANION GAP SERPL CALC-SCNC: 9 MMOL/L
AST SERPL-CCNC: 28 U/L
BASOPHILS # BLD AUTO: 0.03 K/UL
BASOPHILS NFR BLD: 0.5 %
BILIRUB SERPL-MCNC: 0.3 MG/DL
BUN SERPL-MCNC: 15 MG/DL
CALCIUM SERPL-MCNC: 9.2 MG/DL
CHLORIDE SERPL-SCNC: 105 MMOL/L
CO2 SERPL-SCNC: 26 MMOL/L
CREAT SERPL-MCNC: 0.9 MG/DL
DIFFERENTIAL METHOD: ABNORMAL
EOSINOPHIL # BLD AUTO: 0.1 K/UL
EOSINOPHIL NFR BLD: 1.8 %
ERYTHROCYTE [DISTWIDTH] IN BLOOD BY AUTOMATED COUNT: 15.2 %
EST. GFR  (AFRICAN AMERICAN): >60 ML/MIN/1.73 M^2
EST. GFR  (NON AFRICAN AMERICAN): >60 ML/MIN/1.73 M^2
GLUCOSE SERPL-MCNC: 100 MG/DL
HCT VFR BLD AUTO: 34.7 %
HGB BLD-MCNC: 11.1 G/DL
LYMPHOCYTES # BLD AUTO: 3.3 K/UL
LYMPHOCYTES NFR BLD: 51.2 %
MCH RBC QN AUTO: 26.6 PG
MCHC RBC AUTO-ENTMCNC: 32 G/DL
MCV RBC AUTO: 83 FL
MONOCYTES # BLD AUTO: 0.8 K/UL
MONOCYTES NFR BLD: 12.9 %
NEUTROPHILS # BLD AUTO: 2.2 K/UL
NEUTROPHILS NFR BLD: 33.6 %
PLATELET # BLD AUTO: 107 K/UL
PMV BLD AUTO: 8.7 FL
POTASSIUM SERPL-SCNC: 3.9 MMOL/L
PROT SERPL-MCNC: 6 G/DL
RBC # BLD AUTO: 4.18 M/UL
SODIUM SERPL-SCNC: 140 MMOL/L
URATE SERPL-MCNC: 3.8 MG/DL
WBC # BLD AUTO: 6.5 K/UL

## 2017-10-02 PROCEDURE — 1126F AMNT PAIN NOTED NONE PRSNT: CPT | Mod: S$GLB,,, | Performed by: INTERNAL MEDICINE

## 2017-10-02 PROCEDURE — 3079F DIAST BP 80-89 MM HG: CPT | Mod: S$GLB,,, | Performed by: INTERNAL MEDICINE

## 2017-10-02 PROCEDURE — 3008F BODY MASS INDEX DOCD: CPT | Mod: S$GLB,,, | Performed by: INTERNAL MEDICINE

## 2017-10-02 PROCEDURE — 84550 ASSAY OF BLOOD/URIC ACID: CPT

## 2017-10-02 PROCEDURE — 3077F SYST BP >= 140 MM HG: CPT | Mod: S$GLB,,, | Performed by: INTERNAL MEDICINE

## 2017-10-02 PROCEDURE — 80053 COMPREHEN METABOLIC PANEL: CPT

## 2017-10-02 PROCEDURE — 36415 COLL VENOUS BLD VENIPUNCTURE: CPT

## 2017-10-02 PROCEDURE — 99214 OFFICE O/P EST MOD 30 MIN: CPT | Mod: S$GLB,,, | Performed by: INTERNAL MEDICINE

## 2017-10-02 PROCEDURE — 85025 COMPLETE CBC W/AUTO DIFF WBC: CPT

## 2017-10-02 PROCEDURE — 1157F ADVNC CARE PLAN IN RCRD: CPT | Mod: S$GLB,,, | Performed by: INTERNAL MEDICINE

## 2017-10-02 PROCEDURE — 99999 PR PBB SHADOW E&M-EST. PATIENT-LVL III: CPT | Mod: PBBFAC,,, | Performed by: INTERNAL MEDICINE

## 2017-10-02 PROCEDURE — 99499 UNLISTED E&M SERVICE: CPT | Mod: S$GLB,,, | Performed by: INTERNAL MEDICINE

## 2017-10-02 PROCEDURE — 1159F MED LIST DOCD IN RCRD: CPT | Mod: S$GLB,,, | Performed by: INTERNAL MEDICINE

## 2017-10-02 RX ORDER — SODIUM CHLORIDE 0.9 % (FLUSH) 0.9 %
10 SYRINGE (ML) INJECTION
Status: CANCELLED | OUTPATIENT
Start: 2017-10-11

## 2017-10-02 RX ORDER — HEPARIN 100 UNIT/ML
500 SYRINGE INTRAVENOUS
Status: CANCELLED | OUTPATIENT
Start: 2017-10-10

## 2017-10-02 RX ORDER — ACETAMINOPHEN 325 MG/1
650 TABLET ORAL
Status: CANCELLED | OUTPATIENT
Start: 2017-10-10

## 2017-10-02 RX ORDER — FAMOTIDINE 10 MG/ML
20 INJECTION INTRAVENOUS
Status: CANCELLED | OUTPATIENT
Start: 2017-10-10

## 2017-10-02 RX ORDER — HEPARIN 100 UNIT/ML
500 SYRINGE INTRAVENOUS
Status: CANCELLED | OUTPATIENT
Start: 2017-10-11

## 2017-10-02 RX ORDER — MEPERIDINE HYDROCHLORIDE 50 MG/ML
25 INJECTION INTRAMUSCULAR; INTRAVENOUS; SUBCUTANEOUS
Status: CANCELLED | OUTPATIENT
Start: 2017-10-10

## 2017-10-02 RX ORDER — SODIUM CHLORIDE 0.9 % (FLUSH) 0.9 %
10 SYRINGE (ML) INJECTION
Status: CANCELLED | OUTPATIENT
Start: 2017-10-10

## 2017-10-02 NOTE — ASSESSMENT & PLAN NOTE
He has intermittent symptoms of urinary hesitancy and obstruction. These appear to self-resolve. Continue tamsulosin. We will monitor.

## 2017-10-02 NOTE — PROGRESS NOTES
HEMATOLOGIC MALIGNANCIES PROGRESS NOTE    IDENTIFYING STATEMENT   Royce Wallace) is a 72 y.o. male with a  of 1945 from [place] with the diagnosis of DLBCL.      ONCOLOGY HISTORY (from Dr. Lucia's prior note):    Mr. Francis is a 72-year-old man who has been   treated numerous times since  for B-cell lymphoma.  After biopsy of a   retroperitoneal lymph node in , he received six courses of CHOP.  In 2002, he developed lymphadenopathy in his neck.  Although a biopsy of a lymph   node showed atypical lymphoid hyperplasia, he subsequently developed generalized   lymphadenopathy and focal lesions in his spleen.  He was treated with four   weekly doses of Rituxan, following which all of these areas of abnormality   resolved.     In , he developed symptoms of congestive heart failure with his ejection   fraction falling to 31%.  With medical therapy, his cardiac function has   improved and the last echocardiogram in 2005 included an ejection   fraction of 45% and grade 1 diastolic dysfunction.     In , while he was being evaluated for fever and fatigue, a lymph node biopsy   revealed grade 2 follicular lymphoma with some areas of progression to grade 3.    He was treated with Zevalin, following which the signs and symptoms of   lymphoma resolved.     In May 2009, biopsy of a cervical lymph node showed diffuse large B-cell   lymphoma.  A PET scan did not reveal signs of disease elsewhere.  After a   discussion of various treatment alternatives and consultation with a transplant   physician, who felt that he was not a suitable candidate for autologous stem   cell transplantation, he was treated with six courses of ifosfamide, etoposide   and carboplatinum.  Rituxan was not used because of concern about his cardiac   function.  He has not been given oxaliplatin or cisplatin because he has severe  peripheral neuropathy.  He achieved a complete remission after six  courses of   the therapy with ifosfamide, etoposide and carboplatinum.     In early 2012, needle biopsy of a preauricular lymph node showed a population of   kappa restricted B cells with CD20 expression.  A PET scan showed no signs of   disease elsewhere and he was treated with six courses of bendamustine, the last   of which was administered in October 2012.     There was further evidence of recurrent lymphoma in the summer of 2013.  A PET   scan showed hypermetabolic activity throughout the spleen with an SUV max of   15.3 and abnormal lymph nodes in the celiac axis, right pelvis and   retroperitoneum with SUV readings up to 14.4.  There was an area of abnormality   in the right lower quadrant, which represented either small bowel disease and/or   lymphatic disease adjacent to the small bowel.  This had an SUV max of 20.    After a lengthy discussion, he began therapy with gemcitabine, Rituxan and   high-dose dexamethasone.  Following the third course of this therapy, on   09/17/2013, he developed severe right lower quadrant pain and underwent surgical   removal of 30 cm of his small intestine.  This contained a 1.5 cm ulcer with   perforation.  The pathology studies did not show any lymphoma.  He completed a   total of six courses of gemcitabine, Rituxan and high-dose dexamethasone.     About one month ago, he began to have abdominal pain, which is mostly cramping   in nature.  He has not had vomiting.  He has anorexia and yesterday he began to   have persistent diarrhea, although that has been an intermittent problem for a   month or more.  There has been no evidence of bleeding.  He is not having   fevers or sweats.  The pain is gradually worsening.     He has peripheral neuropathy with numbness and burning of his feet and toes.    His gait is unsteady.  He also has severe lumbar spine disease and had a spinal   stimulator implanted about six weeks ago.  So far, he has not had significant   benefit from  that.     He had an episode of pneumonia in November 2014, and during a   hospitalization, he developed pancytopenia.  A bone marrow examination showed   dyserythropoiesis and dysgranulopoiesis.  A cytogenetic study indicated that 10   of 20 metaphases had 20q deletion and FISH analyses confirm the 20q deletion in   33% of cells.  In addition, 19% of cells had four copies of 7q31.  These   results suggested the presence of two abnormal clones, a 7q clone related to   lymphoma and a 20q clone indicating an increased risk of a myelodysplastic   disorder.  Fortunately, after this examination his blood counts improved   substantially, although he often has mild thrombocytopenia and anemia.    INTERVAL HISTORY:      Mr. Francis returns to clinic for follow-up of DLBCL and Cycle 2 of R-bendamustine chemotherapy. He tolerated the first cycle okay, but he developed significant fatigue afterwards. He also states that for two weeks, he had a near daily fever, registering up to 102.2 F. He states that they called the clinic, and he managed it at home with acetaminophen. He reports that this is one of the more difficult chemotherapy regimens he has ever experienced. Otherwise, he feels okay today and has no other acute complaints.     Past Medical History, Past Social History and Past Family History have been reviewed and are unchanged except as noted in the interval history.    MEDICATIONS:     Prior to Admission medications    Medication Sig Start Date End Date Taking? Authorizing Provider   acetaminophen (TYLENOL) 500 MG tablet Take 500 mg by mouth as needed for Pain.   Yes Historical Provider, MD   allopurinol (ZYLOPRIM) 300 MG tablet TAKE 1 TABLET BY MOUTH DAILY FOR 12 DAYS STARTING 3 DAYS BEFORE FIRST CHEMOTHERAPY CYCLE ONLY 9/5/17  Yes eY Lucia Jr., MD   ascorbic acid, vitamin C, (VITAMIN C) 1000 MG tablet Take 1,000 mg by mouth once daily. liposomal   Yes Historical Provider, MD   carisoprodol (SOMA) 350 MG  tablet Take 350 mg by mouth as needed for Muscle spasms.   Yes Historical Provider, MD   cyanocobalamin (VITAMIN B-12) 1000 MCG tablet Take 100 mcg by mouth once daily.   Yes Historical Provider, MD   diphenoxylate-atropine 2.5-0.025 mg (LOMOTIL) 2.5-0.025 mg per tablet One tablet every 8 hours as needed for diarrhea 6/1/17  Yes Ye Lucia Jr., MD   enalapril (VASOTEC) 5 MG tablet Take 1 tablet (5 mg total) by mouth once daily. 6/20/17  Yes Boston Dent MD   esomeprazole (NEXIUM) 20 MG capsule Take 20 mg by mouth before breakfast.   Yes Historical Provider, MD   flurazepam (DALMANE) 30 MG capsule Take 30 mg by mouth nightly as needed for Insomnia.   Yes Historical Provider, MD   fluticasone (FLONASE) 50 mcg/actuation nasal spray SPRAY ONCE IN EACH NOSTRIL ONCE DAILY 7/27/17  Yes Rupert Díaz MD   furosemide (LASIX) 40 MG tablet One daily as needed for edema 9/18/17 9/18/20 Yes Ye Lucia Jr., MD   hydrocodone-acetaminophen 5-325mg (NORCO) 5-325 mg per tablet Take 1 tablet by mouth every 12 (twelve) hours as needed for Pain. 9/26/17 10/26/17 Yes Unruly Leos MD   lactobacillus rhamnosus GG (CULTURELLE) 10 billion cell capsule Take 1 capsule by mouth once daily.   Yes Historical Provider, MD   lidocaine-prilocaine (EMLA) cream  8/5/14  Yes Historical Provider, MD   loperamide (IMODIUM A-D) 2 mg Tab Take 2 mg by mouth once daily at 6am.    Yes Historical Provider, MD   metoprolol tartrate (LOPRESSOR) 50 MG tablet Take 0.5 tablets (25 mg total) by mouth 2 (two) times daily. 9/1/17  Yes Boston Dent MD   mirtazapine (REMERON) 15 MG tablet Take 1 tablet (15 mg total) by mouth every evening. 5/12/17  Yes Ye Lucia Jr., MD   multivitamin capsule Take 1 capsule by mouth once daily.   Yes Historical Provider, MD   NON FORMULARY MEDICATION 300 mg once daily. Desiccated Liver   Yes Historical Provider, MD   predniSONE (DELTASONE) 50 MG Tab 2 tablets daily for 5 days starting on first day of  "every chemotherapy cycle 9/5/17  Yes Ye Lucia Jr., MD   predniSONE (DELTASONE) 50 MG Tab 2 tablets daily for 5 days starting today.  In the future, prednisone will be started on the first day of chemotherapy 9/7/17  Yes Ye Lucia Jr., MD   tamsulosin (FLOMAX) 0.4 mg Cp24 Take 1 capsule (0.4 mg total) by mouth once daily. 7/26/17  Yes Rupert Díaz MD   valacyclovir (VALTREX) 500 MG tablet TAKE 1 TABLET BY MOUTH ONCE DAILY 9/1/17  Yes Ye Lucia Jr., MD   valacyclovir (VALTREX) 500 MG tablet Take 1 tablet (500 mg total) by mouth once daily. 9/5/17  Yes Ye Lucia Jr., MD       ALLERGIES:   Review of patient's allergies indicates:   Allergen Reactions    Iodine and iodide containing products Itching     Contrast dye        ROS:       Review of Systems   Constitutional: Positive for fatigue and fever. Negative for diaphoresis and unexpected weight change.   HENT:   Negative for lump/mass and sore throat.    Eyes: Negative for icterus.   Respiratory: Negative for cough and shortness of breath.    Cardiovascular: Negative for chest pain and palpitations.   Gastrointestinal: Negative for abdominal distention, constipation, diarrhea, nausea and vomiting.   Genitourinary: Positive for difficulty urinating. Negative for dysuria and frequency.    Musculoskeletal: Negative for arthralgias, gait problem and myalgias.   Skin: Negative for rash.   Neurological: Negative for dizziness, gait problem and headaches.   Hematological: Negative for adenopathy. Does not bruise/bleed easily.   Psychiatric/Behavioral: The patient is not nervous/anxious.        PHYSICAL EXAM:  Vitals:    10/02/17 1419   BP: (!) 164/88   Pulse: 92   Temp: 98.5 °F (36.9 °C)   Weight: 87.2 kg (192 lb 3.9 oz)   Height: 5' 11" (1.803 m)   PainSc: 0-No pain       Physical Exam   Constitutional: He is oriented to person, place, and time. He appears well-developed and well-nourished. No distress.   HENT:   Head: Normocephalic and " atraumatic.   Mouth/Throat: Mucous membranes are normal. No oral lesions.   Eyes: Conjunctivae are normal.   Neck: No thyromegaly present.   Cardiovascular: Normal rate, regular rhythm and normal heart sounds.    No murmur heard.  Pulmonary/Chest: Breath sounds normal. He has no wheezes. He has no rales.   Abdominal: Soft. He exhibits no distension and no mass. There is no splenomegaly or hepatomegaly. There is no tenderness.   Ventral hernia is present.    Lymphadenopathy:     He has no cervical adenopathy.        Right cervical: No deep cervical adenopathy present.       Left cervical: No deep cervical adenopathy present.     He has no axillary adenopathy.        Right: No inguinal adenopathy present.        Left: No inguinal adenopathy present.   Neurological: He is alert and oriented to person, place, and time. He has normal strength and normal reflexes. No cranial nerve deficit. Coordination normal.   Skin: No rash noted.       LAB:   Results for orders placed or performed in visit on 10/02/17   CBC auto differential   Result Value Ref Range    WBC 6.50 3.90 - 12.70 K/uL    RBC 4.18 (L) 4.60 - 6.20 M/uL    Hemoglobin 11.1 (L) 14.0 - 18.0 g/dL    Hematocrit 34.7 (L) 40.0 - 54.0 %    MCV 83 82 - 98 fL    MCH 26.6 (L) 27.0 - 31.0 pg    MCHC 32.0 32.0 - 36.0 g/dL    RDW 15.2 (H) 11.5 - 14.5 %    Platelets 107 (L) 150 - 350 K/uL    MPV 8.7 (L) 9.2 - 12.9 fL    Gran # 2.2 1.8 - 7.7 K/uL    Lymph # 3.3 1.0 - 4.8 K/uL    Mono # 0.8 0.3 - 1.0 K/uL    Eos # 0.1 0.0 - 0.5 K/uL    Baso # 0.03 0.00 - 0.20 K/uL    Gran% 33.6 (L) 38.0 - 73.0 %    Lymph% 51.2 (H) 18.0 - 48.0 %    Mono% 12.9 4.0 - 15.0 %    Eosinophil% 1.8 0.0 - 8.0 %    Basophil% 0.5 0.0 - 1.9 %    Differential Method Automated    Comprehensive metabolic panel   Result Value Ref Range    Sodium 140 136 - 145 mmol/L    Potassium 3.9 3.5 - 5.1 mmol/L    Chloride 105 95 - 110 mmol/L    CO2 26 23 - 29 mmol/L    Glucose 100 70 - 110 mg/dL    BUN, Bld 15 8 - 23  mg/dL    Creatinine 0.9 0.5 - 1.4 mg/dL    Calcium 9.2 8.7 - 10.5 mg/dL    Total Protein 6.0 6.0 - 8.4 g/dL    Albumin 3.1 (L) 3.5 - 5.2 g/dL    Total Bilirubin 0.3 0.1 - 1.0 mg/dL    Alkaline Phosphatase 105 55 - 135 U/L    AST 28 10 - 40 U/L    ALT 21 10 - 44 U/L    Anion Gap 9 8 - 16 mmol/L    eGFR if African American >60.0 >60 mL/min/1.73 m^2    eGFR if non African American >60.0 >60 mL/min/1.73 m^2   URIC ACID   Result Value Ref Range    Uric Acid 3.8 3.4 - 7.0 mg/dL     *Note: Due to a large number of results and/or encounters for the requested time period, some results have not been displayed. A complete set of results can be found in Results Review.       PROBLEMS ASSESSED THIS VISIT:    1. Large cell (diffuse) non-Hodgkin's lymphoma    2. Essential hypertension    3. Chronic pain syndrome    4. BPH without obstruction/lower urinary tract symptoms        PLAN:       Chronic pain syndrome  Chronic pain but controlled. We will monitor. Continue current therapy.     Essential hypertension  Elevated today. We will monitor. Continue home medications.     BPH without obstruction/lower urinary tract symptoms  He has intermittent symptoms of urinary hesitancy and obstruction. These appear to self-resolve. Continue tamsulosin. We will monitor.     Large cell (diffuse) non-Hodgkin's lymphoma  Today is Cycle 1, Day 21 of R-bendamustine chemotherapy. Mr. Francis is tolerating this well, and his cell counts are normal today (with peg-filgrastim support). We will continue with Cycle 2 as planned tomorrow. He has already started allopurinol. He has a prescription for prednisone filled.    He will follow-up with Dr. Lucia in 3 weeks prior to Cycle 3.       Sukhdeep Bhandari MD  Hematology and Stem Cell Transplant

## 2017-10-02 NOTE — TELEPHONE ENCOUNTER
----- Message from Miryam Meyers sent at 10/2/2017  3:26 PM CDT -----  x_  1st Request  _  2nd Request  _  3rd Request        Who: fay [pt. wife    Why: pt. Would like to speak with adelia regarding walker rx. Please call to discuss    What Number to Call Back:[phone]    When to Expect a call back: (Before the end of the day)   -- if the call is after 12:00, the call back will be tomorrow.

## 2017-10-02 NOTE — Clinical Note
Patient follows-up with Dr. Lucia in 3 weeks (already scheduled). Needs standing lab orders (CBC, CMP) associated with his weekly lab appointments.

## 2017-10-02 NOTE — ASSESSMENT & PLAN NOTE
Today is Cycle 1, Day 21 of R-bendamustine chemotherapy. Mr. Francis is tolerating this well, and his cell counts are normal today (with peg-filgrastim support). We will continue with Cycle 2 as planned tomorrow. He has already started allopurinol. He has a prescription for prednisone filled.    He will follow-up with Dr. Lucia in 3 weeks prior to Cycle 3.

## 2017-10-03 ENCOUNTER — INFUSION (OUTPATIENT)
Dept: INFUSION THERAPY | Facility: HOSPITAL | Age: 72
End: 2017-10-03
Attending: INTERNAL MEDICINE
Payer: MEDICARE

## 2017-10-03 VITALS — SYSTOLIC BLOOD PRESSURE: 129 MMHG | DIASTOLIC BLOOD PRESSURE: 60 MMHG | HEART RATE: 69 BPM | RESPIRATION RATE: 18 BRPM

## 2017-10-03 DIAGNOSIS — C83.30 LARGE CELL (DIFFUSE) NON-HODGKIN'S LYMPHOMA: Primary | ICD-10-CM

## 2017-10-03 PROCEDURE — 63600175 PHARM REV CODE 636 W HCPCS: Performed by: INTERNAL MEDICINE

## 2017-10-03 PROCEDURE — 96375 TX/PRO/DX INJ NEW DRUG ADDON: CPT

## 2017-10-03 PROCEDURE — 96411 CHEMO IV PUSH ADDL DRUG: CPT

## 2017-10-03 PROCEDURE — 96413 CHEMO IV INFUSION 1 HR: CPT

## 2017-10-03 PROCEDURE — 96367 TX/PROPH/DG ADDL SEQ IV INF: CPT

## 2017-10-03 PROCEDURE — 96415 CHEMO IV INFUSION ADDL HR: CPT

## 2017-10-03 PROCEDURE — S0028 INJECTION, FAMOTIDINE, 20 MG: HCPCS | Performed by: INTERNAL MEDICINE

## 2017-10-03 PROCEDURE — 25000003 PHARM REV CODE 250: Performed by: INTERNAL MEDICINE

## 2017-10-03 RX ORDER — SODIUM CHLORIDE 0.9 % (FLUSH) 0.9 %
10 SYRINGE (ML) INJECTION
Status: DISCONTINUED | OUTPATIENT
Start: 2017-10-03 | End: 2017-10-03 | Stop reason: HOSPADM

## 2017-10-03 RX ORDER — FAMOTIDINE 10 MG/ML
20 INJECTION INTRAVENOUS
Status: COMPLETED | OUTPATIENT
Start: 2017-10-03 | End: 2017-10-03

## 2017-10-03 RX ORDER — HEPARIN 100 UNIT/ML
500 SYRINGE INTRAVENOUS
Status: DISCONTINUED | OUTPATIENT
Start: 2017-10-03 | End: 2017-10-03 | Stop reason: HOSPADM

## 2017-10-03 RX ORDER — MEPERIDINE HYDROCHLORIDE 50 MG/ML
25 INJECTION INTRAMUSCULAR; INTRAVENOUS; SUBCUTANEOUS
Status: DISCONTINUED | OUTPATIENT
Start: 2017-10-03 | End: 2017-10-03 | Stop reason: HOSPADM

## 2017-10-03 RX ORDER — ACETAMINOPHEN 325 MG/1
650 TABLET ORAL
Status: COMPLETED | OUTPATIENT
Start: 2017-10-03 | End: 2017-10-03

## 2017-10-03 RX ADMIN — RITUXIMAB 785 MG: 10 INJECTION, SOLUTION INTRAVENOUS at 08:10

## 2017-10-03 RX ADMIN — ACETAMINOPHEN 650 MG: 325 TABLET ORAL at 08:10

## 2017-10-03 RX ADMIN — DIPHENHYDRAMINE HYDROCHLORIDE 50 MG: 50 INJECTION, SOLUTION INTRAMUSCULAR; INTRAVENOUS at 08:10

## 2017-10-03 RX ADMIN — FAMOTIDINE 20 MG: 10 INJECTION INTRAVENOUS at 08:10

## 2017-10-03 RX ADMIN — BENDAMUSTINE HYDROCHLORIDE 210 MG: 25 INJECTION, SOLUTION INTRAVENOUS at 12:10

## 2017-10-03 RX ADMIN — DEXAMETHASONE SODIUM PHOSPHATE: 4 INJECTION, SOLUTION INTRAMUSCULAR; INTRAVENOUS at 08:10

## 2017-10-03 NOTE — PLAN OF CARE
Problem: Patient Care Overview  Goal: Plan of Care Review  Outcome: Ongoing (interventions implemented as appropriate)  Pt tolerated treatment well.  No s/s of reaction. Vitals stable, NAD.

## 2017-10-04 ENCOUNTER — INFUSION (OUTPATIENT)
Dept: INFUSION THERAPY | Facility: HOSPITAL | Age: 72
End: 2017-10-04
Attending: INTERNAL MEDICINE
Payer: MEDICARE

## 2017-10-04 ENCOUNTER — PATIENT MESSAGE (OUTPATIENT)
Dept: CARDIOLOGY | Facility: CLINIC | Age: 72
End: 2017-10-04

## 2017-10-04 VITALS
TEMPERATURE: 98 F | RESPIRATION RATE: 20 BRPM | DIASTOLIC BLOOD PRESSURE: 62 MMHG | SYSTOLIC BLOOD PRESSURE: 135 MMHG | HEART RATE: 63 BPM

## 2017-10-04 DIAGNOSIS — Z51.11 ENCOUNTER FOR ANTINEOPLASTIC CHEMOTHERAPY AND IMMUNOTHERAPY: ICD-10-CM

## 2017-10-04 DIAGNOSIS — C83.30 LARGE CELL (DIFFUSE) NON-HODGKIN'S LYMPHOMA: Primary | ICD-10-CM

## 2017-10-04 DIAGNOSIS — Z51.12 ENCOUNTER FOR ANTINEOPLASTIC CHEMOTHERAPY AND IMMUNOTHERAPY: ICD-10-CM

## 2017-10-04 PROCEDURE — 25000003 PHARM REV CODE 250: Performed by: INTERNAL MEDICINE

## 2017-10-04 PROCEDURE — 96409 CHEMO IV PUSH SNGL DRUG: CPT

## 2017-10-04 PROCEDURE — 63600175 PHARM REV CODE 636 W HCPCS: Performed by: INTERNAL MEDICINE

## 2017-10-04 RX ORDER — HEPARIN 100 UNIT/ML
500 SYRINGE INTRAVENOUS
Status: DISCONTINUED | OUTPATIENT
Start: 2017-10-04 | End: 2017-10-04 | Stop reason: HOSPADM

## 2017-10-04 RX ORDER — METOPROLOL TARTRATE 50 MG/1
25 TABLET ORAL 2 TIMES DAILY
Qty: 180 TABLET | Refills: 3 | Status: SHIPPED | OUTPATIENT
Start: 2017-10-04

## 2017-10-04 RX ORDER — SODIUM CHLORIDE 0.9 % (FLUSH) 0.9 %
10 SYRINGE (ML) INJECTION
Status: DISCONTINUED | OUTPATIENT
Start: 2017-10-04 | End: 2017-10-04 | Stop reason: HOSPADM

## 2017-10-04 RX ADMIN — BENDAMUSTINE HYDROCHLORIDE 210 MG: 25 INJECTION, SOLUTION INTRAVENOUS at 09:10

## 2017-10-04 RX ADMIN — SODIUM CHLORIDE: 9 INJECTION, SOLUTION INTRAVENOUS at 09:10

## 2017-10-04 NOTE — PLAN OF CARE
Problem: Patient Care Overview  Goal: Plan of Care Review  Outcome: Ongoing (interventions implemented as appropriate)  Pt received Bendeka with no complications. Pt instructed to call MD with any problems and RTC tomorrow for Neulasta injection. Pt discharged home with wife at side.

## 2017-10-05 ENCOUNTER — INFUSION (OUTPATIENT)
Dept: INFUSION THERAPY | Facility: HOSPITAL | Age: 72
End: 2017-10-05
Attending: INTERNAL MEDICINE
Payer: MEDICARE

## 2017-10-05 DIAGNOSIS — C83.30 LARGE CELL (DIFFUSE) NON-HODGKIN'S LYMPHOMA: Primary | ICD-10-CM

## 2017-10-05 PROCEDURE — 96372 THER/PROPH/DIAG INJ SC/IM: CPT

## 2017-10-05 PROCEDURE — 63600175 PHARM REV CODE 636 W HCPCS: Performed by: INTERNAL MEDICINE

## 2017-10-05 RX ADMIN — PEGFILGRASTIM 6 MG: 6 INJECTION SUBCUTANEOUS at 01:10

## 2017-10-05 NOTE — NURSING
Pt arrived for neulasta following chemo yesterday. Pt tolerated injection SQ to left arm.  Discharged to home.

## 2017-10-06 ENCOUNTER — PATIENT MESSAGE (OUTPATIENT)
Dept: HEMATOLOGY/ONCOLOGY | Facility: CLINIC | Age: 72
End: 2017-10-06

## 2017-10-10 ENCOUNTER — LAB VISIT (OUTPATIENT)
Dept: LAB | Facility: HOSPITAL | Age: 72
End: 2017-10-10
Attending: INTERNAL MEDICINE
Payer: MEDICARE

## 2017-10-10 ENCOUNTER — PATIENT MESSAGE (OUTPATIENT)
Dept: HEMATOLOGY/ONCOLOGY | Facility: CLINIC | Age: 72
End: 2017-10-10

## 2017-10-10 DIAGNOSIS — C83.30 LARGE CELL (DIFFUSE) NON-HODGKIN'S LYMPHOMA: ICD-10-CM

## 2017-10-10 DIAGNOSIS — C82.90 FOLLICULAR LYMPHOMA: ICD-10-CM

## 2017-10-10 LAB
ALBUMIN SERPL BCP-MCNC: 2.8 G/DL
ALP SERPL-CCNC: 112 U/L
ALT SERPL W/O P-5'-P-CCNC: 11 U/L
ANION GAP SERPL CALC-SCNC: 14 MMOL/L
AST SERPL-CCNC: 18 U/L
BASOPHILS # BLD AUTO: 0.04 K/UL
BASOPHILS NFR BLD: 0.5 %
BILIRUB SERPL-MCNC: 0.4 MG/DL
BUN SERPL-MCNC: 10 MG/DL
CALCIUM SERPL-MCNC: 8.4 MG/DL
CHLORIDE SERPL-SCNC: 96 MMOL/L
CO2 SERPL-SCNC: 30 MMOL/L
CREAT SERPL-MCNC: 1.1 MG/DL
DIFFERENTIAL METHOD: ABNORMAL
EOSINOPHIL # BLD AUTO: 0 K/UL
EOSINOPHIL NFR BLD: 0.5 %
ERYTHROCYTE [DISTWIDTH] IN BLOOD BY AUTOMATED COUNT: 16.1 %
EST. GFR  (AFRICAN AMERICAN): >60 ML/MIN/1.73 M^2
EST. GFR  (NON AFRICAN AMERICAN): >60 ML/MIN/1.73 M^2
GLUCOSE SERPL-MCNC: 111 MG/DL
HCT VFR BLD AUTO: 37 %
HGB BLD-MCNC: 11.4 G/DL
LYMPHOCYTES # BLD AUTO: 1.4 K/UL
LYMPHOCYTES NFR BLD: 18.7 %
MCH RBC QN AUTO: 26.8 PG
MCHC RBC AUTO-ENTMCNC: 30.8 G/DL
MCV RBC AUTO: 87 FL
MONOCYTES # BLD AUTO: 1.4 K/UL
MONOCYTES NFR BLD: 18.4 %
NEUTROPHILS # BLD AUTO: 4.8 K/UL
NEUTROPHILS NFR BLD: 61.9 %
PLATELET # BLD AUTO: 116 K/UL
PLATELET BLD QL SMEAR: ABNORMAL
PMV BLD AUTO: 9.2 FL
POTASSIUM SERPL-SCNC: 3.5 MMOL/L
PROT SERPL-MCNC: 5 G/DL
RBC # BLD AUTO: 4.26 M/UL
SODIUM SERPL-SCNC: 140 MMOL/L
WBC # BLD AUTO: 7.7 K/UL

## 2017-10-10 PROCEDURE — 80053 COMPREHEN METABOLIC PANEL: CPT

## 2017-10-10 PROCEDURE — 85025 COMPLETE CBC W/AUTO DIFF WBC: CPT | Mod: PO

## 2017-10-10 PROCEDURE — 36415 COLL VENOUS BLD VENIPUNCTURE: CPT | Mod: PO

## 2017-10-11 ENCOUNTER — PATIENT MESSAGE (OUTPATIENT)
Dept: HEMATOLOGY/ONCOLOGY | Facility: CLINIC | Age: 72
End: 2017-10-11

## 2017-10-12 ENCOUNTER — PATIENT MESSAGE (OUTPATIENT)
Dept: HEMATOLOGY/ONCOLOGY | Facility: CLINIC | Age: 72
End: 2017-10-12

## 2017-10-12 ENCOUNTER — TELEPHONE (OUTPATIENT)
Dept: HEMATOLOGY/ONCOLOGY | Facility: CLINIC | Age: 72
End: 2017-10-12

## 2017-10-12 NOTE — TELEPHONE ENCOUNTER
----- Message from Lisa Perez sent at 10/12/2017  1:35 PM CDT -----  Contact: Pt wife Selma Hahn calling stating that she has questions regarding what was being discussed through my [facility] call back number [phone]    Appointments reviewed.

## 2017-10-19 ENCOUNTER — PATIENT MESSAGE (OUTPATIENT)
Dept: HEMATOLOGY/ONCOLOGY | Facility: CLINIC | Age: 72
End: 2017-10-19

## 2017-10-23 ENCOUNTER — OFFICE VISIT (OUTPATIENT)
Dept: HEMATOLOGY/ONCOLOGY | Facility: CLINIC | Age: 72
End: 2017-10-23
Payer: MEDICARE

## 2017-10-23 VITALS
SYSTOLIC BLOOD PRESSURE: 118 MMHG | HEART RATE: 76 BPM | HEIGHT: 70 IN | DIASTOLIC BLOOD PRESSURE: 75 MMHG | BODY MASS INDEX: 27.08 KG/M2 | WEIGHT: 189.13 LBS

## 2017-10-23 DIAGNOSIS — D46.9 MYELODYSPLASTIC SYNDROME: ICD-10-CM

## 2017-10-23 DIAGNOSIS — R19.7 DIARRHEA, UNSPECIFIED TYPE: ICD-10-CM

## 2017-10-23 DIAGNOSIS — C83.30 LARGE CELL (DIFFUSE) NON-HODGKIN'S LYMPHOMA: Primary | ICD-10-CM

## 2017-10-23 PROCEDURE — 99999 PR PBB SHADOW E&M-EST. PATIENT-LVL III: CPT | Mod: PBBFAC,,, | Performed by: INTERNAL MEDICINE

## 2017-10-23 PROCEDURE — 99214 OFFICE O/P EST MOD 30 MIN: CPT | Mod: S$GLB,,, | Performed by: INTERNAL MEDICINE

## 2017-10-23 PROCEDURE — 99499 UNLISTED E&M SERVICE: CPT | Mod: S$GLB,,, | Performed by: INTERNAL MEDICINE

## 2017-10-23 RX ORDER — FAMOTIDINE 10 MG/ML
20 INJECTION INTRAVENOUS
Status: CANCELLED | OUTPATIENT
Start: 2017-10-24

## 2017-10-23 RX ORDER — SODIUM CHLORIDE 0.9 % (FLUSH) 0.9 %
10 SYRINGE (ML) INJECTION
Status: CANCELLED | OUTPATIENT
Start: 2017-10-25

## 2017-10-23 RX ORDER — HEPARIN 100 UNIT/ML
500 SYRINGE INTRAVENOUS
Status: CANCELLED | OUTPATIENT
Start: 2017-10-25

## 2017-10-23 RX ORDER — MEPERIDINE HYDROCHLORIDE 50 MG/ML
25 INJECTION INTRAMUSCULAR; INTRAVENOUS; SUBCUTANEOUS
Status: CANCELLED | OUTPATIENT
Start: 2017-10-24

## 2017-10-23 RX ORDER — HEPARIN 100 UNIT/ML
500 SYRINGE INTRAVENOUS
Status: CANCELLED | OUTPATIENT
Start: 2017-10-24

## 2017-10-23 RX ORDER — SODIUM CHLORIDE 0.9 % (FLUSH) 0.9 %
10 SYRINGE (ML) INJECTION
Status: CANCELLED | OUTPATIENT
Start: 2017-10-24

## 2017-10-23 RX ORDER — ACETAMINOPHEN 325 MG/1
650 TABLET ORAL
Status: CANCELLED | OUTPATIENT
Start: 2017-10-24

## 2017-10-23 NOTE — PROGRESS NOTES
HISTORY OF PRESENT ILLNESS:  Mr. Francis is a 72-year-old man, who has been   treated many times since 1996 for B-cell lymphoma.  I recorded the history   of his various treatments in my note of 09/05/2017.    In 08/2017, he began to have persistent abdominal pain, which was cramping in   nature.  He had some anorexia.  He has had intermittent diarrhea for several   years.    He also has peripheral neuropathy with burning of his feet and toes and he has   severe lumbar spine disease and recently had a spinal stimulator implanted.  He   is suspected to have myelodysplasia based on a prior bone marrow examination in   2014 and he has chronic thrombocytopenia.    Computed tomography of the abdomen on 09/01 showed enlarged mesenteric nodes and   a large mass in the right lower portion of the abdomen, which encased the   distal ileum and extended into the anterior abdominal wall.  The bowel was not   obstructed.    After reviewing his situation with a surgical oncologist, I have recommended   that he begin therapy with bendamustine, Rituxan and prednisone and he has now   completed twocourses of those drugs.  He was extraordinarily fatigued and weak after the   first course.  He had some low-grade fever for several days at a time when he   was not neutropenic.  He has not had vomiting.  He tolerated the second course   somewhat better.  The fatigue was not as severe or prolonged.  In the last week,   he has been able to do some light activities in and outside of his house.  He   has not had fever.  Fortunately, there has been substantial improvement in the   severe abdominal pain.    His major symptom at present is diarrhea.  This has been a problem for several   years and he has had a number of studies and numerous empiric treatments to try   to control this.  He reports that he is now having intermittent diarrhea with   loose stools up to five to six times a day.  On other days, he only has one or   two bowel  movements.  He has not noted any blood.  He is not having vomiting.    He has no anorexia, but he has some hesitation to eat because he is worried   about exacerbating diarrhea.  He is avoiding milk products and high-fiber foods.    ADDITIONAL PAST HISTORY, SYSTEM REVIEW, SOCIAL HISTORY AND FAMILY HISTORY:  Have   been reviewed and updated in the electronic record.    PHYSICAL EXAMINATION:  GENERAL APPEARANCE:  A well-developed, well-nourished man who appears far more   energetic than when I last saw him on 09/09/2017.  He is walking with a cane,   but he is not using a wheelchair.  EYES:  No jaundice or pallor.  NOSE:  Clear nares.  SINUSES:  No tenderness.  EARS:  Bilateral hearing aids.  MOUTH AND THROAT:  Good dentition.  No mucosal lesions.  NECK:  No masses or bruits.  No thyroid abnormalities.  LYMPH NODES:  No enlarged cervical, axillary or inguinal nodes.  CHEST AND LUNGS:  Normal respiratory effort.  Clear to auscultation and   percussion.  HEART:  Regular rate and rhythm without murmur or gallop.  ABDOMEN:  No distention.  No tenderness.  No hepatosplenomegaly.  Ventral   hernia.  EXTREMITIES:  No edema or cyanosis.  PERIPHERAL VASCULATURE:  Diminished pulses in the feet.  Good popliteal pulses.  NEUROLOGIC:  He is oriented.  His gait is good.  Mental status is good.  SKIN:  No suspicious lesions in the areas examined.    LABORATORY STUDIES:  The comprehensive metabolic profile from today is   remarkable only for total protein 5.6 and albumin 3.0.  Uric acid is 5.9.  Blood   counts include hemoglobin 10.3, platelets 89,000 and WBC 4390 with an absolute   neutrophil count of 1600.    IMPRESSION:  1.  Recurrent lymphoma (in the past this has recurred in both diffuse and   follicular patterns, but the most recent biopsy material has shown diffuse   lymphoma).  2.  Cardiomyopathy.  3.  Peripheral neuropathy.  4.  Myelodysplastic syndrome with anemia and thrombocytopenia.    PLAN:  1.  He will start a third  course of bendamustine and Rituxan along with   prednisone 100 mg daily for five days. Bendamustine will be decreased  from 100 mg/m2 to 90 mg/m2.  2.  Weekly CBC.  3.  Return visit ([facility] appointment) in three weeks with CBC, CMP, uric acid and   appointment for a fourth course of bendamustine and Rituxan.  4.  Stool specimen today for C&S, WBC and C. difficile.      ROSA M/ANTONINO  dd: 10/23/2017 07:49:37 (CDT)  td: 10/23/2017 09:40:02 (CDT)  Doc ID   #[id number]  Job ID #[id number]    CC:

## 2017-10-24 ENCOUNTER — PATIENT MESSAGE (OUTPATIENT)
Dept: HEMATOLOGY/ONCOLOGY | Facility: CLINIC | Age: 72
End: 2017-10-24

## 2017-10-24 ENCOUNTER — INFUSION (OUTPATIENT)
Dept: INFUSION THERAPY | Facility: HOSPITAL | Age: 72
End: 2017-10-24
Attending: INTERNAL MEDICINE
Payer: MEDICARE

## 2017-10-24 VITALS
TEMPERATURE: 98 F | HEART RATE: 63 BPM | RESPIRATION RATE: 18 BRPM | DIASTOLIC BLOOD PRESSURE: 61 MMHG | SYSTOLIC BLOOD PRESSURE: 130 MMHG

## 2017-10-24 DIAGNOSIS — C83.30 LARGE CELL (DIFFUSE) NON-HODGKIN'S LYMPHOMA: Primary | ICD-10-CM

## 2017-10-24 PROCEDURE — 63600175 PHARM REV CODE 636 W HCPCS: Performed by: INTERNAL MEDICINE

## 2017-10-24 PROCEDURE — 96375 TX/PRO/DX INJ NEW DRUG ADDON: CPT

## 2017-10-24 PROCEDURE — 96415 CHEMO IV INFUSION ADDL HR: CPT

## 2017-10-24 PROCEDURE — 25000003 PHARM REV CODE 250: Performed by: INTERNAL MEDICINE

## 2017-10-24 PROCEDURE — S0028 INJECTION, FAMOTIDINE, 20 MG: HCPCS | Performed by: INTERNAL MEDICINE

## 2017-10-24 PROCEDURE — 96413 CHEMO IV INFUSION 1 HR: CPT

## 2017-10-24 PROCEDURE — 96367 TX/PROPH/DG ADDL SEQ IV INF: CPT

## 2017-10-24 PROCEDURE — 96411 CHEMO IV PUSH ADDL DRUG: CPT

## 2017-10-24 RX ORDER — ACETAMINOPHEN 325 MG/1
650 TABLET ORAL
Status: COMPLETED | OUTPATIENT
Start: 2017-10-24 | End: 2017-10-24

## 2017-10-24 RX ORDER — MEPERIDINE HYDROCHLORIDE 50 MG/ML
25 INJECTION INTRAMUSCULAR; INTRAVENOUS; SUBCUTANEOUS
Status: DISCONTINUED | OUTPATIENT
Start: 2017-10-24 | End: 2017-10-24 | Stop reason: HOSPADM

## 2017-10-24 RX ORDER — FAMOTIDINE 10 MG/ML
20 INJECTION INTRAVENOUS
Status: COMPLETED | OUTPATIENT
Start: 2017-10-24 | End: 2017-10-24

## 2017-10-24 RX ADMIN — RITUXIMAB 785 MG: 10 INJECTION, SOLUTION INTRAVENOUS at 09:10

## 2017-10-24 RX ADMIN — DIPHENHYDRAMINE HYDROCHLORIDE 50 MG: 50 INJECTION, SOLUTION INTRAMUSCULAR; INTRAVENOUS at 08:10

## 2017-10-24 RX ADMIN — BENDAMUSTINE HYDROCHLORIDE 190 MG: 25 INJECTION, SOLUTION INTRAVENOUS at 12:10

## 2017-10-24 RX ADMIN — DEXAMETHASONE SODIUM PHOSPHATE: 4 INJECTION, SOLUTION INTRAMUSCULAR; INTRAVENOUS at 08:10

## 2017-10-24 RX ADMIN — ACETAMINOPHEN 650 MG: 325 TABLET ORAL at 08:10

## 2017-10-24 RX ADMIN — FAMOTIDINE 20 MG: 10 INJECTION INTRAVENOUS at 08:10

## 2017-10-24 NOTE — PLAN OF CARE
Problem: Patient Care Overview  Goal: Individualization & Mutuality  Outcome: Ongoing (interventions implemented as appropriate)  0848 --  Patient's labs, history, allergies, and medication reviewed.  Assessment complete.  Patient to receive Rituxan/Bendeka.  Discussed plan of care with patient.  Patient in agreement.

## 2017-10-24 NOTE — PLAN OF CARE
Problem: Patient Care Overview  Goal: Plan of Care Review  Outcome: Ongoing (interventions implemented as appropriate)  1241 -- Patient tolerated treatment well.  Discharged without S/S of adverse effects.  AVS given.  Patient instructed to call provider with any questions or concerns.  RTC tomorrow for D2.

## 2017-10-25 ENCOUNTER — PATIENT MESSAGE (OUTPATIENT)
Dept: HEMATOLOGY/ONCOLOGY | Facility: CLINIC | Age: 72
End: 2017-10-25

## 2017-10-25 ENCOUNTER — INFUSION (OUTPATIENT)
Dept: INFUSION THERAPY | Facility: HOSPITAL | Age: 72
End: 2017-10-25
Attending: INTERNAL MEDICINE
Payer: MEDICARE

## 2017-10-25 VITALS
DIASTOLIC BLOOD PRESSURE: 67 MMHG | SYSTOLIC BLOOD PRESSURE: 141 MMHG | TEMPERATURE: 98 F | RESPIRATION RATE: 18 BRPM | HEART RATE: 62 BPM

## 2017-10-25 DIAGNOSIS — C83.30 LARGE CELL (DIFFUSE) NON-HODGKIN'S LYMPHOMA: Primary | ICD-10-CM

## 2017-10-25 PROCEDURE — 96409 CHEMO IV PUSH SNGL DRUG: CPT

## 2017-10-25 PROCEDURE — 63600175 PHARM REV CODE 636 W HCPCS: Performed by: INTERNAL MEDICINE

## 2017-10-25 PROCEDURE — 25000003 PHARM REV CODE 250: Performed by: INTERNAL MEDICINE

## 2017-10-25 RX ORDER — SODIUM CHLORIDE 0.9 % (FLUSH) 0.9 %
10 SYRINGE (ML) INJECTION
Status: DISCONTINUED | OUTPATIENT
Start: 2017-10-25 | End: 2017-10-25 | Stop reason: HOSPADM

## 2017-10-25 RX ORDER — HEPARIN 100 UNIT/ML
500 SYRINGE INTRAVENOUS
Status: DISCONTINUED | OUTPATIENT
Start: 2017-10-25 | End: 2017-10-25 | Stop reason: HOSPADM

## 2017-10-25 RX ADMIN — BENDAMUSTINE HYDROCHLORIDE 190 MG: 25 INJECTION, SOLUTION INTRAVENOUS at 08:10

## 2017-10-25 RX ADMIN — SODIUM CHLORIDE: 9 INJECTION, SOLUTION INTRAVENOUS at 08:10

## 2017-10-26 ENCOUNTER — INFUSION (OUTPATIENT)
Dept: INFUSION THERAPY | Facility: HOSPITAL | Age: 72
End: 2017-10-26
Attending: INTERNAL MEDICINE
Payer: MEDICARE

## 2017-10-26 DIAGNOSIS — C83.30 LARGE CELL (DIFFUSE) NON-HODGKIN'S LYMPHOMA: Primary | ICD-10-CM

## 2017-10-26 PROCEDURE — 63600175 PHARM REV CODE 636 W HCPCS: Performed by: INTERNAL MEDICINE

## 2017-10-26 PROCEDURE — 96372 THER/PROPH/DIAG INJ SC/IM: CPT

## 2017-10-26 RX ADMIN — PEGFILGRASTIM 6 MG: 6 INJECTION SUBCUTANEOUS at 10:10

## 2017-10-30 ENCOUNTER — LAB VISIT (OUTPATIENT)
Dept: LAB | Facility: HOSPITAL | Age: 72
End: 2017-10-30
Attending: INTERNAL MEDICINE
Payer: MEDICARE

## 2017-10-30 DIAGNOSIS — C82.90 FOLLICULAR LYMPHOMA: ICD-10-CM

## 2017-10-30 LAB
ANISOCYTOSIS BLD QL SMEAR: SLIGHT
BASOPHILS # BLD AUTO: 0.05 K/UL
BASOPHILS NFR BLD: 0.5 %
DIFFERENTIAL METHOD: ABNORMAL
EOSINOPHIL # BLD AUTO: 0.1 K/UL
EOSINOPHIL NFR BLD: 0.7 %
ERYTHROCYTE [DISTWIDTH] IN BLOOD BY AUTOMATED COUNT: 16.6 %
HCT VFR BLD AUTO: 35.8 %
HGB BLD-MCNC: 11.3 G/DL
LYMPHOCYTES # BLD AUTO: 0.8 K/UL
LYMPHOCYTES NFR BLD: 9.2 %
MCH RBC QN AUTO: 27.4 PG
MCHC RBC AUTO-ENTMCNC: 31.6 G/DL
MCV RBC AUTO: 87 FL
MONOCYTES # BLD AUTO: 1.4 K/UL
MONOCYTES NFR BLD: 15.2 %
NEUTROPHILS # BLD AUTO: 6.7 K/UL
NEUTROPHILS NFR BLD: 73.9 %
NRBC BLD-RTO: 1 /100 WBC
OVALOCYTES BLD QL SMEAR: ABNORMAL
PLATELET # BLD AUTO: 146 K/UL
PLATELET BLD QL SMEAR: ABNORMAL
PMV BLD AUTO: 9 FL
POIKILOCYTOSIS BLD QL SMEAR: SLIGHT
POLYCHROMASIA BLD QL SMEAR: ABNORMAL
RBC # BLD AUTO: 4.13 M/UL
WBC # BLD AUTO: 9.12 K/UL

## 2017-10-30 PROCEDURE — 36415 COLL VENOUS BLD VENIPUNCTURE: CPT | Mod: PO

## 2017-10-30 PROCEDURE — 85025 COMPLETE CBC W/AUTO DIFF WBC: CPT

## 2017-10-31 ENCOUNTER — HOSPITAL ENCOUNTER (INPATIENT)
Facility: HOSPITAL | Age: 72
LOS: 2 days | Discharge: HOME | DRG: 389 | End: 2017-11-02
Attending: INTERNAL MEDICINE | Admitting: INTERNAL MEDICINE
Payer: MEDICARE

## 2017-10-31 ENCOUNTER — PATIENT MESSAGE (OUTPATIENT)
Dept: FAMILY MEDICINE | Facility: CLINIC | Age: 72
End: 2017-10-31

## 2017-10-31 ENCOUNTER — PATIENT MESSAGE (OUTPATIENT)
Dept: HEMATOLOGY/ONCOLOGY | Facility: CLINIC | Age: 72
End: 2017-10-31

## 2017-10-31 ENCOUNTER — TELEPHONE (OUTPATIENT)
Dept: HEMATOLOGY/ONCOLOGY | Facility: CLINIC | Age: 72
End: 2017-10-31

## 2017-10-31 ENCOUNTER — HOSPITAL ENCOUNTER (EMERGENCY)
Facility: OTHER | Age: 72
Discharge: ANOTHER HOSPITAL | End: 2017-10-31
Attending: EMERGENCY MEDICINE
Payer: MEDICARE

## 2017-10-31 VITALS
TEMPERATURE: 98 F | OXYGEN SATURATION: 96 % | DIASTOLIC BLOOD PRESSURE: 81 MMHG | SYSTOLIC BLOOD PRESSURE: 133 MMHG | BODY MASS INDEX: 27.06 KG/M2 | HEART RATE: 100 BPM | RESPIRATION RATE: 18 BRPM | WEIGHT: 189 LBS | HEIGHT: 70 IN

## 2017-10-31 DIAGNOSIS — K56.609 SMALL BOWEL OBSTRUCTION: ICD-10-CM

## 2017-10-31 DIAGNOSIS — C82.00 FOLLICULAR LYMPHOMA GRADE I, UNSPECIFIED BODY REGION: Primary | ICD-10-CM

## 2017-10-31 DIAGNOSIS — K56.600 SMALL BOWEL OBSTRUCTION, PARTIAL: Primary | ICD-10-CM

## 2017-10-31 PROBLEM — E83.42 HYPOMAGNESEMIA: Status: ACTIVE | Noted: 2017-10-31

## 2017-10-31 PROBLEM — E87.6 HYPOKALEMIA: Status: ACTIVE | Noted: 2017-10-31

## 2017-10-31 LAB
ALBUMIN SERPL BCP-MCNC: 3.1 G/DL
ALBUMIN SERPL-MCNC: 3.7 G/DL (ref 3.3–5.5)
ALP SERPL-CCNC: 117 U/L
ALP SERPL-CCNC: 95 U/L (ref 42–141)
ALT SERPL W/O P-5'-P-CCNC: 11 U/L
ANION GAP SERPL CALC-SCNC: 10 MMOL/L
AST SERPL-CCNC: 18 U/L
BASOPHILS # BLD AUTO: 0.03 K/UL
BASOPHILS NFR BLD: 0.2 %
BILIRUB SERPL-MCNC: 0.5 MG/DL
BILIRUB SERPL-MCNC: 0.5 MG/DL (ref 0.2–1.6)
BUN SERPL-MCNC: 8 MG/DL (ref 7–22)
BUN SERPL-MCNC: 9 MG/DL
CALCIUM SERPL-MCNC: 8.9 MG/DL
CALCIUM SERPL-MCNC: 9.5 MG/DL (ref 8–10.3)
CHLORIDE SERPL-SCNC: 98 MMOL/L
CHLORIDE SERPL-SCNC: 98 MMOL/L (ref 98–108)
CO2 SERPL-SCNC: 33 MMOL/L
CREAT SERPL-MCNC: 0.9 MG/DL (ref 0.6–1.2)
CREAT SERPL-MCNC: 1 MG/DL
DIFFERENTIAL METHOD: ABNORMAL
EOSINOPHIL # BLD AUTO: 0.1 K/UL
EOSINOPHIL NFR BLD: 0.5 %
ERYTHROCYTE [DISTWIDTH] IN BLOOD BY AUTOMATED COUNT: 16.9 %
EST. GFR  (AFRICAN AMERICAN): >60 ML/MIN/1.73 M^2
EST. GFR  (NON AFRICAN AMERICAN): >60 ML/MIN/1.73 M^2
GLUCOSE SERPL-MCNC: 107 MG/DL (ref 73–118)
GLUCOSE SERPL-MCNC: 97 MG/DL
HCT VFR BLD AUTO: 35.9 %
HGB BLD-MCNC: 11.7 G/DL
IMM GRANULOCYTES # BLD AUTO: 0.19 K/UL
IMM GRANULOCYTES NFR BLD AUTO: 1.5 %
LIPASE SERPL-CCNC: 16 U/L
LYMPHOCYTES # BLD AUTO: 1.9 K/UL
LYMPHOCYTES NFR BLD: 14.3 %
MAGNESIUM SERPL-MCNC: 0.8 MG/DL
MCH RBC QN AUTO: 27.7 PG
MCHC RBC AUTO-ENTMCNC: 32.6 G/DL
MCV RBC AUTO: 85 FL
MONOCYTES # BLD AUTO: 2 K/UL
MONOCYTES NFR BLD: 15.3 %
NEUTROPHILS # BLD AUTO: 8.9 K/UL
NEUTROPHILS NFR BLD: 68.2 %
NRBC BLD-RTO: 0 /100 WBC
PHOSPHATE SERPL-MCNC: 2 MG/DL
PLATELET # BLD AUTO: 122 K/UL
PMV BLD AUTO: 8.8 FL
POC ALT (SGPT): 16 U/L (ref 10–47)
POC AST (SGOT): 22 U/L (ref 11–38)
POC CARDIAC TROPONIN I: 0 NG/ML
POC TCO2: 31 MMOL/L (ref 18–33)
POCT GLUCOSE: 110 MG/DL (ref 70–110)
POTASSIUM BLD-SCNC: 3 MMOL/L (ref 3.6–5.1)
POTASSIUM SERPL-SCNC: 2.9 MMOL/L
PROT SERPL-MCNC: 5.5 G/DL
PROTEIN, POC: 5.4 G/DL (ref 6.4–8.1)
RBC # BLD AUTO: 4.22 M/UL
SAMPLE: NORMAL
SODIUM BLD-SCNC: 143 MMOL/L (ref 128–145)
SODIUM SERPL-SCNC: 141 MMOL/L
WBC # BLD AUTO: 12.98 K/UL

## 2017-10-31 PROCEDURE — 80053 COMPREHEN METABOLIC PANEL: CPT

## 2017-10-31 PROCEDURE — 96375 TX/PRO/DX INJ NEW DRUG ADDON: CPT

## 2017-10-31 PROCEDURE — 99223 1ST HOSP IP/OBS HIGH 75: CPT | Mod: ,,, | Performed by: INTERNAL MEDICINE

## 2017-10-31 PROCEDURE — 84100 ASSAY OF PHOSPHORUS: CPT

## 2017-10-31 PROCEDURE — 20600001 HC STEP DOWN PRIVATE ROOM

## 2017-10-31 PROCEDURE — 36415 COLL VENOUS BLD VENIPUNCTURE: CPT

## 2017-10-31 PROCEDURE — 85025 COMPLETE CBC W/AUTO DIFF WBC: CPT

## 2017-10-31 PROCEDURE — 25000003 PHARM REV CODE 250: Performed by: EMERGENCY MEDICINE

## 2017-10-31 PROCEDURE — 83735 ASSAY OF MAGNESIUM: CPT

## 2017-10-31 PROCEDURE — 96361 HYDRATE IV INFUSION ADD-ON: CPT

## 2017-10-31 PROCEDURE — 99285 EMERGENCY DEPT VISIT HI MDM: CPT | Mod: 25

## 2017-10-31 PROCEDURE — 84484 ASSAY OF TROPONIN QUANT: CPT

## 2017-10-31 PROCEDURE — 96374 THER/PROPH/DIAG INJ IV PUSH: CPT

## 2017-10-31 PROCEDURE — S5010 5% DEXTROSE AND 0.45% SALINE: HCPCS | Performed by: HOSPITALIST

## 2017-10-31 PROCEDURE — 63600175 PHARM REV CODE 636 W HCPCS: Performed by: EMERGENCY MEDICINE

## 2017-10-31 PROCEDURE — 83690 ASSAY OF LIPASE: CPT

## 2017-10-31 PROCEDURE — 63600175 PHARM REV CODE 636 W HCPCS: Performed by: INTERNAL MEDICINE

## 2017-10-31 PROCEDURE — 25000003 PHARM REV CODE 250: Performed by: HOSPITALIST

## 2017-10-31 RX ORDER — IBUPROFEN 200 MG
16 TABLET ORAL
Status: DISCONTINUED | OUTPATIENT
Start: 2017-10-31 | End: 2017-11-02 | Stop reason: HOSPADM

## 2017-10-31 RX ORDER — DEXTROSE MONOHYDRATE, SODIUM CHLORIDE, AND POTASSIUM CHLORIDE 50; 2.98; 4.5 G/1000ML; G/1000ML; G/1000ML
INJECTION, SOLUTION INTRAVENOUS CONTINUOUS
Status: DISPENSED | OUTPATIENT
Start: 2017-10-31 | End: 2017-11-01

## 2017-10-31 RX ORDER — ACETAMINOPHEN 325 MG/1
650 TABLET ORAL EVERY 8 HOURS PRN
Status: DISCONTINUED | OUTPATIENT
Start: 2017-10-31 | End: 2017-11-02 | Stop reason: HOSPADM

## 2017-10-31 RX ORDER — VALACYCLOVIR HYDROCHLORIDE 500 MG/1
500 TABLET, FILM COATED ORAL DAILY
Status: DISCONTINUED | OUTPATIENT
Start: 2017-11-01 | End: 2017-11-02 | Stop reason: HOSPADM

## 2017-10-31 RX ORDER — POTASSIUM CHLORIDE 7.45 MG/ML
10 INJECTION INTRAVENOUS
Status: DISPENSED | OUTPATIENT
Start: 2017-10-31 | End: 2017-11-01

## 2017-10-31 RX ORDER — TAMSULOSIN HYDROCHLORIDE 0.4 MG/1
1 CAPSULE ORAL DAILY
Status: DISCONTINUED | OUTPATIENT
Start: 2017-11-01 | End: 2017-11-02 | Stop reason: HOSPADM

## 2017-10-31 RX ORDER — MORPHINE SULFATE 2 MG/ML
2 INJECTION, SOLUTION INTRAMUSCULAR; INTRAVENOUS EVERY 6 HOURS PRN
Status: DISCONTINUED | OUTPATIENT
Start: 2017-10-31 | End: 2017-11-01

## 2017-10-31 RX ORDER — IBUPROFEN 200 MG
24 TABLET ORAL
Status: DISCONTINUED | OUTPATIENT
Start: 2017-10-31 | End: 2017-11-02 | Stop reason: HOSPADM

## 2017-10-31 RX ORDER — ONDANSETRON 2 MG/ML
4 INJECTION INTRAMUSCULAR; INTRAVENOUS EVERY 12 HOURS PRN
Status: DISCONTINUED | OUTPATIENT
Start: 2017-10-31 | End: 2017-11-02 | Stop reason: HOSPADM

## 2017-10-31 RX ORDER — MAGNESIUM SULFATE HEPTAHYDRATE 40 MG/ML
2 INJECTION, SOLUTION INTRAVENOUS
Status: DISPENSED | OUTPATIENT
Start: 2017-10-31 | End: 2017-11-01

## 2017-10-31 RX ORDER — MIRTAZAPINE 15 MG/1
15 TABLET, FILM COATED ORAL NIGHTLY
Status: DISCONTINUED | OUTPATIENT
Start: 2017-10-31 | End: 2017-11-02 | Stop reason: HOSPADM

## 2017-10-31 RX ORDER — GLUCAGON 1 MG
1 KIT INJECTION
Status: DISCONTINUED | OUTPATIENT
Start: 2017-10-31 | End: 2017-11-02 | Stop reason: HOSPADM

## 2017-10-31 RX ORDER — ONDANSETRON 2 MG/ML
4 INJECTION INTRAMUSCULAR; INTRAVENOUS
Status: COMPLETED | OUTPATIENT
Start: 2017-10-31 | End: 2017-10-31

## 2017-10-31 RX ORDER — DEXTROSE MONOHYDRATE AND SODIUM CHLORIDE 5; .45 G/100ML; G/100ML
INJECTION, SOLUTION INTRAVENOUS CONTINUOUS
Status: DISCONTINUED | OUTPATIENT
Start: 2017-10-31 | End: 2017-10-31

## 2017-10-31 RX ORDER — MORPHINE SULFATE 2 MG/ML
2 INJECTION, SOLUTION INTRAMUSCULAR; INTRAVENOUS EVERY 6 HOURS PRN
Status: DISCONTINUED | OUTPATIENT
Start: 2017-10-31 | End: 2017-10-31

## 2017-10-31 RX ORDER — ENOXAPARIN SODIUM 100 MG/ML
40 INJECTION SUBCUTANEOUS EVERY 24 HOURS
Status: DISCONTINUED | OUTPATIENT
Start: 2017-11-01 | End: 2017-11-02 | Stop reason: HOSPADM

## 2017-10-31 RX ORDER — HYDROMORPHONE HYDROCHLORIDE 1 MG/ML
1 INJECTION, SOLUTION INTRAMUSCULAR; INTRAVENOUS; SUBCUTANEOUS
Status: COMPLETED | OUTPATIENT
Start: 2017-10-31 | End: 2017-10-31

## 2017-10-31 RX ADMIN — MORPHINE SULFATE 2 MG: 2 INJECTION, SOLUTION INTRAMUSCULAR; INTRAVENOUS at 10:10

## 2017-10-31 RX ADMIN — ONDANSETRON 4 MG: 2 INJECTION INTRAMUSCULAR; INTRAVENOUS at 12:10

## 2017-10-31 RX ADMIN — DEXTROSE AND SODIUM CHLORIDE: 5; .45 INJECTION, SOLUTION INTRAVENOUS at 10:10

## 2017-10-31 RX ADMIN — MIRTAZAPINE 15 MG: 15 TABLET, FILM COATED ORAL at 10:10

## 2017-10-31 RX ADMIN — SODIUM CHLORIDE 1000 ML: 0.9 INJECTION, SOLUTION INTRAVENOUS at 12:10

## 2017-10-31 RX ADMIN — HYDROMORPHONE HYDROCHLORIDE 1 MG: 1 INJECTION, SOLUTION INTRAMUSCULAR; INTRAVENOUS; SUBCUTANEOUS at 12:10

## 2017-10-31 NOTE — ED PROVIDER NOTES
"Encounter Date: 10/31/2017       History     Chief Complaint   Patient presents with    Abdominal Pain     Pt states," I have abdominal pain since 2a.m. this morning. Drinking makes the pain worse."     Chief complaint: Abdominal pain  72-year-old complaining of upper abdominal pain that has been ongoing intermittently for several months.  The patient has history of lymphoma.  He is currently undergoing chemotherapy.  Patient has "pain" to his upper abdomen that began at 2 AM this morning and has been constant.  There are no  alleviating factors for his symptoms.  He took his regular dose of Vicodin and Bentyl but vomited these medicines.  He's also had nausea and vomiting.  He has not eaten today.  He says that the pain worsens when he drinks.  He rates his pain as 10 out of 10.  He has had decreased urine output as well.  No fever       The history is provided by the patient, the spouse and medical records.     Review of patient's allergies indicates:   Allergen Reactions    Iodine and iodide containing products Itching     Contrast dye     Past Medical History:   Diagnosis Date    Allergy     Anemia     Arthritis     Basal cell carcinoma     excised from upper back    Cancer     Cardiomyopathy due to chemotherapy     Cataract     CHF (congestive heart failure)     Coronary artery disease     D1    Encounter for blood transfusion     Eye injuries 20 yrs    ou fb several x's    Hyperlipidemia     Hypertension     Hypertensive heart disease with heart failure 1/31/2013    Lymphoma 1996    Obstructive sleep apnea on CPAP     SCC (squamous cell carcinoma) excised 12/2015    R proximal forearm    Skin disease     Sleep apnea     SQUAMOUS CELL CARCINOMA excised 2/14/13    L forearm    Squamous cell carcinoma excised 8/4/2016    IN SITU, Left thigh MOHS    Squamous cell carcinoma excised 8/25/2016    left helix, MOHS     Past Surgical History:   Procedure Laterality Date    CATARACT EXTRACTION   "    od dr yanes    CATARACT EXTRACTION W/  INTRAOCULAR LENS IMPLANT Left 01/12/2017    Dr. Yanes    EYE SURGERY      Rt cataract    KIDNEY STONE SURGERY      SMALL INTESTINE SURGERY  09/2013    TONSILLECTOMY       Family History   Problem Relation Age of Onset    Cataracts Mother     Hypertension Mother     Cataracts Father     Hypertension Father     Cancer Father      lung    Hypertension Maternal Grandmother     Hypertension Maternal Grandfather     Hypertension Paternal Grandmother     Hypertension Paternal Grandfather     No Known Problems Sister     No Known Problems Brother     No Known Problems Maternal Aunt     No Known Problems Maternal Uncle     No Known Problems Paternal Aunt     No Known Problems Paternal Uncle     Amblyopia Neg Hx     Blindness Neg Hx     Diabetes Neg Hx     Glaucoma Neg Hx     Macular degeneration Neg Hx     Retinal detachment Neg Hx     Strabismus Neg Hx     Stroke Neg Hx     Thyroid disease Neg Hx     Melanoma Neg Hx     Heart disease Neg Hx     Celiac disease Neg Hx     Cirrhosis Neg Hx     Colon cancer Neg Hx     Colon polyps Neg Hx     Crohn's disease Neg Hx     Cystic fibrosis Neg Hx     Esophageal cancer Neg Hx     Hemochromatosis Neg Hx     Inflammatory bowel disease Neg Hx     Irritable bowel syndrome Neg Hx     Liver cancer Neg Hx     Liver disease Neg Hx     Rectal cancer Neg Hx     Stomach cancer Neg Hx     Ulcerative colitis Neg Hx     Burt's disease Neg Hx      Social History   Substance Use Topics    Smoking status: Former Smoker     Packs/day: 1.50     Years: 1.00     Quit date: 1960    Smokeless tobacco: Never Used    Alcohol use 1.2 oz/week     1 Glasses of wine, 1 Shots of liquor per week      Comment: socially     Review of Systems   Constitutional: Negative for fever.   HENT: Negative for sore throat.    Respiratory: Negative for shortness of breath.    Cardiovascular: Negative for chest pain.    Gastrointestinal: Positive for abdominal pain, constipation, diarrhea, nausea and vomiting.   Genitourinary: Positive for decreased urine volume. Negative for dysuria.   Musculoskeletal: Negative for back pain.   Skin: Negative for rash.   Neurological: Positive for weakness.   Hematological: Does not bruise/bleed easily.       Physical Exam     Initial Vitals [10/31/17 1144]   BP Pulse Resp Temp SpO2   (!) 190/86 95 16 97.9 °F (36.6 °C) 98 %      MAP       120.67         Physical Exam    Constitutional: He appears well-developed and well-nourished. He appears distressed.   HENT:   Head: Normocephalic and atraumatic.   Eyes: EOM are normal. Pupils are equal, round, and reactive to light.   Neck: Neck supple.   Cardiovascular: Normal rate, regular rhythm and normal heart sounds.   Pulmonary/Chest: Breath sounds normal.   Abdominal: Soft. Bowel sounds are increased. There is tenderness in the epigastric area. There is no rebound and no guarding.   Musculoskeletal: Normal range of motion.   Neurological: He is alert and oriented to person, place, and time. No cranial nerve deficit.   Skin: Skin is warm and dry.   Psychiatric: He has a normal mood and affect.         ED Course   Critical Care  Date/Time: 10/31/2017 3:22 PM  Performed by: THIERRY NINO.  Authorized by: THIERRY NINO.   Direct patient critical care time: 30 minutes  Additional history critical care time: 8 minutes  Ordering / reviewing critical care time: 8 minutes  Documentation critical care time: 8 minutes  Consulting other physicians critical care time: 5 minutes  Total critical care time (exclusive of procedural time) : 59 minutes  Critical care was necessary to treat or prevent imminent or life-threatening deterioration of the following conditions: transfer.  Critical care was time spent personally by me on the following activities: development of treatment plan with patient or surrogate, examination of patient, evaluation of patient's  response to treatment, obtaining history from patient or surrogate, ordering and review of laboratory studies, review of old charts, pulse oximetry, ordering and performing treatments and interventions, ordering and review of radiographic studies and re-evaluation of patient's condition.        Labs Reviewed   POCT CBC   POCT CMP             Medical Decision Making:   Initial Assessment:   72-year-old presents with abdominal pain.  Patient has hyperactive bowel sounds on exam and tenderness as well.  He has no guarding or rebound.  Vital signs are stable.  Blood pressure is elevated however  ED Management:  CT of the abdomen and pelvis will be done without contrast as patient is ALLERGIC to IVP contrast.  He will be given hydromorphone and Zofran and IV fluids as well.  Patient receive relief with hydromorphone.  He appears well.  Patient's CT does show a partial small bowel obstruction.  I feel that he needs to be transferred for further evaluation.  I discussed the patient with the oncologist Dr. Pruitt who accepts the patient in transfer.                   ED Course      Clinical Impression:   The encounter diagnosis was Small bowel obstruction, partial.                           Kristie Osorio MD  10/31/17 7260       Kristie Osorio MD  10/31/17 4592

## 2017-10-31 NOTE — TELEPHONE ENCOUNTER
----- Message from Naveed Bhandari sent at 10/31/2017 10:44 AM CDT -----  Contact: Spouse  Will like a call from [place] regarding pt experiencing pains in stomach    Contact::[phone]    Patient instructed to go to the ER. Abdominal pain from 0-10. It is a 10.

## 2017-11-01 ENCOUNTER — PATIENT MESSAGE (OUTPATIENT)
Dept: HEMATOLOGY/ONCOLOGY | Facility: CLINIC | Age: 72
End: 2017-11-01

## 2017-11-01 LAB
ALBUMIN SERPL BCP-MCNC: 2.7 G/DL
ALP SERPL-CCNC: 144 U/L
ALT SERPL W/O P-5'-P-CCNC: 9 U/L
ANION GAP SERPL CALC-SCNC: 9 MMOL/L
AST SERPL-CCNC: 19 U/L
BASOPHILS # BLD AUTO: 0.02 K/UL
BASOPHILS NFR BLD: 0.2 %
BILIRUB SERPL-MCNC: 0.4 MG/DL
BUN SERPL-MCNC: 8 MG/DL
CALCIUM SERPL-MCNC: 8.2 MG/DL
CHLORIDE SERPL-SCNC: 97 MMOL/L
CO2 SERPL-SCNC: 34 MMOL/L
CREAT SERPL-MCNC: 0.9 MG/DL
DIFFERENTIAL METHOD: ABNORMAL
EOSINOPHIL # BLD AUTO: 0.1 K/UL
EOSINOPHIL NFR BLD: 0.7 %
ERYTHROCYTE [DISTWIDTH] IN BLOOD BY AUTOMATED COUNT: 16.8 %
EST. GFR  (AFRICAN AMERICAN): >60 ML/MIN/1.73 M^2
EST. GFR  (NON AFRICAN AMERICAN): >60 ML/MIN/1.73 M^2
GLUCOSE SERPL-MCNC: 107 MG/DL
HCT VFR BLD AUTO: 31.2 %
HGB BLD-MCNC: 10 G/DL
IMM GRANULOCYTES # BLD AUTO: 0.11 K/UL
IMM GRANULOCYTES NFR BLD AUTO: 1.1 %
LYMPHOCYTES # BLD AUTO: 1.2 K/UL
LYMPHOCYTES NFR BLD: 12.7 %
MAGNESIUM SERPL-MCNC: 2 MG/DL
MCH RBC QN AUTO: 27.5 PG
MCHC RBC AUTO-ENTMCNC: 32.1 G/DL
MCV RBC AUTO: 86 FL
MONOCYTES # BLD AUTO: 1.6 K/UL
MONOCYTES NFR BLD: 17 %
NEUTROPHILS # BLD AUTO: 6.6 K/UL
NEUTROPHILS NFR BLD: 68.3 %
NRBC BLD-RTO: 0 /100 WBC
PHOSPHATE SERPL-MCNC: 2.5 MG/DL
PLATELET # BLD AUTO: 111 K/UL
PMV BLD AUTO: 9 FL
POTASSIUM SERPL-SCNC: 3.1 MMOL/L
PROT SERPL-MCNC: 4.7 G/DL
RBC # BLD AUTO: 3.64 M/UL
SODIUM SERPL-SCNC: 140 MMOL/L
WBC # BLD AUTO: 9.65 K/UL

## 2017-11-01 PROCEDURE — 25000003 PHARM REV CODE 250: Performed by: INTERNAL MEDICINE

## 2017-11-01 PROCEDURE — 99233 SBSQ HOSP IP/OBS HIGH 50: CPT | Mod: ,,, | Performed by: INTERNAL MEDICINE

## 2017-11-01 PROCEDURE — 99223 1ST HOSP IP/OBS HIGH 75: CPT | Mod: ,,, | Performed by: SURGERY

## 2017-11-01 PROCEDURE — 97161 PT EVAL LOW COMPLEX 20 MIN: CPT

## 2017-11-01 PROCEDURE — 63600175 PHARM REV CODE 636 W HCPCS: Performed by: HOSPITALIST

## 2017-11-01 PROCEDURE — 80053 COMPREHEN METABOLIC PANEL: CPT

## 2017-11-01 PROCEDURE — 84100 ASSAY OF PHOSPHORUS: CPT

## 2017-11-01 PROCEDURE — 20600001 HC STEP DOWN PRIVATE ROOM

## 2017-11-01 PROCEDURE — 36415 COLL VENOUS BLD VENIPUNCTURE: CPT

## 2017-11-01 PROCEDURE — 83735 ASSAY OF MAGNESIUM: CPT

## 2017-11-01 PROCEDURE — 25000003 PHARM REV CODE 250: Performed by: HOSPITALIST

## 2017-11-01 PROCEDURE — 63600175 PHARM REV CODE 636 W HCPCS: Performed by: INTERNAL MEDICINE

## 2017-11-01 PROCEDURE — 85025 COMPLETE CBC W/AUTO DIFF WBC: CPT

## 2017-11-01 RX ORDER — MORPHINE SULFATE 2 MG/ML
2 INJECTION, SOLUTION INTRAMUSCULAR; INTRAVENOUS EVERY 6 HOURS PRN
Status: DISCONTINUED | OUTPATIENT
Start: 2017-11-01 | End: 2017-11-02

## 2017-11-01 RX ORDER — MAGNESIUM SULFATE HEPTAHYDRATE 40 MG/ML
2 INJECTION, SOLUTION INTRAVENOUS
Status: DISCONTINUED | OUTPATIENT
Start: 2017-11-01 | End: 2017-11-02

## 2017-11-01 RX ORDER — HYDROCODONE BITARTRATE AND ACETAMINOPHEN 5; 325 MG/1; MG/1
1 TABLET ORAL EVERY 6 HOURS PRN
Status: DISCONTINUED | OUTPATIENT
Start: 2017-11-01 | End: 2017-11-02

## 2017-11-01 RX ORDER — POTASSIUM CHLORIDE 7.45 MG/ML
40 INJECTION INTRAVENOUS
Status: DISCONTINUED | OUTPATIENT
Start: 2017-11-01 | End: 2017-11-02

## 2017-11-01 RX ORDER — POTASSIUM CHLORIDE 7.45 MG/ML
60 INJECTION INTRAVENOUS
Status: DISCONTINUED | OUTPATIENT
Start: 2017-11-01 | End: 2017-11-02

## 2017-11-01 RX ORDER — POTASSIUM CHLORIDE 7.45 MG/ML
80 INJECTION INTRAVENOUS
Status: DISCONTINUED | OUTPATIENT
Start: 2017-11-01 | End: 2017-11-02

## 2017-11-01 RX ADMIN — MIRTAZAPINE 15 MG: 15 TABLET, FILM COATED ORAL at 09:11

## 2017-11-01 RX ADMIN — DEXTROSE MONOHYDRATE, SODIUM CHLORIDE, AND POTASSIUM CHLORIDE: 50; 4.5; 2.98 INJECTION, SOLUTION INTRAVENOUS at 12:11

## 2017-11-01 RX ADMIN — POTASSIUM CHLORIDE 10 MEQ: 10 INJECTION, SOLUTION INTRAVENOUS at 07:11

## 2017-11-01 RX ADMIN — MAGNESIUM SULFATE IN WATER 2 G: 40 INJECTION, SOLUTION INTRAVENOUS at 03:11

## 2017-11-01 RX ADMIN — MORPHINE SULFATE 2 MG: 2 INJECTION, SOLUTION INTRAMUSCULAR; INTRAVENOUS at 07:11

## 2017-11-01 RX ADMIN — MAGNESIUM SULFATE IN WATER 2 G: 40 INJECTION, SOLUTION INTRAVENOUS at 12:11

## 2017-11-01 RX ADMIN — MAGNESIUM SULFATE IN WATER 2 G: 40 INJECTION, SOLUTION INTRAVENOUS at 08:11

## 2017-11-01 RX ADMIN — MORPHINE SULFATE 2 MG: 2 INJECTION, SOLUTION INTRAMUSCULAR; INTRAVENOUS at 02:11

## 2017-11-01 RX ADMIN — TAMSULOSIN HYDROCHLORIDE 0.4 MG: 0.4 CAPSULE ORAL at 09:11

## 2017-11-01 RX ADMIN — MORPHINE SULFATE 2 MG: 2 INJECTION, SOLUTION INTRAMUSCULAR; INTRAVENOUS at 09:11

## 2017-11-01 RX ADMIN — HYDROCODONE BITARTRATE AND ACETAMINOPHEN 1 TABLET: 5; 325 TABLET ORAL at 06:11

## 2017-11-01 RX ADMIN — ENOXAPARIN SODIUM 40 MG: 100 INJECTION SUBCUTANEOUS at 09:11

## 2017-11-01 RX ADMIN — MAGNESIUM SULFATE IN WATER 2 G: 40 INJECTION, SOLUTION INTRAVENOUS at 05:11

## 2017-11-01 RX ADMIN — HYDROCODONE BITARTRATE AND ACETAMINOPHEN 1 TABLET: 5; 325 TABLET ORAL at 09:11

## 2017-11-01 RX ADMIN — POTASSIUM CHLORIDE 10 MEQ: 10 INJECTION, SOLUTION INTRAVENOUS at 03:11

## 2017-11-01 RX ADMIN — MORPHINE SULFATE 2 MG: 2 INJECTION, SOLUTION INTRAMUSCULAR; INTRAVENOUS at 05:11

## 2017-11-01 RX ADMIN — POTASSIUM CHLORIDE 10 MEQ: 10 INJECTION, SOLUTION INTRAVENOUS at 05:11

## 2017-11-01 RX ADMIN — POTASSIUM CHLORIDE 10 MEQ: 10 INJECTION, SOLUTION INTRAVENOUS at 01:11

## 2017-11-01 RX ADMIN — VALACYCLOVIR HYDROCHLORIDE 500 MG: 500 TABLET, FILM COATED ORAL at 09:11

## 2017-11-01 NOTE — HPI
"71 y/o M with hx of  DLBCL (currently on outpatient rituximab bendamustine) presented to [facility] as a transfer from [facility] free-standing ER for 1 day hx of worsening diffuse abdominal pain. abd pain is worse in upper abdomen. Associated with vomiting and nausea, abdominal distension. No fevers or chills. Has been passing stool- diarrhea 1 day prior. Passed gas earlier on the day of admit. CT abdomen at OSH showed " small bowel dilatation proximal to the suture lines/region of the previously identified mass encasing the distal ileum and partial small bowel obstruction is suspected". Pt received IV pain meds at OSH with resolution of his abd pain.     Pt has hx of SBO in 2013 at the time of his lymphoma diagnosis. He had a bowel resection at that time. Has hx of chronic pain syndrome and has been on mult pain meds. However, he has been having diarrhea with his rituximab bendamustine. No significant constipation   "

## 2017-11-01 NOTE — PT/OT/SLP PROGRESS
Occupational Therapy      Royce Francis  MRN: [id number]    Patient not seen today secondary to Xray. Writing therapist attempted pt in PM, but pt off the floor at x-ray. Will follow-up as scheduled.    Jefferson Simmons OT  11/1/2017

## 2017-11-01 NOTE — ASSESSMENT & PLAN NOTE
- likely 2/2 diarrhea and 2/2 SBO.   - IVF with 1/2 NS +5%dex + 40 mEq KCl given lyte abnormalities  - electrolytes replaced PRN protocol

## 2017-11-01 NOTE — ASSESSMENT & PLAN NOTE
- likely 2/2 diarrhea and 2/2 SBO. + of 2.9  - IVF with 1/2 NS +5%dex + 40 mEq KCl given lyte abnormalities  - additional IV K+ riders  - replace with IV mag for mag of 0.9

## 2017-11-01 NOTE — PT/OT/SLP EVAL
Physical Therapy  Evaluation    Royce Francis   MRN: [id number]   Admitting Diagnosis: Small bowel obstruction    PT Received On: 11/01/17  PT Start Time: 0825     PT Stop Time: 0839    PT Total Time (min): 14 min       Billable Minutes:  Evaluation 14    Diagnosis: Small bowel obstruction    Personal factors/comorbidities: Due to the listed comorbidities the patient cannot fully participate in PT POC.  Body systems elements affected: head, neck, back, lower extremities, upper extremities, runk, musculoskeletal system, neuromuscular system, cardiovascular, pulmonary system, orientation/level of consciousness  Clinical Presentation: evolving  Functional Outcome Tools: AMPAC, ROM, MMT, orientation   Evaluation Complexity Level: Low    Past Medical History:   Diagnosis Date    Allergy     Anemia     Arthritis     Basal cell carcinoma     excised from upper back    Cancer     Cardiomyopathy due to chemotherapy     Cataract     CHF (congestive heart failure)     Coronary artery disease     D1    Encounter for blood transfusion     Eye injuries 20 yrs    ou fb several x's    Hyperlipidemia     Hypertension     Hypertensive heart disease with heart failure 1/31/2013    Lymphoma 1996    Obstructive sleep apnea on CPAP     SCC (squamous cell carcinoma) excised 12/2015    R proximal forearm    Skin disease     Sleep apnea     SQUAMOUS CELL CARCINOMA excised 2/14/13    L forearm    Squamous cell carcinoma excised 8/4/2016    IN SITU, Left thigh MOHS    Squamous cell carcinoma excised 8/25/2016    left helix, MOHS      Past Surgical History:   Procedure Laterality Date    CATARACT EXTRACTION      od dr yanes    CATARACT EXTRACTION W/  INTRAOCULAR LENS IMPLANT Left 01/12/2017    Dr. Yanes    EYE SURGERY      Rt cataract    KIDNEY STONE SURGERY      SMALL INTESTINE SURGERY  09/2013    TONSILLECTOMY       Referring physician: Kristen  Date referred to PT: 10/31    General Precautions:  "Standard, fall      Do you have any cultural, spiritual, Methodist conflicts, given your current situation?: None noted    Patient History:  Lives With: spouse  Living Arrangements: house  Home Accessibility: stairs to enter home  Living Environment Comment: Pt lives in a H with x2STE with spouse. PTA pt was using a SPC for ambulation in the home and a rollator in the early AM 2/2 arthritis. Pt reports (I) with ADLs; limited intermittently by back pain.  Equipment Currently Used at Home: cane, straight, rollator  DME owned (not currently used): none    Previous Level of Function:  Ambulation Skills: needs device  Transfer Skills: needs device  ADL Skills: needs device  Work/Leisure Activity: needs device    Subjective:  Communicated with RN prior to session.    Chief Complaint: "My back just hurts in the morning"  Patient goals: "I am going home tomorrow."    Pain/Comfort  Pain Rating 1:  (Pt reports back pain; did not rate)  Pain Addressed 1: Reposition, Cessation of Activity, Nurse notified (Pt reports improved LBP following PT Eval)      Objective:   Patient found with: peripheral IV, PICC line     Cognitive Exam:  Oriented to: Person, Place, Time and Situation    Follows Commands/attention: Follows multistep  commands  Communication: clear/fluent  Safety awareness/insight to disability: intact    Physical Exam:  Postural examination/scapula alignment: Rounded shoulder, Head forward and Posterior pelvic tilt    Skin integrity: Visible skin intact, Thin and Dry  Edema: None noted    Sensation:   Intact; pt reports neuropathic pain to lateral toes    Upper Extremity Range of Motion:  Right Upper Extremity: WFL  Left Upper Extremity: WFL    Upper Extremity Strength: No focal weakness noted; grossly 4/5  Right Upper Extremity: WFL  Left Upper Extremity: WFL    Lower Extremity Range of Motion:  Right Lower Extremity: WFL  Left Lower Extremity: WNL    Lower Extremity Strength: No focal weakness noted; grossly " "4/5  Right Lower Extremity: WFL  Left Lower Extremity: WFL     Fine motor coordination:  Intact    Gross motor coordination: WFL    Functional Mobility:  Bed Mobility:  Supine to Sit:  (pt reports (I) with bed mobility)    Transfers:  Sit <> Stand Assistance: Modified Independent (use of hands)  Sit <> Stand Assistive Device: No Assistive Device  Bed <> Chair Technique: Stand Pivot  Bed <> Chair Assistance: Modified Independent  Bed <> Chair Assistive Device: Straight Cane    Gait:   Gait Distance: x40 feet in hospital room with use of SPC; no LOB noted or overt instability. Pt reports baseline mobility and gait mechanics at this time.  Assistance 1: Supervision  Gait Assistive Device: Single point cane  Gait Pattern: 3-point gait  Gait Deviation(s): decreased santosh, decreased weight-shifting ability, foot flat (increased lateral sway)    Balance:   Static Sit: GOOD: Takes MODERATE challenges from all directions  Dynamic Sit: GOOD: Maintains balance through MODERATE excursions of active trunk movement  Static Stand: FAIR+: Takes MINIMAL challenges from all directions  Dynamic stand: GOOD-: Needs SUPERVISION only during gait and able to self right with moderate LOB and use of cane    Therapeutic Activities and Exercises:  PT arrived to pt's room to find pt resting quietly; agreeable to PT session. Pt performed mobility as above. Upon return to [facility], PT reviewed mobility needs and recommendations with pt. Pt declined further PT services stating "I am doing things the same as I do at home. I am just not getting as much sleep." Questions/concerns addressed within PT scope of practice; pt with no further questions.    AM-PAC 6 CLICK MOBILITY  How much help from another person does this patient currently need?   1 = Unable, Total/Dependent Assistance  2 = A lot, Maximum/Moderate Assistance  3 = A little, Minimum/Contact Guard/Supervision  4 = None, Modified [place]/Independent    Turning over in bed " (including adjusting bedclothes, sheets and blankets)?: 4  Sitting down on and standing up from a chair with arms (e.g., wheelchair, bedside commode, etc.): 4  Moving from lying on back to sitting on the side of the bed?: 4  Moving to and from a bed to a chair (including a wheelchair)?: 4  Need to walk in hospital room?: 4  Climbing 3-5 steps with a railing?: 3  Total Score: 23     AM-PAC Raw Score CMS G-Code Modifier Level of Impairment Assistance   6 % Total / Unable   7 - 9 CM 80 - 100% Maximal Assist   10 - 14 CL 60 - 80% Moderate Assist   15 - 19 CK 40 - 60% Moderate Assist   20 - 22 CJ 20 - 40% Minimal Assist   23 CI 1-20% SBA / CGA   24 CH 0% Independent/ Mod I     Patient left up in chair with all lines intact, call button in reach and RN notified.    Assessment:   Royce Francis is a 72 y.o. male with a medical diagnosis of Small bowel obstruction and presents with lower back pain/discomfort. PTA pt was mod (I) with mobility and ADLs using a SPC. Upon evaluation, pt presents with generalized deconditioning, impaired functional mobility, and decreased activity tolerance. Pt currently requires no assist for majority of mobility and supervision for gait in crowded setting. Pt requesting no further PT services. Goals set for 1x/wk for follow up as needed pending medical course. Patient will benefit from continued PT services to address the above and below impairments..    Rehab identified problem list/impairments: Rehab identified problem list/impairments: weakness, impaired endurance, impaired sensation, gait instability, impaired balance, pain    Rehab potential is good.    Activity tolerance: Good    Discharge recommendations: Discharge Facility/Level Of Care Needs: home     Barriers to discharge: Barriers to Discharge: None    Equipment recommendations: Equipment Needed After Discharge: none     GOALS:    Physical Therapy Goals        Problem: Physical Therapy Goal    Goal Priority Disciplines  Outcome Goal Variances Interventions   Physical Therapy Goal     PT/OT, PT Ongoing (interventions implemented as appropriate)     Description:  Goals to be met by: 2017     Patient will increase functional independence with mobility by performin. Gait x100 feet with Supervision using SPC or other AD  2. Stand for x5 minutes with Stand-by Assistance while performing dynamic balance tasks  3. Lower extremity exercise program x15 reps with supervision to maintain B LE strength and coordination                  PLAN:    Patient to be seen 1 x/week to address the above listed problems via gait training, therapeutic activities, therapeutic exercises, neuromuscular re-education  Plan of Care expires: 17  Plan of Care reviewed with: patient     Nyasia TROY Brito, PT, DPT  [phone]  2017

## 2017-11-01 NOTE — H&P
"[facility]  Hematology  Bone Marrow Transplant  H&P    Subjective:     Principal Problem: Small bowel obstruction    HPI: 71 y/o M with hx of  DLBCL (currently on outpatient rituximab bendamustine) presented [place] [facility] [place] from [facility] free-standing ER for 1 day hx of worsening diffuse abdominal pain. abd pain is worse in upper abdomen. Associated with vomiting and nausea, abdominal distension. No fevers or chills. Has been passing stool- diarrhea 1 day prior. Passed gas earlier on the day of admit. CT abdomen at OSH showed " small bowel dilatation proximal to the suture lines/region of the previously identified mass encasing the distal ileum and partial small bowel obstruction is suspected". Pt received IV pain meds at OSH with resolution of his abd pain.     Pt has hx of SBO in 2013 at the time of his lymphoma diagnosis. He had a bowel resection at that time. Has hx of chronic pain syndrome and has been on mult pain meds. However, he has been having diarrhea with his rituximab bendamustine. No significant constipation     Patient information was obtained from patient, spouse/SO, past medical records and ER records.     Oncology History:     BMT clinic notes per Dr Ye Lucia    72-year-old man, who has been treated many times since 1996 for B-cell lymphoma.  I recorded the history of his various treatments in my note of 09/05/2017.     In 08/2017, he began to have persistent abdominal pain, which was cramping in nature.  He had some anorexia.  He has had intermittent diarrhea for several years.     He also has peripheral neuropathy with burning of his feet and toes and he has severe lumbar spine disease and recently had a spinal stimulator implanted.  He is suspected to have myelodysplasia based on a prior bone marrow examination in 2014 and he has chronic thrombocytopenia.     Computed tomography of the abdomen on 09/01 showed enlarged mesenteric nodes carlene large mass in the right " lower portion of the abdomen, which encased the distal ileum and extended into the anterior abdominal wall.  The bowel was not obstructed.     After reviewing his situation with a surgical oncologist, I have recommended that he begin therapy with bendamustine, Rituxan and prednisone and he has nowcompleted twocourses of those drugs.  He was extraordinarily fatigued and weak after the first course.  He had some low-grade fever for several days at a time when he was not neutropenic.  He has not had vomiting.  He tolerated the second course somewhat better.  The fatigue was not as severe or prolonged.  In the last week,he has been able to do some light activities in and outside of his house.  He has not had fever.  Fortunately, there has been substantial improvement in the severe abdominal pain.        Prescriptions Prior to Admission   Medication Sig Dispense Refill Last Dose    enalapril (VASOTEC) 5 MG tablet Take 1 tablet (5 mg total) by mouth once daily. 90 tablet 3 10/30/2017 at Unknown time    esomeprazole (NEXIUM) 20 MG capsule Take 20 mg by mouth before breakfast.   10/30/2017 at Unknown time    flurazepam (DALMANE) 30 MG capsule Take 30 mg by mouth nightly as needed for Insomnia.   10/30/2017 at Unknown time    mirtazapine (REMERON) 15 MG tablet Take 1 tablet (15 mg total) by mouth every evening. 90 tablet 3 10/30/2017 at Unknown time    tamsulosin (FLOMAX) 0.4 mg Cp24 Take 1 capsule (0.4 mg total) by mouth once daily. 90 capsule 3 10/30/2017 at Unknown time    valacyclovir (VALTREX) 500 MG tablet TAKE 1 TABLET BY MOUTH ONCE DAILY 90 tablet 2 10/30/2017 at Unknown time    valacyclovir (VALTREX) 500 MG tablet Take 1 tablet (500 mg total) by mouth once daily. 90 tablet 3 10/30/2017 at Unknown time    acetaminophen (TYLENOL) 500 MG tablet Take 500 mg by mouth as needed for Pain.   Unknown at Unknown time    ascorbic acid, vitamin C, (VITAMIN C) 1000 MG tablet Take 1,000 mg by mouth once daily. liposomal    Unknown at Unknown time    carisoprodol (SOMA) 350 MG tablet Take 350 mg by mouth as needed for Muscle spasms.   Unknown at Unknown time    cyanocobalamin (VITAMIN B-12) 1000 MCG tablet Take 100 mcg by mouth once daily.   Unknown at Unknown time    diphenoxylate-atropine 2.5-0.025 mg (LOMOTIL) 2.5-0.025 mg per tablet One tablet every 8 hours as needed for diarrhea 60 tablet 3 Unknown at Unknown time    fluticasone (FLONASE) 50 mcg/actuation nasal spray SPRAY ONCE IN EACH NOSTRIL ONCE DAILY 48 g 3 Unknown at Unknown time    furosemide (LASIX) 40 MG tablet One daily as needed for edema 30 tablet 11 Unknown at Unknown time    lactobacillus rhamnosus GG (CULTURELLE) 10 billion cell capsule Take 1 capsule by mouth once daily.   Unknown at Unknown time    lidocaine-prilocaine (EMLA) cream    Unknown at Unknown time    loperamide (IMODIUM A-D) 2 mg Tab Take 2 mg by mouth once daily at 6am.    Unknown at Unknown time    metoprolol tartrate (LOPRESSOR) 50 MG tablet Take 0.5 tablets (25 mg total) by mouth 2 (two) times daily. 180 tablet 3 10/30/2017    multivitamin capsule Take 1 capsule by mouth once daily.   Unknown at Unknown time    NON FORMULARY MEDICATION 300 mg once daily. Desiccated Liver   Unknown at Unknown time    predniSONE (DELTASONE) 50 MG Tab 2 tablets daily for 5 days starting on first day of every chemotherapy cycle 10 tablet 6 Unknown at Unknown time    predniSONE (DELTASONE) 50 MG Tab 2 tablets daily for 5 days starting today.  In the future, prednisone will be started on the first day of chemotherapy 10 tablet 6 Unknown at Unknown time       Iodine and iodide containing products     Past Medical History:   Diagnosis Date    Allergy     Anemia     Arthritis     Basal cell carcinoma     excised from upper back    Cancer     Cardiomyopathy due to chemotherapy     Cataract     CHF (congestive heart failure)     Coronary artery disease     D1    Encounter for blood transfusion     Eye  injuries 20 yrs    ou fb several x's    Hyperlipidemia     Hypertension     Hypertensive heart disease with heart failure 1/31/2013    Lymphoma 1996    Obstructive sleep apnea on CPAP     SCC (squamous cell carcinoma) excised 12/2015    R proximal forearm    Skin disease     Sleep apnea     SQUAMOUS CELL CARCINOMA excised 2/14/13    L forearm    Squamous cell carcinoma excised 8/4/2016    IN SITU, Left thigh MOHS    Squamous cell carcinoma excised 8/25/2016    left helix, MOHS     Past Surgical History:   Procedure Laterality Date    CATARACT EXTRACTION      od dr yanes    CATARACT EXTRACTION W/  INTRAOCULAR LENS IMPLANT Left 01/12/2017    Dr. Yanes    EYE SURGERY      Rt cataract    KIDNEY STONE SURGERY      SMALL INTESTINE SURGERY  09/2013    TONSILLECTOMY       Family History     Problem Relation (Age of Onset)    Cancer Father    Cataracts Mother, Father    Hypertension Mother, Father, Maternal Grandmother, Maternal Grandfather, Paternal Grandmother, Paternal Grandfather    No Known Problems Sister, Brother, Maternal Aunt, Maternal Uncle, Paternal Aunt, Paternal Uncle        Social History Main Topics    Smoking status: Former Smoker     Packs/day: 1.50     Years: 1.00     Quit date: 1960    Smokeless tobacco: Never Used    Alcohol use 1.2 oz/week     1 Glasses of wine, 1 Shots of liquor per week      Comment: socially    Drug use: No    Sexual activity: Yes     Partners: Female      Comment:        Review of Systems   Constitutional: Positive for appetite change. Negative for diaphoresis, fatigue and fever.   HENT: Negative for congestion.    Respiratory: Negative for cough, shortness of breath and wheezing.    Gastrointestinal: Positive for abdominal distention, abdominal pain, diarrhea, nausea and vomiting. Negative for constipation.   Genitourinary: Negative for dysuria and flank pain.   Musculoskeletal: Negative for arthralgias and joint swelling.   Skin: Negative for  rash.   Allergic/Immunologic: Positive for immunocompromised state.   Neurological: Negative for seizures, syncope, weakness, light-headedness and numbness.   Psychiatric/Behavioral: Negative for behavioral problems and confusion.     Objective:     Vital Signs (Most Recent):  Temp: 99 °F (37.2 °C) (10/31/17 2328)  Pulse: 100 (10/31/17 2328)  Resp: 19 (10/31/17 2328)  BP: 136/64 (10/31/17 2328)  SpO2: (!) 93 % (10/31/17 2328) Vital Signs (24h Range):  Temp:  [97.5 °F (36.4 °C)-99 °F (37.2 °C)] 99 °F (37.2 °C)  Pulse:  [] 100  Resp:  [16-19] 19  SpO2:  [85 %-98 %] 93 %  BP: (119-190)/(57-86) 136/64        There is no height or weight on file to calculate BMI.  There is no height or weight on file to calculate BSA.    ECOG SCORE         [unfilled]    Lines/Drains/Airways     Peripheral Intravenous Line                 Peripheral IV - Single Lumen Left Hand -- days         Peripheral IV - Single Lumen 10/31/17 1207 Left Hand less than 1 day                Physical Exam   Constitutional: He is oriented to person, place, and time. He appears well-developed and well-nourished. No distress.   HENT:   Head: Normocephalic and atraumatic.   Right Ear: External ear normal.   Left Ear: External ear normal.   Eyes: EOM are normal. Pupils are equal, round, and reactive to light. No scleral icterus.   Neck: Normal range of motion. Neck supple.   Cardiovascular: Normal rate, regular rhythm, normal heart sounds and intact distal pulses.    No murmur heard.  Pulmonary/Chest: Effort normal and breath sounds normal. No respiratory distress. He has no wheezes.   Abdominal: Soft. He exhibits distension. He exhibits no mass. There is tenderness. There is no rebound and no guarding.   + bowel sounds in L upper abd, very mildly distended ; diffuse tenderness   Musculoskeletal: Normal range of motion. He exhibits no edema, tenderness or deformity.   Neurological: He is alert and oriented to person, place, and time. No cranial nerve  deficit.   Skin: Skin is warm and dry. No rash noted. He is not diaphoretic. There is pallor.   Psychiatric: He has a normal mood and affect.       Significant Labs:   CBC:   Recent Labs  Lab 10/30/17  0750 10/31/17  1955   WBC 9.12 12.98*   HGB 11.3* 11.7*   HCT 35.8* 35.9*   * 122*    and CMP:   Recent Labs  Lab 10/31/17  1955      K 2.9*   CL 98   CO2 33*   GLU 97   BUN 9   CREATININE 1.0   CALCIUM 8.9   PROT 5.5*   ALBUMIN 3.1*   BILITOT 0.5   ALKPHOS 117   AST 18   ALT 11   ANIONGAP 10   EGFRNONAA >60.0       Diagnostic Results:    CT abdomen 10/31    The previously identified mass encasing the distal ileum is no longer identified.  There is a surgical suture line in this region which appears to have been present on the prior examinations.  There is small bowel dilatation proximal to the suture lines/region of the previously identified mass encasing the distal ileum and partial small bowel obstruction is suspected.  Single minimally enlarged mesenteric lymph node identified measuring 1.0 cm in short axis.  Note that the mesenteric adenopathy has decreased when compared to the prior examination and the pelvic adenopathy has resolved.  Multiple punctate nonobstructing bilateral renal calculi.  Multiple subcentimeter hepatic hypodensities likely representing hepatic cysts, unchanged.    KUB 10/31  Persistent mild small bowel distention, which can be seen in the setting of small bowel obstruction or ileus.      Assessment/Plan:     * Small bowel obstruction    - per CT abdomen, suspected partial small bowel obstruction   - NPO and bowel rest  - IVF with 1/2 NS +5%dex + 40 mEq KCl given lyte abnormalities  - deferring NGT at this time as symptoms are mild and PE is not overtly remarkable. Obstruction is partial  - will place NGT is symptoms worsen or do not resolve  - cautious pain control with IV morphine, as to avoid further possible ileus   - possible gen surg consult in the AM per primary team         Large cell (diffuse) non-Hodgkin's lymphoma    - as per hpi        Lymphoma, follicular    - as per hpi        Cardiomyopathy due to chemotherapy    - echo in 12/2016 with  Mildly depressed left ventricular systolic function. EF 45-50%. And Grade I ventricular diastolic dysfunction  - currently not decompensated   - hold ACEI, BB and prn lasix at this time        Chronic pain    - IV morphine prn severe pain given NPO status        Hypokalemia    - likely 2/2 diarrhea and 2/2 SBO. + of 2.9  - IVF with 1/2 NS +5%dex + 40 mEq KCl given lyte abnormalities  - additional IV K+ riders  - replace with IV mag for mag of 0.9        Hypomagnesemia    - mag of 0.9  - replace with IV mag        SALINAS on CPAP    - hold CPAP given SBO and possible necessity for NGT  - may restart when resolved            VTE Risk Mitigation         Ordered     enoxaparin injection 40 mg  Daily     Route:  Subcutaneous        10/31/17 1940     Medium Risk of VTE  Once      10/31/17 1940          Disposition: admitted to BMT service     Jewell Peterson MD  Bone Marrow Transplant  Hematology  [facility]

## 2017-11-01 NOTE — ASSESSMENT & PLAN NOTE
- echo in 12/2016 with  Mildly depressed left ventricular systolic function. EF 45-50%. And Grade I ventricular diastolic dysfunction  - currently not decompensated   - hold ACEI, BB and prn lasix at this time

## 2017-11-01 NOTE — PLAN OF CARE
MDR's with Dr Pruitt.  Patient admitted for a SBO.  NPO and on IVF.  He is not nauseous and reports passing some gas. Reports improvement in pain.  General sx has been consulted.  UGI ordered.  Wife at bedside for support.  Will continue to follow for d/c needs.

## 2017-11-01 NOTE — NURSING
Patient arrives to the unit at 1930 via stretcher. Patient ambulated to the bed. 20g PIV noted to left hand, saline locked. Patient has no complaints of pain at this time. Patient oriented to room. Fall precautions instituted. Patient educated on fall precautions and verbalizes understanding. Patient has no skin breakdown noted at this time. Awaiting orders. Will continue to monitor.

## 2017-11-01 NOTE — SUBJECTIVE & OBJECTIVE
Patient information was obtained from patient, spouse/SO, past medical records and ER records.     Oncology History:     BMT clinic notes per Dr Ye Lucia    72-year-old man, who has been treated many times since 1996 for B-cell lymphoma.  I recorded the history of his various treatments in my note of 09/05/2017.     In 08/2017, he began to have persistent abdominal pain, which was cramping in nature.  He had some anorexia.  He has had intermittent diarrhea for several years.     He also has peripheral neuropathy with burning of his feet and toes and he has severe lumbar spine disease and recently had a spinal stimulator implanted.  He is suspected to have myelodysplasia based on a prior bone marrow examination in 2014 and he has chronic thrombocytopenia.     Computed tomography of the abdomen on 09/01 showed enlarged mesenteric nodes carlene large mass in the right lower portion of the abdomen, which encased the distal ileum and extended into the anterior abdominal wall.  The bowel was not obstructed.     After reviewing his situation with a surgical oncologist, I have recommended that he begin therapy with bendamustine, Rituxan and prednisone and he has nowcompleted twocourses of those drugs.  He was extraordinarily fatigued and weak after the first course.  He had some low-grade fever for several days at a time when he was not neutropenic.  He has not had vomiting.  He tolerated the second course somewhat better.  The fatigue was not as severe or prolonged.  In the last week,he has been able to do some light activities in and outside of his house.  He has not had fever.  Fortunately, there has been substantial improvement in the severe abdominal pain.        Prescriptions Prior to Admission   Medication Sig Dispense Refill Last Dose    enalapril (VASOTEC) 5 MG tablet Take 1 tablet (5 mg total) by mouth once daily. 90 tablet 3 10/30/2017 at Unknown time    esomeprazole (NEXIUM) 20 MG capsule Take 20 mg by mouth  before breakfast.   10/30/2017 at Unknown time    flurazepam (DALMANE) 30 MG capsule Take 30 mg by mouth nightly as needed for Insomnia.   10/30/2017 at Unknown time    mirtazapine (REMERON) 15 MG tablet Take 1 tablet (15 mg total) by mouth every evening. 90 tablet 3 10/30/2017 at Unknown time    tamsulosin (FLOMAX) 0.4 mg Cp24 Take 1 capsule (0.4 mg total) by mouth once daily. 90 capsule 3 10/30/2017 at Unknown time    valacyclovir (VALTREX) 500 MG tablet TAKE 1 TABLET BY MOUTH ONCE DAILY 90 tablet 2 10/30/2017 at Unknown time    valacyclovir (VALTREX) 500 MG tablet Take 1 tablet (500 mg total) by mouth once daily. 90 tablet 3 10/30/2017 at Unknown time    acetaminophen (TYLENOL) 500 MG tablet Take 500 mg by mouth as needed for Pain.   Unknown at Unknown time    ascorbic acid, vitamin C, (VITAMIN C) 1000 MG tablet Take 1,000 mg by mouth once daily. liposomal   Unknown at Unknown time    carisoprodol (SOMA) 350 MG tablet Take 350 mg by mouth as needed for Muscle spasms.   Unknown at Unknown time    cyanocobalamin (VITAMIN B-12) 1000 MCG tablet Take 100 mcg by mouth once daily.   Unknown at Unknown time    diphenoxylate-atropine 2.5-0.025 mg (LOMOTIL) 2.5-0.025 mg per tablet One tablet every 8 hours as needed for diarrhea 60 tablet 3 Unknown at Unknown time    fluticasone (FLONASE) 50 mcg/actuation nasal spray SPRAY ONCE IN EACH NOSTRIL ONCE DAILY 48 g 3 Unknown at Unknown time    furosemide (LASIX) 40 MG tablet One daily as needed for edema 30 tablet 11 Unknown at Unknown time    lactobacillus rhamnosus GG (CULTURELLE) 10 billion cell capsule Take 1 capsule by mouth once daily.   Unknown at Unknown time    lidocaine-prilocaine (EMLA) cream    Unknown at Unknown time    loperamide (IMODIUM A-D) 2 mg Tab Take 2 mg by mouth once daily at 6am.    Unknown at Unknown time    metoprolol tartrate (LOPRESSOR) 50 MG tablet Take 0.5 tablets (25 mg total) by mouth 2 (two) times daily. 180 tablet 3 10/30/2017     multivitamin capsule Take 1 capsule by mouth once daily.   Unknown at Unknown time    NON FORMULARY MEDICATION 300 mg once daily. Desiccated Liver   Unknown at Unknown time    predniSONE (DELTASONE) 50 MG Tab 2 tablets daily for 5 days starting on first day of every chemotherapy cycle 10 tablet 6 Unknown at Unknown time    predniSONE (DELTASONE) 50 MG Tab 2 tablets daily for 5 days starting today.  In the future, prednisone will be started on the first day of chemotherapy 10 tablet 6 Unknown at Unknown time       Iodine and iodide containing products     Past Medical History:   Diagnosis Date    Allergy     Anemia     Arthritis     Basal cell carcinoma     excised from upper back    Cancer     Cardiomyopathy due to chemotherapy     Cataract     CHF (congestive heart failure)     Coronary artery disease     D1    Encounter for blood transfusion     Eye injuries 20 yrs    ou fb several x's    Hyperlipidemia     Hypertension     Hypertensive heart disease with heart failure 1/31/2013    Lymphoma 1996    Obstructive sleep apnea on CPAP     SCC (squamous cell carcinoma) excised 12/2015    R proximal forearm    Skin disease     Sleep apnea     SQUAMOUS CELL CARCINOMA excised 2/14/13    L forearm    Squamous cell carcinoma excised 8/4/2016    IN SITU, Left thigh MOHS    Squamous cell carcinoma excised 8/25/2016    left helix, MOHS     Past Surgical History:   Procedure Laterality Date    CATARACT EXTRACTION      od dr yanes    CATARACT EXTRACTION W/  INTRAOCULAR LENS IMPLANT Left 01/12/2017    Dr. Yanes    EYE SURGERY      Rt cataract    KIDNEY STONE SURGERY      SMALL INTESTINE SURGERY  09/2013    TONSILLECTOMY       Family History     Problem Relation (Age of Onset)    Cancer Father    Cataracts Mother, Father    Hypertension Mother, Father, Maternal Grandmother, Maternal Grandfather, Paternal Grandmother, Paternal Grandfather    No Known Problems Sister, Brother, Maternal Aunt,  Maternal Uncle, Paternal Aunt, Paternal Uncle        Social History Main Topics    Smoking status: Former Smoker     Packs/day: 1.50     Years: 1.00     Quit date: 1960    Smokeless tobacco: Never Used    Alcohol use 1.2 oz/week     1 Glasses of wine, 1 Shots of liquor per week      Comment: socially    Drug use: No    Sexual activity: Yes     Partners: Female      Comment:        Review of Systems   Constitutional: Positive for appetite change. Negative for diaphoresis, fatigue and fever.   HENT: Negative for congestion.    Respiratory: Negative for cough, shortness of breath and wheezing.    Gastrointestinal: Positive for abdominal distention, abdominal pain, diarrhea, nausea and vomiting. Negative for constipation.   Genitourinary: Negative for dysuria and flank pain.   Musculoskeletal: Negative for arthralgias and joint swelling.   Skin: Negative for rash.   Allergic/Immunologic: Positive for immunocompromised state.   Neurological: Negative for seizures, syncope, weakness, light-headedness and numbness.   Psychiatric/Behavioral: Negative for behavioral problems and confusion.     Objective:     Vital Signs (Most Recent):  Temp: 99 °F (37.2 °C) (10/31/17 2328)  Pulse: 100 (10/31/17 2328)  Resp: 19 (10/31/17 2328)  BP: 136/64 (10/31/17 2328)  SpO2: (!) 93 % (10/31/17 2328) Vital Signs (24h Range):  Temp:  [97.5 °F (36.4 °C)-99 °F (37.2 °C)] 99 °F (37.2 °C)  Pulse:  [] 100  Resp:  [16-19] 19  SpO2:  [85 %-98 %] 93 %  BP: (119-190)/(57-86) 136/64        There is no height or weight on file to calculate BMI.  There is no height or weight on file to calculate BSA.    ECOG SCORE         [unfilled]    Lines/Drains/Airways     Peripheral Intravenous Line                 Peripheral IV - Single Lumen Left Hand -- days         Peripheral IV - Single Lumen 10/31/17 1207 Left Hand less than 1 day                Physical Exam   Constitutional: He is oriented to person, place, and time. He appears  well-developed and well-nourished. No distress.   HENT:   Head: Normocephalic and atraumatic.   Right Ear: External ear normal.   Left Ear: External ear normal.   Eyes: EOM are normal. Pupils are equal, round, and reactive to light. No scleral icterus.   Neck: Normal range of motion. Neck supple.   Cardiovascular: Normal rate, regular rhythm, normal heart sounds and intact distal pulses.    No murmur heard.  Pulmonary/Chest: Effort normal and breath sounds normal. No respiratory distress. He has no wheezes.   Abdominal: Soft. He exhibits distension. He exhibits no mass. There is tenderness. There is no rebound and no guarding.   + bowel sounds in L upper abd, very mildly distended ; diffuse tenderness   Musculoskeletal: Normal range of motion. He exhibits no edema, tenderness or deformity.   Neurological: He is alert and oriented to person, place, and time. No cranial nerve deficit.   Skin: Skin is warm and dry. No rash noted. He is not diaphoretic. There is pallor.   Psychiatric: He has a normal mood and affect.       Significant Labs:   CBC:   Recent Labs  Lab 10/30/17  0750 10/31/17  1955   WBC 9.12 12.98*   HGB 11.3* 11.7*   HCT 35.8* 35.9*   * 122*    and CMP:   Recent Labs  Lab 10/31/17  1955      K 2.9*   CL 98   CO2 33*   GLU 97   BUN 9   CREATININE 1.0   CALCIUM 8.9   PROT 5.5*   ALBUMIN 3.1*   BILITOT 0.5   ALKPHOS 117   AST 18   ALT 11   ANIONGAP 10   EGFRNONAA >60.0       Diagnostic Results:    CT abdomen 10/31    The previously identified mass encasing the distal ileum is no longer identified.  There is a surgical suture line in this region which appears to have been present on the prior examinations.  There is small bowel dilatation proximal to the suture lines/region of the previously identified mass encasing the distal ileum and partial small bowel obstruction is suspected.  Single minimally enlarged mesenteric lymph node identified measuring 1.0 cm in short axis.  Note that the  mesenteric adenopathy has decreased when compared to the prior examination and the pelvic adenopathy has resolved.  Multiple punctate nonobstructing bilateral renal calculi.  Multiple subcentimeter hepatic hypodensities likely representing hepatic cysts, unchanged.    KUB 10/31  Persistent mild small bowel distention, which can be seen in the setting of small bowel obstruction or ileus.

## 2017-11-01 NOTE — PLAN OF CARE
Problem: Patient Care Overview  Goal: Plan of Care Review  Outcome: Ongoing (interventions implemented as appropriate)  Pt has remained free from injury this shift. Bed in low locked position. Call light and personal belongings within reach. Side rails up x2. Pt ambulates in room with personal cane. Pt up to chair for most of shift. Mattress overlay ordered for pt's bed to attempt to be easier on his back. Pt c/o severe back pain; prn morphine and norco given. Potassium and magnesium replacements finished this AM. IVFs discontinued this AM. Pt went for a small bowel follow through this afternoon. Pt remains afebrile. Wife at bedside and involved in care. All questions and concerns addressed at this time. Will continue to monitor.

## 2017-11-01 NOTE — PLAN OF CARE
Problem: Physical Therapy Goal  Goal: Physical Therapy Goal  Goals to be met by: 2017     Patient will increase functional independence with mobility by performin. Gait x100 feet with Supervision using SPC or other AD  2. Stand for x5 minutes with Stand-by Assistance while performing dynamic balance tasks  3. Lower extremity exercise program x15 reps with supervision to maintain B LE strength and coordination  Outcome: Ongoing (interventions implemented as appropriate)    PT Evaluation complete. Recommending home with support of spouse upon D/C. Please see full note for details.    Nyasia Brito, PT, DPT  [phone]  2017

## 2017-11-01 NOTE — ASSESSMENT & PLAN NOTE
- per CT abdomen, suspected partial small bowel obstruction   - NPO and bowel rest  - IVF with 1/2 NS +5%dex + 40 mEq KCl given lyte abnormalities  - deferring NGT at this time as symptoms are mild and PE is not overtly remarkable. Obstruction is partial  - will place NGT is symptoms worsen or do not resolve  - cautious pain control with IV morphine, as to avoid further possible ileus   - possible gen surg consult in the AM per primary team

## 2017-11-01 NOTE — SUBJECTIVE & OBJECTIVE
Subjective:     Interval History:    - Admitted 10/31/17 for SBO seen on CT. Patient with nausea and vomiting, 1 loose bowel movement on 10/30. Made NPO for bowel rest, will hold on NG given mild symptoms at this time. Complains of abdominal pain greater in the upper abdomen and abdominal distention. Given IV morphine for pain.   - Started on IV fluids with potassium due to hypokalemia. - Counts stable, status post cycle 3 of BR for B cell lymphoma on 10/24.   - No other acute issues overnight. VSS Afebrile.     Objective:     Vital Signs (Most Recent):  Temp: 98.3 °F (36.8 °C) (11/01/17 0407)  Pulse: 84 (11/01/17 0407)  Resp: 20 (11/01/17 0407)  BP: (!) 117/56 (11/01/17 0407)  SpO2: (!) 94 % (11/01/17 0407) Vital Signs (24h Range):  Temp:  [97.5 °F (36.4 °C)-99 °F (37.2 °C)] 98.3 °F (36.8 °C)  Pulse:  [] 84  Resp:  [16-20] 20  SpO2:  [85 %-98 %] 94 %  BP: (117-190)/(56-86) 117/56     Weight: 84.8 kg (187 lb 1 oz)  Body mass index is 26.84 kg/m².  Body surface area is 2.05 meters squared.    ECOG SCORE         [unfilled]    Intake/Output - Last 3 Shifts       10/30 0700 - 10/31 0659 10/31 0700 - 11/01 0659 11/01 0700 - 11/02 0659    Urine (mL/kg/hr)  575     Total Output   575      Net   -575             Urine Occurrence  2 x           Physical Exam   Constitutional: He is oriented to person, place, and time. He appears well-developed and well-nourished. No distress.   HENT:   Head: Normocephalic and atraumatic.   Mouth/Throat: Oropharynx is clear and moist. No oropharyngeal exudate.   Eyes: Pupils are equal, round, and reactive to light.   Cardiovascular: Normal rate, regular rhythm and normal heart sounds.    Pulmonary/Chest: Effort normal and breath sounds normal. No respiratory distress.   Abdominal: Soft. Bowel sounds are normal. He exhibits distension. There is tenderness in the right upper quadrant and left upper quadrant.   Musculoskeletal: He exhibits no edema.   Neurological: He is alert and oriented  to person, place, and time.   Skin: Skin is warm and dry. No erythema.   Nursing note and vitals reviewed.      Significant Labs:   CBC:   Recent Labs  Lab 10/30/17  0750 10/31/17  1955 11/01/17  0500   WBC 9.12 12.98* 9.65   HGB 11.3* 11.7* 10.0*   HCT 35.8* 35.9* 31.2*   * 122* 111*    and CMP:   Recent Labs  Lab 10/31/17  1955 11/01/17  0500    140   K 2.9* 3.1*   CL 98 97   CO2 33* 34*   GLU 97 107   BUN 9 8   CREATININE 1.0 0.9   CALCIUM 8.9 8.2*   PROT 5.5* 4.7*   ALBUMIN 3.1* 2.7*   BILITOT 0.5 0.4   ALKPHOS 117 144*   AST 18 19   ALT 11 9*   ANIONGAP 10 9   EGFRNONAA >60.0 >60.0       Diagnostic Results:  CT: There is small bowel dilatation proximal to the suture lines/region of the previously identified mass encasing the distal ileum and partial small bowel obstruction is suspected  I have reviewed all pertinent imaging results/findings within the past 24 hours.   Abdominal X-Ray: Persistent mild small bowel distention, which can be seen in the setting of small bowel obstruction or ileus

## 2017-11-01 NOTE — ASSESSMENT & PLAN NOTE
- see Dr. Lucia's note from 9/5 for detailed history on lymphoma  - most recent treatment cycle 3 of BR with neulasta; day 1 was 10/24

## 2017-11-01 NOTE — PROGRESS NOTES
Admit Assessment    Patient Identification  Royce Francis   :  1945  Admit Date:  10/31/2017  Attending Provider:  Jose Miguel Pruitt MD              Referral:   Pt has a diagnosis of Diffuse B-Cell Lymphoma   and was admitted this hospital stay due to Small bowel obstruction [K56.609]  Small bowel obstruction [K56.609]. Oncology social worker is involved for psychosocial services.  Patient presents as a 72 y.o. year old  male.    Persons interviewed: Patient's wife and her friend (with her permission). (The patient was off the floor for a procedure)    Living Situation:    Lives with his wife, Selma Francis at [address] [place] [address] , phone: [phone] (home).           Current or Past Agencies and Description of Services/Supplies    DME  Unknown  ([place])    Home Health  None    IV Infusion  None    Nutrition: Oral    Outpatient Pharmacy:     [facility] [address] [place] [address] -  [facility]   [facility] [place] [address]  Phone: [phone] Fax: [phone]      Patient Preference of agencies include: As stated above    Patient/Caregiver informed of right to choose providers or agencies.  Patient provides permission to release any necessary information to [facility] and to [facility] agencies as needed to facilitate patient care, treatment planning, and patient discharge planning.  Written and verbal resources provided.                Adjustment to Diagnosis and Treatment  The patient's wife stated that they were coping well with his disease and hospitalization. Her friend was visiting from [place]. The patient has one daughter living in [place].             History/Current Symptoms of Anxiety/Depression: No known history  History/Current Substance Use:   Social History     Social History Main Topics    Smoking status: Former Smoker     Packs/day: 1.50     Years: 1.00     Quit date:     Smokeless tobacco: Never Used     Alcohol use 1.2 oz/week     1 Glasses of wine, 1 Shots of liquor per week      Comment: socially    Drug use: No    Sexual activity: Yes     Partners: Female      Comment:        Indications of Abuse/Neglect: No      Financial:  Payor/Plan Subscr  Sex Relation Sub. Ins. ID Effective Group Num   1. [employer] MANAGE* SUBHA QUINTANILLA* 1945 Male  [id number]                                   [address] [address]                            Other identified concerns/needs: None at present    Plan:    Interventions/Referrals: Offered services to the patient's wife and will follow as needed for supportive counseling and discharge arrangements. Provided the patient's wife with contact information.     Patient/caregiver engaged in treatment planning process.     providing psychosocial and supportive counseling, resources, education, assistance and discharge planning as appropriate.  Patient/caregiver state understanding of  available resources,  following, remains available.

## 2017-11-01 NOTE — HOSPITAL COURSE
11/1/17: Admitted 10/31/17 for SBO seen on CT. Patient with nausea and vomiting, 1 loose bowel movement on 10/30. Made NPO for bowel rest, will hold on NG given mild symptoms at this time. Complains of abdominal pain greater in the upper abdomen and abdominal distention. Given IV morphine for pain. Started on IV fluids with potassium due to hypokalemia. Counts stable, status post cycle 3 of BR for B cell lymphoma on 10/24. No other acute issues overnight. VSS Afebrile.   11/2/17: No acute issues overnight. VSS Afebrile. 2 Bowel movements over the last 24 hours. General surgery consuled and ordered a UGI small bowel follow through; awaiting read. Based on results will possibly be able to advance diet - will continue to follow up.

## 2017-11-01 NOTE — ASSESSMENT & PLAN NOTE
- per CT abdomen, suspected partial small bowel obstruction   - NPO and bowel rest  - IVF with 1/2 NS +5%dex + 40 mEq KCl given lyte abnormalities  - deferring NGT at this time as symptoms are mild; will wait on gen surg recommendations  - will place NGT is symptoms worsen or do not resolve  - cautious pain control with IV morphine, as to avoid further possible ileus   - gen surg consult today

## 2017-11-01 NOTE — PLAN OF CARE
Problem: Patient Care Overview  Goal: Plan of Care Review  Outcome: Ongoing (interventions implemented as appropriate)  Pt AAOx4, independent, involved in plan of care and communicating. VSS and afebrile throughout shift. Complaints of back pain moderately relieved with PRN meds. Pt complains of LE numbness/tingling. Pt is NPO due to bowel obstruction, no complaints of N/V. Voiding w/o difficulty. No evidence of skin breakdown. No acute events so far this shift. Pt remaining free from falls or injury. Bed in lowest locked position, side rails up x2, call light w/i reach, pt instructed to call for assistance if needed. Skid proof socks on. Care plan explained to patient. No additional complaints at this time. Q 2hr rounding performed. Will continue to monitor.

## 2017-11-01 NOTE — CONSULTS
[facility]  General Surgery  Consult Note    Inpatient consult to General Surgery  Consult performed by: NAKIA ROMERO  Consult ordered by: SHAJI PEDERSEN        Subjective:     Chief Complaint/Reason for Admission: SBO    History of Present Illness: 73 y/o M with Lymphoma currently undergoing chemo with rituximab bendamustine last dose 25th presents with inceasing abdominal pain over the past few days.  Pt presented with OSH where a CT scan showed and obstruction likely related to the staple line from his prior small bowel resection in 2013.  Currently pt is feeling better that yesterday.  He is not nauseated and has been passing flatus.  Denies fevers or chills.  His activity is limited by chronic back pain.  Denies SOB, CP.  He is feeling fatigued related to his last chemo.      Current Facility-Administered Medications on File Prior to Encounter   Medication    [COMPLETED] HYDROmorphone injection 1 mg    [COMPLETED] ondansetron injection 4 mg    [COMPLETED] sodium chloride 0.9% bolus 1,000 mL     Current Outpatient Prescriptions on File Prior to Encounter   Medication Sig    enalapril (VASOTEC) 5 MG tablet Take 1 tablet (5 mg total) by mouth once daily.    esomeprazole (NEXIUM) 20 MG capsule Take 20 mg by mouth before breakfast.    flurazepam (DALMANE) 30 MG capsule Take 30 mg by mouth nightly as needed for Insomnia.    mirtazapine (REMERON) 15 MG tablet Take 1 tablet (15 mg total) by mouth every evening.    tamsulosin (FLOMAX) 0.4 mg Cp24 Take 1 capsule (0.4 mg total) by mouth once daily.    valacyclovir (VALTREX) 500 MG tablet TAKE 1 TABLET BY MOUTH ONCE DAILY    valacyclovir (VALTREX) 500 MG tablet Take 1 tablet (500 mg total) by mouth once daily.    acetaminophen (TYLENOL) 500 MG tablet Take 500 mg by mouth as needed for Pain.    ascorbic acid, vitamin C, (VITAMIN C) 1000 MG tablet Take 1,000 mg by mouth once daily. liposomal    carisoprodol (SOMA) 350 MG tablet Take  350 mg by mouth as needed for Muscle spasms.    cyanocobalamin (VITAMIN B-12) 1000 MCG tablet Take 100 mcg by mouth once daily.    diphenoxylate-atropine 2.5-0.025 mg (LOMOTIL) 2.5-0.025 mg per tablet One tablet every 8 hours as needed for diarrhea    fluticasone (FLONASE) 50 mcg/actuation nasal spray SPRAY ONCE IN EACH NOSTRIL ONCE DAILY    furosemide (LASIX) 40 MG tablet One daily as needed for edema    lactobacillus rhamnosus GG (CULTURELLE) 10 billion cell capsule Take 1 capsule by mouth once daily.    lidocaine-prilocaine (EMLA) cream     loperamide (IMODIUM A-D) 2 mg Tab Take 2 mg by mouth once daily at 6am.     metoprolol tartrate (LOPRESSOR) 50 MG tablet Take 0.5 tablets (25 mg total) by mouth 2 (two) times daily.    multivitamin capsule Take 1 capsule by mouth once daily.    NON FORMULARY MEDICATION 300 mg once daily. Desiccated Liver    predniSONE (DELTASONE) 50 MG Tab 2 tablets daily for 5 days starting on first day of every chemotherapy cycle    predniSONE (DELTASONE) 50 MG Tab 2 tablets daily for 5 days starting today.  In the future, prednisone will be started on the first day of chemotherapy       Review of patient's allergies indicates:   Allergen Reactions    Iodine and iodide containing products Itching     Contrast dye       Past Medical History:   Diagnosis Date    Allergy     Anemia     Arthritis     Basal cell carcinoma     excised from upper back    Cancer     Cardiomyopathy due to chemotherapy     Cataract     CHF (congestive heart failure)     Coronary artery disease     D1    Encounter for blood transfusion     Eye injuries 20 yrs    ou fb several x's    Hyperlipidemia     Hypertension     Hypertensive heart disease with heart failure 1/31/2013    Lymphoma 1996    Obstructive sleep apnea on CPAP     SCC (squamous cell carcinoma) excised 12/2015    R proximal forearm    Skin disease     Sleep apnea     SQUAMOUS CELL CARCINOMA excised 2/14/13    L forearm     Squamous cell carcinoma excised 8/4/2016    IN SITU, Left thigh MOHS    Squamous cell carcinoma excised 8/25/2016    left helix, MOHS     Past Surgical History:   Procedure Laterality Date    CATARACT EXTRACTION      od dr yanes    CATARACT EXTRACTION W/  INTRAOCULAR LENS IMPLANT Left 01/12/2017    Dr. Yanes    EYE SURGERY      Rt cataract    KIDNEY STONE SURGERY      SMALL INTESTINE SURGERY  09/2013    TONSILLECTOMY       Family History     Problem Relation (Age of Onset)    Cancer Father    Cataracts Mother, Father    Hypertension Mother, Father, Maternal Grandmother, Maternal Grandfather, Paternal Grandmother, Paternal Grandfather    No Known Problems Sister, Brother, Maternal Aunt, Maternal Uncle, Paternal Aunt, Paternal Uncle        Social History Main Topics    Smoking status: Former Smoker     Packs/day: 1.50     Years: 1.00     Quit date: 1960    Smokeless tobacco: Never Used    Alcohol use 1.2 oz/week     1 Glasses of wine, 1 Shots of liquor per week      Comment: socially    Drug use: No    Sexual activity: Yes     Partners: Female      Comment:      Review of Systems   ROS negative except for as above.    Fatigue, mild abd pain but improving  Objective:     Vital Signs (Most Recent):  Temp: 98.5 °F (36.9 °C) (11/01/17 0729)  Pulse: 92 (11/01/17 0729)  Resp: 20 (11/01/17 0729)  BP: 131/65 (11/01/17 0729)  SpO2: 96 % (11/01/17 0729) Vital Signs (24h Range):  Temp:  [97.5 °F (36.4 °C)-99 °F (37.2 °C)] 98.5 °F (36.9 °C)  Pulse:  [] 92  Resp:  [16-20] 20  SpO2:  [85 %-98 %] 96 %  BP: (117-190)/(56-86) 131/65     Weight: 84.8 kg (187 lb 1 oz)  Body mass index is 26.84 kg/m².      Intake/Output Summary (Last 24 hours) at 11/01/17 1030  Last data filed at 11/01/17 0914   Gross per 24 hour   Intake          1363.34 ml   Output              675 ml   Net           688.34 ml       Physical Exam   Gen: NAD  CV: RRR  Pulm: Unlabored  GI: Soft, Distended and tympanitic  Skin: No  rashes  Neuro: Non-focal    Significant Labs:  CBC:   Recent Labs  Lab 11/01/17  0500   WBC 9.65   RBC 3.64*   HGB 10.0*   HCT 31.2*   *   MCV 86   MCH 27.5   MCHC 32.1     CMP:   Recent Labs  Lab 11/01/17  0500      CALCIUM 8.2*   ALBUMIN 2.7*   PROT 4.7*      K 3.1*   CO2 34*   CL 97   BUN 8   CREATININE 0.9   ALKPHOS 144*   ALT 9*   AST 19   BILITOT 0.4     Coagulation: No results for input(s): INR, APTT in the last 48 hours.    Invalid input(s): PT  All pertinent labs from the last 24 hours have been reviewed.    Significant Diagnostics:  CT: Focal transition point at the prior anastomotic site    Assessment/Plan:     73 y/o M with lymphoma and SBO at  anastomotic site.  Pt is relatively asymptomatic at this time, passing flatus.  Recent administration of chemotherapy    While asymptomatic at this time, anastomotic stricture seems to be site of concern  UGI with small bowel follow through ordered to further delineate if the proximal bowel is in fact dilated.  Imaging may be both diagnostic and therapeutic.    If contrast reaches the colon, may start clears and advance slowly as tolerated  Will follow      Active Diagnoses:    Diagnosis Date Noted POA    PRINCIPAL PROBLEM:  Small bowel obstruction [K56.609] 10/31/2017 Yes    Hypokalemia [E87.6] 10/31/2017 Yes    Hypomagnesemia [E83.42] 10/31/2017 Yes    Chronic pain [G89.29] 01/06/2017 Yes    SALINAS on CPAP [G47.33, Z99.89] 10/28/2016 Not Applicable     Chronic    Large cell (diffuse) non-Hodgkin's lymphoma [C83.30] 08/06/2013 Yes    Cardiomyopathy due to chemotherapy [I42.7, T45.1X5A]  Yes     Chronic    Lymphoma, follicular [C82.90] 07/18/2012 Yes      Problems Resolved During this Admission:    Diagnosis Date Noted Date Resolved POA       Thank you for your consult. I will follow-up with patient. Please contact us if you have any additional questions.    Ibrahima Mahajan MD  General Surgery  [facility]

## 2017-11-01 NOTE — PROGRESS NOTES
[facility]  Hematology  Bone Marrow Transplant  Progress Note    Patient Name: Royce Francis  Admission Date: 10/31/2017  Hospital Length of Stay: 1 days  Code Status: Full Code    Subjective:     Interval History:    - Admitted 10/31/17 for SBO seen on CT. Patient with nausea and vomiting, 1 loose bowel movement on 10/30. Made NPO for bowel rest, will hold on NG given mild symptoms at this time. Complains of abdominal pain greater in the upper abdomen and abdominal distention. Given IV morphine for pain.   - Started on IV fluids with potassium due to hypokalemia. - Counts stable, status post cycle 3 of BR for B cell lymphoma on 10/24.   - No other acute issues overnight. VSS Afebrile.     Objective:     Vital Signs (Most Recent):  Temp: 98.3 °F (36.8 °C) (11/01/17 0407)  Pulse: 84 (11/01/17 0407)  Resp: 20 (11/01/17 0407)  BP: (!) 117/56 (11/01/17 0407)  SpO2: (!) 94 % (11/01/17 0407) Vital Signs (24h Range):  Temp:  [97.5 °F (36.4 °C)-99 °F (37.2 °C)] 98.3 °F (36.8 °C)  Pulse:  [] 84  Resp:  [16-20] 20  SpO2:  [85 %-98 %] 94 %  BP: (117-190)/(56-86) 117/56     Weight: 84.8 kg (187 lb 1 oz)  Body mass index is 26.84 kg/m².  Body surface area is 2.05 meters squared.    ECOG SCORE         [unfilled]    Intake/Output - Last 3 Shifts       10/30 0700 - 10/31 0659 10/31 0700 - 11/01 0659 11/01 0700 - 11/02 0659    Urine (mL/kg/hr)  575     Total Output   575      Net   -575             Urine Occurrence  2 x           Physical Exam   Constitutional: He is oriented to person, place, and time. He appears well-developed and well-nourished. No distress.   HENT:   Head: Normocephalic and atraumatic.   Mouth/Throat: Oropharynx is clear and moist. No oropharyngeal exudate.   Eyes: Pupils are equal, round, and reactive to light.   Cardiovascular: Normal rate, regular rhythm and normal heart sounds.    Pulmonary/Chest: Effort normal and breath sounds normal. No respiratory distress.   Abdominal: Soft.  Bowel sounds are normal. He exhibits distension. There is tenderness in the right upper quadrant and left upper quadrant.   Musculoskeletal: He exhibits no edema.   Neurological: He is alert and oriented to person, place, and time.   Skin: Skin is warm and dry. No erythema.   Nursing note and vitals reviewed.      Significant Labs:   CBC:   Recent Labs  Lab 10/30/17  0750 10/31/17  1955 11/01/17  0500   WBC 9.12 12.98* 9.65   HGB 11.3* 11.7* 10.0*   HCT 35.8* 35.9* 31.2*   * 122* 111*    and CMP:   Recent Labs  Lab 10/31/17  1955 11/01/17  0500    140   K 2.9* 3.1*   CL 98 97   CO2 33* 34*   GLU 97 107   BUN 9 8   CREATININE 1.0 0.9   CALCIUM 8.9 8.2*   PROT 5.5* 4.7*   ALBUMIN 3.1* 2.7*   BILITOT 0.5 0.4   ALKPHOS 117 144*   AST 18 19   ALT 11 9*   ANIONGAP 10 9   EGFRNONAA >60.0 >60.0       Diagnostic Results:  CT: There is small bowel dilatation proximal to the suture lines/region of the previously identified mass encasing the distal ileum and partial small bowel obstruction is suspected  I have reviewed all pertinent imaging results/findings within the past 24 hours.   Abdominal X-Ray: Persistent mild small bowel distention, which can be seen in the setting of small bowel obstruction or ileus    Assessment/Plan:     * Small bowel obstruction    - per CT abdomen, suspected partial small bowel obstruction   - NPO and bowel rest  - IVF with 1/2 NS +5%dex + 40 mEq KCl given lyte abnormalities  - deferring NGT at this time as symptoms are mild; will wait on gen surg recommendations  - will place NGT is symptoms worsen or do not resolve  - cautious pain control with IV morphine, as to avoid further possible ileus   - gen surg consult today        Hypomagnesemia    - mag of 0.9  - replace with IV mag        Hypokalemia    - likely 2/2 diarrhea and 2/2 SBO.   - IVF with 1/2 NS +5%dex + 40 mEq KCl given lyte abnormalities  - electrolytes replaced PRN protocol           Chronic pain    - IV morphine prn  severe pain given NPO status        SALINAS on CPAP    - CPAP used last night           Large cell (diffuse) non-Hodgkin's lymphoma    - see Dr. Lucia's note from 9/5 for detailed history on lymphoma  - most recent treatment cycle 3 of BR with neulasta; day 1 was 10/24         Cardiomyopathy due to chemotherapy    - echo in 12/2016 with  Mildly depressed left ventricular systolic function. EF 45-50%. And Grade I ventricular diastolic dysfunction  - currently not decompensated   - hold ACEI, BB and prn lasix at this time        Lymphoma, follicular    - as per hpi            VTE Risk Mitigation         Ordered     enoxaparin injection 40 mg  Daily     Route:  Subcutaneous        10/31/17 1940     Medium Risk of VTE  Once      10/31/17 1940          Disposition: Pending resolution of SBO.     Shyanne Chavarria NP  Bone Marrow Transplant  [facility]

## 2017-11-02 VITALS
WEIGHT: 186.94 LBS | DIASTOLIC BLOOD PRESSURE: 63 MMHG | HEIGHT: 70 IN | BODY MASS INDEX: 26.76 KG/M2 | TEMPERATURE: 98 F | HEART RATE: 91 BPM | OXYGEN SATURATION: 95 % | RESPIRATION RATE: 16 BRPM | SYSTOLIC BLOOD PRESSURE: 148 MMHG

## 2017-11-02 LAB
ALBUMIN SERPL BCP-MCNC: 3 G/DL
ALP SERPL-CCNC: 107 U/L
ALT SERPL W/O P-5'-P-CCNC: 12 U/L
ANION GAP SERPL CALC-SCNC: 11 MMOL/L
AST SERPL-CCNC: 21 U/L
BASOPHILS # BLD AUTO: 0.02 K/UL
BASOPHILS NFR BLD: 0.2 %
BILIRUB SERPL-MCNC: 0.5 MG/DL
BUN SERPL-MCNC: 8 MG/DL
CALCIUM SERPL-MCNC: 8.5 MG/DL
CHLORIDE SERPL-SCNC: 98 MMOL/L
CO2 SERPL-SCNC: 29 MMOL/L
CREAT SERPL-MCNC: 0.9 MG/DL
DIFFERENTIAL METHOD: ABNORMAL
EOSINOPHIL # BLD AUTO: 0.1 K/UL
EOSINOPHIL NFR BLD: 0.8 %
ERYTHROCYTE [DISTWIDTH] IN BLOOD BY AUTOMATED COUNT: 16.7 %
EST. GFR  (AFRICAN AMERICAN): >60 ML/MIN/1.73 M^2
EST. GFR  (NON AFRICAN AMERICAN): >60 ML/MIN/1.73 M^2
GLUCOSE SERPL-MCNC: 99 MG/DL
HCT VFR BLD AUTO: 32.9 %
HGB BLD-MCNC: 10.6 G/DL
IMM GRANULOCYTES # BLD AUTO: 0.14 K/UL
IMM GRANULOCYTES NFR BLD AUTO: 1.4 %
LYMPHOCYTES # BLD AUTO: 0.9 K/UL
LYMPHOCYTES NFR BLD: 9 %
MAGNESIUM SERPL-MCNC: 1.6 MG/DL
MCH RBC QN AUTO: 28 PG
MCHC RBC AUTO-ENTMCNC: 32.2 G/DL
MCV RBC AUTO: 87 FL
MONOCYTES # BLD AUTO: 1.2 K/UL
MONOCYTES NFR BLD: 11.6 %
NEUTROPHILS # BLD AUTO: 7.8 K/UL
NEUTROPHILS NFR BLD: 77 %
NRBC BLD-RTO: 0 /100 WBC
PHOSPHATE SERPL-MCNC: 2.4 MG/DL
PLATELET # BLD AUTO: 94 K/UL
PMV BLD AUTO: 8.9 FL
POTASSIUM SERPL-SCNC: 3 MMOL/L
PROT SERPL-MCNC: 5.2 G/DL
RBC # BLD AUTO: 3.79 M/UL
SODIUM SERPL-SCNC: 138 MMOL/L
WBC # BLD AUTO: 10.07 K/UL

## 2017-11-02 PROCEDURE — 36415 COLL VENOUS BLD VENIPUNCTURE: CPT

## 2017-11-02 PROCEDURE — 83735 ASSAY OF MAGNESIUM: CPT

## 2017-11-02 PROCEDURE — 25000003 PHARM REV CODE 250: Performed by: HOSPITALIST

## 2017-11-02 PROCEDURE — 63600175 PHARM REV CODE 636 W HCPCS: Performed by: INTERNAL MEDICINE

## 2017-11-02 PROCEDURE — 63600175 PHARM REV CODE 636 W HCPCS: Performed by: NURSE PRACTITIONER

## 2017-11-02 PROCEDURE — 25000003 PHARM REV CODE 250: Performed by: INTERNAL MEDICINE

## 2017-11-02 PROCEDURE — 85025 COMPLETE CBC W/AUTO DIFF WBC: CPT

## 2017-11-02 PROCEDURE — 99238 HOSP IP/OBS DSCHRG MGMT 30/<: CPT | Mod: ,,, | Performed by: INTERNAL MEDICINE

## 2017-11-02 PROCEDURE — 80053 COMPREHEN METABOLIC PANEL: CPT

## 2017-11-02 PROCEDURE — 25000003 PHARM REV CODE 250: Performed by: NURSE PRACTITIONER

## 2017-11-02 PROCEDURE — 84100 ASSAY OF PHOSPHORUS: CPT

## 2017-11-02 RX ORDER — LANOLIN ALCOHOL/MO/W.PET/CERES
800 CREAM (GRAM) TOPICAL EVERY 4 HOURS PRN
Status: DISCONTINUED | OUTPATIENT
Start: 2017-11-02 | End: 2017-11-02 | Stop reason: HOSPADM

## 2017-11-02 RX ORDER — LANOLIN ALCOHOL/MO/W.PET/CERES
400 CREAM (GRAM) TOPICAL EVERY 4 HOURS PRN
Status: DISCONTINUED | OUTPATIENT
Start: 2017-11-02 | End: 2017-11-02 | Stop reason: HOSPADM

## 2017-11-02 RX ORDER — SODIUM,POTASSIUM PHOSPHATES 280-250MG
1 POWDER IN PACKET (EA) ORAL EVERY 4 HOURS PRN
Status: DISCONTINUED | OUTPATIENT
Start: 2017-11-02 | End: 2017-11-02 | Stop reason: HOSPADM

## 2017-11-02 RX ORDER — POTASSIUM CHLORIDE 750 MG/1
20 CAPSULE, EXTENDED RELEASE ORAL
Status: DISCONTINUED | OUTPATIENT
Start: 2017-11-02 | End: 2017-11-02 | Stop reason: HOSPADM

## 2017-11-02 RX ORDER — SODIUM,POTASSIUM PHOSPHATES 280-250MG
2 POWDER IN PACKET (EA) ORAL EVERY 4 HOURS PRN
Status: DISCONTINUED | OUTPATIENT
Start: 2017-11-02 | End: 2017-11-02 | Stop reason: HOSPADM

## 2017-11-02 RX ORDER — HYDROCODONE BITARTRATE AND ACETAMINOPHEN 5; 325 MG/1; MG/1
1 TABLET ORAL ONCE
Status: COMPLETED | OUTPATIENT
Start: 2017-11-02 | End: 2017-11-02

## 2017-11-02 RX ORDER — HYDROCODONE BITARTRATE AND ACETAMINOPHEN 10; 325 MG/1; MG/1
1 TABLET ORAL EVERY 6 HOURS PRN
Qty: 40 TABLET | Refills: 0 | Status: SHIPPED | OUTPATIENT
Start: 2017-11-02 | End: 2017-11-14

## 2017-11-02 RX ORDER — HYDROCODONE BITARTRATE AND ACETAMINOPHEN 10; 325 MG/1; MG/1
1 TABLET ORAL EVERY 6 HOURS PRN
Status: DISCONTINUED | OUTPATIENT
Start: 2017-11-02 | End: 2017-11-02 | Stop reason: HOSPADM

## 2017-11-02 RX ADMIN — HYDROCODONE BITARTRATE AND ACETAMINOPHEN 1 TABLET: 5; 325 TABLET ORAL at 12:11

## 2017-11-02 RX ADMIN — POTASSIUM CHLORIDE 10 MEQ: 10 INJECTION, SOLUTION INTRAVENOUS at 07:11

## 2017-11-02 RX ADMIN — VALACYCLOVIR HYDROCHLORIDE 500 MG: 500 TABLET, FILM COATED ORAL at 08:11

## 2017-11-02 RX ADMIN — POTASSIUM CHLORIDE 10 MEQ: 10 INJECTION, SOLUTION INTRAVENOUS at 08:11

## 2017-11-02 RX ADMIN — TAMSULOSIN HYDROCHLORIDE 0.4 MG: 0.4 CAPSULE ORAL at 08:11

## 2017-11-02 RX ADMIN — MORPHINE SULFATE 2 MG: 2 INJECTION, SOLUTION INTRAMUSCULAR; INTRAVENOUS at 04:11

## 2017-11-02 RX ADMIN — POTASSIUM CHLORIDE 10 MEQ: 10 INJECTION, SOLUTION INTRAVENOUS at 06:11

## 2017-11-02 RX ADMIN — POTASSIUM CHLORIDE 20 MEQ: 750 CAPSULE, EXTENDED RELEASE ORAL at 10:11

## 2017-11-02 RX ADMIN — HYDROCODONE BITARTRATE AND ACETAMINOPHEN 1 TABLET: 5; 325 TABLET ORAL at 10:11

## 2017-11-02 RX ADMIN — HYDROCODONE BITARTRATE AND ACETAMINOPHEN 1 TABLET: 5; 325 TABLET ORAL at 06:11

## 2017-11-02 RX ADMIN — HYDROCODONE BITARTRATE AND ACETAMINOPHEN 1 TABLET: 10; 325 TABLET ORAL at 01:11

## 2017-11-02 RX ADMIN — MAGNESIUM SULFATE IN WATER 2 G: 40 INJECTION, SOLUTION INTRAVENOUS at 06:11

## 2017-11-02 NOTE — DISCHARGE SUMMARY
"[facility]  Hematology  Bone Marrow Transplant  Discharge Summary      Patient Name: Royce Francis  MRN: [id number]  Admission Date: 10/31/2017  Hospital Length of Stay: 2 days  Discharge Date and Time: 11/2/2017 at 4:00 PM  Attending Physician: Jose Miguel Pruitt MD   Discharging Provider: Shyanne Chavarria NP  Primary Care Provider: Rupert Díaz MD    HPI: 71 y/o M with hx of  DLBCL (currently on outpatient rituximab bendamustine) presented to [facility] as a transfer from [facility] free-standing ER for 1 day hx of worsening diffuse abdominal pain. abd pain is worse in upper abdomen. Associated with vomiting and nausea, abdominal distension. No fevers or chills. Has been passing stool- diarrhea 1 day prior. Passed gas earlier on the day of admit. CT abdomen at OSH showed " small bowel dilatation proximal to the suture lines/region of the previously identified mass encasing the distal ileum and partial small bowel obstruction is suspected". Pt received IV pain meds at OSH with resolution of his abd pain.     Pt has hx of SBO in 2013 at the time of his lymphoma diagnosis. He had a bowel resection at that time. Has hx of chronic pain syndrome and has been on mult pain meds. However, he has been having diarrhea with his rituximab bendamustine. No significant constipation     * No surgery found *     Hospital Course: 11/1/17: Admitted 10/31/17 for SBO seen on CT. Patient with nausea and vomiting, 1 loose bowel movement on 10/30. Made NPO for bowel rest, will hold on NG given mild symptoms at this time. Complains of abdominal pain greater in the upper abdomen and abdominal distention. Given IV morphine for pain. Started on IV fluids with potassium due to hypokalemia. Counts stable, status post cycle 3 of BR for B cell lymphoma on 10/24. No other acute issues overnight. VSS Afebrile.   11/2/17: No acute issues overnight. VSS Afebrile. 2 Bowel movements over the last 24 hours. General surgery " consuled and ordered a UGI small bowel follow through; awaiting read. Based on results will possibly be able to advance diet - will continue to follow up.     Physical Exam   Constitutional: He is oriented to person, place, and time. He appears well-developed and well-nourished. No distress.   HENT:   Head: Normocephalic.   Right Ear: External ear normal.   Left Ear: External ear normal.   Nose: Nose normal.   Mouth/Throat: Oropharynx is clear and moist and mucous membranes are normal. No oropharyngeal exudate.   Eyes: Conjunctivae and EOM are normal. Pupils are equal, round, and reactive to light.   Neck: Neck supple.   Cardiovascular: Normal rate, regular rhythm and normal heart sounds.    Pulmonary/Chest: Effort normal and breath sounds normal.   Abdominal: Soft. Normal appearance and bowel sounds are normal. He exhibits no distension. There is no tenderness.   Musculoskeletal: He exhibits no edema.   Neurological: He is alert and oriented to person, place, and time.   Skin: Skin is warm, dry and intact. No cyanosis. Nails show no clubbing.   Psychiatric: He has a normal mood and affect. His behavior is normal. Thought content normal. His mood appears not anxious.   Nursing note and vitals reviewed.    Consults         Status Ordering Provider     Inpatient consult to General Surgery  Once     Provider:  (Not yet assigned)    Completed SHAJI PEDERSEN          Significant Diagnostic Studies: Labs:   CMP   Recent Labs  Lab 10/31/17  1955 11/01/17  0500 11/02/17  0525    140 138   K 2.9* 3.1* 3.0*   CL 98 97 98   CO2 33* 34* 29   GLU 97 107 99   BUN 9 8 8   CREATININE 1.0 0.9 0.9   CALCIUM 8.9 8.2* 8.5*   PROT 5.5* 4.7* 5.2*   ALBUMIN 3.1* 2.7* 3.0*   BILITOT 0.5 0.4 0.5   ALKPHOS 117 144* 107   AST 18 19 21   ALT 11 9* 12   ANIONGAP 10 9 11   ESTGFRAFRICA >60.0 >60.0 >60.0   EGFRNONAA >60.0 >60.0 >60.0    and CBC   Recent Labs  Lab 10/31/17  1955 11/01/17  0500 11/02/17  0525   WBC 12.98* 9.65 10.07    HGB 11.7* 10.0* 10.6*   HCT 35.9* 31.2* 32.9*   * 111* 94*       Pending Diagnostic Studies:     None        Final Active Diagnoses:    Diagnosis Date Noted POA    PRINCIPAL PROBLEM:  Small bowel obstruction [K56.609] 10/31/2017 Yes    Hypokalemia [E87.6] 10/31/2017 Yes    Hypomagnesemia [E83.42] 10/31/2017 Yes    Chronic pain [G89.29] 01/06/2017 Yes    SALINAS on CPAP [G47.33, Z99.89] 10/28/2016 Not Applicable     Chronic    Large cell (diffuse) non-Hodgkin's lymphoma [C83.30] 08/06/2013 Yes    Cardiomyopathy due to chemotherapy [I42.7, T45.1X5A]  Yes     Chronic      Problems Resolved During this Admission:    Diagnosis Date Noted Date Resolved POA      Discharged Condition: stable    Disposition: Home or Self Care    Follow Up: With Dr. Lucia in Hem/Onc clinic    Patient Instructions:     Diet general   Scheduling Instructions: As tolerated     Activity as tolerated     Call MD for:  increased confusion or weakness     Call MD for:  persistent dizziness, light-headedness, or visual disturbances     Call MD for:  worsening rash     Call MD for:  difficulty breathing or increased cough     Call MD for:  redness, tenderness, or signs of infection (pain, swelling, redness, odor or green/yellow discharge around incision site)     Call MD for:  severe uncontrolled pain     Call MD for:  persistent nausea and vomiting or diarrhea     Call MD for:  temperature >100.4       Medications:  Reconciled Home Medications:   Current Discharge Medication List      START taking these medications    Details   hydrocodone-acetaminophen 10-325mg (NORCO)  mg Tab Take 1 tablet by mouth every 6 (six) hours as needed for Pain.  Qty: 40 tablet, Refills: 0         CONTINUE these medications which have NOT CHANGED    Details   enalapril (VASOTEC) 5 MG tablet Take 1 tablet (5 mg total) by mouth once daily.  Qty: 90 tablet, Refills: 3      esomeprazole (NEXIUM) 20 MG capsule Take 20 mg by mouth before breakfast.       flurazepam (DALMANE) 30 MG capsule Take 30 mg by mouth nightly as needed for Insomnia.      mirtazapine (REMERON) 15 MG tablet Take 1 tablet (15 mg total) by mouth every evening.  Qty: 90 tablet, Refills: 3    Comments: **Patient requests 90 day supply**  Associated Diagnoses: Depression, unspecified depression type      tamsulosin (FLOMAX) 0.4 mg Cp24 Take 1 capsule (0.4 mg total) by mouth once daily.  Qty: 90 capsule, Refills: 3    Associated Diagnoses: BPH without obstruction/lower urinary tract symptoms      valacyclovir (VALTREX) 500 MG tablet Take 1 tablet (500 mg total) by mouth once daily.  Qty: 90 tablet, Refills: 3    Associated Diagnoses: Herpes zoster complicated      ascorbic acid, vitamin C, (VITAMIN C) 1000 MG tablet Take 1,000 mg by mouth once daily. liposomal      cyanocobalamin (VITAMIN B-12) 1000 MCG tablet Take 100 mcg by mouth once daily.      fluticasone (FLONASE) 50 mcg/actuation nasal spray SPRAY ONCE IN EACH NOSTRIL ONCE DAILY  Qty: 48 g, Refills: 3    Comments: **Patient requests 90 days supply**      furosemide (LASIX) 40 MG tablet One daily as needed for edema  Qty: 30 tablet, Refills: 11    Associated Diagnoses: Edema, unspecified type      lactobacillus rhamnosus GG (CULTURELLE) 10 billion cell capsule Take 1 capsule by mouth once daily.      lidocaine-prilocaine (EMLA) cream       metoprolol tartrate (LOPRESSOR) 50 MG tablet Take 0.5 tablets (25 mg total) by mouth 2 (two) times daily.  Qty: 180 tablet, Refills: 3      multivitamin capsule Take 1 capsule by mouth once daily.      NON FORMULARY MEDICATION 300 mg once daily. Desiccated Liver      predniSONE (DELTASONE) 50 MG Tab 2 tablets daily for 5 days starting today.  In the future, prednisone will be started on the first day of chemotherapy  Qty: 10 tablet, Refills: 6    Associated Diagnoses: Diffuse large B-cell lymphoma of intra-abdominal lymph nodes         STOP taking these medications       acetaminophen (TYLENOL) 500 MG  tablet Comments:   Reason for Stopping:         carisoprodol (SOMA) 350 MG tablet Comments:   Reason for Stopping:         diphenoxylate-atropine 2.5-0.025 mg (LOMOTIL) 2.5-0.025 mg per tablet Comments:   Reason for Stopping:         loperamide (IMODIUM A-D) 2 mg Tab Comments:   Reason for Stopping:               Shyanne Chavarria, NP  Bone Marrow Transplant  [facility]

## 2017-11-02 NOTE — PROGRESS NOTES
Contrast reaching colon on SBFT.    No signs of obstruction.    Would give diet clear liquids and advance as tolerated.    Surgery will sign off, please call with questions.       Alisia Lipscomb MD  General Surgery, PGY-5  Pager: [phone]  Cell: [phone]

## 2017-11-02 NOTE — ASSESSMENT & PLAN NOTE
- IV morphine prn severe pain given NPO status  - chronic back pain made worse by hospital bed - controlled at home on oral medications

## 2017-11-02 NOTE — ASSESSMENT & PLAN NOTE
- per CT abdomen, suspected partial small bowel obstruction   - NPO and bowel rest  - IVF with 1/2 NS +5%dex + 40 mEq KCl given lyte abnormalities  - deferring NGT at this time as symptoms are mild; will place NGT is symptoms worsen or do not resolve  - cautious pain control with IV morphine, as to avoid further possible ileus   - gen surg consul; appreciate recs awaiting read on UGI from overnight

## 2017-11-02 NOTE — PLAN OF CARE
Problem: Patient Care Overview  Goal: Plan of Care Review  Outcome: Ongoing (interventions implemented as appropriate)  Pt AAOx4, independent, involved in plan of care and communicating. VSS and afebrile throughout shift. Complaints of back pain throughout shift. Pt receives little relief with PRN meds and mattress overlay. Pt is NPO due to bowel obstruction, no complaints of N/V. Voiding w/o difficulty. No evidence of skin breakdown. No acute events so far this shift. Pt remaining free from falls or injury. Bed in lowest locked position, side rails up x2, call light w/i reach, pt instructed to call for assistance if needed. Skid proof socks on. Care plan explained to patient. No additional complaints at this time. Q 2hr rounding performed. Will continue to monitor.

## 2017-11-02 NOTE — PROGRESS NOTES
[facility]  Hematology  Bone Marrow Transplant  Progress Note    Patient Name: Royce Francis  Admission Date: 10/31/2017  Hospital Length of Stay: 2 days  Code Status: Full Code    Subjective:     Interval History:   - No acute issues overnight. VSS Afebrile.   - 2 Bowel movements over the last 24 hours.   - General surgery consuled and ordered a UGI small bowel follow through; awaiting read. Based on results will possibly be able to advance diet - will continue to follow up.    Objective:     Vital Signs (Most Recent):  Temp: 98 °F (36.7 °C) (11/02/17 0706)  Pulse: 98 (11/02/17 0706)  Resp: 19 (11/02/17 0706)  BP: 128/63 (11/02/17 0706)  SpO2: (!) 93 % (11/02/17 0706) Vital Signs (24h Range):  Temp:  [97.7 °F (36.5 °C)-98.3 °F (36.8 °C)] 98 °F (36.7 °C)  Pulse:  [] 98  Resp:  [16-19] 19  SpO2:  [91 %-93 %] 93 %  BP: (118-147)/(54-80) 128/63     Weight: 84.8 kg (186 lb 15.2 oz)  Body mass index is 26.82 kg/m².  Body surface area is 2.05 meters squared.    ECOG SCORE         [unfilled]    Intake/Output - Last 3 Shifts       10/31 0700 - 11/01 0659 11/01 0700 - 11/02 0659 11/02 0700 - 11/03 0659    P.O.  110     I.V. (mL/kg) 521.7 (6.2) 561.7 (6.6)     IV Piggyback 200      Total Intake(mL/kg) 721.7 (8.5) 671.7 (7.9)     Urine (mL/kg/hr) 575 805 (0.4)     Stool  0 (0)     Total Output 575 805      Net +146.7 -133.3             Urine Occurrence 2 x      Stool Occurrence  2 x           Physical Exam   Constitutional: He is oriented to person, place, and time. He appears well-developed and well-nourished. No distress.   HENT:   Head: Normocephalic.   Right Ear: External ear normal.   Left Ear: External ear normal.   Nose: Nose normal.   Mouth/Throat: Oropharynx is clear and moist and mucous membranes are normal. No oropharyngeal exudate.   Eyes: Conjunctivae and EOM are normal. Pupils are equal, round, and reactive to light.   Neck: Neck supple.   Cardiovascular: Normal rate, regular rhythm  and normal heart sounds.    Pulmonary/Chest: Effort normal and breath sounds normal.   Abdominal: Soft. Normal appearance and bowel sounds are normal. He exhibits no distension. There is no tenderness.   Musculoskeletal: He exhibits no edema.   Neurological: He is alert and oriented to person, place, and time.   Skin: Skin is warm, dry and intact. No cyanosis. Nails show no clubbing.   Psychiatric: He has a normal mood and affect. His behavior is normal. Thought content normal. His mood appears not anxious.   Nursing note and vitals reviewed.      Significant Labs:   CBC:   Recent Labs  Lab 10/31/17  1955 11/01/17  0500 11/02/17  0525   WBC 12.98* 9.65 10.07   HGB 11.7* 10.0* 10.6*   HCT 35.9* 31.2* 32.9*   * 111* 94*    and CMP:   Recent Labs  Lab 10/31/17  1955 11/01/17  0500 11/02/17  0525    140 138   K 2.9* 3.1* 3.0*   CL 98 97 98   CO2 33* 34* 29   GLU 97 107 99   BUN 9 8 8   CREATININE 1.0 0.9 0.9   CALCIUM 8.9 8.2* 8.5*   PROT 5.5* 4.7* 5.2*   ALBUMIN 3.1* 2.7* 3.0*   BILITOT 0.5 0.4 0.5   ALKPHOS 117 144* 107   AST 18 19 21   ALT 11 9* 12   ANIONGAP 10 9 11   EGFRNONAA >60.0 >60.0 >60.0       Diagnostic Results:  I have reviewed all pertinent imaging results/findings within the past 24 hours.  Awaiting read on UGI small bowel follow through     Assessment/Plan:     * Small bowel obstruction    - per CT abdomen, suspected partial small bowel obstruction   - NPO and bowel rest  - IVF with 1/2 NS +5%dex + 40 mEq KCl given lyte abnormalities  - deferring NGT at this time as symptoms are mild; will place NGT is symptoms worsen or do not resolve  - cautious pain control with IV morphine, as to avoid further possible ileus   - gen surg consul; appreciate recs awaiting read on UGI from overnight         Hypomagnesemia    - mag of 1.6  - replace with IV mag  - resolved         Hypokalemia    - likely 2/2 diarrhea and 2/2 SBO.   - IVF with 1/2 NS +5%dex + 40 mEq KCl given lyte abnormalities  -  electrolytes replaced PRN protocol           Chronic pain    - IV morphine prn severe pain given NPO status  - chronic back pain made worse by hospital bed - controlled at home on oral medications         SALINAS on CPAP    - CPAP used last night           Large cell (diffuse) non-Hodgkin's lymphoma    - see Dr. Lucia's note from 9/5 for detailed history on lymphoma  - most recent treatment cycle 3 of BR with neulasta; day 1 was 10/24         Cardiomyopathy due to chemotherapy    - echo in 12/2016 with  Mildly depressed left ventricular systolic function. EF 45-50%. And Grade I ventricular diastolic dysfunction  - currently not decompensated   - hold ACEI, BB and prn lasix at this time        Lymphoma, follicular    - as per hpi            VTE Risk Mitigation         Ordered     enoxaparin injection 40 mg  Daily     Route:  Subcutaneous        10/31/17 1940     Medium Risk of VTE  Once      10/31/17 1940          Disposition: Pending resolution of small bowel obstruction.     Shyanne Chavarria, NP  Bone Marrow Transplant  [facility]

## 2017-11-02 NOTE — PLAN OF CARE
MDR's with Dr Pruitt.  Patient's SBO has resolved.  Advancing diet today.  Planning for d/c home this afternoon if tolerates PO.  Wife at bedside and both agree with the d/c plan.  No HH/DME needs.  CM reviewed f/u with the patient and his wife.  IMM signed.  Will continue to follow.      Future Appointments  Date Time Provider Department Center   11/13/2017 7:30 AM [facility]   11/13/2017 8:30 AM Ye Lucia Jr., MD [facility] HEM ONC [facility]   11/14/2017 8:00 AM [facility]   11/15/2017 7:10 AM LAB, [facility]ER [facility]   11/15/2017 8:00 AM [facility], [facility]   11/16/2017 1:30 PM LAB, [facility] CANCER BLDG [facility]   11/16/2017 2:30 PM INJECTION, [facility]        11/02/17 1107   Final Note   Assessment Type Final Discharge Note   Discharge Disposition Home   What phone number can be called within the next 1-3 days to see how you are doing after discharge? ([phone])   Hospital Follow Up  Appt(s) scheduled? Yes   Discharge plans and expectations educations in teach back method with documentation complete? Yes   Right Care Referral Info   Post Acute Recommendation No Care

## 2017-11-02 NOTE — NURSING
ROADTEST  O2- Pt on room air sating 94-99%  Activity-Pt ambulates with the assistance of a cane.  Devices- Pt not being sent home on any devices.   Tolerating-Pt tolerating PO diet and medication.  Elimination-Pt voiding and having bowel movements independently.  Self Care- Pt able to do personal hygiene independently  Teaching- Pt instructed on when to take home meds.     Pt's peripheral IV removed. Cath tip intact. Pt tolerated well. AVS and prescriptions given to pt. All questions answered. Pt verbalized understanding. Pt awaiting transport at this time.

## 2017-11-02 NOTE — SUBJECTIVE & OBJECTIVE
Subjective:     Interval History:   - No acute issues overnight. VSS Afebrile.   - 2 Bowel movements over the last 24 hours.   - General surgery consuled and ordered a UGI small bowel follow through; awaiting read. Based on results will possibly be able to advance diet - will continue to follow up.    Objective:     Vital Signs (Most Recent):  Temp: 98 °F (36.7 °C) (11/02/17 0706)  Pulse: 98 (11/02/17 0706)  Resp: 19 (11/02/17 0706)  BP: 128/63 (11/02/17 0706)  SpO2: (!) 93 % (11/02/17 0706) Vital Signs (24h Range):  Temp:  [97.7 °F (36.5 °C)-98.3 °F (36.8 °C)] 98 °F (36.7 °C)  Pulse:  [] 98  Resp:  [16-19] 19  SpO2:  [91 %-93 %] 93 %  BP: (118-147)/(54-80) 128/63     Weight: 84.8 kg (186 lb 15.2 oz)  Body mass index is 26.82 kg/m².  Body surface area is 2.05 meters squared.    ECOG SCORE         [unfilled]    Intake/Output - Last 3 Shifts       10/31 0700 - 11/01 0659 11/01 0700 - 11/02 0659 11/02 0700 - 11/03 0659    P.O.  110     I.V. (mL/kg) 521.7 (6.2) 561.7 (6.6)     IV Piggyback 200      Total Intake(mL/kg) 721.7 (8.5) 671.7 (7.9)     Urine (mL/kg/hr) 575 805 (0.4)     Stool  0 (0)     Total Output 575 805      Net +146.7 -133.3             Urine Occurrence 2 x      Stool Occurrence  2 x           Physical Exam   Constitutional: He is oriented to person, place, and time. He appears well-developed and well-nourished. No distress.   HENT:   Head: Normocephalic.   Right Ear: External ear normal.   Left Ear: External ear normal.   Nose: Nose normal.   Mouth/Throat: Oropharynx is clear and moist and mucous membranes are normal. No oropharyngeal exudate.   Eyes: Conjunctivae and EOM are normal. Pupils are equal, round, and reactive to light.   Neck: Neck supple.   Cardiovascular: Normal rate, regular rhythm and normal heart sounds.    Pulmonary/Chest: Effort normal and breath sounds normal.   Abdominal: Soft. Normal appearance and bowel sounds are normal. He exhibits no distension. There is no tenderness.    Musculoskeletal: He exhibits no edema.   Neurological: He is alert and oriented to person, place, and time.   Skin: Skin is warm, dry and intact. No cyanosis. Nails show no clubbing.   Psychiatric: He has a normal mood and affect. His behavior is normal. Thought content normal. His mood appears not anxious.   Nursing note and vitals reviewed.      Significant Labs:   CBC:   Recent Labs  Lab 10/31/17  1955 11/01/17  0500 11/02/17  0525   WBC 12.98* 9.65 10.07   HGB 11.7* 10.0* 10.6*   HCT 35.9* 31.2* 32.9*   * 111* 94*    and CMP:   Recent Labs  Lab 10/31/17  1955 11/01/17  0500 11/02/17  0525    140 138   K 2.9* 3.1* 3.0*   CL 98 97 98   CO2 33* 34* 29   GLU 97 107 99   BUN 9 8 8   CREATININE 1.0 0.9 0.9   CALCIUM 8.9 8.2* 8.5*   PROT 5.5* 4.7* 5.2*   ALBUMIN 3.1* 2.7* 3.0*   BILITOT 0.5 0.4 0.5   ALKPHOS 117 144* 107   AST 18 19 21   ALT 11 9* 12   ANIONGAP 10 9 11   EGFRNONAA >60.0 >60.0 >60.0       Diagnostic Results:  I have reviewed all pertinent imaging results/findings within the past 24 hours.  Awaiting read on UGI small bowel follow through

## 2017-11-04 NOTE — PT/OT/SLP DISCHARGE
Physical Therapy Discharge Summary    Royce Francis  MRN: [id number]   Small bowel obstruction   Patient Discharged from acute Physical Therapy on 2017.  Please refer to prior PT noted date on 2017 for functional status.     Assessment:   Patient appropriate for care in another setting.  GOALS:    Physical Therapy Goals        Problem: Physical Therapy Goal    Goal Priority Disciplines Outcome Goal Variances Interventions   Physical Therapy Goal     PT/OT, PT Ongoing (interventions implemented as appropriate)     Description:  Goals to be met by: 2017     Patient will increase functional independence with mobility by performin. Gait x100 feet with Supervision using SPC or other AD  2. Stand for x5 minutes with Stand-by Assistance while performing dynamic balance tasks  3. Lower extremity exercise program x15 reps with supervision to maintain B LE strength and coordination                  Reasons for Discontinuation of Therapy Services  Transfer to alternate level of care.      Plan:  Patient Discharged to: Home no PT services needed.    Nyasia Brito, PT, DPT  [phone]  2017

## 2017-11-13 ENCOUNTER — LAB VISIT (OUTPATIENT)
Dept: LAB | Facility: HOSPITAL | Age: 72
End: 2017-11-13
Attending: INTERNAL MEDICINE
Payer: MEDICARE

## 2017-11-13 ENCOUNTER — OFFICE VISIT (OUTPATIENT)
Dept: HEMATOLOGY/ONCOLOGY | Facility: CLINIC | Age: 72
End: 2017-11-13
Payer: MEDICARE

## 2017-11-13 ENCOUNTER — PATIENT MESSAGE (OUTPATIENT)
Dept: PAIN MEDICINE | Facility: CLINIC | Age: 72
End: 2017-11-13

## 2017-11-13 VITALS
SYSTOLIC BLOOD PRESSURE: 122 MMHG | BODY MASS INDEX: 27.11 KG/M2 | HEART RATE: 68 BPM | WEIGHT: 189.38 LBS | HEIGHT: 70 IN | DIASTOLIC BLOOD PRESSURE: 75 MMHG

## 2017-11-13 DIAGNOSIS — G62.0 DRUG-INDUCED POLYNEUROPATHY: ICD-10-CM

## 2017-11-13 DIAGNOSIS — C83.30 LARGE CELL (DIFFUSE) NON-HODGKIN'S LYMPHOMA: Primary | ICD-10-CM

## 2017-11-13 DIAGNOSIS — D46.9 MYELODYSPLASTIC SYNDROME: ICD-10-CM

## 2017-11-13 DIAGNOSIS — K52.9 CHRONIC DIARRHEA: Chronic | ICD-10-CM

## 2017-11-13 DIAGNOSIS — C82.90 FOLLICULAR LYMPHOMA: ICD-10-CM

## 2017-11-13 DIAGNOSIS — K56.609 SMALL BOWEL OBSTRUCTION: ICD-10-CM

## 2017-11-13 PROBLEM — R19.7 DIARRHEA: Status: RESOLVED | Noted: 2017-10-23 | Resolved: 2017-11-13

## 2017-11-13 LAB
ALBUMIN SERPL BCP-MCNC: 3 G/DL
ALP SERPL-CCNC: 88 U/L
ALT SERPL W/O P-5'-P-CCNC: 8 U/L
ANION GAP SERPL CALC-SCNC: 9 MMOL/L
AST SERPL-CCNC: 14 U/L
BASOPHILS # BLD AUTO: 0.01 K/UL
BASOPHILS NFR BLD: 0.2 %
BILIRUB SERPL-MCNC: 0.3 MG/DL
BUN SERPL-MCNC: 14 MG/DL
CALCIUM SERPL-MCNC: 9 MG/DL
CHLORIDE SERPL-SCNC: 104 MMOL/L
CO2 SERPL-SCNC: 27 MMOL/L
CREAT SERPL-MCNC: 0.9 MG/DL
DIFFERENTIAL METHOD: ABNORMAL
EOSINOPHIL # BLD AUTO: 0 K/UL
EOSINOPHIL NFR BLD: 0.6 %
ERYTHROCYTE [DISTWIDTH] IN BLOOD BY AUTOMATED COUNT: 15.8 %
EST. GFR  (AFRICAN AMERICAN): >60 ML/MIN/1.73 M^2
EST. GFR  (NON AFRICAN AMERICAN): >60 ML/MIN/1.73 M^2
GLUCOSE SERPL-MCNC: 100 MG/DL
HCT VFR BLD AUTO: 30.2 %
HGB BLD-MCNC: 9.9 G/DL
IMM GRANULOCYTES # BLD AUTO: 0.01 K/UL
IMM GRANULOCYTES NFR BLD AUTO: 0.2 %
LYMPHOCYTES # BLD AUTO: 1.6 K/UL
LYMPHOCYTES NFR BLD: 32.6 %
MCH RBC QN AUTO: 27.7 PG
MCHC RBC AUTO-ENTMCNC: 32.8 G/DL
MCV RBC AUTO: 85 FL
MONOCYTES # BLD AUTO: 0.6 K/UL
MONOCYTES NFR BLD: 12.7 %
NEUTROPHILS # BLD AUTO: 2.6 K/UL
NEUTROPHILS NFR BLD: 53.7 %
NRBC BLD-RTO: 0 /100 WBC
PLATELET # BLD AUTO: 93 K/UL
PMV BLD AUTO: 9.4 FL
POTASSIUM SERPL-SCNC: 3.2 MMOL/L
PROT SERPL-MCNC: 5.6 G/DL
RBC # BLD AUTO: 3.57 M/UL
SODIUM SERPL-SCNC: 140 MMOL/L
URATE SERPL-MCNC: 5.3 MG/DL
WBC # BLD AUTO: 4.79 K/UL

## 2017-11-13 PROCEDURE — 36415 COLL VENOUS BLD VENIPUNCTURE: CPT

## 2017-11-13 PROCEDURE — 85025 COMPLETE CBC W/AUTO DIFF WBC: CPT

## 2017-11-13 PROCEDURE — 99214 OFFICE O/P EST MOD 30 MIN: CPT | Mod: S$GLB,,, | Performed by: INTERNAL MEDICINE

## 2017-11-13 PROCEDURE — 80053 COMPREHEN METABOLIC PANEL: CPT

## 2017-11-13 PROCEDURE — 99999 PR PBB SHADOW E&M-EST. PATIENT-LVL III: CPT | Mod: PBBFAC,,, | Performed by: INTERNAL MEDICINE

## 2017-11-13 PROCEDURE — 84550 ASSAY OF BLOOD/URIC ACID: CPT

## 2017-11-13 PROCEDURE — 99499 UNLISTED E&M SERVICE: CPT | Mod: S$GLB,,, | Performed by: INTERNAL MEDICINE

## 2017-11-13 RX ORDER — ACETAMINOPHEN 325 MG/1
650 TABLET ORAL
Status: CANCELLED | OUTPATIENT
Start: 2017-11-14

## 2017-11-13 RX ORDER — FAMOTIDINE 10 MG/ML
20 INJECTION INTRAVENOUS
Status: CANCELLED | OUTPATIENT
Start: 2017-11-14

## 2017-11-13 RX ORDER — MEPERIDINE HYDROCHLORIDE 50 MG/ML
25 INJECTION INTRAMUSCULAR; INTRAVENOUS; SUBCUTANEOUS
Status: CANCELLED | OUTPATIENT
Start: 2017-11-14

## 2017-11-13 RX ORDER — HEPARIN 100 UNIT/ML
500 SYRINGE INTRAVENOUS
Status: CANCELLED | OUTPATIENT
Start: 2017-11-14

## 2017-11-13 RX ORDER — SODIUM CHLORIDE 0.9 % (FLUSH) 0.9 %
10 SYRINGE (ML) INJECTION
Status: CANCELLED | OUTPATIENT
Start: 2017-11-15

## 2017-11-13 RX ORDER — HEPARIN 100 UNIT/ML
500 SYRINGE INTRAVENOUS
Status: CANCELLED | OUTPATIENT
Start: 2017-11-15

## 2017-11-13 RX ORDER — SODIUM CHLORIDE 0.9 % (FLUSH) 0.9 %
10 SYRINGE (ML) INJECTION
Status: CANCELLED | OUTPATIENT
Start: 2017-11-14

## 2017-11-13 NOTE — PROGRESS NOTES
HISTORY OF PRESENT ILLNESS:  Mr. Francis is a 72-year-old man who has had many   treatments since 1996 for B-cell lymphoma.  I recorded the history of these   treatments in my note of 09/05/2017.    In August 2017, he began to have persistent abdominal cramping pain, anorexia   and more diarrhea.  Computed tomography of the abdomen on 09/01/2017 showed   enlarged mesenteric nodes and a large mass in the right lower abdomen, which   encased the distal ileum and extended into the anterior abdominal wall.  There   was no bowel obstruction.    After discussing his case with the surgical oncologist, I recommended that he   start bendamustine, Rituxan and prednisone and he has now completed three   courses of those drugs.  He had substantial fatigue and weakness after each of   these.  He has sometimes had low-grade fever, but he has not had neutropenia.    When I saw him in late October 2017, he was complaining of diarrhea.  That   problem has substantially improved.    He was hospitalized between 10/31/2017 and 11/02/2017 when he developed severe   abdominal pain and evidence of small bowel obstruction.  With intravenous fluids   and bowel rest, his symptoms substantially diminished.  He is now following a   low residue diet and drinking more fluids.  A small bowel x-ray on 11/01/2017   showed mild dilation of the small bowel proximal to a previous anastomotic site   with findings compatible with partial low-grade obstruction.  A computed   tomography examination on 10/31/2017 showed substantial regression of the   abdominal masses and lymph nodes that had been seen before.  There remained only   one borderline enlarged mesenteric lymph node, the largest diameter of which   was 1 cm.  There was small bowel dilation proximal to the area where he has  surgical anastomosis.    His severe pain resolved and he now only has some intermittent much milder   abdominal cramping.  Fortunately, as noted above, the diarrhea is  less of a   problem.    He continues to have persistent pain in his back and legs.  He has had numerous   treatments for this including placement of a dorsal column stimulator.  The   dorsal column stimulator does not seem to have been of benefit and he is   considering having it removed after he completes the current series of   chemotherapy treatment.    He frequently had mild hypokalemia during the hospitalization and today his   potassium is slightly below normal at 3.2.    ADDITIONAL PAST HISTORY, SYSTEM REVIEW, SOCIAL HISTORY AND FAMILY HISTORY:  Have   been reviewed and updated in the electronic record.    PHYSICAL EXAMINATION:  GENERAL APPEARANCE:  A well-developed, well-nourished man who is walking with a   Cane and is no longer using a wheelchair.  EYES:  No jaundice or pallor.  MOUTH AND THROAT:  No mucosal lesions.  Good dentition.  SINUSES:  No tenderness.  NOSE:  Clear nares.  NECK:  No masses or bruits.  No thyroid abnormalities.  LYMPH NODES:  No enlarged cervical, axillary or inguinal nodes.  CHEST AND LUNGS:  Normal respiratory effort.  Clear to auscultation and   percussion.  HEART:  Regular rate and rhythm without murmur or gallop.  ABDOMEN:  Soft without masses or tenderness.  No hepatosplenomegaly.  No   tenderness.  Ventral hernia.  EXTREMITIES:  No edema or cyanosis.  PERIPHERAL VASCULATURE:  Diminished pulses in the feet.  Good popliteal pulses.  NEUROLOGIC:  Motor function is good.  His mental status is normal.  He is fully   oriented.  SKIN:  No suspicious lesions in the areas examined.    LABORATORY STUDIES:  Blood counts today include hemoglobin 9.9, WBC 4790 with   54% granulocytes and platelets 93,000.  Comprehensive metabolic profile is   remarkable for potassium 3.2 and albumin 3.0.    IMPRESSION:  1.  Recurrent lymphoma (in the past this has recurred in both diffuse and   follicular patterns, but the most recent biopsy material has shown diffuse   lymphoma).  2.  Peripheral  neuropathy.  3.  Cardiomyopathy.  4.  Myelodysplastic syndrome with anemia and thrombocytopenia.  5.  Partial small bowel obstruction at the site of prior surgery and prior   lymphoma.    PLAN:  1.  He will begin a fourth cycle of bendamustine and Rituxan along with   prednisone 100 mg daily for five days.  This will start on 11/14/2017.  2.  Weekly CBC.  3.  When blood is drawn in one week, he should also have a potassium and   magnesium level.  4.  Return visit ([facility] appointment) in three weeks with CBC, CMP and appointment   for a fifth course of bendamustine and Rituxan.  5.  I explained to Mr. Francis and his wife that was encouraged by the   findings on the recent CT scan.  I anticipate stopping the current chemotherapy   regimen after he has completed six courses.      ROSA M/ANTONINO  dd: 11/13/2017 08:54:45 (CST)  td: 11/14/2017 06:06:17 (CST)  Doc ID   #[id number]  Job ID #[id number]    CC:

## 2017-11-14 ENCOUNTER — INFUSION (OUTPATIENT)
Dept: INFUSION THERAPY | Facility: HOSPITAL | Age: 72
End: 2017-11-14
Attending: INTERNAL MEDICINE
Payer: MEDICARE

## 2017-11-14 ENCOUNTER — OFFICE VISIT (OUTPATIENT)
Dept: PAIN MEDICINE | Facility: CLINIC | Age: 72
End: 2017-11-14
Payer: MEDICARE

## 2017-11-14 ENCOUNTER — TELEPHONE (OUTPATIENT)
Dept: PAIN MEDICINE | Facility: CLINIC | Age: 72
End: 2017-11-14

## 2017-11-14 VITALS
OXYGEN SATURATION: 96 % | TEMPERATURE: 98 F | HEART RATE: 69 BPM | SYSTOLIC BLOOD PRESSURE: 139 MMHG | DIASTOLIC BLOOD PRESSURE: 65 MMHG | RESPIRATION RATE: 20 BRPM

## 2017-11-14 VITALS
WEIGHT: 189.63 LBS | DIASTOLIC BLOOD PRESSURE: 71 MMHG | HEIGHT: 70 IN | SYSTOLIC BLOOD PRESSURE: 146 MMHG | TEMPERATURE: 98 F | HEART RATE: 76 BPM | RESPIRATION RATE: 20 BRPM | BODY MASS INDEX: 27.15 KG/M2

## 2017-11-14 DIAGNOSIS — G89.4 CHRONIC PAIN SYNDROME: Primary | ICD-10-CM

## 2017-11-14 DIAGNOSIS — M96.1 FAILED BACK SYNDROME: ICD-10-CM

## 2017-11-14 DIAGNOSIS — C83.30 LARGE CELL (DIFFUSE) NON-HODGKIN'S LYMPHOMA: Primary | ICD-10-CM

## 2017-11-14 DIAGNOSIS — M54.16 LUMBAR RADICULOPATHY: ICD-10-CM

## 2017-11-14 DIAGNOSIS — R53.1 WEAKNESS: ICD-10-CM

## 2017-11-14 PROCEDURE — 99213 OFFICE O/P EST LOW 20 MIN: CPT | Mod: S$GLB,,, | Performed by: NURSE PRACTITIONER

## 2017-11-14 PROCEDURE — 96415 CHEMO IV INFUSION ADDL HR: CPT

## 2017-11-14 PROCEDURE — 96413 CHEMO IV INFUSION 1 HR: CPT

## 2017-11-14 PROCEDURE — 96367 TX/PROPH/DG ADDL SEQ IV INF: CPT

## 2017-11-14 PROCEDURE — 99999 PR PBB SHADOW E&M-EST. PATIENT-LVL IV: CPT | Mod: PBBFAC,,, | Performed by: NURSE PRACTITIONER

## 2017-11-14 PROCEDURE — 25000003 PHARM REV CODE 250: Performed by: INTERNAL MEDICINE

## 2017-11-14 PROCEDURE — 99499 UNLISTED E&M SERVICE: CPT | Mod: S$GLB,,, | Performed by: NURSE PRACTITIONER

## 2017-11-14 PROCEDURE — 96417 CHEMO IV INFUS EACH ADDL SEQ: CPT

## 2017-11-14 PROCEDURE — 63600175 PHARM REV CODE 636 W HCPCS: Performed by: INTERNAL MEDICINE

## 2017-11-14 PROCEDURE — 96375 TX/PRO/DX INJ NEW DRUG ADDON: CPT

## 2017-11-14 PROCEDURE — S0028 INJECTION, FAMOTIDINE, 20 MG: HCPCS | Performed by: INTERNAL MEDICINE

## 2017-11-14 RX ORDER — HYDROCODONE BITARTRATE AND ACETAMINOPHEN 5; 325 MG/1; MG/1
1 TABLET ORAL EVERY 8 HOURS PRN
Qty: 90 TABLET | Refills: 0 | Status: SHIPPED | OUTPATIENT
Start: 2017-11-14 | End: 2017-12-14

## 2017-11-14 RX ORDER — MEPERIDINE HYDROCHLORIDE 50 MG/ML
25 INJECTION INTRAMUSCULAR; INTRAVENOUS; SUBCUTANEOUS
Status: DISCONTINUED | OUTPATIENT
Start: 2017-11-14 | End: 2017-11-14 | Stop reason: HOSPADM

## 2017-11-14 RX ORDER — ACETAMINOPHEN 325 MG/1
650 TABLET ORAL
Status: COMPLETED | OUTPATIENT
Start: 2017-11-14 | End: 2017-11-14

## 2017-11-14 RX ORDER — FAMOTIDINE 10 MG/ML
20 INJECTION INTRAVENOUS
Status: COMPLETED | OUTPATIENT
Start: 2017-11-14 | End: 2017-11-14

## 2017-11-14 RX ADMIN — ACETAMINOPHEN 650 MG: 325 TABLET ORAL at 08:11

## 2017-11-14 RX ADMIN — RITUXIMAB 785 MG: 10 INJECTION, SOLUTION INTRAVENOUS at 08:11

## 2017-11-14 RX ADMIN — FAMOTIDINE 20 MG: 10 INJECTION INTRAVENOUS at 08:11

## 2017-11-14 RX ADMIN — BENDAMUSTINE HYDROCHLORIDE 210 MG: 25 INJECTION, SOLUTION INTRAVENOUS at 11:11

## 2017-11-14 RX ADMIN — DIPHENHYDRAMINE HYDROCHLORIDE 50 MG: 50 INJECTION, SOLUTION INTRAMUSCULAR; INTRAVENOUS at 08:11

## 2017-11-14 RX ADMIN — DEXAMETHASONE SODIUM PHOSPHATE: 4 INJECTION, SOLUTION INTRAMUSCULAR; INTRAVENOUS at 08:11

## 2017-11-14 NOTE — PROGRESS NOTES
Chronic patient Established Note (Follow up visit)      SUBJECTIVE:    Royce Francis presents to the clinic for a follow-up appointment for chronic back pain.  He is s/p Nevro lumbar SCS implant on 6/29/17 which is currently not providing much benefit.  He is reporting worsening pain since undergoing chemotherapy.  Since his last visit, he was diagnosed with lymphoma again.  He has a hx of non-Hodgkins lymphoma which has recurred multiple times.  He continues to follow up closely with Dr. Lucia at [facility].  He has been using Norco 5/325 mg PRN, prescribed by our office.  When he was discharged from the hospital, he was given 10/325 mg which he thinks was too strong.  He would like to return to 5 mg but possible take more than every 12 hours if needed.  Since the last visit, Royce Francis states the pain has been worsening.         Pain Disability Index Review:  Last 3 PDI Scores 11/14/2017 8/9/2017 7/24/2017   Pain Disability Index (PDI) 54 58 30       Pain Medications:    - Opioids: Norco (Hydrocodone/Acetaminophen) 5/325 mg Q12h PRN pain    Opioid Contract: no     report:  Reviewed and consistent with medication use as prescribed.    Pain Procedures:   6/29/17 Lumbar SCS implant Nevro-   5/23/17 Lumbar SCS trial- significant benefit of back and leg pain  1/16/17 Left L2,3,4,5 RFA- significant relief  12/14/16 Bilateral Synvisc One- significant relief  3/28/16- Bilateral L4-5 AND L5-S1 Facet injection  8/28/15- Bilateral L4-5 and L5-S1 Facet injection  6/5/15- Caudal DARIO with Racz Catheter   4/10/15- Bilateral L4-5 TF DARIO  2/20/15- Left L3-4-5 RFA  2/13/15- right L3-4-5 RFA  1/16/15- bilateral L3-4-5 MBB  9/12/14- bilateral L3-4-5 MBB  8/15/14- Bilateral L4-5 TF DARIO    Physical Therapy/Home Exercise: per self    Imaging:     Thoracic XRAY 4/21/16  Narrative   AP and lateral radiographs of the thoracic spine were obtained.  There is mild scoliosis of the thoracolumbar spine.  Posterior vertebral  alignment appears satisfactory.  Vertebral body heights appear well-maintained.  There are degenerative changes with small anterior osteophytes noted throughout the thoracic spine.  No abnormal paraspinal masses are evident.   Impression     Thoracic spondylosis.  Mild thoracolumbar scoliosis.  No evidence for acute fracture, bone destruction, or subluxation.     Lumbar MRI 7/28/14  Narrative   MRI LUMBAR SPINE    TECHNIQUE: MRI lumbar spine was performed on a 1.5T magnet. The following sequences were obtained: Localizer; sagittal T1, T2, STIR; axial T1 and T2. After administration of 20 mL Omniscan, axial and sagittal T1-weighted sequences were attained.    COMPARISON: Not available.    FINDINGS:    There are 5 lumbar vertebrae.  There is lumbar levoscoliosis.  There is grade 1 anterolisthesis of L4 on L5.  Vertebral body heights are maintained.  Degenerative endplate changes are noted.  No evidence for infiltrative process.  There is multilevel   severe, asymmetric loss of disk height. Conus terminates at L1 and appears unremarkable. Limited evaluation of posterior abdominal structures is unremarkable.  Paraspinal musculature is within normal limits.  Evaluation of sacroiliac joints is   unremarkable.  S3 Tarlov cyst noted.    L1-L2: There is mild bilateral facet arthrosis and circumferential disk bulge resulting in mild spinal canal stenosis and mild right neural foraminal narrowing.    L2-L3: There is a compression disk bulge and moderate bilateral facet arthrosis resulting in moderate spinal canal stenosis and mild bilateral neural foraminal narrowing.    L3-L4: There is mild bilateral facet arthrosis and circumferential disk bulge resulting in mild left neural foraminal narrowing.    L4-L5: There is moderate circumferential disk bulge and bilateral facet arthrosis resulting in moderate spinal canal stenosis and moderate left neural foraminal narrowing.    L5-S1: There is severe right facet arthrosis and mild  circumferential disk bulge resulting in moderate right neural foraminal narrowing.   Impression    No marrow infiltrative process. Multilevel degenerative changes with moderate spinal canal stenosis as detailed above       Thoracic XRAY 4/21/16  Narrative   AP and lateral radiographs of the thoracic spine were obtained.  There is mild scoliosis of the thoracolumbar spine.  Posterior vertebral alignment appears satisfactory.  Vertebral body heights appear well-maintained.  There are degenerative changes with small anterior osteophytes noted throughout the thoracic spine.  No abnormal paraspinal masses are evident.   Impression     Thoracic spondylosis.  Mild thoracolumbar scoliosis.  No evidence for acute fracture       Bilateral Knee XRAYs 3/10/16  Narrative   AP, lateral, and sunrise views of both knees were obtained.  The bones are intact.  There is no evidence for acute fracture or bone destruction.  There is chondrocalcinosis present.  There is moderate narrowing of the medial compartments of the femorotibial joints bilaterally.  There is spurring of the tibial spines with osteophytes at the femorotibial and patellofemoral joints.  There is mild soft tissue prominence in a right suprapatellar location and a small right-sided joint effusion cannot be excluded.  Atherosclerotic calcification is present within the arteries of the distal thighs and proximal calf.   Impression     Prominent symmetric degenerative changes of the femorotibial and patellofemoral joints with moderate narrowing of the medial compartments of the femorotibial joints.  Chondrocalcinosis noted bilaterally.  Possible right suprapatellar joint effusion.           Allergies:   Review of patient's allergies indicates:   Allergen Reactions    Iodine and iodide containing products Itching     Contrast dye       Current Medications:   Current Outpatient Prescriptions   Medication Sig Dispense Refill    ascorbic acid, vitamin C, (VITAMIN C) 1000 MG  tablet Take 1,000 mg by mouth once daily. liposomal      cyanocobalamin (VITAMIN B-12) 1000 MCG tablet Take 100 mcg by mouth once daily.      enalapril (VASOTEC) 5 MG tablet Take 1 tablet (5 mg total) by mouth once daily. 90 tablet 3    esomeprazole (NEXIUM) 20 MG capsule Take 20 mg by mouth before breakfast.      flurazepam (DALMANE) 30 MG capsule Take 30 mg by mouth nightly as needed for Insomnia.      fluticasone (FLONASE) 50 mcg/actuation nasal spray SPRAY ONCE IN EACH NOSTRIL ONCE DAILY 48 g 3    furosemide (LASIX) 40 MG tablet One daily as needed for edema 30 tablet 11    hydrocodone-acetaminophen 10-325mg (NORCO)  mg Tab Take 1 tablet by mouth every 6 (six) hours as needed for Pain. 40 tablet 0    lactobacillus rhamnosus GG (CULTURELLE) 10 billion cell capsule Take 1 capsule by mouth once daily.      lidocaine-prilocaine (EMLA) cream       metoprolol tartrate (LOPRESSOR) 50 MG tablet Take 0.5 tablets (25 mg total) by mouth 2 (two) times daily. 180 tablet 3    mirtazapine (REMERON) 15 MG tablet Take 1 tablet (15 mg total) by mouth every evening. 90 tablet 3    multivitamin capsule Take 1 capsule by mouth once daily.      NON FORMULARY MEDICATION 300 mg once daily. Desiccated Liver      predniSONE (DELTASONE) 50 MG Tab 2 tablets daily for 5 days starting today.  In the future, prednisone will be started on the first day of chemotherapy 10 tablet 6    tamsulosin (FLOMAX) 0.4 mg Cp24 Take 1 capsule (0.4 mg total) by mouth once daily. 90 capsule 3    valacyclovir (VALTREX) 500 MG tablet Take 1 tablet (500 mg total) by mouth once daily. 90 tablet 3     No current facility-administered medications for this visit.      Facility-Administered Medications Ordered in Other Visits   Medication Dose Route Frequency Provider Last Rate Last Dose    meperidine injection 25 mg  25 mg Intravenous PRN Ye Lucia Jr., MD           REVIEW OF SYSTEMS:    GENERAL:  No weight loss, malaise or  fevers.  HEENT:  Negative for frequent or significant headaches.  NECK:  Negative for lumps, goiter, pain and significant neck swelling.  RESPIRATORY:  Negative for cough, wheezing or shortness of breath.  CARDIOVASCULAR:  Negative for chest pain, leg swelling or palpitations. H/O CHF and cardiomyopathy.  GI:  Negative for abdominal discomfort, blood in stools or black stools or change in bowel habits.  MUSCULOSKELETAL:  See HPI.  SKIN:  Negative for lesions, rash, and itching.  PSYCH:  Negative for sleep disturbance, mood disorder and recent psychosocial stressors.  HEMATOLOGY/LYMPHOLOGY:  Negative for prolonged bleeding, bruising easily or swollen nodes. H/O lymphoma and squamous cell carcinoma.  NEURO:   No history of headaches, syncope, paralysis, seizures or tremors.  All other reviewed and negative other than HPI.    Past Medical History:  Past Medical History:   Diagnosis Date    Allergy     Anemia     Arthritis     Basal cell carcinoma     excised from upper back    Cancer     Cardiomyopathy due to chemotherapy     Cataract     CHF (congestive heart failure)     Coronary artery disease     D1    Encounter for blood transfusion     Eye injuries 20 yrs    ou fb several x's    Hyperlipidemia     Hypertension     Hypertensive heart disease with heart failure 1/31/2013    Lymphoma 1996    Obstructive sleep apnea on CPAP     SCC (squamous cell carcinoma) excised 12/2015    R proximal forearm    Skin disease     Sleep apnea     SQUAMOUS CELL CARCINOMA excised 2/14/13    L forearm    Squamous cell carcinoma excised 8/4/2016    IN SITU, Left thigh MOHS    Squamous cell carcinoma excised 8/25/2016    left helix, MOHS       Past Surgical History:  Past Surgical History:   Procedure Laterality Date    CATARACT EXTRACTION      od dr yanes    CATARACT EXTRACTION W/  INTRAOCULAR LENS IMPLANT Left 01/12/2017    Dr. Yanes    EYE SURGERY      Rt cataract    KIDNEY STONE SURGERY      SMALL  INTESTINE SURGERY  09/2013    TONSILLECTOMY         Family History:  Family History   Problem Relation Age of Onset    Cataracts Mother     Hypertension Mother     Cataracts Father     Hypertension Father     Cancer Father      lung    Hypertension Maternal Grandmother     Hypertension Maternal Grandfather     Hypertension Paternal Grandmother     Hypertension Paternal Grandfather     No Known Problems Sister     No Known Problems Brother     No Known Problems Maternal Aunt     No Known Problems Maternal Uncle     No Known Problems Paternal Aunt     No Known Problems Paternal Uncle     Amblyopia Neg Hx     Blindness Neg Hx     Diabetes Neg Hx     Glaucoma Neg Hx     Macular degeneration Neg Hx     Retinal detachment Neg Hx     Strabismus Neg Hx     Stroke Neg Hx     Thyroid disease Neg Hx     Melanoma Neg Hx     Heart disease Neg Hx     Celiac disease Neg Hx     Cirrhosis Neg Hx     Colon cancer Neg Hx     Colon polyps Neg Hx     Crohn's disease Neg Hx     Cystic fibrosis Neg Hx     Esophageal cancer Neg Hx     Hemochromatosis Neg Hx     Inflammatory bowel disease Neg Hx     Irritable bowel syndrome Neg Hx     Liver cancer Neg Hx     Liver disease Neg Hx     Rectal cancer Neg Hx     Stomach cancer Neg Hx     Ulcerative colitis Neg Hx     Burt's disease Neg Hx        Social History:  Social History     Social History    Marital status:      Spouse name: N/A    Number of children: N/A    Years of education: N/A     Occupational History    retired      Social History Main Topics    Smoking status: Former Smoker     Packs/day: 1.50     Years: 1.00     Quit date: 1960    Smokeless tobacco: Never Used    Alcohol use 1.2 oz/week     1 Glasses of wine, 1 Shots of liquor per week      Comment: socially    Drug use: No    Sexual activity: Yes     Partners: Female      Comment:      Other Topics Concern    None     Social History Narrative    None  "      OBJECTIVE:    BP (!) 146/71   Pulse 76   Temp 97.6 °F (36.4 °C) (Oral)   Resp 20   Ht 5' 10" (1.778 m)   Wt 86 kg (189 lb 9.6 oz)   BMI 27.20 kg/m²     PHYSICAL EXAMINATION:    General appearance: Well appearing, in no acute distress, alert and oriented x3.  Psych:  Mood and affect appropriate.  Skin:  SCS incisions well healing without signs of infection.  Head/face:  Atraumatic, normocephalic. No palpable lymph nodes  Cor: RRR  Pulm: CTA  GI: Abdomen soft and non-tender.  Back: There is pain to palpation of lumbar facet joints.  There is limited extension and flexion with pain.  Positive facet loading bilaterally.  Negative SLR.  Extremities: Peripheral joint ROM is full and pain free without obvious instability or laxity in all four extremities. No deformities, edema, or skin discoloration. Good capillary refill.  Neuro: Bilateral upper and lower extremity coordination and muscle stretch reflexes are physiologic and symmetric.  Plantar response are downgoing.    Gait: Antalgic.    ASSESSMENT: 72 y.o. year old male with lower back and BLE pain, consistent with the following diagnoses:     1. Chronic pain syndrome     2. Weakness     3. Failed back syndrome     4. Lumbar radiculopathy           PLAN:     - Previous imaging was reviewed and discussed with the patient today.     - I will have Haseeb reach out to the patient to see if reprogramming his SCS may provide more benefit.    - He will continue with chemotherapy and close f/u with Dr. Lucia.    - Can continue Norco 5/325 mg Q8h PRN pain.  He was provided with a refill today, #90.    - The patient will continue a home exercise routine to help with pain and strengthening.      - RTC in 1 month.  If he cannot come in for visit, he may call for refill.        The above plan and management options were discussed at length with patient. Patient is in agreement with the above and verbalized understanding.    Francisca Chen  11/14/2017  "

## 2017-11-14 NOTE — TELEPHONE ENCOUNTER
Staff spoke with patient's wife whom agreed to take the offered 11/14/17 appointment today for 2 pm.     They expressed thanks for call back.

## 2017-11-14 NOTE — TELEPHONE ENCOUNTER
----- Message from Tracy Cheng sent at 11/14/2017  8:10 AM CST -----  Contact: Selma   _haleigh  1st Request  _  2nd Request  _  3rd Request        Who: Selma     Why: patient's wife states her  would like to come today for 2pm if the appt is still available. Please call back to confirm .     What Number to Call Back: [phone]    When to Expect a call back: (Before the end of the day)   -- if call after 3:00 call back will be tomorrow.

## 2017-11-14 NOTE — PLAN OF CARE
tProblem: Patient Care Overview  Goal: Plan of Care Review  Outcome: Ongoing (interventions implemented as appropriate)  Reports taking Prednisone as ordered starting today for 5 days.  Pt. Tolerated treatment well. Discharged without complaints or signs of adverse effects. To return tomorrow for Bendeka Day2.

## 2017-11-14 NOTE — PATIENT INSTRUCTIONS
Managing Fatigue     Family members can help with meals and chores around the house.   Fatigue is common. It can be caused by worry, lack of sleep, or poor appetite. Fatigue can also be a sign of anemia, a shortage of red blood cells. You might need medical treatment for anemia. The tips below can help you feel better.  Conserving energy  · Keep track of the times of day when you are most tired and plan around them. For instance, if you are more tired in the afternoon, try to get tasks done in the morning.  · Decide which tasks are most important. Do those first.  · Pass tasks along to others when you need to. Ask for help.  · Accept help when its offered. Tell people what they can do to help. For instance, you may need someone to fix a meal, fold clothes, or put gas in your car.  · Plan rest times. You may want to take a nap each day. Just sitting quietly for a few minutes can make you feel more rested.  What you can do to feel better  · Relax before you try to sleep. Take a bath or read for a while.  · Form a sleep pattern. Go to bed at the same time each night and get up at the same time each morning.  · Eat well. Choose foods from all of the food groups each day.  · Exercise. Take a brisk walk to help increase your energy.  · Avoid caffeine and alcohol. Drink plenty of water or fruit juices instead.  Treating anemia  If you begin to feel more tired than normal, tell your doctor. Fatigue could be a sign of anemia. This problem is fairly common in cancer patients, especially during chemotherapy and radiation treatments. If your red blood cell count is too low, you may get a blood transfusion. In some cases, you may need medicine to increase the number of red blood cells your body makes.  When to call your healthcare provider  Call your healthcare provider if you have:  · Shortness of breath or chest pain  · A dizzy feeling when you get up from lying or sitting down  · Paler skin than normal  · Extreme tiredness  that is not helped by sleep   Date Last Reviewed: 1/3/2016  © 7681-5272 [employer]. [address] [place] [address], [place] [address]. All rights reserved. This information is not intended as a substitute for professional medical care. Always follow your healthcare professional's instructions.

## 2017-11-15 ENCOUNTER — INFUSION (OUTPATIENT)
Dept: INFUSION THERAPY | Facility: HOSPITAL | Age: 72
End: 2017-11-15
Attending: INTERNAL MEDICINE
Payer: MEDICARE

## 2017-11-15 VITALS — SYSTOLIC BLOOD PRESSURE: 149 MMHG | RESPIRATION RATE: 18 BRPM | DIASTOLIC BLOOD PRESSURE: 66 MMHG | HEART RATE: 55 BPM

## 2017-11-15 DIAGNOSIS — C83.30 LARGE CELL (DIFFUSE) NON-HODGKIN'S LYMPHOMA: Primary | ICD-10-CM

## 2017-11-15 PROCEDURE — 25000003 PHARM REV CODE 250: Performed by: INTERNAL MEDICINE

## 2017-11-15 PROCEDURE — 96409 CHEMO IV PUSH SNGL DRUG: CPT

## 2017-11-15 PROCEDURE — 63600175 PHARM REV CODE 636 W HCPCS: Performed by: INTERNAL MEDICINE

## 2017-11-15 RX ORDER — HEPARIN 100 UNIT/ML
500 SYRINGE INTRAVENOUS
Status: DISCONTINUED | OUTPATIENT
Start: 2017-11-15 | End: 2017-11-15 | Stop reason: HOSPADM

## 2017-11-15 RX ORDER — SODIUM CHLORIDE 0.9 % (FLUSH) 0.9 %
10 SYRINGE (ML) INJECTION
Status: DISCONTINUED | OUTPATIENT
Start: 2017-11-15 | End: 2017-11-15 | Stop reason: HOSPADM

## 2017-11-15 RX ADMIN — BENDAMUSTINE HYDROCHLORIDE 210 MG: 25 INJECTION, SOLUTION INTRAVENOUS at 08:11

## 2017-11-15 NOTE — PLAN OF CARE
Problem: Patient Care Overview  Goal: Plan of Care Review  Outcome: Ongoing (interventions implemented as appropriate)  Pt arrived for day 2 Bendeka. PIV started to left hand. Blankets and snacks offered. VSS; NAD. Will monitor.

## 2017-11-15 NOTE — PLAN OF CARE
Problem: Patient Care Overview  Goal: Plan of Care Review  Outcome: Ongoing (interventions implemented as appropriate)  Pt tolerated Bendeka infusion without complication. PIV removed. Discharging unassisted.

## 2017-11-16 ENCOUNTER — INFUSION (OUTPATIENT)
Dept: INFUSION THERAPY | Facility: HOSPITAL | Age: 72
End: 2017-11-16
Attending: INTERNAL MEDICINE
Payer: MEDICARE

## 2017-11-16 DIAGNOSIS — C83.30 LARGE CELL (DIFFUSE) NON-HODGKIN'S LYMPHOMA: Primary | ICD-10-CM

## 2017-11-16 DIAGNOSIS — D50.0 ANEMIA DUE TO CHRONIC BLOOD LOSS: Primary | ICD-10-CM

## 2017-11-16 PROCEDURE — 96372 THER/PROPH/DIAG INJ SC/IM: CPT

## 2017-11-16 PROCEDURE — 63600175 PHARM REV CODE 636 W HCPCS: Performed by: INTERNAL MEDICINE

## 2017-11-16 RX ADMIN — PEGFILGRASTIM 6 MG: 6 INJECTION SUBCUTANEOUS at 10:11

## 2017-11-16 NOTE — NURSING
Pt arrived neulasta following chemo yesterday.  Pt tolerated injection SQ to left arm.  Discharged to home using his cane.

## 2017-11-18 ENCOUNTER — PATIENT MESSAGE (OUTPATIENT)
Dept: HEMATOLOGY/ONCOLOGY | Facility: CLINIC | Age: 72
End: 2017-11-18

## 2017-11-19 ENCOUNTER — PATIENT MESSAGE (OUTPATIENT)
Dept: HEMATOLOGY/ONCOLOGY | Facility: CLINIC | Age: 72
End: 2017-11-19

## 2017-11-20 ENCOUNTER — TELEPHONE (OUTPATIENT)
Dept: HEMATOLOGY/ONCOLOGY | Facility: CLINIC | Age: 72
End: 2017-11-20

## 2017-11-20 ENCOUNTER — PATIENT MESSAGE (OUTPATIENT)
Dept: HEMATOLOGY/ONCOLOGY | Facility: CLINIC | Age: 72
End: 2017-11-20

## 2017-11-20 DIAGNOSIS — E87.6 HYPOKALEMIA: ICD-10-CM

## 2017-11-20 DIAGNOSIS — E87.6 HYPOKALEMIA: Primary | ICD-10-CM

## 2017-11-20 RX ORDER — LANOLIN ALCOHOL/MO/W.PET/CERES
CREAM (GRAM) TOPICAL
Qty: 90 TABLET | Refills: 2 | Status: SHIPPED | OUTPATIENT
Start: 2017-11-20 | End: 2017-12-04 | Stop reason: ALTCHOICE

## 2017-11-20 RX ORDER — POTASSIUM CHLORIDE 20 MEQ/1
20 TABLET, EXTENDED RELEASE ORAL 2 TIMES DAILY
Qty: 60 TABLET | Refills: 0 | Status: SHIPPED | OUTPATIENT
Start: 2017-11-20 | End: 2017-11-20 | Stop reason: SDUPTHER

## 2017-11-20 RX ORDER — POTASSIUM CHLORIDE 20 MEQ/1
TABLET, EXTENDED RELEASE ORAL
Qty: 180 TABLET | Refills: 0 | Status: SHIPPED | OUTPATIENT
Start: 2017-11-20 | End: 2017-12-04 | Stop reason: ALTCHOICE

## 2017-11-22 ENCOUNTER — PATIENT MESSAGE (OUTPATIENT)
Dept: HEMATOLOGY/ONCOLOGY | Facility: CLINIC | Age: 72
End: 2017-11-22

## 2017-11-23 ENCOUNTER — PATIENT MESSAGE (OUTPATIENT)
Dept: HEMATOLOGY/ONCOLOGY | Facility: CLINIC | Age: 72
End: 2017-11-23

## 2017-11-24 ENCOUNTER — PATIENT MESSAGE (OUTPATIENT)
Dept: FAMILY MEDICINE | Facility: CLINIC | Age: 72
End: 2017-11-24

## 2017-11-24 NOTE — TELEPHONE ENCOUNTER
"Wife, Selma, states the patient has a hard place on forearm that is "a little bit red and painful".  She states he has been applying heat and taking Tylenol which have been helpful.  Appointment scheduled with NELI Zuñiga for 11/27/2017.  "

## 2017-11-27 ENCOUNTER — OFFICE VISIT (OUTPATIENT)
Dept: FAMILY MEDICINE | Facility: CLINIC | Age: 72
End: 2017-11-27
Payer: MEDICARE

## 2017-11-27 ENCOUNTER — LAB VISIT (OUTPATIENT)
Dept: LAB | Facility: HOSPITAL | Age: 72
End: 2017-11-27
Attending: INTERNAL MEDICINE
Payer: MEDICARE

## 2017-11-27 ENCOUNTER — PATIENT MESSAGE (OUTPATIENT)
Dept: HEMATOLOGY/ONCOLOGY | Facility: CLINIC | Age: 72
End: 2017-11-27

## 2017-11-27 VITALS
SYSTOLIC BLOOD PRESSURE: 120 MMHG | DIASTOLIC BLOOD PRESSURE: 70 MMHG | BODY MASS INDEX: 26.51 KG/M2 | HEART RATE: 74 BPM | TEMPERATURE: 98 F | HEIGHT: 70 IN | WEIGHT: 185.19 LBS | OXYGEN SATURATION: 98 %

## 2017-11-27 DIAGNOSIS — C83.30 LARGE CELL (DIFFUSE) NON-HODGKIN'S LYMPHOMA: ICD-10-CM

## 2017-11-27 DIAGNOSIS — C82.90 FOLLICULAR LYMPHOMA: ICD-10-CM

## 2017-11-27 DIAGNOSIS — C82.00 FOLLICULAR LYMPHOMA GRADE I, UNSPECIFIED BODY REGION: ICD-10-CM

## 2017-11-27 DIAGNOSIS — L02.413 ABSCESS OF ARM, RIGHT: Primary | ICD-10-CM

## 2017-11-27 DIAGNOSIS — D84.9 IMMUNOCOMPROMISED STATE: ICD-10-CM

## 2017-11-27 LAB
BASOPHILS # BLD AUTO: 0.02 K/UL
BASOPHILS NFR BLD: 0.3 %
DIFFERENTIAL METHOD: ABNORMAL
EOSINOPHIL # BLD AUTO: 0.1 K/UL
EOSINOPHIL NFR BLD: 1.5 %
ERYTHROCYTE [DISTWIDTH] IN BLOOD BY AUTOMATED COUNT: 15.5 %
HCT VFR BLD AUTO: 34.9 %
HGB BLD-MCNC: 11.1 G/DL
LYMPHOCYTES # BLD AUTO: 1.3 K/UL
LYMPHOCYTES NFR BLD: 18.7 %
MCH RBC QN AUTO: 27.8 PG
MCHC RBC AUTO-ENTMCNC: 31.8 G/DL
MCV RBC AUTO: 87 FL
MONOCYTES # BLD AUTO: 0.6 K/UL
MONOCYTES NFR BLD: 8.1 %
NEUTROPHILS # BLD AUTO: 4.8 K/UL
NEUTROPHILS NFR BLD: 71.4 %
PLATELET # BLD AUTO: 88 K/UL
PMV BLD AUTO: 9 FL
RBC # BLD AUTO: 4 M/UL
WBC # BLD AUTO: 6.75 K/UL

## 2017-11-27 PROCEDURE — 85025 COMPLETE CBC W/AUTO DIFF WBC: CPT | Mod: PO

## 2017-11-27 PROCEDURE — 36415 COLL VENOUS BLD VENIPUNCTURE: CPT | Mod: PO

## 2017-11-27 PROCEDURE — 99999 PR PBB SHADOW E&M-EST. PATIENT-LVL V: CPT | Mod: PBBFAC,,, | Performed by: NURSE PRACTITIONER

## 2017-11-27 PROCEDURE — 99214 OFFICE O/P EST MOD 30 MIN: CPT | Mod: S$GLB,,, | Performed by: NURSE PRACTITIONER

## 2017-11-27 PROCEDURE — 99499 UNLISTED E&M SERVICE: CPT | Mod: S$GLB,,, | Performed by: NURSE PRACTITIONER

## 2017-11-27 RX ORDER — SULFAMETHOXAZOLE AND TRIMETHOPRIM 800; 160 MG/1; MG/1
1 TABLET ORAL 2 TIMES DAILY
Qty: 20 TABLET | Refills: 0 | Status: SHIPPED | OUTPATIENT
Start: 2017-11-27 | End: 2017-12-07

## 2017-11-27 NOTE — PATIENT INSTRUCTIONS
Abscess (Antibiotic Treatment Only)  An abscess (sometimes called a boil) happens when bacteria get trapped under the skin and start to grow. Pus forms inside the abscess as the body responds to the bacteria. An abscess can happen with an insect bite, ingrown hair, blocked oil gland, pimple, cyst, or puncture wound.  In the early stages, your wound may be red and tender. For this stage, you may get antibiotics. If the abscess does not get better with antibiotics, it will need to be drained with a small cut.  Home care  These tips will help you care for your abscess at home:  · Soak the wound in hot water or apply hot packs (small towel soaked in hot water) to the area for 20 minutes at a time. Do this 3 to 4 times a day.  · Do not cut, squeeze, or pop the boil yourself.  · Apply antibiotic cream or ointment to the skin 3 to 4 times a day, unless something else was prescribed. Some ointments include an antibiotic plus a pain reliever.  · If your doctor prescribed antibiotics, do not stop taking them until you have finished the medicine or the doctor tells you to stop.  · You may use an over-the-counter pain medicine to control pain, unless another pain medicine was prescribed. If you have chronic liver or kidney disease or ever had a stomach ulcer or gastrointestinal bleeding, talk with your doctor before using these any of these.  Follow-up care  Follow up with your healthcare provider, or as advised. Check your wound each day for the signs of worsening infection listed below.  When to seek medical advice  Get prompt medical attention if any of these occur:  · An increase in redness or swelling  · Red streaks in the skin leading away from the abscess  · An increase in local pain or swelling  · Fever of 100.4ºF (38ºC) or higher, or as directed by your healthcare provider  · Pus or fluid coming from the abscess  · Boil returns after getting better  Date Last Reviewed: 9/1/2016  © 5738-4304 [employer] [address] [place] [id number]. All rights reserved. This information is not intended as a substitute for professional medical care. Always follow your healthcare professional's instructions.

## 2017-11-27 NOTE — PROGRESS NOTES
"History of Present Illness   Royce Francis is a 72 y.o. man with medical history as listed below who presents today for evaluation of possible abscess to right forearm. He first noticed symptoms about two weeks ago. He complains of tender, red, and warm "bump" to right forearm. He has had associated low grade fevers. He has had no drainage to the area. He has tried tylenol and warm compresses with minimal relief. He has no additional complaints and is otherwise healthy on today's visit.      Past Medical History:   Diagnosis Date    Allergy     Anemia     Arthritis     Basal cell carcinoma     excised from upper back    Cancer     Cardiomyopathy due to chemotherapy     Cataract     CHF (congestive heart failure)     Coronary artery disease     D1    Encounter for blood transfusion     Eye injuries 20 yrs    ou fb several x's    Hyperlipidemia     Hypertension     Hypertensive heart disease with heart failure 1/31/2013    Lymphoma 1996    Obstructive sleep apnea on CPAP     SCC (squamous cell carcinoma) excised 12/2015    R proximal forearm    Skin disease     Sleep apnea     SQUAMOUS CELL CARCINOMA excised 2/14/13    L forearm    Squamous cell carcinoma excised 8/4/2016    IN SITU, Left thigh MOHS    Squamous cell carcinoma excised 8/25/2016    left helix, MOHS       Past Surgical History:   Procedure Laterality Date    CATARACT EXTRACTION      od dr yanes    CATARACT EXTRACTION W/  INTRAOCULAR LENS IMPLANT Left 01/12/2017    Dr. Yanes    EYE SURGERY      Rt cataract    KIDNEY STONE SURGERY      SMALL INTESTINE SURGERY  09/2013    TONSILLECTOMY         Social History     Social History    Marital status:      Spouse name: N/A    Number of children: N/A    Years of education: N/A     Occupational History    retired      Social History Main Topics    Smoking status: Former Smoker     Packs/day: 1.50     Years: 1.00     Quit date: 1960    Smokeless tobacco: Never " Used    Alcohol use 1.2 oz/week     1 Glasses of wine, 1 Shots of liquor per week      Comment: socially    Drug use: No    Sexual activity: Yes     Partners: Female      Comment:      Other Topics Concern    None     Social History Narrative    None       Family History   Problem Relation Age of Onset    Cataracts Mother     Hypertension Mother     Cataracts Father     Hypertension Father     Cancer Father      lung    Hypertension Maternal Grandmother     Hypertension Maternal Grandfather     Hypertension Paternal Grandmother     Hypertension Paternal Grandfather     No Known Problems Sister     No Known Problems Brother     No Known Problems Maternal Aunt     No Known Problems Maternal Uncle     No Known Problems Paternal Aunt     No Known Problems Paternal Uncle     Amblyopia Neg Hx     Blindness Neg Hx     Diabetes Neg Hx     Glaucoma Neg Hx     Macular degeneration Neg Hx     Retinal detachment Neg Hx     Strabismus Neg Hx     Stroke Neg Hx     Thyroid disease Neg Hx     Melanoma Neg Hx     Heart disease Neg Hx     Celiac disease Neg Hx     Cirrhosis Neg Hx     Colon cancer Neg Hx     Colon polyps Neg Hx     Crohn's disease Neg Hx     Cystic fibrosis Neg Hx     Esophageal cancer Neg Hx     Hemochromatosis Neg Hx     Inflammatory bowel disease Neg Hx     Irritable bowel syndrome Neg Hx     Liver cancer Neg Hx     Liver disease Neg Hx     Rectal cancer Neg Hx     Stomach cancer Neg Hx     Ulcerative colitis Neg Hx     Burt's disease Neg Hx        Review of Systems  Review of Systems   Constitutional: Positive for fever and malaise/fatigue.   HENT: Positive for hearing loss.    Skin: Positive for itching and rash.   Neurological: Positive for weakness.     A complete review of systems was otherwise negative.    Physical Exam  /70 (BP Location: Left arm, Patient Position: Sitting, BP Method: Large (Manual))   Pulse 74   Temp 98.4 °F (36.9 °C) (Oral)   " Ht 5' 10" (1.778 m)   Wt 84 kg (185 lb 3 oz)   SpO2 98%   BMI 26.57 kg/m²   General appearance: alert, appears stated age, cooperative and no distress  Extremities: extremities normal, atraumatic, no cyanosis or edema  Pulses: 2+ and symmetric  Skin: quarter size area of induration to right forearm with surrounding erythema and warmth  Neurologic: Grossly normal    Assessment/Plan  Abscess of arm, right  Treat with Bactrim.  Continue Tylenol and warm compresses.  Monitor for worsening S&S.  ER precautions discussed.  RTC PRN for persistent or worsening symptoms.  Follow-up with hem-onc as scheduled.  -     sulfamethoxazole-trimethoprim 800-160mg (BACTRIM DS) 800-160 mg Tab; Take 1 tablet by mouth 2 (two) times daily.  Dispense: 20 tablet; Refill: 0    Follicular lymphoma grade I, unspecified body region  The current medical regimen is effective;  continue present plan and medications.    Large cell (diffuse) non-Hodgkin's lymphoma  The current medical regimen is effective;  continue present plan and medications.    Immunocompromised state  The current medical regimen is effective;  continue present plan and medications.    He has verbalized understanding and is in agreement with plan of care.    Return if symptoms worsen or fail to improve.  "

## 2017-11-30 ENCOUNTER — PATIENT MESSAGE (OUTPATIENT)
Dept: HEMATOLOGY/ONCOLOGY | Facility: CLINIC | Age: 72
End: 2017-11-30

## 2017-12-01 ENCOUNTER — PATIENT MESSAGE (OUTPATIENT)
Dept: HEMATOLOGY/ONCOLOGY | Facility: CLINIC | Age: 72
End: 2017-12-01

## 2017-12-01 ENCOUNTER — LAB VISIT (OUTPATIENT)
Dept: LAB | Facility: HOSPITAL | Age: 72
End: 2017-12-01
Attending: INTERNAL MEDICINE
Payer: MEDICARE

## 2017-12-01 ENCOUNTER — TELEPHONE (OUTPATIENT)
Dept: HEMATOLOGY/ONCOLOGY | Facility: CLINIC | Age: 72
End: 2017-12-01

## 2017-12-01 DIAGNOSIS — C82.90 FOLLICULAR LYMPHOMA: Primary | ICD-10-CM

## 2017-12-01 DIAGNOSIS — R39.11 URINARY HESITANCY: ICD-10-CM

## 2017-12-01 DIAGNOSIS — C82.90 FOLLICULAR LYMPHOMA: ICD-10-CM

## 2017-12-01 DIAGNOSIS — N28.9 RENAL IMPAIRMENT: Primary | ICD-10-CM

## 2017-12-01 LAB
ALBUMIN SERPL BCP-MCNC: 3.3 G/DL
ALP SERPL-CCNC: 84 U/L
ALT SERPL W/O P-5'-P-CCNC: 7 U/L
ANION GAP SERPL CALC-SCNC: 8 MMOL/L
AST SERPL-CCNC: 19 U/L
BILIRUB SERPL-MCNC: 0.3 MG/DL
BUN SERPL-MCNC: 20 MG/DL
CALCIUM SERPL-MCNC: 9.9 MG/DL
CHLORIDE SERPL-SCNC: 104 MMOL/L
CO2 SERPL-SCNC: 25 MMOL/L
CREAT SERPL-MCNC: 1.5 MG/DL
EST. GFR  (AFRICAN AMERICAN): 52.3 ML/MIN/1.73 M^2
EST. GFR  (NON AFRICAN AMERICAN): 45.2 ML/MIN/1.73 M^2
GLUCOSE SERPL-MCNC: 104 MG/DL
MAGNESIUM SERPL-MCNC: 1.8 MG/DL
POTASSIUM SERPL-SCNC: 4.9 MMOL/L
PROT SERPL-MCNC: 6.2 G/DL
SODIUM SERPL-SCNC: 137 MMOL/L

## 2017-12-01 PROCEDURE — 36415 COLL VENOUS BLD VENIPUNCTURE: CPT | Mod: PO

## 2017-12-01 PROCEDURE — 83735 ASSAY OF MAGNESIUM: CPT

## 2017-12-01 PROCEDURE — 80053 COMPREHEN METABOLIC PANEL: CPT

## 2017-12-01 NOTE — TELEPHONE ENCOUNTER
----- Message from Flaca Neely RN sent at 12/1/2017  1:27 PM CST -----   renal ultrasound & urology consult--      ----- Message -----  From: Ye Lucia Jr., MD  Sent: 12/1/2017   1:18 PM  To: Khari AVILA Jr Staff    K back to normal--can stop oral KCl.  Renal function test up a bit--drink plenty of fluid; if he is having difficulty urinating, renal ultrasound & urology consult--will order

## 2017-12-01 NOTE — TELEPHONE ENCOUNTER
Called patient and spoke with his wife and scheduled his US and apt with urology. Apt slips mailed out

## 2017-12-04 ENCOUNTER — HOSPITAL ENCOUNTER (OUTPATIENT)
Dept: RADIOLOGY | Facility: HOSPITAL | Age: 72
Discharge: HOME | End: 2017-12-04
Attending: INTERNAL MEDICINE
Payer: MEDICARE

## 2017-12-04 ENCOUNTER — PATIENT MESSAGE (OUTPATIENT)
Dept: HEMATOLOGY/ONCOLOGY | Facility: CLINIC | Age: 72
End: 2017-12-04

## 2017-12-04 ENCOUNTER — OFFICE VISIT (OUTPATIENT)
Dept: HEMATOLOGY/ONCOLOGY | Facility: CLINIC | Age: 72
End: 2017-12-04
Payer: MEDICARE

## 2017-12-04 VITALS
BODY MASS INDEX: 27.15 KG/M2 | DIASTOLIC BLOOD PRESSURE: 75 MMHG | HEART RATE: 72 BPM | HEIGHT: 70 IN | SYSTOLIC BLOOD PRESSURE: 122 MMHG | WEIGHT: 189.63 LBS

## 2017-12-04 DIAGNOSIS — D46.9 MYELODYSPLASTIC SYNDROME: ICD-10-CM

## 2017-12-04 DIAGNOSIS — N28.9 RENAL IMPAIRMENT: ICD-10-CM

## 2017-12-04 DIAGNOSIS — C83.30 LARGE CELL (DIFFUSE) NON-HODGKIN'S LYMPHOMA: Primary | ICD-10-CM

## 2017-12-04 PROCEDURE — 99214 OFFICE O/P EST MOD 30 MIN: CPT | Mod: S$GLB,,, | Performed by: INTERNAL MEDICINE

## 2017-12-04 PROCEDURE — 99499 UNLISTED E&M SERVICE: CPT | Mod: S$GLB,,, | Performed by: INTERNAL MEDICINE

## 2017-12-04 PROCEDURE — 76770 US EXAM ABDO BACK WALL COMP: CPT | Mod: 26,,, | Performed by: RADIOLOGY

## 2017-12-04 PROCEDURE — 99999 PR PBB SHADOW E&M-EST. PATIENT-LVL III: CPT | Mod: PBBFAC,,, | Performed by: INTERNAL MEDICINE

## 2017-12-04 PROCEDURE — 76770 US EXAM ABDO BACK WALL COMP: CPT | Mod: TC

## 2017-12-04 RX ORDER — HEPARIN 100 UNIT/ML
500 SYRINGE INTRAVENOUS
Status: CANCELLED | OUTPATIENT
Start: 2017-12-05

## 2017-12-04 RX ORDER — MEPERIDINE HYDROCHLORIDE 50 MG/ML
25 INJECTION INTRAMUSCULAR; INTRAVENOUS; SUBCUTANEOUS
Status: CANCELLED | OUTPATIENT
Start: 2017-12-05

## 2017-12-04 RX ORDER — SODIUM CHLORIDE 0.9 % (FLUSH) 0.9 %
10 SYRINGE (ML) INJECTION
Status: CANCELLED | OUTPATIENT
Start: 2017-12-05

## 2017-12-04 RX ORDER — ACETAMINOPHEN 325 MG/1
650 TABLET ORAL
Status: CANCELLED | OUTPATIENT
Start: 2017-12-05

## 2017-12-04 RX ORDER — FAMOTIDINE 10 MG/ML
20 INJECTION INTRAVENOUS
Status: CANCELLED | OUTPATIENT
Start: 2017-12-05

## 2017-12-04 RX ORDER — SODIUM CHLORIDE 0.9 % (FLUSH) 0.9 %
10 SYRINGE (ML) INJECTION
Status: CANCELLED | OUTPATIENT
Start: 2017-12-06

## 2017-12-04 RX ORDER — HEPARIN 100 UNIT/ML
500 SYRINGE INTRAVENOUS
Status: CANCELLED | OUTPATIENT
Start: 2017-12-06

## 2017-12-04 NOTE — PROGRESS NOTES
HISTORY OF PRESENT ILLNESS:  Mr. Francis is a 72-year-old man who has been   treated many times since 1996, for B-cell lymphoma.  The details of these   therapies were recorded in my note of 09/05/2017.    In August 2017, he developed persistent abdominal cramping pain, anorexia and   increasing diarrhea.  Computed tomography showed enlarged mesenteric nodes and a   large mass in the right lower quadrant, which encased the distal ileum and   extended into the anterior abdominal wall.  There was no bowel obstruction.    Following discussion with a surgical oncologist, I recommended starting   bendamustine, Rituxan and prednisone and he has now completed 4 courses of these   drugs.  After the third course, he was hospitalized between 10/31/2017 and   11/02/2017 with severe abdominal pain and signs of partial small-bowel   obstruction.  With intravenous fluids and bowel rest, his symptoms resolved.  He   is now following a low residue diet.  A small bowel x-ray on 11/01/2017, showed   mild dilation of the small bowel proximal to a previous anastomotic site.    Computed tomography during that hospitalization showed substantial regression of   the abdominal masses and lymph nodes, which had been seen before.  There was   only 1 borderline enlarged mesenteric node measuring 1.0 cm.    The severe pain resolved and he is now having very little abdominal discomfort.    Also, the diarrhea that troubled him in the past has resolved and he now   occasionally requires a stool softener.    Since I last saw him, he was seen by another physician for an infected skin   lesion on his right arm and he was placed on Bactrim.  Shortly after taking   that, his serum creatinine was slightly elevated.  He also has had hypokalemia   and hypomagnesemia and has been taking supplements of both of those substances.    Today I advised him to stop them since his potassium has returned to normal.    His serum magnesium is pending.    He is not  having fevers.  He remains weak, but he was able to do some light   outdoor work yesterday.  He also continues to have the persistent pain in his   back and legs that has troubled him for many years.  He has had numerous   treatments for this including a dorsal column stimulator.    He did not develop leukopenia since his last treatment 3 weeks ago.  His   platelet count declined to 88,000 and is now 115,000.  His hemoglobin fell to   8.8, then increased to 11.1 and today is 9.2.  After listening to his   description of his last week, I suspect that he was somewhat volume depleted   during the latter days of November.    ADDITIONAL PAST HISTORY, SYSTEM REVIEW, SOCIAL HISTORY AND FAMILY HISTORY:  Have   been reviewed and updated in the electronic record.    PHYSICAL EXAMINATION:  GENERAL APPEARANCE:  A well-developed and well-nourished man who uses a cane,   but can climb onto an examination table without assistance.  EYES:  No jaundice or pallor.  MOUTH AND THROAT:  No mucosal lesions.  Good dentition.  NECK:  No masses or bruits.  No thyroid abnormalities.  SINUSES:  No tenderness.  NOSE:  Clear nares.  LYMPH NODES:  No enlarged cervical, axillary or inguinal nodes.  CHEST AND LUNGS:  Normal respiratory effort.  Clear to auscultation and   percussion.  HEART:  Regular rate and rhythm without murmur or gallop.  ABDOMEN:  Soft without masses or tenderness.  No hepatosplenomegaly.  EXTREMITIES:  No edema or cyanosis.  PERIPHERAL VASCULATURE:  Good popliteal pulses.  Diminished pulses in the feet   and ankles.  NEUROLOGIC:  Mental status is good.  He is fully oriented.  Strength is good.  SKIN:  No suspicious lesions in the areas examined.  I see no evidence of   infection on his arms.    LABORATORY STUDIES:  Blood counts today include hemoglobin 9.2, WBC 5030 and   platelets 115,000.  Comprehensive metabolic profile is normal with the   exceptions of total protein 5.7 and albumin 3.1.    IMPRESSION:  1.  Recurrent  lymphoma (in the past, this has recurred in both diffuse and   follicular patterns, but the more recent biopsy material has shown a diffuse   large B cell lymphoma).  2.  Peripheral neuropathy.  3.  Myelodysplastic syndrome with anemia and thrombocytopenia.  4.  Recent partial small bowel obstruction at a site of prior surgery and prior   lymphoma.    PLAN:  1.  He will start a fifth course of bendamustine and Rituxan along with   prednisone 100 mg daily for 5 days on 12/05/2017.  2.  Weekly CBC and CMP.  3.  He is scheduled for a renal ultrasound today.  This was scheduled at a time   when his creatinine had increased somewhat.  After discussing the situation with   him today, it appears that this was probably related to his taking Bactrim and   drinking less fluid.  4.  Return visit (EPA appointment) in 3 weeks with CBC, CMP, magnesium and   appointment for the sixth and final course of bendamustine and Rituxan.      ROSA M/IN  dd: 12/04/2017 13:23:39 (CST)  td: 12/05/2017 11:20:38 (CST)  Doc ID   #[id number]  Job ID #[id number]    CC:

## 2017-12-05 ENCOUNTER — PATIENT MESSAGE (OUTPATIENT)
Dept: HEMATOLOGY/ONCOLOGY | Facility: CLINIC | Age: 72
End: 2017-12-05

## 2017-12-05 ENCOUNTER — INFUSION (OUTPATIENT)
Dept: INFUSION THERAPY | Facility: HOSPITAL | Age: 72
End: 2017-12-05
Attending: INTERNAL MEDICINE
Payer: MEDICARE

## 2017-12-05 VITALS — HEART RATE: 65 BPM | RESPIRATION RATE: 18 BRPM | DIASTOLIC BLOOD PRESSURE: 52 MMHG | SYSTOLIC BLOOD PRESSURE: 99 MMHG

## 2017-12-05 DIAGNOSIS — C82.90 FOLLICULAR LYMPHOMA: Primary | ICD-10-CM

## 2017-12-05 DIAGNOSIS — C83.30 LARGE CELL (DIFFUSE) NON-HODGKIN'S LYMPHOMA: Primary | ICD-10-CM

## 2017-12-05 PROCEDURE — 63600175 PHARM REV CODE 636 W HCPCS: Performed by: INTERNAL MEDICINE

## 2017-12-05 PROCEDURE — 96375 TX/PRO/DX INJ NEW DRUG ADDON: CPT

## 2017-12-05 PROCEDURE — 96411 CHEMO IV PUSH ADDL DRUG: CPT

## 2017-12-05 PROCEDURE — 96367 TX/PROPH/DG ADDL SEQ IV INF: CPT

## 2017-12-05 PROCEDURE — 25000003 PHARM REV CODE 250: Performed by: INTERNAL MEDICINE

## 2017-12-05 PROCEDURE — S0028 INJECTION, FAMOTIDINE, 20 MG: HCPCS | Performed by: INTERNAL MEDICINE

## 2017-12-05 PROCEDURE — 96415 CHEMO IV INFUSION ADDL HR: CPT

## 2017-12-05 PROCEDURE — 96413 CHEMO IV INFUSION 1 HR: CPT

## 2017-12-05 RX ORDER — HEPARIN 100 UNIT/ML
500 SYRINGE INTRAVENOUS
Status: DISCONTINUED | OUTPATIENT
Start: 2017-12-05 | End: 2017-12-05 | Stop reason: HOSPADM

## 2017-12-05 RX ORDER — SODIUM CHLORIDE 0.9 % (FLUSH) 0.9 %
10 SYRINGE (ML) INJECTION
Status: DISCONTINUED | OUTPATIENT
Start: 2017-12-05 | End: 2017-12-05 | Stop reason: HOSPADM

## 2017-12-05 RX ORDER — MEPERIDINE HYDROCHLORIDE 50 MG/ML
25 INJECTION INTRAMUSCULAR; INTRAVENOUS; SUBCUTANEOUS
Status: DISCONTINUED | OUTPATIENT
Start: 2017-12-05 | End: 2017-12-05 | Stop reason: HOSPADM

## 2017-12-05 RX ORDER — ACETAMINOPHEN 325 MG/1
650 TABLET ORAL
Status: COMPLETED | OUTPATIENT
Start: 2017-12-05 | End: 2017-12-05

## 2017-12-05 RX ORDER — FAMOTIDINE 10 MG/ML
20 INJECTION INTRAVENOUS
Status: COMPLETED | OUTPATIENT
Start: 2017-12-05 | End: 2017-12-05

## 2017-12-05 RX ADMIN — DEXAMETHASONE SODIUM PHOSPHATE: 4 INJECTION, SOLUTION INTRAMUSCULAR; INTRAVENOUS at 08:12

## 2017-12-05 RX ADMIN — DIPHENHYDRAMINE HYDROCHLORIDE 50 MG: 50 INJECTION, SOLUTION INTRAMUSCULAR; INTRAVENOUS at 08:12

## 2017-12-05 RX ADMIN — RITUXIMAB 785 MG: 10 INJECTION, SOLUTION INTRAVENOUS at 09:12

## 2017-12-05 RX ADMIN — BENDAMUSTINE HYDROCHLORIDE 210 MG: 25 INJECTION, SOLUTION INTRAVENOUS at 12:12

## 2017-12-05 RX ADMIN — FAMOTIDINE 20 MG: 10 INJECTION INTRAVENOUS at 08:12

## 2017-12-05 RX ADMIN — ACETAMINOPHEN 650 MG: 325 TABLET ORAL at 08:12

## 2017-12-06 ENCOUNTER — INFUSION (OUTPATIENT)
Dept: INFUSION THERAPY | Facility: HOSPITAL | Age: 72
End: 2017-12-06
Attending: INTERNAL MEDICINE
Payer: MEDICARE

## 2017-12-06 VITALS
TEMPERATURE: 98 F | RESPIRATION RATE: 20 BRPM | HEART RATE: 60 BPM | DIASTOLIC BLOOD PRESSURE: 65 MMHG | SYSTOLIC BLOOD PRESSURE: 142 MMHG

## 2017-12-06 DIAGNOSIS — C83.30 LARGE CELL (DIFFUSE) NON-HODGKIN'S LYMPHOMA: Primary | ICD-10-CM

## 2017-12-06 PROCEDURE — 63600175 PHARM REV CODE 636 W HCPCS: Performed by: INTERNAL MEDICINE

## 2017-12-06 PROCEDURE — 96409 CHEMO IV PUSH SNGL DRUG: CPT

## 2017-12-06 PROCEDURE — 25000003 PHARM REV CODE 250: Performed by: INTERNAL MEDICINE

## 2017-12-06 RX ORDER — SODIUM CHLORIDE 0.9 % (FLUSH) 0.9 %
10 SYRINGE (ML) INJECTION
Status: DISCONTINUED | OUTPATIENT
Start: 2017-12-06 | End: 2017-12-06 | Stop reason: HOSPADM

## 2017-12-06 RX ORDER — HEPARIN 100 UNIT/ML
500 SYRINGE INTRAVENOUS
Status: DISCONTINUED | OUTPATIENT
Start: 2017-12-06 | End: 2017-12-06 | Stop reason: HOSPADM

## 2017-12-06 RX ADMIN — BENDAMUSTINE HYDROCHLORIDE 210 MG: 25 INJECTION, SOLUTION INTRAVENOUS at 08:12

## 2017-12-06 RX ADMIN — SODIUM CHLORIDE: 9 INJECTION, SOLUTION INTRAVENOUS at 08:12

## 2017-12-06 NOTE — PLAN OF CARE
Problem: Patient Care Overview  Goal: Plan of Care Review  Outcome: Ongoing (interventions implemented as appropriate)  Patient tolerated Bendamustine infusion well. Patient and spouse given AVS;instructed to call MD if any issues.

## 2017-12-06 NOTE — PLAN OF CARE
Problem: Patient Care Overview  Goal: Individualization & Mutuality  Outcome: Ongoing (interventions implemented as appropriate)  Patient present in clinic accompanied by wife. No complaints. Pending Bendamustine infusion. Will continue to monitor.

## 2017-12-07 ENCOUNTER — INFUSION (OUTPATIENT)
Dept: INFUSION THERAPY | Facility: HOSPITAL | Age: 72
End: 2017-12-07
Attending: INTERNAL MEDICINE
Payer: MEDICARE

## 2017-12-07 DIAGNOSIS — C83.30 LARGE CELL (DIFFUSE) NON-HODGKIN'S LYMPHOMA: Primary | ICD-10-CM

## 2017-12-07 PROCEDURE — 63600175 PHARM REV CODE 636 W HCPCS: Performed by: INTERNAL MEDICINE

## 2017-12-07 PROCEDURE — 96372 THER/PROPH/DIAG INJ SC/IM: CPT

## 2017-12-07 RX ADMIN — PEGFILGRASTIM 6 MG: 6 INJECTION SUBCUTANEOUS at 11:12

## 2017-12-07 NOTE — NURSING
Pt arrived for Neulasta shot. Shot given to left arm no problems reported. Pt ambulated off unit without any difficulty noted.

## 2017-12-09 ENCOUNTER — PATIENT MESSAGE (OUTPATIENT)
Dept: HEMATOLOGY/ONCOLOGY | Facility: CLINIC | Age: 72
End: 2017-12-09

## 2017-12-12 ENCOUNTER — PATIENT MESSAGE (OUTPATIENT)
Dept: HEMATOLOGY/ONCOLOGY | Facility: CLINIC | Age: 72
End: 2017-12-12

## 2017-12-18 ENCOUNTER — LAB VISIT (OUTPATIENT)
Dept: LAB | Facility: HOSPITAL | Age: 72
End: 2017-12-18
Attending: INTERNAL MEDICINE
Payer: MEDICARE

## 2017-12-18 DIAGNOSIS — C82.90 FOLLICULAR LYMPHOMA: ICD-10-CM

## 2017-12-18 LAB
ALBUMIN SERPL BCP-MCNC: 2.8 G/DL
ALP SERPL-CCNC: 84 U/L
ALT SERPL W/O P-5'-P-CCNC: 10 U/L
ANION GAP SERPL CALC-SCNC: 7 MMOL/L
AST SERPL-CCNC: 20 U/L
BASOPHILS # BLD AUTO: 0.01 K/UL
BASOPHILS NFR BLD: 0.2 %
BILIRUB SERPL-MCNC: 0.4 MG/DL
BUN SERPL-MCNC: 9 MG/DL
CALCIUM SERPL-MCNC: 8.6 MG/DL
CHLORIDE SERPL-SCNC: 103 MMOL/L
CO2 SERPL-SCNC: 30 MMOL/L
CREAT SERPL-MCNC: 0.9 MG/DL
DIFFERENTIAL METHOD: ABNORMAL
EOSINOPHIL # BLD AUTO: 0.1 K/UL
EOSINOPHIL NFR BLD: 2.1 %
ERYTHROCYTE [DISTWIDTH] IN BLOOD BY AUTOMATED COUNT: 14.9 %
EST. GFR  (AFRICAN AMERICAN): >60 ML/MIN/1.73 M^2
EST. GFR  (NON AFRICAN AMERICAN): >60 ML/MIN/1.73 M^2
GLUCOSE SERPL-MCNC: 98 MG/DL
HCT VFR BLD AUTO: 29.1 %
HGB BLD-MCNC: 9.3 G/DL
LYMPHOCYTES # BLD AUTO: 1 K/UL
LYMPHOCYTES NFR BLD: 22.2 %
MCH RBC QN AUTO: 28.4 PG
MCHC RBC AUTO-ENTMCNC: 32 G/DL
MCV RBC AUTO: 89 FL
MONOCYTES # BLD AUTO: 0.4 K/UL
MONOCYTES NFR BLD: 8.5 %
NEUTROPHILS # BLD AUTO: 2.9 K/UL
NEUTROPHILS NFR BLD: 67 %
PLATELET # BLD AUTO: 71 K/UL
PLATELET BLD QL SMEAR: ABNORMAL
PMV BLD AUTO: 9.7 FL
POTASSIUM SERPL-SCNC: 3.4 MMOL/L
PROT SERPL-MCNC: 5.4 G/DL
RBC # BLD AUTO: 3.27 M/UL
SODIUM SERPL-SCNC: 140 MMOL/L
WBC # BLD AUTO: 4.36 K/UL

## 2017-12-18 PROCEDURE — 36415 COLL VENOUS BLD VENIPUNCTURE: CPT | Mod: PO

## 2017-12-18 PROCEDURE — 80053 COMPREHEN METABOLIC PANEL: CPT

## 2017-12-18 PROCEDURE — 85025 COMPLETE CBC W/AUTO DIFF WBC: CPT | Mod: PO

## 2017-12-26 ENCOUNTER — INFUSION (OUTPATIENT)
Dept: INFUSION THERAPY | Facility: HOSPITAL | Age: 72
End: 2017-12-26
Attending: INTERNAL MEDICINE
Payer: MEDICARE

## 2017-12-26 ENCOUNTER — OFFICE VISIT (OUTPATIENT)
Dept: HEMATOLOGY/ONCOLOGY | Facility: CLINIC | Age: 72
End: 2017-12-26
Payer: MEDICARE

## 2017-12-26 VITALS
HEART RATE: 93 BPM | TEMPERATURE: 98 F | DIASTOLIC BLOOD PRESSURE: 61 MMHG | RESPIRATION RATE: 18 BRPM | SYSTOLIC BLOOD PRESSURE: 129 MMHG

## 2017-12-26 VITALS
HEART RATE: 72 BPM | WEIGHT: 184.5 LBS | SYSTOLIC BLOOD PRESSURE: 116 MMHG | BODY MASS INDEX: 25.83 KG/M2 | DIASTOLIC BLOOD PRESSURE: 75 MMHG | HEIGHT: 71 IN

## 2017-12-26 DIAGNOSIS — G62.0 DRUG-INDUCED POLYNEUROPATHY: ICD-10-CM

## 2017-12-26 DIAGNOSIS — G89.4 CHRONIC PAIN SYNDROME: Chronic | ICD-10-CM

## 2017-12-26 DIAGNOSIS — I42.7 CARDIOMYOPATHY DUE TO CHEMOTHERAPY: Chronic | ICD-10-CM

## 2017-12-26 DIAGNOSIS — C83.30 LARGE CELL (DIFFUSE) NON-HODGKIN'S LYMPHOMA: Primary | ICD-10-CM

## 2017-12-26 DIAGNOSIS — T45.1X5A CARDIOMYOPATHY DUE TO CHEMOTHERAPY: Chronic | ICD-10-CM

## 2017-12-26 DIAGNOSIS — C83.38 DIFFUSE LARGE B-CELL LYMPHOMA OF LYMPH NODES OF MULTIPLE REGIONS: ICD-10-CM

## 2017-12-26 PROBLEM — G89.29 CHRONIC PAIN: Status: RESOLVED | Noted: 2017-01-06 | Resolved: 2017-12-26

## 2017-12-26 PROBLEM — E87.6 HYPOKALEMIA: Status: RESOLVED | Noted: 2017-10-31 | Resolved: 2017-12-26

## 2017-12-26 PROBLEM — E83.42 HYPOMAGNESEMIA: Status: RESOLVED | Noted: 2017-10-31 | Resolved: 2017-12-26

## 2017-12-26 PROCEDURE — 25000003 PHARM REV CODE 250: Performed by: INTERNAL MEDICINE

## 2017-12-26 PROCEDURE — 99214 OFFICE O/P EST MOD 30 MIN: CPT | Mod: S$GLB,,, | Performed by: INTERNAL MEDICINE

## 2017-12-26 PROCEDURE — 99999 PR PBB SHADOW E&M-EST. PATIENT-LVL III: CPT | Mod: PBBFAC,,, | Performed by: INTERNAL MEDICINE

## 2017-12-26 PROCEDURE — 96415 CHEMO IV INFUSION ADDL HR: CPT

## 2017-12-26 PROCEDURE — 63600175 PHARM REV CODE 636 W HCPCS: Performed by: INTERNAL MEDICINE

## 2017-12-26 PROCEDURE — S0028 INJECTION, FAMOTIDINE, 20 MG: HCPCS | Performed by: INTERNAL MEDICINE

## 2017-12-26 PROCEDURE — 96367 TX/PROPH/DG ADDL SEQ IV INF: CPT

## 2017-12-26 PROCEDURE — 99499 UNLISTED E&M SERVICE: CPT | Mod: S$GLB,,, | Performed by: INTERNAL MEDICINE

## 2017-12-26 PROCEDURE — 96375 TX/PRO/DX INJ NEW DRUG ADDON: CPT

## 2017-12-26 PROCEDURE — 96411 CHEMO IV PUSH ADDL DRUG: CPT

## 2017-12-26 PROCEDURE — 96413 CHEMO IV INFUSION 1 HR: CPT

## 2017-12-26 RX ORDER — HEPARIN 100 UNIT/ML
500 SYRINGE INTRAVENOUS
Status: CANCELLED | OUTPATIENT
Start: 2018-01-02

## 2017-12-26 RX ORDER — MEPERIDINE HYDROCHLORIDE 50 MG/ML
25 INJECTION INTRAMUSCULAR; INTRAVENOUS; SUBCUTANEOUS
Status: CANCELLED | OUTPATIENT
Start: 2018-01-02

## 2017-12-26 RX ORDER — FAMOTIDINE 10 MG/ML
20 INJECTION INTRAVENOUS
Status: CANCELLED | OUTPATIENT
Start: 2018-01-02

## 2017-12-26 RX ORDER — ACETAMINOPHEN 325 MG/1
650 TABLET ORAL
Status: CANCELLED | OUTPATIENT
Start: 2018-01-02

## 2017-12-26 RX ORDER — SODIUM CHLORIDE 0.9 % (FLUSH) 0.9 %
10 SYRINGE (ML) INJECTION
Status: CANCELLED | OUTPATIENT
Start: 2018-01-03

## 2017-12-26 RX ORDER — HEPARIN 100 UNIT/ML
500 SYRINGE INTRAVENOUS
Status: CANCELLED | OUTPATIENT
Start: 2018-01-03

## 2017-12-26 RX ORDER — ACETAMINOPHEN 325 MG/1
650 TABLET ORAL
Status: COMPLETED | OUTPATIENT
Start: 2017-12-26 | End: 2017-12-26

## 2017-12-26 RX ORDER — MEPERIDINE HYDROCHLORIDE 50 MG/ML
25 INJECTION INTRAMUSCULAR; INTRAVENOUS; SUBCUTANEOUS
Status: DISCONTINUED | OUTPATIENT
Start: 2017-12-26 | End: 2017-12-26 | Stop reason: HOSPADM

## 2017-12-26 RX ORDER — SODIUM CHLORIDE 0.9 % (FLUSH) 0.9 %
10 SYRINGE (ML) INJECTION
Status: CANCELLED | OUTPATIENT
Start: 2018-01-02

## 2017-12-26 RX ORDER — FAMOTIDINE 10 MG/ML
20 INJECTION INTRAVENOUS
Status: COMPLETED | OUTPATIENT
Start: 2017-12-26 | End: 2017-12-26

## 2017-12-26 RX ADMIN — BENDAMUSTINE HYDROCHLORIDE 210 MG: 25 INJECTION, SOLUTION INTRAVENOUS at 04:12

## 2017-12-26 RX ADMIN — RITUXIMAB 785 MG: 10 INJECTION, SOLUTION INTRAVENOUS at 12:12

## 2017-12-26 RX ADMIN — DEXAMETHASONE SODIUM PHOSPHATE: 4 INJECTION, SOLUTION INTRAMUSCULAR; INTRAVENOUS at 03:12

## 2017-12-26 RX ADMIN — FAMOTIDINE 20 MG: 10 INJECTION INTRAVENOUS at 11:12

## 2017-12-26 RX ADMIN — ACETAMINOPHEN 650 MG: 325 TABLET ORAL at 11:12

## 2017-12-26 RX ADMIN — SODIUM CHLORIDE: 0.9 INJECTION, SOLUTION INTRAVENOUS at 11:12

## 2017-12-26 RX ADMIN — DIPHENHYDRAMINE HYDROCHLORIDE 50 MG: 50 INJECTION, SOLUTION INTRAMUSCULAR; INTRAVENOUS at 11:12

## 2017-12-26 NOTE — PLAN OF CARE
Problem: Patient Care Overview  Goal: Individualization & Mutuality  Outcome: Ongoing (interventions implemented as appropriate)  1110-Labs , hx, and medications reviewed, patient was seen by MD this am. Assessment completed. Discussed plan of care with patient. Patient in agreement. Chair reclined and warm blanket and snack offered.

## 2017-12-26 NOTE — PROGRESS NOTES
HISTORY OF PRESENT ILLNESS:  Mr. Francis is a 72-year-old man, who has been   treated many times since 1996 for B-cell lymphoma.  I recorded the details of   these treatments in my note of 09/05/2017.    In August 2017, he developed persistent abdominal cramping pain, anorexia and   increasing diarrhea.  Computed tomography revealed enlarged mesenteric lymph   nodes and a large mass in the right lower quadrant, which encased the distal   ileum and extended into the anterior abdominal wall.    Following discussion with a surgical oncologist, I started the patient on   bendamustine, Rituxan and prednisone and he has now completed five courses of   these drugs.  After the third course, he was hospitalized between 10/31/2017 and   11/02/2017 with severe abdominal pain and signs of partial small-bowel   obstruction.  With intravenous fluids and bowel rest, his symptoms resolved.  He   is now following a low residue diet.  A small bowel x-ray on 11/01/2017 showed   mild dilation of the small bowel proximal to a previous anastomotic site.    Computed tomography during that hospitalization showed substantial regression of   the abdominal masses and lymph nodes.  There was one borderline enlarged   mesenteric node measuring 1 cm.    He has now completed five courses of bendamustine, Rituxan and prednisone.  With   the last course, he continued to have fatigue and some dyspnea on exertion.    One week ago, he attended a party and did not follow his low residue diet   carefully.  For the next two days, he had abdominal cramping and constipation.    He then took laxatives and the constipation was relieved and the cramping    resolved.  He is back on the low residue diet.    He is not having fevers or chills.  He continues to have pain in his back and   frequently has pain in his legs.  He has a dorsal column stimulator,   but he has had no relief of his pain after using this.    His blood counts since I last saw him and since  his last treatment on 12/04/2017   showed no decline in the white blood cell count.  The platelet count declined   to 71,000 and today is 115,000.  His hemoglobin declined to 8.8, then increased   to 10.0 and today is 8.4.    ADDITIONAL PAST HISTORY, SYSTEM REVIEW, SOCIAL HISTORY AND FAMILY HISTORY:  Have   been reviewed and updated in the electronic record.    PHYSICAL EXAMINATION:  GENERAL APPEARANCE:  Well-developed, well-nourished man who can climb on to an   examination table without assistance.  EYES:  No jaundice.  Some conjunctival pallor.  MOUTH AND THROAT:  No mucosal lesions.  Good dentition.  NECK:  No masses or bruits.  No thyroid abnormalities.  LYMPH NODES:  No enlarged cervical, axillary or inguinal nodes.  CHEST AND LUNGS:  Normal respiratory effort.  Clear to auscultation and   percussion.  HEART:  Regular rate and rhythm without murmur or gallop.  ABDOMEN:  Soft without masses or tenderness.  No hepatosplenomegaly.  EXTREMITIES:  No edema or cyanosis.  PERIPHERAL VASCULATURE:  Decreased pulses in the feet and ankles.  Good   popliteal pulses.  SKIN:  No suspicious lesions in the areas examined.  NEUROLOGIC:  Mental status is good.  He is fully oriented.  Motor function is   normal.    LABORATORY STUDIES:  Blood counts include hemoglobin 8.4, WBC 4620 and platelets   115,000.  The ANC is 3200.  Comprehensive metabolic profile is normal with the   exceptions of protein 5.6 and albumin 2.9.    IMPRESSION:  1.  Recurrent lymphoma (in the past this has recurred in both diffuse and   follicular patterns, but the most recent biopsy material showed a diffuse large   B-cell lymphoma).  2.  Myelodysplastic syndrome with anemia and thrombocytopenia.  3.  Partial small bowel obstruction at a site of prior surgery and lymphoma.  4.  Peripheral neuropathy.  5.  Chronic back pain from degenerative disease of the spine.    RECOMMENDATIONS:  1.  Today, he will start the sixth and final planned course of  bendamustine and   Rituxan along with prednisone 100 mg daily for five days.  2.  Weekly CBC and CMP.  3.  I have told Mr. Francis that it is likely he will require a RBC  transfusion in the next several weeks.  4.  Return visit (EPA appointment) in seven to eight weeks with CBC, CMP and PET   scan performed one to two days prior to that visit.      LUKE  dd: 12/26/2017 11:03:24 (CST)  td: 12/27/2017 07:41:53 (CST)  Doc ID   #[id number]  Job ID #[id number]    CC:

## 2017-12-26 NOTE — PLAN OF CARE
Problem: Patient Care Overview  Goal: Plan of Care Review  Outcome: Ongoing (interventions implemented as appropriate)  1658-Rituxan started at rate of 100 cc/hr and increased by 100 cc/hr every 30 minutes to a max rate of 400 cc/hr. Patient then reiceved bendeka.  Patient tolerated treatment well. Discharged without complaints or S/S of adverse event.   Instructed to call provider for any questions or concerns.  Patient verbalized will return to clinic tomorrow for next infusion.

## 2017-12-27 ENCOUNTER — INFUSION (OUTPATIENT)
Dept: INFUSION THERAPY | Facility: HOSPITAL | Age: 72
End: 2017-12-27
Attending: INTERNAL MEDICINE
Payer: MEDICARE

## 2017-12-27 ENCOUNTER — PATIENT MESSAGE (OUTPATIENT)
Dept: HEMATOLOGY/ONCOLOGY | Facility: CLINIC | Age: 72
End: 2017-12-27

## 2017-12-27 VITALS
RESPIRATION RATE: 16 BRPM | HEART RATE: 61 BPM | TEMPERATURE: 98 F | DIASTOLIC BLOOD PRESSURE: 63 MMHG | SYSTOLIC BLOOD PRESSURE: 136 MMHG

## 2017-12-27 DIAGNOSIS — C83.30 LARGE CELL (DIFFUSE) NON-HODGKIN'S LYMPHOMA: Primary | ICD-10-CM

## 2017-12-27 PROCEDURE — 63600175 PHARM REV CODE 636 W HCPCS: Performed by: INTERNAL MEDICINE

## 2017-12-27 PROCEDURE — 25000003 PHARM REV CODE 250: Performed by: INTERNAL MEDICINE

## 2017-12-27 PROCEDURE — 96409 CHEMO IV PUSH SNGL DRUG: CPT

## 2017-12-27 RX ADMIN — BENDAMUSTINE HYDROCHLORIDE 210 MG: 25 INJECTION, SOLUTION INTRAVENOUS at 09:12

## 2017-12-27 NOTE — PLAN OF CARE
Problem: Patient Care Overview  Goal: Plan of Care Review  Outcome: Ongoing (interventions implemented as appropriate)  Patient tolerated treatment well.  No reaction suspected.  VSS.  No questions or concerns.  Will RTC tomorrow for injection.  AVS given to patient.  Patient ambulated off unit accompanied by his wife.

## 2017-12-27 NOTE — PLAN OF CARE
Problem: Chemotherapy Effects (Adult)  Goal: Signs and Symptoms of Listed Potential Problems Will be Absent, Minimized or Managed (Chemotherapy Effects)  Signs and symptoms of listed potential problems will be absent, minimized or managed by discharge/transition of care (reference Chemotherapy Effects (Adult) CPG).   Outcome: Ongoing (interventions implemented as appropriate)  Patient here for D2 Bendeka.  Assessment complete and labs reviewed.  No events overnight. VSS.  Chair reclined and blanket offered.  No needs expressed at this time.  Will continue to monitor.

## 2017-12-28 ENCOUNTER — INFUSION (OUTPATIENT)
Dept: INFUSION THERAPY | Facility: HOSPITAL | Age: 72
End: 2017-12-28
Attending: INTERNAL MEDICINE
Payer: MEDICARE

## 2017-12-28 DIAGNOSIS — C83.30 LARGE CELL (DIFFUSE) NON-HODGKIN'S LYMPHOMA: Primary | ICD-10-CM

## 2017-12-28 PROCEDURE — 63600175 PHARM REV CODE 636 W HCPCS: Performed by: INTERNAL MEDICINE

## 2017-12-28 PROCEDURE — 96372 THER/PROPH/DIAG INJ SC/IM: CPT

## 2017-12-28 RX ADMIN — PEGFILGRASTIM 6 MG: 6 INJECTION SUBCUTANEOUS at 08:12

## 2017-12-29 ENCOUNTER — TELEPHONE (OUTPATIENT)
Dept: FAMILY MEDICINE | Facility: CLINIC | Age: 72
End: 2017-12-29

## 2017-12-29 ENCOUNTER — PATIENT MESSAGE (OUTPATIENT)
Dept: HEMATOLOGY/ONCOLOGY | Facility: CLINIC | Age: 72
End: 2017-12-29

## 2017-12-29 NOTE — TELEPHONE ENCOUNTER
----- Message from Courtney Gaytan MA sent at 12/29/2017  2:26 PM CST -----  Contact: [place] [phone]      ----- Message -----  From: Jeffery Larson  Sent: 12/29/2017   1:34 PM  To: Arjun Emery Staff    Pt wants to know if her and her (Who just finished chemotherapy) need to come back in for a different flu injection shot. Both of them had the flu shot in October. Please call at your earliest convenience.

## 2018-01-02 ENCOUNTER — LAB VISIT (OUTPATIENT)
Dept: LAB | Facility: HOSPITAL | Age: 73
End: 2018-01-02
Attending: INTERNAL MEDICINE
Payer: MEDICARE

## 2018-01-02 ENCOUNTER — PATIENT MESSAGE (OUTPATIENT)
Dept: PAIN MEDICINE | Facility: CLINIC | Age: 73
End: 2018-01-02

## 2018-01-02 DIAGNOSIS — M54.16 LUMBAR RADICULOPATHY: ICD-10-CM

## 2018-01-02 DIAGNOSIS — R53.1 WEAKNESS: ICD-10-CM

## 2018-01-02 DIAGNOSIS — M96.1 FAILED BACK SYNDROME: ICD-10-CM

## 2018-01-02 DIAGNOSIS — C82.90 FOLLICULAR LYMPHOMA: ICD-10-CM

## 2018-01-02 DIAGNOSIS — G89.4 CHRONIC PAIN SYNDROME: Primary | ICD-10-CM

## 2018-01-02 LAB
ALBUMIN SERPL BCP-MCNC: 2.8 G/DL
ALP SERPL-CCNC: 82 U/L
ALT SERPL W/O P-5'-P-CCNC: 9 U/L
ANION GAP SERPL CALC-SCNC: 9 MMOL/L
ANISOCYTOSIS BLD QL SMEAR: SLIGHT
AST SERPL-CCNC: 17 U/L
BASOPHILS NFR BLD: 0 %
BILIRUB SERPL-MCNC: 0.3 MG/DL
BUN SERPL-MCNC: 10 MG/DL
CALCIUM SERPL-MCNC: 8.5 MG/DL
CHLORIDE SERPL-SCNC: 102 MMOL/L
CO2 SERPL-SCNC: 29 MMOL/L
CREAT SERPL-MCNC: 1 MG/DL
DIFFERENTIAL METHOD: ABNORMAL
EOSINOPHIL NFR BLD: 1 %
ERYTHROCYTE [DISTWIDTH] IN BLOOD BY AUTOMATED COUNT: 14.4 %
EST. GFR  (AFRICAN AMERICAN): >60 ML/MIN/1.73 M^2
EST. GFR  (NON AFRICAN AMERICAN): >60 ML/MIN/1.73 M^2
GLUCOSE SERPL-MCNC: 97 MG/DL
HCT VFR BLD AUTO: 27.8 %
HGB BLD-MCNC: 8.8 G/DL
HYPOCHROMIA BLD QL SMEAR: ABNORMAL
LYMPHOCYTES NFR BLD: 6 %
MCH RBC QN AUTO: 27.4 PG
MCHC RBC AUTO-ENTMCNC: 31.7 G/DL
MCV RBC AUTO: 87 FL
MONOCYTES NFR BLD: 15 %
NEUTROPHILS NFR BLD: 76 %
NEUTS BAND NFR BLD MANUAL: 2 %
NRBC BLD-RTO: 0 /100 WBC
OVALOCYTES BLD QL SMEAR: ABNORMAL
PLATELET # BLD AUTO: 107 K/UL
PLATELET BLD QL SMEAR: ABNORMAL
PMV BLD AUTO: 9.7 FL
POIKILOCYTOSIS BLD QL SMEAR: SLIGHT
POLYCHROMASIA BLD QL SMEAR: ABNORMAL
POTASSIUM SERPL-SCNC: 3.6 MMOL/L
PROT SERPL-MCNC: 4.9 G/DL
RBC # BLD AUTO: 3.21 M/UL
SODIUM SERPL-SCNC: 140 MMOL/L
WBC # BLD AUTO: 5.18 K/UL

## 2018-01-02 PROCEDURE — 36415 COLL VENOUS BLD VENIPUNCTURE: CPT | Mod: PO

## 2018-01-02 PROCEDURE — 80053 COMPREHEN METABOLIC PANEL: CPT

## 2018-01-02 RX ORDER — HYDROCODONE BITARTRATE AND ACETAMINOPHEN 5; 325 MG/1; MG/1
1 TABLET ORAL EVERY 8 HOURS PRN
Qty: 90 TABLET | Refills: 0 | Status: SHIPPED | OUTPATIENT
Start: 2018-01-02 | End: 2018-02-21 | Stop reason: SDUPTHER

## 2018-01-02 NOTE — TELEPHONE ENCOUNTER
Last office visit:11/14/17  Next appointment: none scheduled.  Last filled via :11/14/17  Last UDS:none found.    Please review and advise.

## 2018-01-10 ENCOUNTER — PATIENT MESSAGE (OUTPATIENT)
Dept: HEMATOLOGY/ONCOLOGY | Facility: CLINIC | Age: 73
End: 2018-01-10

## 2018-01-11 ENCOUNTER — PATIENT MESSAGE (OUTPATIENT)
Dept: HEMATOLOGY/ONCOLOGY | Facility: CLINIC | Age: 73
End: 2018-01-11

## 2018-01-12 DIAGNOSIS — E87.6 HYPOKALEMIA: Primary | ICD-10-CM

## 2018-01-12 RX ORDER — POTASSIUM CHLORIDE 20 MEQ/1
20 TABLET, EXTENDED RELEASE ORAL DAILY
Qty: 60 TABLET | Refills: 11 | Status: SHIPPED | OUTPATIENT
Start: 2018-01-12 | End: 2018-02-11

## 2018-01-22 ENCOUNTER — PATIENT MESSAGE (OUTPATIENT)
Dept: HEMATOLOGY/ONCOLOGY | Facility: CLINIC | Age: 73
End: 2018-01-22

## 2018-01-22 ENCOUNTER — TELEPHONE (OUTPATIENT)
Dept: FAMILY MEDICINE | Facility: CLINIC | Age: 73
End: 2018-01-22

## 2018-01-22 ENCOUNTER — OFFICE VISIT (OUTPATIENT)
Dept: URGENT CARE | Facility: CLINIC | Age: 73
End: 2018-01-22
Payer: MEDICARE

## 2018-01-22 VITALS
HEIGHT: 70 IN | SYSTOLIC BLOOD PRESSURE: 117 MMHG | HEART RATE: 94 BPM | WEIGHT: 185 LBS | BODY MASS INDEX: 26.48 KG/M2 | DIASTOLIC BLOOD PRESSURE: 67 MMHG | RESPIRATION RATE: 16 BRPM | OXYGEN SATURATION: 98 % | TEMPERATURE: 97 F

## 2018-01-22 DIAGNOSIS — K20.90 ESOPHAGITIS, UNSPECIFIED: Primary | ICD-10-CM

## 2018-01-22 PROCEDURE — 99214 OFFICE O/P EST MOD 30 MIN: CPT | Mod: S$GLB,,, | Performed by: PHYSICIAN ASSISTANT

## 2018-01-22 PROCEDURE — 99499 UNLISTED E&M SERVICE: CPT | Mod: S$GLB,,, | Performed by: PHYSICIAN ASSISTANT

## 2018-01-22 RX ORDER — NYSTATIN 100000 [USP'U]/ML
4 SUSPENSION ORAL 4 TIMES DAILY
Qty: 160 ML | Refills: 0 | Status: SHIPPED | OUTPATIENT
Start: 2018-01-22 | End: 2018-02-01

## 2018-01-22 NOTE — PATIENT INSTRUCTIONS
Please go to the Emergency Department for any concerns or worsening of condition.    Please follow up with your primary care doctor or specialist as needed.        Esophagitis     With esophagitis, the lining of the esophagus is inflamed.   Do you often have burning pain in your chest? You may have esophagitis. This is when the lining of the esophagus becomes red and swollen (inflamed). The esophagus is the tube that connects your throat to your stomach. This sheet tells you more about esophagitis. It also explains your treatment options.  Main types of esophagitis  Reflux esophagitis. This is the more common type. It is caused by GERD (gastroesophageal reflux disease). Stomach contents with stomach acid flow back up into the esophagus. This happens over and over. It leads to inflammation. Risk factors can include:  · Being overweight  · Asthma  · Smoking  · Pregnancy  · Frequent vomiting  · Certain medicines (such as aspirin and other anti-inflammatories)  · Hiatal hernia  Infectious esophagitis. This is caused by an infection. You are more at risk for this if you have a weakened immune system and poor nutrition. Antibiotic use can also be a factor. The infection is often due to the following:  · A type of fungus (typically candida)  · A virus, such as herpes simplex virus 1 (HSV-1) or cytomegalovirus (CMV)  Eosinophilic esophagitis. Foods or other things around you can give you an allergic reaction. This triggers an immune response and leads to esophagitis.  Pill-induced esophagitis. Certain types of medicines can cause inflammation and ulcers in the esophagus. These include doxycycline, aspirin, NSAIDs, alendronate, potassium, quinidine, iron.  Symptoms of esophagitis  The following symptoms can occur with esophagitis:  · Pain when swallowing, or trouble swallowing  · Pain behind your breastbone (heartburn)  · Acid regurgitation  · Chronic sore throat  · Gum Inflammation  · Cavities  · Bad  breath  · Nausea  · Pain in your upper belly (abdomen)  · Bleeding (indicated by bright red vomit or black, tarry stool)  These symptoms occur more often with reflux esophagitis:  · Coughing, wheezing, or asthma  · Hoarseness  Diagnosis of esophagitis  Your healthcare provider will ask about your health history and symptoms. Youll also be examined. Sometimes certain tests are needed. These may include:  · Upper endoscopy. A thin, flexible tube with a tiny light and camera is used. It is inserted through the mouth down into the esophagus. This lets the provider look for damage. A small sample of tissue (biopsy) may also be removed. The sample is sent to a lab for testing.  · Upper GI X-ray with barium. An X-ray is done after you drink a substance called barium. Barium may make problems in the esophagus easier to see on an x-ray.  · Esophageal pH. A soft, thin tube is passed into the esophagus through the nose or mouth for 24 hours. It measures the acid level in the esophagus.  · Esophageal manometry. A soft, thin tube is passed into the esophagus through the nose or mouth. It measures muscle contractions in the esophagus.  Treatment of esophagitis  Medicines. Different medicines can help treat esophagitis. The medicine used will depend on the type of esophagitis you have. Talk with your healthcare provider.  Lifestyle changes. Making the following changes can help reduce irritation and ease your symptoms:  · Avoid spicy foods (pepper, chili powder, redd). Also avoid hard foods (nuts, crackers, raw vegetables) and acidic foods and drinks (tomatoes, citrus fruits and juices). Other problem foods include chocolate, peppermint, nutmeg, and foods high in fat.  · Until you can swallow without pain, follow a combined liquid and soft diet. Try foods such as cooked cereals, mashed potatoes, and soups.  · Take small bites and chew your food thoroughly.  · Avoid large meals and heavy evening meals. Don't lie down within 2 to  3 hours of eating.  · Get to or stay at a healthy weight.  · Avoid alcohol, caffeine, and smoking or tobacco products.  · Brush and floss your teeth  · Raise your upper body by 4 to 6 inches when lying in bed. This can be done using a foam wedge. Or put blocks under the legs at the head of your bed.  Surgery. This may be needed for severe reflux esophagitis. Other noninvasive procedures to treat GERD and esophagitis are being studied. Your provider can tell you more.  Why treatment Is important  Without treatment, esophagitis can get worse. This is especially true with severe reflux esophagitis. For instance, continued symptoms can cause scarring of the esophagus. Over time, this can cause a narrowing the esophagus (stricture). This can make it hard to pass food down to the stomach. As symptoms go on they can also cause changes in the lining of the esophagus. These changes can put you at a slightly higher risk of cancer of the esophagus.   Date Last Reviewed: 7/1/2016  © 5292-0667 [employer]. [address] [place] [address], [place] [address]. All rights reserved. This information is not intended as a substitute for professional medical care. Always follow your healthcare professional's instructions.

## 2018-01-22 NOTE — PROGRESS NOTES
"Subjective:       Patient ID: Royce Francis is a 72 y.o. male.    Vitals:  height is 5' 10" (1.778 m) and weight is 83.9 kg (185 lb). His temperature is 97 °F (36.1 °C). His blood pressure is 117/67 and his pulse is 94. His respiration is 16 and oxygen saturation is 98%.     Chief Complaint: Sore Throat    Pt on chemotherapy currently, reporting that last round of chemo more extensive than previous. Reports he usually feels weak with accompanying sore throat after a round of chemo, however post-chemo symptoms are lasting longer than usual, especially sore throat. His abdominal pain is chronic and mild due to post-surgical adhesions. He is not concerned about the abdominal pain today. Was sent here to get swabbed for flu, however, clinic is currently out of stock. Today he denies any fever, chills, or myalgias.      Sore Throat    This is a new problem. The current episode started 1 to 4 weeks ago. The problem has been gradually worsening. There has been no fever. The pain is at a severity of 10/10. The pain is severe. Associated symptoms include abdominal pain and coughing. Pertinent negatives include no congestion, ear pain, headaches, hoarse voice or shortness of breath. He has tried acetaminophen for the symptoms. The treatment provided no relief.     Review of Systems   Constitution: Positive for fever and malaise/fatigue. Negative for chills.   HENT: Positive for sore throat. Negative for congestion, ear pain and hoarse voice.    Eyes: Negative for discharge and redness.   Cardiovascular: Negative for chest pain, dyspnea on exertion and leg swelling.   Respiratory: Positive for cough. Negative for shortness of breath, sputum production and wheezing.    Musculoskeletal: Negative for myalgias.   Gastrointestinal: Positive for abdominal pain. Negative for nausea.   Neurological: Negative for headaches.       Objective:      Physical Exam   Constitutional: He is oriented to person, place, and time. Vital signs " are normal. He appears well-developed. He appears listless. He is cooperative. He does not appear ill. No distress.   HENT:   Head: Normocephalic and atraumatic.   Right Ear: Hearing, tympanic membrane, external ear and ear canal normal.   Left Ear: Hearing, tympanic membrane, external ear and ear canal normal.   Nose: Mucosal edema (mild) present. No rhinorrhea or nasal deformity. Right sinus exhibits no maxillary sinus tenderness and no frontal sinus tenderness. Left sinus exhibits no maxillary sinus tenderness and no frontal sinus tenderness.   Mouth/Throat: Uvula is midline and mucous membranes are normal. No trismus in the jaw. Normal dentition. No uvula swelling. Posterior oropharyngeal edema and posterior oropharyngeal erythema present. No oropharyngeal exudate.   Pt describing pain in esophagus more inferior than able to visualize, however oropharynx is edematous.   Eyes: Conjunctivae and lids are normal. No scleral icterus.   Sclera clear bilat   Neck: Trachea normal, full passive range of motion without pain and phonation normal. Neck supple. No neck rigidity. No Brudzinski's sign noted.   Cardiovascular: Normal rate, regular rhythm, normal heart sounds, intact distal pulses and normal pulses.    Pulmonary/Chest: Effort normal and breath sounds normal. No respiratory distress. He has no decreased breath sounds. He has no wheezes. He has no rhonchi. He has no rales.   Abdominal: Soft. Normal appearance and bowel sounds are normal. He exhibits no distension and no ascites. There is no tenderness. There is no rigidity, no rebound, no guarding, no CVA tenderness, no tenderness at McBurney's point and negative Giang's sign.   Musculoskeletal: Normal range of motion. He exhibits no edema or deformity.   Neurological: He is oriented to person, place, and time. He appears listless. No cranial nerve deficit or sensory deficit. He exhibits normal muscle tone. Coordination normal.   Skin: Skin is warm, dry and  intact. He is not diaphoretic. No pallor.   Psychiatric: He has a normal mood and affect. His speech is normal and behavior is normal. Judgment and thought content normal. Cognition and memory are normal.   Nursing note and vitals reviewed.      Assessment:       1. Esophagitis, unspecified        Plan:       - Pt symptoms not conducive with diagnosis of flu. Pt with v/u, however, that clinic is out of flu swabs. Post-chemo sore throat more concerning for candidal esophagitis, will treat. Pt with instructions to seek treatment in the ER for worsening condition, Pt with v/u.    Esophagitis, unspecified  -     nystatin (MYCOSTATIN) 100,000 unit/mL suspension; Take 4 mLs (400,000 Units total) by mouth 4 (four) times daily.  Dispense: 160 mL; Refill: 0          Patient Instructions     Please go to the Emergency Department for any concerns or worsening of condition.    Please follow up with your primary care doctor or specialist as needed.        Esophagitis     With esophagitis, the lining of the esophagus is inflamed.   Do you often have burning pain in your chest? You may have esophagitis. This is when the lining of the esophagus becomes red and swollen (inflamed). The esophagus is the tube that connects your throat to your stomach. This sheet tells you more about esophagitis. It also explains your treatment options.  Main types of esophagitis  Reflux esophagitis. This is the more common type. It is caused by GERD (gastroesophageal reflux disease). Stomach contents with stomach acid flow back up into the esophagus. This happens over and over. It leads to inflammation. Risk factors can include:  · Being overweight  · Asthma  · Smoking  · Pregnancy  · Frequent vomiting  · Certain medicines (such as aspirin and other anti-inflammatories)  · Hiatal hernia  Infectious esophagitis. This is caused by an infection. You are more at risk for this if you have a weakened immune system and poor nutrition. Antibiotic use can also  be a factor. The infection is often due to the following:  · A type of fungus (typically candida)  · A virus, such as herpes simplex virus 1 (HSV-1) or cytomegalovirus (CMV)  Eosinophilic esophagitis. Foods or other things around you can give you an allergic reaction. This triggers an immune response and leads to esophagitis.  Pill-induced esophagitis. Certain types of medicines can cause inflammation and ulcers in the esophagus. These include doxycycline, aspirin, NSAIDs, alendronate, potassium, quinidine, iron.  Symptoms of esophagitis  The following symptoms can occur with esophagitis:  · Pain when swallowing, or trouble swallowing  · Pain behind your breastbone (heartburn)  · Acid regurgitation  · Chronic sore throat  · Gum Inflammation  · Cavities  · Bad breath  · Nausea  · Pain in your upper belly (abdomen)  · Bleeding (indicated by bright red vomit or black, tarry stool)  These symptoms occur more often with reflux esophagitis:  · Coughing, wheezing, or asthma  · Hoarseness  Diagnosis of esophagitis  Your healthcare provider will ask about your health history and symptoms. Youll also be examined. Sometimes certain tests are needed. These may include:  · Upper endoscopy. A thin, flexible tube with a tiny light and camera is used. It is inserted through the mouth down into the esophagus. This lets the provider look for damage. A small sample of tissue (biopsy) may also be removed. The sample is sent to a lab for testing.  · Upper GI X-ray with barium. An X-ray is done after you drink a substance called barium. Barium may make problems in the esophagus easier to see on an x-ray.  · Esophageal pH. A soft, thin tube is passed into the esophagus through the nose or mouth for 24 hours. It measures the acid level in the esophagus.  · Esophageal manometry. A soft, thin tube is passed into the esophagus through the nose or mouth. It measures muscle contractions in the esophagus.  Treatment of esophagitis  Medicines.  Different medicines can help treat esophagitis. The medicine used will depend on the type of esophagitis you have. Talk with your healthcare provider.  Lifestyle changes. Making the following changes can help reduce irritation and ease your symptoms:  · Avoid spicy foods (pepper, chili powder, redd). Also avoid hard foods (nuts, crackers, raw vegetables) and acidic foods and drinks (tomatoes, citrus fruits and juices). Other problem foods include chocolate, peppermint, nutmeg, and foods high in fat.  · Until you can swallow without pain, follow a combined liquid and soft diet. Try foods such as cooked cereals, mashed potatoes, and soups.  · Take small bites and chew your food thoroughly.  · Avoid large meals and heavy evening meals. Don't lie down within 2 to 3 hours of eating.  · Get to or stay at a healthy weight.  · Avoid alcohol, caffeine, and smoking or tobacco products.  · Brush and floss your teeth  · Raise your upper body by 4 to 6 inches when lying in bed. This can be done using a foam wedge. Or put blocks under the legs at the head of your bed.  Surgery. This may be needed for severe reflux esophagitis. Other noninvasive procedures to treat GERD and esophagitis are being studied. Your provider can tell you more.  Why treatment Is important  Without treatment, esophagitis can get worse. This is especially true with severe reflux esophagitis. For instance, continued symptoms can cause scarring of the esophagus. Over time, this can cause a narrowing the esophagus (stricture). This can make it hard to pass food down to the stomach. As symptoms go on they can also cause changes in the lining of the esophagus. These changes can put you at a slightly higher risk of cancer of the esophagus.   Date Last Reviewed: 7/1/2016  © 6389-6196 [employer]. [address] [place] [address], [place] [address]. All rights reserved. This information is not intended as a substitute for professional medical care. Always  follow your healthcare professional's instructions.

## 2018-01-22 NOTE — TELEPHONE ENCOUNTER
----- Message from Jannie Dobbs sent at 1/22/2018  8:29 AM CST -----  Contact: Self  Pt calling to speak to a nurse regarding sore throat. Please call [phone].

## 2018-01-23 ENCOUNTER — PATIENT MESSAGE (OUTPATIENT)
Dept: FAMILY MEDICINE | Facility: CLINIC | Age: 73
End: 2018-01-23

## 2018-01-23 ENCOUNTER — PATIENT MESSAGE (OUTPATIENT)
Dept: HEMATOLOGY/ONCOLOGY | Facility: CLINIC | Age: 73
End: 2018-01-23

## 2018-01-23 DIAGNOSIS — R06.02 SHORTNESS OF BREATH: Primary | ICD-10-CM

## 2018-01-24 ENCOUNTER — PATIENT MESSAGE (OUTPATIENT)
Dept: HEMATOLOGY/ONCOLOGY | Facility: CLINIC | Age: 73
End: 2018-01-24

## 2018-01-24 DIAGNOSIS — G47.00 INSOMNIA, UNSPECIFIED TYPE: Primary | ICD-10-CM

## 2018-01-24 RX ORDER — FLURAZEPAM HYDROCHLORIDE 30 MG/1
30 CAPSULE ORAL NIGHTLY PRN
Qty: 30 CAPSULE | Refills: 3 | Status: SHIPPED | OUTPATIENT
Start: 2018-01-24

## 2018-01-25 ENCOUNTER — OFFICE VISIT (OUTPATIENT)
Dept: GASTROENTEROLOGY | Facility: CLINIC | Age: 73
End: 2018-01-25
Payer: MEDICARE

## 2018-01-25 ENCOUNTER — HOSPITAL ENCOUNTER (OUTPATIENT)
Dept: CARDIOLOGY | Facility: CLINIC | Age: 73
Discharge: HOME | End: 2018-01-25
Payer: MEDICARE

## 2018-01-25 ENCOUNTER — OFFICE VISIT (OUTPATIENT)
Dept: CARDIOLOGY | Facility: CLINIC | Age: 73
End: 2018-01-25
Payer: MEDICARE

## 2018-01-25 VITALS
HEIGHT: 70 IN | BODY MASS INDEX: 25.34 KG/M2 | DIASTOLIC BLOOD PRESSURE: 59 MMHG | WEIGHT: 177 LBS | SYSTOLIC BLOOD PRESSURE: 100 MMHG | HEART RATE: 96 BPM

## 2018-01-25 VITALS
HEART RATE: 98 BPM | OXYGEN SATURATION: 96 % | SYSTOLIC BLOOD PRESSURE: 97 MMHG | HEIGHT: 71 IN | WEIGHT: 173.5 LBS | BODY MASS INDEX: 24.29 KG/M2 | DIASTOLIC BLOOD PRESSURE: 52 MMHG

## 2018-01-25 DIAGNOSIS — R06.02 SHORTNESS OF BREATH: ICD-10-CM

## 2018-01-25 DIAGNOSIS — R19.7 DIARRHEA, UNSPECIFIED TYPE: Primary | ICD-10-CM

## 2018-01-25 DIAGNOSIS — I42.7 CARDIOMYOPATHY DUE TO CHEMOTHERAPY: Chronic | ICD-10-CM

## 2018-01-25 DIAGNOSIS — R13.10 ODYNOPHAGIA: ICD-10-CM

## 2018-01-25 DIAGNOSIS — R53.81 MALAISE: Primary | ICD-10-CM

## 2018-01-25 DIAGNOSIS — I10 ESSENTIAL HYPERTENSION: Chronic | ICD-10-CM

## 2018-01-25 DIAGNOSIS — T45.1X5A CARDIOMYOPATHY DUE TO CHEMOTHERAPY: Chronic | ICD-10-CM

## 2018-01-25 PROCEDURE — 99499 UNLISTED E&M SERVICE: CPT | Mod: S$GLB,,, | Performed by: NURSE PRACTITIONER

## 2018-01-25 PROCEDURE — 99999 PR PBB SHADOW E&M-EST. PATIENT-LVL V: CPT | Mod: PBBFAC,GC,, | Performed by: INTERNAL MEDICINE

## 2018-01-25 PROCEDURE — 93000 ELECTROCARDIOGRAM COMPLETE: CPT | Mod: S$GLB,,, | Performed by: INTERNAL MEDICINE

## 2018-01-25 PROCEDURE — 99214 OFFICE O/P EST MOD 30 MIN: CPT | Mod: S$GLB,,, | Performed by: NURSE PRACTITIONER

## 2018-01-25 PROCEDURE — 99213 OFFICE O/P EST LOW 20 MIN: CPT | Mod: GC,S$GLB,, | Performed by: INTERNAL MEDICINE

## 2018-01-25 PROCEDURE — 99999 PR PBB SHADOW E&M-EST. PATIENT-LVL IV: CPT | Mod: PBBFAC,,, | Performed by: NURSE PRACTITIONER

## 2018-01-25 RX ORDER — ACETAMINOPHEN 500 MG
500 TABLET ORAL EVERY 6 HOURS PRN
COMMUNITY

## 2018-01-25 RX ORDER — ENALAPRIL MALEATE 5 MG/1
2.5 TABLET ORAL DAILY
Qty: 90 TABLET | Refills: 3 | Status: SHIPPED | OUTPATIENT
Start: 2018-01-25

## 2018-01-25 NOTE — PROGRESS NOTES
"    Cardiology Clinic Note  Reason for Visit: SOB and Weakness    HPI:   71 y/o male with PMH of HTN, HLD, CAD (90% D1 lesion in 2003), CMP presumably from chemotherapy (ECHO 12/2015: EF 45-50%, DD), SALINAS, and B cell lymphoma who presents with weakness and MEDLEY. The patient completed chemotherapy on December 26 (bendamustine and Rituxan along with prednisone 100 mg daily). The patient denies palpitations, syncope, CP or orthopnea. He uses his CPAP. The patient gets gets "tired" and "weak" at 50 feet of ambulation. The patient describes generalized weakness and dizziness upon standing. He says he has had "very, very bad" diarrhea for at least one month going to the restroom ~20 at times. The patient is "scared to eat anything", which is "why I'm losing so much weight". He has lost 20 lbs unintentionally. The patient is also being treated for thrush. He reports getting SOB with casual conversation and with minimal exertion occassionally.     CONCLUSIONS TTE 12/2015     1 - Mildly depressed left ventricular systolic function. EF 45-50%. Visually the LV funciton appears unchanged.    2 - Grade I ventricular diastolic dysfunction.     3 - Normal right ventricular systolic function .     4 - Mild left atrial enlargement.    5- Globa;lo longitudinal strain = - 13% , appears unchanged from the prior study. Poor tracking of basal posterolateral wall but overall the strain to be uncnaged from prior.     Review of Systems   Constitution: Positive for weight loss. Negative for chills and fever.   HENT: Negative for ear discharge.    Eyes: Negative for pain and visual disturbance.   Cardiovascular: Negative for chest pain, dyspnea on exertion, irregular heartbeat, leg swelling, orthopnea, palpitations, paroxysmal nocturnal dyspnea and syncope.   Respiratory: Positive for shortness of breath. Negative for hemoptysis and wheezing.    Skin: Negative for rash and suspicious lesions.   Musculoskeletal: Negative for joint pain and " muscle weakness.   Gastrointestinal: Positive for anorexia and diarrhea. Negative for abdominal pain, hematemesis, hematochezia, melena, nausea and vomiting.   Genitourinary: Negative for dysuria and frequency.   Neurological: Positive for dizziness. Negative for focal weakness, headaches and tremors.   Psychiatric/Behavioral: Negative for altered mental status, suicidal ideas and thoughts of violence.       PMH:     Past Medical History:   Diagnosis Date    Allergy     Anemia     Arthritis     Basal cell carcinoma     excised from upper back    Cancer     Cardiomyopathy due to chemotherapy     Cataract     CHF (congestive heart failure)     Coronary artery disease     D1    Encounter for blood transfusion     Eye injuries 20 yrs    ou fb several x's    Hyperlipidemia     Hypertension     Hypertensive heart disease with heart failure 1/31/2013    Lymphoma 1996    Obstructive sleep apnea on CPAP     SCC (squamous cell carcinoma) excised 12/2015    R proximal forearm    Skin disease     Sleep apnea     SQUAMOUS CELL CARCINOMA excised 2/14/13    L forearm    Squamous cell carcinoma excised 8/4/2016    IN SITU, Left thigh MOHS    Squamous cell carcinoma excised 8/25/2016    left helix, MOHS     Past Surgical History:   Procedure Laterality Date    CATARACT EXTRACTION      od dr yanes    CATARACT EXTRACTION W/  INTRAOCULAR LENS IMPLANT Left 01/12/2017    Dr. Yanes    EYE SURGERY      Rt cataract    KIDNEY STONE SURGERY      SMALL INTESTINE SURGERY  09/2013    TONSILLECTOMY       Allergies:     Review of patient's allergies indicates:   Allergen Reactions    Iodine and iodide containing products Itching     Contrast dye     Medications:     Current Outpatient Prescriptions on File Prior to Visit   Medication Sig Dispense Refill    ascorbic acid, vitamin C, (VITAMIN C) 1000 MG tablet Take 1,000 mg by mouth once daily. liposomal      cyanocobalamin (VITAMIN B-12) 1000 MCG tablet Take  100 mcg by mouth once daily.      enalapril (VASOTEC) 5 MG tablet Take 1 tablet (5 mg total) by mouth once daily. 90 tablet 3    esomeprazole (NEXIUM) 20 MG capsule Take 20 mg by mouth before breakfast.      flurazepam (DALMANE) 30 MG capsule Take 1 capsule (30 mg total) by mouth nightly as needed for Insomnia. 30 capsule 3    fluticasone (FLONASE) 50 mcg/actuation nasal spray SPRAY ONCE IN EACH NOSTRIL ONCE DAILY 48 g 3    furosemide (LASIX) 40 MG tablet One daily as needed for edema 30 tablet 11    hydrocodone-acetaminophen 5-325mg (NORCO) 5-325 mg per tablet Take 1 tablet by mouth every 8 (eight) hours as needed for Pain. 90 tablet 0    lactobacillus rhamnosus GG (CULTURELLE) 10 billion cell capsule Take 1 capsule by mouth once daily.      lidocaine-prilocaine (EMLA) cream       metoprolol tartrate (LOPRESSOR) 50 MG tablet Take 0.5 tablets (25 mg total) by mouth 2 (two) times daily. 180 tablet 3    mirtazapine (REMERON) 15 MG tablet Take 1 tablet (15 mg total) by mouth every evening. 90 tablet 3    multivitamin capsule Take 1 capsule by mouth once daily.      NON FORMULARY MEDICATION 300 mg once daily. Desiccated Liver      nystatin (MYCOSTATIN) 100,000 unit/mL suspension Take 4 mLs (400,000 Units total) by mouth 4 (four) times daily. 160 mL 0    potassium chloride SA (K-DUR,KLOR-CON) 20 MEQ tablet Take 1 tablet (20 mEq total) by mouth once daily. 60 tablet 11    tamsulosin (FLOMAX) 0.4 mg Cp24 Take 1 capsule (0.4 mg total) by mouth once daily. 90 capsule 3    valacyclovir (VALTREX) 500 MG tablet Take 1 tablet (500 mg total) by mouth once daily. 90 tablet 3    [DISCONTINUED] predniSONE (DELTASONE) 50 MG Tab 2 tablets daily for 5 days starting today.  In the future, prednisone will be started on the first day of chemotherapy 10 tablet 6     No current facility-administered medications on file prior to visit.      Social History:     Social History   Substance Use Topics    Smoking status: Former  Smoker     Packs/day: 1.50     Years: 1.00     Quit date: 1960    Smokeless tobacco: Never Used    Alcohol use 1.2 oz/week     1 Glasses of wine, 1 Shots of liquor per week      Comment: socially     Family History:     Family History   Problem Relation Age of Onset    Cataracts Mother     Hypertension Mother     Cataracts Father     Hypertension Father     Cancer Father      lung    Hypertension Maternal Grandmother     Hypertension Maternal Grandfather     Hypertension Paternal Grandmother     Hypertension Paternal Grandfather     No Known Problems Sister     No Known Problems Brother     No Known Problems Maternal Aunt     No Known Problems Maternal Uncle     No Known Problems Paternal Aunt     No Known Problems Paternal Uncle     Amblyopia Neg Hx     Blindness Neg Hx     Diabetes Neg Hx     Glaucoma Neg Hx     Macular degeneration Neg Hx     Retinal detachment Neg Hx     Strabismus Neg Hx     Stroke Neg Hx     Thyroid disease Neg Hx     Melanoma Neg Hx     Heart disease Neg Hx     Celiac disease Neg Hx     Cirrhosis Neg Hx     Colon cancer Neg Hx     Colon polyps Neg Hx     Crohn's disease Neg Hx     Cystic fibrosis Neg Hx     Esophageal cancer Neg Hx     Hemochromatosis Neg Hx     Inflammatory bowel disease Neg Hx     Irritable bowel syndrome Neg Hx     Liver cancer Neg Hx     Liver disease Neg Hx     Rectal cancer Neg Hx     Stomach cancer Neg Hx     Ulcerative colitis Neg Hx     Burt's disease Neg Hx        Physical Exam   Constitutional: He is oriented to person, place, and time. He appears well-developed and well-nourished.   HENT:   Head: Normocephalic and atraumatic.   Eyes: EOM are normal.   Cardiovascular: Normal rate and regular rhythm.  Exam reveals no gallop and no friction rub.    Pulmonary/Chest: Effort normal and breath sounds normal. No stridor. He has no wheezes. He has no rales.   Abdominal: Soft. Bowel sounds are normal. There is no rebound and no  "guarding.   Musculoskeletal: He exhibits no edema.   Neurological: He is alert and oriented to person, place, and time. No cranial nerve deficit.   Skin: Skin is warm and dry.   Psychiatric: He has a normal mood and affect. His behavior is normal.     BP (!) 97/52 (BP Location: Left arm, Patient Position: Sitting, BP Method: Medium (Automatic))   Pulse 98   Ht 5' 10.5" (1.791 m)   Wt 78.7 kg (173 lb 8 oz)   SpO2 96%   BMI 24.54 kg/m²          Labs:     Lab Results   Component Value Date     01/23/2018    K 4.3 01/23/2018     01/23/2018    CO2 26 01/23/2018    BUN 14 01/23/2018    CREATININE 1.0 01/23/2018    ANIONGAP 7 (L) 01/23/2018     Lab Results   Component Value Date    HGBA1C 5.5 11/21/2006     Lab Results   Component Value Date    BNP 22 12/09/2015    BNP <10 03/24/2014    BNP 38 05/08/2013    Lab Results   Component Value Date    WBC 3.29 (L) 01/23/2018    HGB 8.0 (L) 01/23/2018    HCT 26.1 (L) 01/23/2018    HCT 21 (L) 09/25/2013     01/23/2018    GRAN 0.9 (L) 01/23/2018    GRAN 25.9 (L) 01/23/2018     Lab Results   Component Value Date    CHOL 124 08/08/2014    HDL 24 (L) 08/08/2014    LDLCALC 60.0 (L) 08/08/2014    TRIG 200 (H) 08/08/2014          EF   Date Value Ref Range Status   12/09/2015 45 55 - 65    04/28/2014 50     11/07/2013 53         EKG: NSR with HR 92 BPM    Assessment and Plan  Royce Francis is a 73 y/o male with PMH of HTN, HLD, CAD (90% D1 lesion in 2003), CMP presumably from chemotherapy (ECHO 12/2015: EF 45-50%, DD), SALINAS, and B cell lymphoma who presents with weakness and MEDLEY.     Malaise  - in the setting of relatively low BP, diarrhea, poor appetite, weight loss  - decrease enalapril to 2.5 mg qD  - patient to see GI tomorrow    HTN  - decrease ACEi as above  - c/w BB     CMP due to chemotherapy  - borderline EF in 2015  - clinically euvolemic  - c/w ACEi and BB    Signed:    Simeon Barnes  "

## 2018-01-25 NOTE — PROGRESS NOTES
"The patient completed chemotherapy on December 26 (bendamustine and Rituxan along with prednisone 100 mg daily). The patient denies palpitations, syncope, CP or orthopnea. He uses his CPAP. The patient gets gets "tired" and "weak" at 50 feet of ambulation. The patient describes generalized weakness and dizziness upon standing. He says he has had "very, very bad" diarrhea for at least one month. The patient is "scared to eat anything", which is "why I'm losing so much weight". The patient is also being treated for thrush. He reports getting SOB with casual conversation and with minimal exertion occassionally.   "

## 2018-01-25 NOTE — PROGRESS NOTES
[facility] Established Patient Visit    Reason for Visit:  The primary encounter diagnosis was Diarrhea, unspecified type. A diagnosis of Odynophagia was also pertinent to this visit.    PCP: Rupert Díaz    HPI:  This is a 72 y.o. male here for evaluation of diarrhea.  Here with wife  Seen by Dr. Richard in 5/2017.   Last GI clinic visit with me on 8/25/2017 for abd pain and diarrhea. Subsequent CT revealed a large mass incasing the distal ilium c/w recurrence of lymphoma vs metastases or primary SB malignancy. He has been followed by his oncologist (Dr. Lucia) and restarted chemo.    He currently reports worsening of diarrhea in the a last few weeks s/p restarting chemo for lymphoma. On average he will have  3-4 loose stools almost daily. But can have 10-20 loose stools daily occurring once weekly or so. W/ increased fatigue and weakness. Worse if eating larger amounts. Feels he cannot tolerate foods as well d/t diarrhea. Intake has been mostly liquid as a result. Takes imodium at least once daily (sometimes more) with some improvement but can cause constipation s/s. He states he has lost weight and per EPIC he has lost about 12 lbs since last month. He reports mild abd pain few days weekly associated with BMS. He denies GI bleeding/black stools. No sick contacts. No recent travel. No recent abx use. No well water.     Previously could not get Xifaxan as rx d/t cost.  20 mg Dicyclomine has provided some relief of abd pain in the past, but made diarrhea worse, and made him sleepy.    H/o liver cysts. eval per hepatology w/ fibroscan w/o fibrosis.     Reflux - no  Dysphagia/odynophagia - +odynophagia s/p chemo restart. Painful swallows felt at upper throat to upper chest/esopahgus.  Treated with dukes swish and swallow per UC a few days ago with minimal improvement. States this has occurred in the past when he was on chemo.  GI bleeding - denies hematochezia, hematemesis, melena, BRBPR, black/tarry  stools, and coffee ground emesis  NSAID usage - none    ROS:  Constitutional: No fevers, no chills, + weight loss, + fatigue,   ENT: No allergies  CV: No chest pain, no palpitations, no perif. edema, no sob on exertion  Pulm: No cough, No shortness of breath, no wheezes, no sputum  Ophtho: No vision changes  GI: see HPI; also no change in appetite  Derm: No rash  Heme: No lymphadenopathy, No bruising  MSK: No arthritis, no muscle pain, no muscle weakness  : No dysuria, No hematuria  Endo: No hot or cold intolerance  Neuro: No syncope, No seizure,     PMHX:  has a past medical history of Allergy; Anemia; Arthritis; Basal cell carcinoma; Cancer; Cardiomyopathy due to chemotherapy; Cataract; CHF (congestive heart failure); Coronary artery disease; Encounter for blood transfusion; Eye injuries (20 yrs); Hyperlipidemia; Hypertension; Hypertensive heart disease with heart failure (1/31/2013); Lymphoma (1996); Obstructive sleep apnea on CPAP; SCC (squamous cell carcinoma) (excised 12/2015); Skin disease; Sleep apnea; SQUAMOUS CELL CARCINOMA (excised 2/14/13); Squamous cell carcinoma (excised 8/4/2016); and Squamous cell carcinoma (excised 8/25/2016).    PSHX:  has a past surgical history that includes Kidney stone surgery; Small intestine surgery (09/2013); Tonsillectomy; Eye surgery; Cataract extraction; and Cataract extraction w/  intraocular lens implant (Left, 01/12/2017).    The patient's social and family histories were reviewed by me and updated in the appropriate section of the electronic medical record.    Review of patient's allergies indicates:   Allergen Reactions    Iodine and iodide containing products Itching     Contrast dye       Current Outpatient Prescriptions   Medication Sig    ascorbic acid, vitamin C, (VITAMIN C) 1000 MG tablet Take 1,000 mg by mouth once daily. liposomal    cyanocobalamin (VITAMIN B-12) 1000 MCG tablet Take 100 mcg by mouth once daily.    enalapril (VASOTEC) 5 MG tablet Take 1  "tablet (5 mg total) by mouth once daily.    esomeprazole (NEXIUM) 20 MG capsule Take 20 mg by mouth before breakfast.    flurazepam (DALMANE) 30 MG capsule Take 1 capsule (30 mg total) by mouth nightly as needed for Insomnia.    fluticasone (FLONASE) 50 mcg/actuation nasal spray SPRAY ONCE IN EACH NOSTRIL ONCE DAILY    furosemide (LASIX) 40 MG tablet One daily as needed for edema    hydrocodone-acetaminophen 5-325mg (NORCO) 5-325 mg per tablet Take 1 tablet by mouth every 8 (eight) hours as needed for Pain.    lactobacillus rhamnosus GG (CULTURELLE) 10 billion cell capsule Take 1 capsule by mouth once daily.    lidocaine-prilocaine (EMLA) cream     metoprolol tartrate (LOPRESSOR) 50 MG tablet Take 0.5 tablets (25 mg total) by mouth 2 (two) times daily.    mirtazapine (REMERON) 15 MG tablet Take 1 tablet (15 mg total) by mouth every evening.    multivitamin capsule Take 1 capsule by mouth once daily.    NON FORMULARY MEDICATION 300 mg once daily. Desiccated Liver    nystatin (MYCOSTATIN) 100,000 unit/mL suspension Take 4 mLs (400,000 Units total) by mouth 4 (four) times daily.    potassium chloride SA (K-DUR,KLOR-CON) 20 MEQ tablet Take 1 tablet (20 mEq total) by mouth once daily.    tamsulosin (FLOMAX) 0.4 mg Cp24 Take 1 capsule (0.4 mg total) by mouth once daily.    valacyclovir (VALTREX) 500 MG tablet Take 1 tablet (500 mg total) by mouth once daily.    acetaminophen (TYLENOL) 500 MG tablet Take 500 mg by mouth every 6 (six) hours as needed for Pain.    water Liqd 150 mL with MILK OF MAGNESIA 400 mg/5 mL Susp 400 mg, diphenhydrAMINE 12.5 mg/5 mL Elix 60 mg, nystatin 100,000 unit/mL Susp 500,000 Units Take 5 mLs by mouth.     No current facility-administered medications for this visit.          Objective Findings:    Vital Signs:  BP (!) 100/59   Pulse 96   Ht 5' 10" (1.778 m)   Wt 80.3 kg (177 lb 0.5 oz)   BMI 25.40 kg/m²  Body mass index is 25.4 kg/m².    Physical Exam:  General Appearance: " Well appearing in no acute distress  Head:   Normocephalic, without obvious abnormality  Eyes:    No scleral icterus, EOMI  Throat: Lips, mucosa, and tongue normal; teeth and gums normal  Lungs: CTA bilaterally in anterior and posterior fields, no wheezes, no crackles.  Heart:  Regular rate and rhythm, S1, S2 normal, no murmurs heard  Abdomen: Soft, non tender, non distended with positive bowel sounds in all four quadrants. No hepatosplenomegaly, ascites, or mass  Extremities:    2+ radial pulses, no clubbing, cyanosis or edema  Skin: No rash to exposed areas  Neurologic: A&Ox4      Labs:  Lab Results   Component Value Date    WBC 3.29 (L) 01/23/2018    HGB 8.0 (L) 01/23/2018    HCT 26.1 (L) 01/23/2018     01/23/2018    CHOL 124 08/08/2014    TRIG 200 (H) 08/08/2014    HDL 24 (L) 08/08/2014    ALT 6 (L) 01/23/2018    AST 16 01/23/2018     01/23/2018    K 4.3 01/23/2018     01/23/2018    CREATININE 1.0 01/23/2018    BUN 14 01/23/2018    CO2 26 01/23/2018    TSH 0.806 07/11/2016    PSA 1.37 05/02/2013    INR 1.0 10/06/2014    GLUF 96 04/16/2007    HGBA1C 5.5 11/21/2006       Endoscopy:   EGD - 5/27/13 by Dr. Graham for MONSE; fundic gland polyp; 6 mm antral ulcer (reactive gastritis, HP stain negative); normal duodenal (biopsies normal).    COLONOSCOPY - 5/27/13 by Dr. Graham for MONSE; to cecum, excellent prep; normal exam.    Assessment:    1. Diarrhea, unspecified type    2. Odynophagia          Recommendations:  1. Diarrhea-stools r/o c diff. And stool culture.  2. Odynophagia- continue dukes. Further recs per Dr. Richard.    F/u with Dr Richard-first amaya.    Order summary:  Orders Placed This Encounter    Clostridium difficile EIA    Stool culture       Thank you for allowing me to participate in the care of Royce MARILYN Sylvester, APRN, FNP-C

## 2018-01-26 ENCOUNTER — TELEPHONE (OUTPATIENT)
Dept: URGENT CARE | Facility: CLINIC | Age: 73
End: 2018-01-26

## 2018-01-26 ENCOUNTER — OFFICE VISIT (OUTPATIENT)
Dept: GASTROENTEROLOGY | Facility: CLINIC | Age: 73
End: 2018-01-26
Payer: MEDICARE

## 2018-01-26 ENCOUNTER — PATIENT MESSAGE (OUTPATIENT)
Dept: GASTROENTEROLOGY | Facility: CLINIC | Age: 73
End: 2018-01-26

## 2018-01-26 ENCOUNTER — LAB VISIT (OUTPATIENT)
Dept: LAB | Facility: HOSPITAL | Age: 73
End: 2018-01-26
Attending: INTERNAL MEDICINE
Payer: MEDICARE

## 2018-01-26 VITALS
BODY MASS INDEX: 24.47 KG/M2 | HEART RATE: 86 BPM | HEIGHT: 71 IN | WEIGHT: 174.81 LBS | DIASTOLIC BLOOD PRESSURE: 58 MMHG | SYSTOLIC BLOOD PRESSURE: 94 MMHG

## 2018-01-26 DIAGNOSIS — D84.9 IMMUNOCOMPROMISED STATE: ICD-10-CM

## 2018-01-26 DIAGNOSIS — C83.30 LARGE CELL (DIFFUSE) NON-HODGKIN'S LYMPHOMA: ICD-10-CM

## 2018-01-26 DIAGNOSIS — R19.7 DIARRHEA, UNSPECIFIED TYPE: ICD-10-CM

## 2018-01-26 DIAGNOSIS — K56.609 SMALL BOWEL OBSTRUCTION: ICD-10-CM

## 2018-01-26 DIAGNOSIS — K52.9 CHRONIC DIARRHEA: Primary | Chronic | ICD-10-CM

## 2018-01-26 DIAGNOSIS — Z12.11 SPECIAL SCREENING FOR MALIGNANT NEOPLASMS, COLON: Primary | ICD-10-CM

## 2018-01-26 PROCEDURE — 3008F BODY MASS INDEX DOCD: CPT | Mod: S$GLB,,, | Performed by: INTERNAL MEDICINE

## 2018-01-26 PROCEDURE — 87046 STOOL CULTR AEROBIC BACT EA: CPT | Mod: 59

## 2018-01-26 PROCEDURE — 99999 PR PBB SHADOW E&M-EST. PATIENT-LVL III: CPT | Mod: PBBFAC,,, | Performed by: INTERNAL MEDICINE

## 2018-01-26 PROCEDURE — 1159F MED LIST DOCD IN RCRD: CPT | Mod: S$GLB,,, | Performed by: INTERNAL MEDICINE

## 2018-01-26 PROCEDURE — 87045 FECES CULTURE AEROBIC BACT: CPT

## 2018-01-26 PROCEDURE — 1126F AMNT PAIN NOTED NONE PRSNT: CPT | Mod: S$GLB,,, | Performed by: INTERNAL MEDICINE

## 2018-01-26 PROCEDURE — 87427 SHIGA-LIKE TOXIN AG IA: CPT

## 2018-01-26 PROCEDURE — 99214 OFFICE O/P EST MOD 30 MIN: CPT | Mod: S$GLB,,, | Performed by: INTERNAL MEDICINE

## 2018-01-26 RX ORDER — SODIUM, POTASSIUM,MAG SULFATES 17.5-3.13G
SOLUTION, RECONSTITUTED, ORAL ORAL
Qty: 1 BOTTLE | Refills: 0 | Status: ON HOLD | OUTPATIENT
Start: 2018-01-26 | End: 2018-02-28 | Stop reason: HOSPADM

## 2018-01-26 NOTE — PROGRESS NOTES
I have personally interviewed and examined the patient. I have reviewed the notes, assessments and plans performed by Dr Barnes and I CONCUR with his documentation of  Royce Francis.

## 2018-01-26 NOTE — PROGRESS NOTES
" [facility] Established Patient Visit    Reason for Visit:    Chief Complaint   Patient presents with    Diarrhea       PCP: Rupert Díaz    HPI:  Royce Francis is a 72 y.o. male here for follow-up of diarrhea, abdominal pain, and odynophagia.  I last saw him in May for diarrhea.  He was seen by my NP, Annita Sylvester, just yesterday.  His diarrhea has been present since surgery in 2013.  He has to watch what he eats.  His diarrhea has been worse in the last 3 weeks.  It is worse with eating and improved when he doesn't eat.  Liquids don't cause diarrhea.  He has lost 10-12 lbs in the past month.  Certain foods cause him more pain and feels "blocked-up".  He had an episode of partial small bowel obstruction in November that resolved with conservative management.  This hasn't happened again since then.  He still cannot eat fibrous foods.  He isn't having much abdominal pain.  Stool studies were ordered by my NP.    He also complains of odynophagia.  He has some improvement with Nystatin swallow.  He denies dysphagia.            ROS:  Constitutional: No fevers, chills, No weight loss, normal appetite  ENT: No congestion, rhinorrhea, or chronic sinus problems  CV: No chest pain or palpitations  Pulm: No cough, No shortness of breath  Ophtho: No vision changes or pain  GI: see HPI        PMHX:  has a past medical history of Allergy; Anemia; Arthritis; Basal cell carcinoma; Cancer; Cardiomyopathy due to chemotherapy; Cataract; CHF (congestive heart failure); Coronary artery disease; Encounter for blood transfusion; Eye injuries (20 yrs); Hyperlipidemia; Hypertension; Hypertensive heart disease with heart failure (1/31/2013); Lymphoma (1996); Obstructive sleep apnea on CPAP; SCC (squamous cell carcinoma) (excised 12/2015); Skin disease; Sleep apnea; SQUAMOUS CELL CARCINOMA (excised 2/14/13); Squamous cell carcinoma (excised 8/4/2016); and Squamous cell carcinoma (excised " 8/25/2016).    PSHX:  has a past surgical history that includes Kidney stone surgery; Small intestine surgery (09/2013); Tonsillectomy; Eye surgery; Cataract extraction; and Cataract extraction w/  intraocular lens implant (Left, 01/12/2017).    The patient's social and family histories were reviewed by me and updated in the appropriate section of the electronic medical record.    Review of patient's allergies indicates:   Allergen Reactions    Iodine and iodide containing products Itching     Contrast dye       Prior to Admission medications    Medication Sig Start Date End Date Taking? Authorizing Provider   acetaminophen (TYLENOL) 500 MG tablet Take 500 mg by mouth every 6 (six) hours as needed for Pain.   Yes Historical Provider, MD   ascorbic acid, vitamin C, (VITAMIN C) 1000 MG tablet Take 1,000 mg by mouth once daily. liposomal   Yes Historical Provider, MD   cyanocobalamin (VITAMIN B-12) 1000 MCG tablet Take 100 mcg by mouth once daily.   Yes Historical Provider, MD   enalapril (VASOTEC) 5 MG tablet Take 0.5 tablets (2.5 mg total) by mouth once daily. 1/25/18  Yes Simeon Barnes MD   esomeprazole (NEXIUM) 20 MG capsule Take 20 mg by mouth before breakfast.   Yes Historical Provider, MD   flurazepam (DALMANE) 30 MG capsule Take 1 capsule (30 mg total) by mouth nightly as needed for Insomnia. 1/24/18  Yes Ye Lucia Jr., MD   fluticasone (FLONASE) 50 mcg/actuation nasal spray SPRAY ONCE IN EACH NOSTRIL ONCE DAILY 7/27/17  Yes Rupert Díaz MD   furosemide (LASIX) 40 MG tablet One daily as needed for edema 9/18/17 9/18/20 Yes Ye Lucia Jr., MD   hydrocodone-acetaminophen 5-325mg (NORCO) 5-325 mg per tablet Take 1 tablet by mouth every 8 (eight) hours as needed for Pain. 1/2/18  Yes Unruly Leos MD   lactobacillus rhamnosus GG (CULTURELLE) 10 billion cell capsule Take 1 capsule by mouth once daily.   Yes Historical Provider, MD   lidocaine-prilocaine (EMLA) cream  8/5/14  Yes Historical  "Provider, MD   metoprolol tartrate (LOPRESSOR) 50 MG tablet Take 0.5 tablets (25 mg total) by mouth 2 (two) times daily. 10/4/17  Yes Boston Dent MD   mirtazapine (REMERON) 15 MG tablet Take 1 tablet (15 mg total) by mouth every evening. 5/12/17  Yes Ye Lucia Jr., MD   multivitamin capsule Take 1 capsule by mouth once daily.   Yes Historical Provider, MD   NON FORMULARY MEDICATION 300 mg once daily. Desiccated Liver   Yes Historical Provider, MD   nystatin (MYCOSTATIN) 100,000 unit/mL suspension Take 4 mLs (400,000 Units total) by mouth 4 (four) times daily. 1/22/18 2/1/18 Yes Tj Everett PA-C   potassium chloride SA (K-DUR,KLOR-CON) 20 MEQ tablet Take 1 tablet (20 mEq total) by mouth once daily. 1/12/18 2/11/18 Yes Ye Lucia Jr., MD   tamsulosin (FLOMAX) 0.4 mg Cp24 Take 1 capsule (0.4 mg total) by mouth once daily. 7/26/17  Yes Rupert Díaz MD   valacyclovir (VALTREX) 500 MG tablet Take 1 tablet (500 mg total) by mouth once daily. 9/5/17  Yes Ye Lucia Jr., MD   water Liqd 150 mL with MILK OF MAGNESIA 400 mg/5 mL Susp 400 mg, diphenhydrAMINE 12.5 mg/5 mL Elix 60 mg, nystatin 100,000 unit/mL Susp 500,000 Units Take 5 mLs by mouth.   Yes Historical Provider, MD         Objective Findings:  Vital Signs:  BP (!) 94/58   Pulse 86   Ht 5' 10.5" (1.791 m)   Wt 79.3 kg (174 lb 13.2 oz)   BMI 24.73 kg/m²  Body mass index is 24.73 kg/m².    Physical Exam:  General Appearance:  Well appearing in no acute distress, appears stated age  Head:  Normocephalic, atraumatic  Eyes:  No scleral icterus or pallor, EOMI  ENT:  Neck supple, moist mucosa; OP clear  Lungs:  CTA bilaterally in anterior and posterior fields, no wheezes, no crackles; no dullness  Heart:  Regular rate and rhythm, S1, S2 normal, no murmurs heard  Abdomen:  Soft, non tender, non distended with positive bowel sounds in all four quadrants. No hepatosplenomegaly, ascites, or mass      Labs:  Lab Results   Component Value Date "    WBC 3.29 (L) 01/23/2018    HGB 8.0 (L) 01/23/2018    HCT 26.1 (L) 01/23/2018    MCV 83 01/23/2018    RDW 13.8 01/23/2018     01/23/2018    GRAN 0.9 (L) 01/23/2018    GRAN 25.9 (L) 01/23/2018    LYMPH 1.7 01/23/2018    LYMPH 52.9 (H) 01/23/2018    MONO 0.6 01/23/2018    MONO 18.8 (H) 01/23/2018    EOS 0.1 01/23/2018    BASO 0.01 01/23/2018     Lab Results   Component Value Date     01/23/2018    K 4.3 01/23/2018     01/23/2018    CO2 26 01/23/2018    GLU 94 01/23/2018    BUN 14 01/23/2018    CREATININE 1.0 01/23/2018    CALCIUM 9.1 01/23/2018    PROT 5.2 (L) 01/23/2018    ALBUMIN 2.6 (L) 01/23/2018    BILITOT 0.3 01/23/2018    ALKPHOS 63 01/23/2018    AST 16 01/23/2018    ALT 6 (L) 01/23/2018              Imaging:  Small bowel follow-through 11/1/17:  Mild dilatation of the small bowel proximal to the anastomotic site suggestive of focal ileus versus partial low-grade obstruction.    CT abd 10/31/17:  The previously identified mass encasing the distal ileum is no longer identified.  There is a surgical suture line in this region which appears to have been present on the prior examinations.  There is small bowel dilatation proximal to the suture lines/region of the previously identified mass encasing the distal ileum and partial small bowel obstruction is suspected.  Single minimally enlarged mesenteric lymph node identified measuring 1.0 cm in short axis.  Note that the mesenteric adenopathy has decreased when compared to the prior examination and the pelvic adenopathy has resolved.  Multiple punctate nonobstructing bilateral renal calculi.  Multiple subcentimeter hepatic hypodensities likely representing hepatic cysts, unchanged.              Assessment:  Royce Francis is a 72 y.o. male here with:  1. Chronic diarrhea - stool studies pending.  Present since surgery.  Worse in last 3 weeks.  Seems diet related.  Recent imaging shows that his distal small bowel mass has resolved.  He has an  anastomosis which caused a partial SBO.  His symptoms could indicate continued symptoms of a partial obstruction.     2. Weight loss  3. Odynophagia - some improvement with Nystatin swallow.      Recommendations:  1. Follow-up stool studies to rule out infection  2. I will arrange for EGD and colonoscopy to assess odynophagia and diarrhea  3. Consider referral back to general surgery if testing is unrevealing.    Follow-up pending the above        Nino Richard MD

## 2018-01-28 ENCOUNTER — PATIENT MESSAGE (OUTPATIENT)
Dept: GASTROENTEROLOGY | Facility: CLINIC | Age: 73
End: 2018-01-28

## 2018-01-28 LAB
E COLI SXT1 STL QL IA: NEGATIVE
E COLI SXT2 STL QL IA: NEGATIVE

## 2018-01-29 ENCOUNTER — PATIENT MESSAGE (OUTPATIENT)
Dept: GASTROENTEROLOGY | Facility: CLINIC | Age: 73
End: 2018-01-29

## 2018-01-29 ENCOUNTER — PATIENT MESSAGE (OUTPATIENT)
Dept: HEMATOLOGY/ONCOLOGY | Facility: CLINIC | Age: 73
End: 2018-01-29

## 2018-01-30 ENCOUNTER — PATIENT MESSAGE (OUTPATIENT)
Dept: GASTROENTEROLOGY | Facility: CLINIC | Age: 73
End: 2018-01-30

## 2018-01-30 ENCOUNTER — TELEPHONE (OUTPATIENT)
Dept: HEMATOLOGY/ONCOLOGY | Facility: CLINIC | Age: 73
End: 2018-01-30

## 2018-01-30 LAB — BACTERIA STL CULT: NORMAL

## 2018-01-30 NOTE — TELEPHONE ENCOUNTER
Smear requested at 12:59pm.     ----- Message from Ye Lucia Jr., MD sent at 1/30/2018 11:45 AM CST -----  I need smear from [facility] today

## 2018-01-30 NOTE — TELEPHONE ENCOUNTER
Returned call to Tricia with [facility] lab.   Tricia wanted to give FYI to staff that stat CBC cannot be processed because machine is down.   Lab will be sent to [facility].   Nurse verbalized understanding.         ----- Message from Naveed Bhandari sent at 1/30/2018 10:05 AM CST -----  Contact: Lab desk 1   Will a call from [facility] regarding not able to run stats because machine is down, so it will be sent to [facility]    Contact::[phone]

## 2018-01-31 ENCOUNTER — PATIENT MESSAGE (OUTPATIENT)
Dept: GASTROENTEROLOGY | Facility: CLINIC | Age: 73
End: 2018-01-31

## 2018-01-31 ENCOUNTER — TELEPHONE (OUTPATIENT)
Dept: HEMATOLOGY/ONCOLOGY | Facility: CLINIC | Age: 73
End: 2018-01-31

## 2018-01-31 NOTE — TELEPHONE ENCOUNTER
Smear given to MD.   MD thanked nurse.         ----- Message from Ye Lucia Jr., MD sent at 1/30/2018 11:45 AM CST -----  I need smear from [facility] today

## 2018-02-01 ENCOUNTER — PATIENT MESSAGE (OUTPATIENT)
Dept: GASTROENTEROLOGY | Facility: CLINIC | Age: 73
End: 2018-02-01

## 2018-02-01 ENCOUNTER — PATIENT MESSAGE (OUTPATIENT)
Dept: HEMATOLOGY/ONCOLOGY | Facility: CLINIC | Age: 73
End: 2018-02-01

## 2018-02-01 ENCOUNTER — INFUSION (OUTPATIENT)
Dept: INFUSION THERAPY | Facility: HOSPITAL | Age: 73
End: 2018-02-01
Attending: INTERNAL MEDICINE
Payer: MEDICARE

## 2018-02-01 ENCOUNTER — OFFICE VISIT (OUTPATIENT)
Dept: HEMATOLOGY/ONCOLOGY | Facility: CLINIC | Age: 73
End: 2018-02-01
Payer: MEDICARE

## 2018-02-01 VITALS
HEART RATE: 76 BPM | WEIGHT: 172.63 LBS | SYSTOLIC BLOOD PRESSURE: 106 MMHG | BODY MASS INDEX: 24.42 KG/M2 | DIASTOLIC BLOOD PRESSURE: 65 MMHG

## 2018-02-01 VITALS
SYSTOLIC BLOOD PRESSURE: 132 MMHG | TEMPERATURE: 98 F | HEART RATE: 99 BPM | DIASTOLIC BLOOD PRESSURE: 63 MMHG | RESPIRATION RATE: 17 BRPM

## 2018-02-01 DIAGNOSIS — D50.0 ANEMIA DUE TO CHRONIC BLOOD LOSS: Primary | ICD-10-CM

## 2018-02-01 DIAGNOSIS — E53.8 B12 DEFICIENCY: Primary | ICD-10-CM

## 2018-02-01 DIAGNOSIS — D46.9 MYELODYSPLASTIC SYNDROME: ICD-10-CM

## 2018-02-01 DIAGNOSIS — C83.30 LARGE CELL (DIFFUSE) NON-HODGKIN'S LYMPHOMA: ICD-10-CM

## 2018-02-01 DIAGNOSIS — D64.9 ANEMIA, UNSPECIFIED TYPE: ICD-10-CM

## 2018-02-01 DIAGNOSIS — D50.0 ANEMIA DUE TO CHRONIC BLOOD LOSS: ICD-10-CM

## 2018-02-01 LAB
BLD PROD TYP BPU: NORMAL
BLD PROD TYP BPU: NORMAL
BLOOD UNIT EXPIRATION DATE: NORMAL
BLOOD UNIT EXPIRATION DATE: NORMAL
BLOOD UNIT TYPE CODE: 5100
BLOOD UNIT TYPE CODE: 9500
BLOOD UNIT TYPE: NORMAL
BLOOD UNIT TYPE: NORMAL
BODY SITE - BONE MARROW: NORMAL
BONE MARROW IRON STAIN COMMENT: NORMAL
BONE MARROW WRIGHT STAIN COMMENT: NORMAL
CLINICAL DIAGNOSIS - BONE MARROW: NORMAL
CODING SYSTEM: NORMAL
CODING SYSTEM: NORMAL
DISPENSE STATUS: NORMAL
DISPENSE STATUS: NORMAL
FLOW CYTOMETRY ANTIBODIES ANALYZED - BONE MARROW: NORMAL
FLOW CYTOMETRY COMMENT - BONE MARROW: NORMAL
FLOW CYTOMETRY INTERPRETATION - BONE MARROW: NORMAL
NUM UNITS TRANS PACKED RBC: NORMAL
NUM UNITS TRANS PACKED RBC: NORMAL

## 2018-02-01 PROCEDURE — 85097 BONE MARROW INTERPRETATION: CPT | Mod: ,,, | Performed by: PATHOLOGY

## 2018-02-01 PROCEDURE — 63600175 PHARM REV CODE 636 W HCPCS: Performed by: INTERNAL MEDICINE

## 2018-02-01 PROCEDURE — 88311 DECALCIFY TISSUE: CPT | Mod: 26,,, | Performed by: PATHOLOGY

## 2018-02-01 PROCEDURE — 99999 PR PBB SHADOW E&M-EST. PATIENT-LVL III: CPT | Mod: PBBFAC,,, | Performed by: INTERNAL MEDICINE

## 2018-02-01 PROCEDURE — 99499 UNLISTED E&M SERVICE: CPT | Mod: S$GLB,,, | Performed by: INTERNAL MEDICINE

## 2018-02-01 PROCEDURE — 88313 SPECIAL STAINS GROUP 2: CPT

## 2018-02-01 PROCEDURE — 88313 SPECIAL STAINS GROUP 2: CPT | Mod: 26,,, | Performed by: PATHOLOGY

## 2018-02-01 PROCEDURE — 88275 CYTOGENETICS 100-300: CPT | Mod: 59

## 2018-02-01 PROCEDURE — 88271 CYTOGENETICS DNA PROBE: CPT

## 2018-02-01 PROCEDURE — 96374 THER/PROPH/DIAG INJ IV PUSH: CPT

## 2018-02-01 PROCEDURE — 38222 DX BONE MARROW BX & ASPIR: CPT | Mod: LT,S$GLB,, | Performed by: INTERNAL MEDICINE

## 2018-02-01 PROCEDURE — 86920 COMPATIBILITY TEST SPIN: CPT

## 2018-02-01 PROCEDURE — 96375 TX/PRO/DX INJ NEW DRUG ADDON: CPT

## 2018-02-01 PROCEDURE — 88189 FLOWCYTOMETRY/READ 16 & >: CPT | Mod: ,,, | Performed by: PATHOLOGY

## 2018-02-01 PROCEDURE — 88299 UNLISTED CYTOGENETIC STUDY: CPT

## 2018-02-01 PROCEDURE — 88275 CYTOGENETICS 100-300: CPT

## 2018-02-01 PROCEDURE — 25000003 PHARM REV CODE 250: Performed by: INTERNAL MEDICINE

## 2018-02-01 PROCEDURE — 1159F MED LIST DOCD IN RCRD: CPT | Mod: S$GLB,,, | Performed by: INTERNAL MEDICINE

## 2018-02-01 PROCEDURE — 88305 TISSUE EXAM BY PATHOLOGIST: CPT | Performed by: PATHOLOGY

## 2018-02-01 PROCEDURE — 99214 OFFICE O/P EST MOD 30 MIN: CPT | Mod: 25,S$GLB,, | Performed by: INTERNAL MEDICINE

## 2018-02-01 PROCEDURE — 88237 TISSUE CULTURE BONE MARROW: CPT

## 2018-02-01 PROCEDURE — 3008F BODY MASS INDEX DOCD: CPT | Mod: S$GLB,,, | Performed by: INTERNAL MEDICINE

## 2018-02-01 PROCEDURE — 88184 FLOWCYTOMETRY/ TC 1 MARKER: CPT | Performed by: PATHOLOGY

## 2018-02-01 PROCEDURE — 36430 TRANSFUSION BLD/BLD COMPNT: CPT

## 2018-02-01 PROCEDURE — P9040 RBC LEUKOREDUCED IRRADIATED: HCPCS

## 2018-02-01 PROCEDURE — P9038 RBC IRRADIATED: HCPCS

## 2018-02-01 PROCEDURE — 88271 CYTOGENETICS DNA PROBE: CPT | Mod: 59

## 2018-02-01 PROCEDURE — 88305 TISSUE EXAM BY PATHOLOGIST: CPT | Mod: 26,,, | Performed by: PATHOLOGY

## 2018-02-01 PROCEDURE — 27201040 HC RC 50 FILTER

## 2018-02-01 PROCEDURE — 88185 FLOWCYTOMETRY/TC ADD-ON: CPT | Performed by: PATHOLOGY

## 2018-02-01 PROCEDURE — 88264 CHROMOSOME ANALYSIS 20-25: CPT

## 2018-02-01 RX ORDER — OXYCODONE AND ACETAMINOPHEN 5; 325 MG/1; MG/1
2 TABLET ORAL ONCE
Status: COMPLETED | OUTPATIENT
Start: 2018-02-01 | End: 2018-02-01

## 2018-02-01 RX ORDER — HYDROCODONE BITARTRATE AND ACETAMINOPHEN 500; 5 MG/1; MG/1
TABLET ORAL ONCE
Status: COMPLETED | OUTPATIENT
Start: 2018-02-01 | End: 2018-02-01

## 2018-02-01 RX ORDER — FUROSEMIDE 10 MG/ML
20 INJECTION INTRAMUSCULAR; INTRAVENOUS
Status: CANCELLED | OUTPATIENT
Start: 2018-02-01

## 2018-02-01 RX ORDER — HYDROCODONE BITARTRATE AND ACETAMINOPHEN 500; 5 MG/1; MG/1
TABLET ORAL ONCE
Status: CANCELLED | OUTPATIENT
Start: 2018-02-01 | End: 2018-02-01

## 2018-02-01 RX ORDER — FUROSEMIDE 10 MG/ML
20 INJECTION INTRAMUSCULAR; INTRAVENOUS
Status: DISCONTINUED | OUTPATIENT
Start: 2018-02-01 | End: 2018-02-01 | Stop reason: HOSPADM

## 2018-02-01 RX ADMIN — FUROSEMIDE 20 MG: 10 INJECTION, SOLUTION INTRAMUSCULAR; INTRAVENOUS at 01:02

## 2018-02-01 RX ADMIN — SODIUM CHLORIDE: 900 INJECTION, SOLUTION INTRAVENOUS at 10:02

## 2018-02-01 RX ADMIN — OXYCODONE HYDROCHLORIDE AND ACETAMINOPHEN 2 TABLET: 5; 325 TABLET ORAL at 03:02

## 2018-02-01 NOTE — PROGRESS NOTES
HISTORY OF PRESENT ILLNESS:  Mr. Francis is a 72-year-old man who has been   treated numerous times since 1996 for B-cell lymphoma, initially follicular and   later diffuse.    In August 2017, he developed abdominal pain, anorexia and increasing diarrhea.    Computed tomography showed enlarged mesenteric lymph nodes and a large mass in   the right lower quadrant, which encased the distal ileum and extended into the   anterior abdominal wall.  This was an area in which he had previously had   surgery for a complication of presumed lymphoma--perforation of the intestine   following earlier chemotherapy.    After discussing the situation in August 2017 with a surgical oncologist, I   placed him on bendamustine and Rituxan and he received the sixth and last of this on   12/26/2017 and 12/27/2017.    His severe abdominal pain quickly resolved after the treatment began.  He has   continued to have a low-grade fever to 100 degrees and recently he has been   having more diarrhea.  He tells me that he is scheduled to have an EGD and   colonoscopy examination tomorrow by Dr. Richard.  He is very weak and he is   spending most of his time either sitting in a chair or lying down.  Today, he is   in a wheelchair because he can only walk short distances before he becomes very   fatigued.    I have been particularly concerned about his recent blood counts.  In November 2014, during a hospitalization he developed pancytopenia.  A bone marrow   examination showed dysplastic changes and a cytogenetic study revealed 10 of 20   metaphases with a 20q deletion.  A FISH panel on the marrow material   confirmed the 20q deletion in 33% of cells.  In addition, 19% of cells had four   copies of 7q31.  The results suggested the presence of two abnormal clones, a 7q   clone related to lymphoma and 20q clone indicating an increased risk of   myelodysplastic disease.  Fortunately, after that examination, his blood counts   improved  substantially, although he often had mild thrombocytopenia and anemia.    Since his last chemotherapy in late December 2017, his blood counts have not   recovered to their prior levels.  There has been a gradual decline in all three   cell lines.  Two days ago, his CBC included hemoglobin 7.5, platelets 113,000   and WBC 3260 with 28% granulocytes, 55% lymphocytes, 14% monocytes and 1%   eosinophil.    ADDITIONAL PAST HISTORY, SYSTEM REVIEW, SOCIAL HISTORY AND FAMILY HISTORY:  Have   been reviewed and updated in the electronic record.    PHYSICAL EXAMINATION:  GENERAL APPEARANCE:  A well-developed man who appears much weaker than when I   last saw him one month ago.  He is able to stand and climb on to an examination   table without assistance.  EYES:  No jaundice.  Conjunctival pallor.  MOUTH AND THROAT:  No mucosal lesions.  Good dentition.  NECK:  No masses or bruits.  No thyroid abnormalities.  LYMPH NODES:  No enlarged cervical, axillary or inguinal nodes.  CHEST AND LUNGS:  Normal respiratory effort.  Clear to auscultation and   percussion.  HEART:  Regular rate and rhythm without murmur or gallop.  ABDOMEN:  Soft without masses or tenderness.  No hepatosplenomegaly.  EXTREMITIES:  No edema or cyanosis.  PERIPHERAL VASCULATURE:  Diminished pulses in the feet and ankles.  Good   popliteal pulses.  NEUROLOGIC:  Motor function is mildly impaired.  He is fully oriented.  Mental   status is good.  SKIN:  No suspicious lesions in the areas examined.    ADDITIONAL LABORATORY STUDIES:  The comprehensive metabolic profile on   01/30/2018 was remarkable for calcium 8.6, total protein 5.1, and albumin 2.5.    The last imaging study of the abdomen was a CT scan on 10/31/2017.  This showed   resolution of a large mass that encased the distal ileum.  There was some small   bowel dilatation proximal to the area where surgery had been performed.  There   was a single mesenteric lymph node that measured about 1.0 cm.  Other  mesenteric   lymphadenopathy had diminished to normal size.    IMPRESSION:  1.  Recurrent lymphoma in both diffuse and follicular patterns but recently in a  primarily diffuse pattern.  The last CT scan indicated at least a good partial   remission.  2.  Probable myelodysplastic syndrome which I suspect is progressing.  3.  Persistent and worsening diarrhea.  4.  Peripheral neuropathy.  5.  Chronic back pain from degenerative disease of the spine.    PLAN:  1.  I have advised Mr. Francis to have a bone marrow examination because I am   concerned that he has an overt myelodysplastic syndrome or possibly lymphoma in   his bone marrow.  I reviewed his peripheral blood smear from 2 days ago.  It   shows a few teardrop cells, anisocytosis, decreased platelets and white cells   with an occasional metamyelocyte and an occasional bilobed neutrophil.  2.  He is scheduled to have an EGD and colonoscopy examination tomorrow   afternoon.  3.  I will transfuse him with 2 units of red blood cells this afternoon so that he may  tolerate the endoscopic examinations better and perhaps have less weakness.  4.  I will notify him with the results of the studies performed today.  I will   also decide when his next followup examination should be.      MICHAELB/ANTONINO  dd: 02/01/2018 08:18:20 (CST)  td: 02/01/2018 09:14:57 (CST)  Doc ID   #[id number]  Job ID #[id number]    CC: Nino Richard MD      PROCEDURE NOTE    After explaining the bone marrow examination and its potential risks to the   patient and his wife, he signed a consent form for it.  He laid prone on a   procedure table.  The skin over the left posterior iliac bone was cleaned with   Betadine.  A 2% Xylocaine solution was infiltrated into the skin and onto the   surface of the bone.  An [place] needle was inserted and bone marrow was   aspirated for standard studies, flow cytometry, cytogenetics, FISH for   myelodysplastic disorders and a DNA/RNA freeze and hold specimen.  A biopsy was    then taken with a separate Jamshidi needle.  The patient tolerated the procedure   well and a pressure bandage was placed.  He was given instructions on wound   care.  I will notify him of the results of this either by phone or in person.      ROSA M/ANTONINO  dd: 02/01/2018 08:44:44 (CST)  td: 02/02/2018 01:21:09 (CST)  Doc ID   #[id number]  Job ID #[id number]    CC:      My entire visit note (above) was re-entered along with the procedure note.

## 2018-02-01 NOTE — PLAN OF CARE
Problem: Patient Care Overview  Goal: Individualization & Mutuality  PIV     Outcome: Ongoing (interventions implemented as appropriate)  Labs , hx, and medications reviewed. Assessment completed. Discussed plan of care with patient. Patient in agreement. Chair reclined and warm blanket and snack offered.

## 2018-02-02 ENCOUNTER — ANESTHESIA EVENT (OUTPATIENT)
Dept: ENDOSCOPY | Facility: HOSPITAL | Age: 73
End: 2018-02-02
Payer: MEDICARE

## 2018-02-02 ENCOUNTER — SURGERY (OUTPATIENT)
Age: 73
End: 2018-02-02

## 2018-02-02 ENCOUNTER — ANESTHESIA (OUTPATIENT)
Dept: ENDOSCOPY | Facility: HOSPITAL | Age: 73
End: 2018-02-02
Payer: MEDICARE

## 2018-02-02 ENCOUNTER — HOSPITAL ENCOUNTER (OUTPATIENT)
Facility: HOSPITAL | Age: 73
Discharge: HOME | End: 2018-02-02
Attending: INTERNAL MEDICINE | Admitting: INTERNAL MEDICINE
Payer: MEDICARE

## 2018-02-02 VITALS
RESPIRATION RATE: 18 BRPM | TEMPERATURE: 98 F | SYSTOLIC BLOOD PRESSURE: 118 MMHG | DIASTOLIC BLOOD PRESSURE: 57 MMHG | OXYGEN SATURATION: 98 % | WEIGHT: 172 LBS | HEART RATE: 81 BPM | BODY MASS INDEX: 24.62 KG/M2 | HEIGHT: 70 IN

## 2018-02-02 DIAGNOSIS — K52.9 CHRONIC DIARRHEA: Primary | Chronic | ICD-10-CM

## 2018-02-02 DIAGNOSIS — T45.1X5A CARDIOMYOPATHY DUE TO CHEMOTHERAPY: Chronic | ICD-10-CM

## 2018-02-02 DIAGNOSIS — I42.7 CARDIOMYOPATHY DUE TO CHEMOTHERAPY: Chronic | ICD-10-CM

## 2018-02-02 LAB
DNA/RNA EXTRACT AND HOLD RESULT: NORMAL
DNA/RNA EXTRACTION: NORMAL
EXHR SPECIMEN TYPE: NORMAL

## 2018-02-02 PROCEDURE — 37000008 HC ANESTHESIA 1ST 15 MINUTES: Performed by: INTERNAL MEDICINE

## 2018-02-02 PROCEDURE — 27201012 HC FORCEPS, HOT/COLD, DISP: Performed by: INTERNAL MEDICINE

## 2018-02-02 PROCEDURE — 25000003 PHARM REV CODE 250: Performed by: INTERNAL MEDICINE

## 2018-02-02 PROCEDURE — 43239 EGD BIOPSY SINGLE/MULTIPLE: CPT | Performed by: INTERNAL MEDICINE

## 2018-02-02 PROCEDURE — 37000009 HC ANESTHESIA EA ADD 15 MINS: Performed by: INTERNAL MEDICINE

## 2018-02-02 PROCEDURE — D9220A PRA ANESTHESIA: Mod: CRNA,,, | Performed by: NURSE ANESTHETIST, CERTIFIED REGISTERED

## 2018-02-02 PROCEDURE — 63600175 PHARM REV CODE 636 W HCPCS: Performed by: NURSE ANESTHETIST, CERTIFIED REGISTERED

## 2018-02-02 PROCEDURE — 45380 COLONOSCOPY AND BIOPSY: CPT | Mod: GC,,, | Performed by: INTERNAL MEDICINE

## 2018-02-02 PROCEDURE — D9220A PRA ANESTHESIA: Mod: ANES,,, | Performed by: ANESTHESIOLOGY

## 2018-02-02 PROCEDURE — 88305 TISSUE EXAM BY PATHOLOGIST: CPT | Mod: 26,,, | Performed by: PATHOLOGY

## 2018-02-02 PROCEDURE — 25000003 PHARM REV CODE 250: Performed by: NURSE ANESTHETIST, CERTIFIED REGISTERED

## 2018-02-02 PROCEDURE — 45380 COLONOSCOPY AND BIOPSY: CPT | Performed by: INTERNAL MEDICINE

## 2018-02-02 PROCEDURE — 88305 TISSUE EXAM BY PATHOLOGIST: CPT | Performed by: PATHOLOGY

## 2018-02-02 PROCEDURE — 43239 EGD BIOPSY SINGLE/MULTIPLE: CPT | Mod: 51,GC,, | Performed by: INTERNAL MEDICINE

## 2018-02-02 RX ORDER — PHENYLEPHRINE HYDROCHLORIDE 10 MG/ML
INJECTION INTRAVENOUS
Status: DISCONTINUED | OUTPATIENT
Start: 2018-02-02 | End: 2018-02-02

## 2018-02-02 RX ORDER — GLYCOPYRROLATE 0.2 MG/ML
INJECTION INTRAMUSCULAR; INTRAVENOUS
Status: DISCONTINUED | OUTPATIENT
Start: 2018-02-02 | End: 2018-02-02

## 2018-02-02 RX ORDER — LIDOCAINE HCL/PF 100 MG/5ML
SYRINGE (ML) INTRAVENOUS
Status: DISCONTINUED | OUTPATIENT
Start: 2018-02-02 | End: 2018-02-02

## 2018-02-02 RX ORDER — PROPOFOL 10 MG/ML
VIAL (ML) INTRAVENOUS
Status: DISCONTINUED | OUTPATIENT
Start: 2018-02-02 | End: 2018-02-02

## 2018-02-02 RX ORDER — SODIUM CHLORIDE 9 MG/ML
INJECTION, SOLUTION INTRAVENOUS CONTINUOUS
Status: DISCONTINUED | OUTPATIENT
Start: 2018-02-02 | End: 2018-02-02 | Stop reason: HOSPADM

## 2018-02-02 RX ADMIN — PROPOFOL 25 MG: 10 INJECTION, EMULSION INTRAVENOUS at 01:02

## 2018-02-02 RX ADMIN — GLYCOPYRROLATE 0.1 MG: 0.2 INJECTION, SOLUTION INTRAMUSCULAR; INTRAVENOUS at 01:02

## 2018-02-02 RX ADMIN — PROPOFOL 50 MG: 10 INJECTION, EMULSION INTRAVENOUS at 01:02

## 2018-02-02 RX ADMIN — LIDOCAINE HYDROCHLORIDE 100 MG: 20 INJECTION, SOLUTION INTRAVENOUS at 01:02

## 2018-02-02 RX ADMIN — PHENYLEPHRINE HYDROCHLORIDE 100 MCG: 10 INJECTION INTRAVENOUS at 01:02

## 2018-02-02 RX ADMIN — SODIUM CHLORIDE: 0.9 INJECTION, SOLUTION INTRAVENOUS at 12:02

## 2018-02-02 NOTE — ANESTHESIA PREPROCEDURE EVALUATION
02/02/2018  Royce Francis is a 72 y.o., male.  Past Medical History:   Diagnosis Date    Allergy     Anemia     Arthritis     Basal cell carcinoma     excised from upper back    Cancer     Cardiomyopathy due to chemotherapy     Cataract     CHF (congestive heart failure)     Coronary artery disease     D1    Encounter for blood transfusion     Eye injuries 20 yrs    ou fb several x's    Hyperlipidemia     Hypertension     Hypertensive heart disease with heart failure 1/31/2013    Lymphoma 1996    Obstructive sleep apnea on CPAP     SCC (squamous cell carcinoma) excised 12/2015    R proximal forearm    Sleep apnea     SQUAMOUS CELL CARCINOMA excised 2/14/13    L forearm    Squamous cell carcinoma excised 8/4/2016    IN SITU, Left thigh MOHS    Squamous cell carcinoma excised 8/25/2016    left helix, MOHS     Past Surgical History:   Procedure Laterality Date    CATARACT EXTRACTION      od dr yanes    CATARACT EXTRACTION W/  INTRAOCULAR LENS IMPLANT Left 01/12/2017    Dr. Yanes    EYE SURGERY      Rt cataract    KIDNEY STONE SURGERY      SMALL INTESTINE SURGERY  09/2013    TONSILLECTOMY           Anesthesia Evaluation    I have reviewed the Patient Summary Reports.    I have reviewed the Nursing Notes.   I have reviewed the Medications.     Review of Systems  Anesthesia Hx:  No problems with previous Anesthesia  History of prior surgery of interest to airway management or planning: Previous anesthesia: General Denies Family Hx of Anesthesia complications.   Denies Personal Hx of Anesthesia complications.   Social:  Non-Smoker    Hematology/Oncology:         -- Anemia: Hematology Comments: S/p 2u pRBC yesterday Current/Recent Cancer. chemotherapy    Cardiovascular:   Hypertension CAD   CHF Worsening fatigue, MEDLEY, recently seen by cardiology, 2DE unchanged from 2015 per  their notes.    Pulmonary:   Sleep Apnea, CPAP    Hepatic/GI:   Bowel Prep. Denies current N/V/abdominal pain, h/o SBO   Neurological:  Neurology Normal    Endocrine:  Endocrine Normal        Physical Exam  General:  Well nourished    Airway/Jaw/Neck:  Airway Findings: Mouth Opening: Normal Tongue: Normal  General Airway Assessment: Adult  Mallampati: II  Improves to I with phonation.  TM Distance: Normal, at least 6 cm  Jaw/Neck Findings:  Neck ROM: Normal ROM      Dental:  Dental Findings: In tact        Mental Status:  Mental Status Findings:  Cooperative, Alert and Oriented         Anesthesia Plan  Type of Anesthesia, risks & benefits discussed:  Anesthesia Type:  general  Patient's Preference:   Intra-op Monitoring Plan:   Intra-op Monitoring Plan Comments:   Post Op Pain Control Plan:   Post Op Pain Control Plan Comments:   Induction:   IV  Beta Blocker:  Patient is on a Beta-Blocker and has received one dose within the past 24 hours (No further documentation required).       Informed Consent: Patient understands risks and agrees with Anesthesia plan.  Questions answered. Anesthesia consent signed with patient.  ASA Score: 3     Day of Surgery Review of History & Physical:    H&P update referred to the surgeon.         Ready For Surgery From Anesthesia Perspective.

## 2018-02-02 NOTE — PROVATION PATIENT INSTRUCTIONS
Discharge Summary/Instructions after an Endoscopic Procedure  Patient Name: Royce Francis  Patient MRN: [id number]  Patient YOB: 1945 Friday, February 02, 2018  Josh Boyd MD  RESTRICTIONS:  During your procedure today, you received medications for sedation.  These   medications may affect your judgment, balance and coordination.  Therefore,   for 24 hours, you have the following restrictions:   - DO NOT drive a car, operate machinery, make legal/financial decisions,   sign important papers or drink alcohol.    ACTIVITY:  The following day: return to full activity including work, except no heavy   lifting, straining or running for 3 days if polyps were removed.  DIET:  Eat and drink normally unless instructed otherwise.     TREATMENT FOR COMMON SIDE EFFECTS:  - Mild abdominal pain, belching, bloating or excessive gas: rest, eat   lightly and use a heating pad.  - Sore Throat: treat with throat lozenges and/or gargle with warm salt   water.  SYMPTOMS TO WATCH FOR AND REPORT TO YOUR PHYSICIAN:  1. Abdominal pain or bloating, other than gas cramps.  2. Chest pain.  3. Back pain.  4. Chills or fever occurring within 24 hours after the procedure.  5. Rectal bleeding, which would show as bright red, maroon, or black stools.   (A tablespoon of blood from the rectum is not serious, especially if   hemorrhoids are present.)  6. Vomiting.  7. Weakness or dizziness.  8. Because air was used during the procedure, expelling large amounts of air   from your rectum or belching is normal.  9. If a bowel prep was taken, you may not have a bowel movement for 1-3   days.  This is normal.  GO DIRECTLY TO THE NEAREST EMERGENCY ROOM IF YOU HAVE ANY OF THE FOLLOWING:      Difficulty breathing  Chills and/or fever over 101 F   Persistent vomiting and/or vomiting blood   Severe abdominal pain   Severe chest pain   Black, tarry stools   Bleeding- more than one tablespoon   Any other symptom or condition that you may feel needs  urgent attention  Your doctor recommends these additional instructions:  If any biopsies were taken, your doctor s clinic will contact you in 1 to 2   weeks with any results.  You are being discharged to home.   We are waiting for your pathology results.   Your physician has recommended a colonoscopy today.   The findings and recommendations were discussed with your designated   responsible adult.  For questions, problems or results please call your physician - Josh Boyd MD at Work:  [phone].  [facility], EMERGENCY ROOM PHONE NUMBER: [phone]  IF A COMPLICATION OR EMERGENCY SITUATION ARISES AND YOU ARE UNABLE TO REACH   YOUR PHYSICIAN - GO DIRECTLY TO THE EMERGENCY ROOM.  Josh Boyd MD  2/2/2018 1:16:39 PM  This report has been verified and signed electronically.

## 2018-02-02 NOTE — TRANSFER OF CARE
"Anesthesia Transfer of Care Note    Patient: Royce Francis    Procedure(s) Performed: Procedure(s) (LRB):  ESOPHAGOGASTRODUODENOSCOPY (EGD) (N/A)  COLONOSCOPY (N/A)    Patient location: PACU    Anesthesia Type: general    Transport from OR: Transported from OR on room air with adequate spontaneous ventilation    Post pain: adequate analgesia    Post assessment: no apparent anesthetic complications and tolerated procedure well    Post vital signs: stable    Level of consciousness: awake and sedated    Nausea/Vomiting: no nausea/vomiting    Complications: none    Transfer of care protocol was followed      Last vitals:   Visit Vitals  BP (!) 95/52 (BP Location: Left arm, Patient Position: Lying)   Pulse 83   Temp 36.7 °C (98.1 °F) (Temporal)   Resp 16   Ht 5' 10" (1.778 m)   Wt 78 kg (172 lb)   SpO2 99%   BMI 24.68 kg/m²     "

## 2018-02-02 NOTE — ANESTHESIA POSTPROCEDURE EVALUATION
"Anesthesia Post Evaluation    Patient: Royce Francis    Procedure(s) Performed: Procedure(s) (LRB):  ESOPHAGOGASTRODUODENOSCOPY (EGD) (N/A)  COLONOSCOPY (N/A)    Final Anesthesia Type: general  Patient location during evaluation: GI PACU  Patient participation: Yes- Able to Participate  Level of consciousness: awake and alert  Post-procedure vital signs: reviewed and stable  Pain management: adequate  Airway patency: patent  PONV status at discharge: No PONV  Anesthetic complications: no      Cardiovascular status: hemodynamically stable  Respiratory status: unassisted, spontaneous ventilation and room air  Hydration status: euvolemic  Follow-up not needed.        Visit Vitals  BP (!) 118/57 (BP Location: Left arm, Patient Position: Lying)   Pulse 81   Temp 36.4 °C (97.6 °F) (Oral)   Resp 18   Ht 5' 10" (1.778 m)   Wt 78 kg (172 lb)   SpO2 98%   BMI 24.68 kg/m²       Pain/Masoud Score: Pain Assessment Performed: Yes (2/2/2018  2:16 PM)  Presence of Pain: denies (2/2/2018  2:16 PM)  Pain Rating Prior to Med Admin: 8 (2/1/2018  3:19 PM)  Masoud Score: 10 (2/2/2018  1:49 PM)      "

## 2018-02-02 NOTE — LETTER
Geri Camachoep Instructions for Colonoscopy    DATE: 2/2/18 ARRIVAL TIME: 12:45pm Take elevator B the Fourth Floor [facility], Endoscopy Lab    IMPORTANT:  PLEASE READ CAREFULLY. FAILURE TO FOLLOW THESE INSTRUCTIONS MAY RESULT IN YOUR PROCEDURE BEING CANCELED, RESCHEDULED, OR REPEATED.                                       General Instructions and Notes:  · Since anesthesia is used during the colonoscopy, it is necessary for a responsible adult (family member or friend 18 years and over) to accompany you home.  · Both you and your responsible adult should be prepared to stay at the hospital at least three (3) hours from check-in to discharge. If they cannot stay during your procedure, your procedure will be canceled.    · Please leave all valuables and jewelry at home.  · Please bring a picture ID, insurance card, & copayment.  · Please look over the clear liquid diet and purchase any necessary items.    · If you have any questions about the cost of the procedure, you should contact your health insurance co. as soon as possible. You can also contact [facility] for co-pay and deductibles at [phone].    Clear Liquid Diet   Water, tea, regular or decaffeinated coffee   Carbonated beverages (soft drinks), regular and sugar-free   Gelatin dessert, plain or fruit flavored (no red gelatin)   Apple juice, grape juice, juicy juice   Gatorade, Power Aid, Crystal Lite, Killian-Aid, Lemonade, Limeade   Bouillon, clear consommé, broth 100% fat free beef, chicken or vegetable   Snowballs, popsicles, hard candy, sugar, salt   100% cranberry juice is the only red liquid allowed   Avoid any liquids containing red dye    Avoid any liquids not specifically listed    Avoid dairy products (liquid or powder)   Alcohol is not permitted    Your Medication(s) Instructions  · Your colon prep was sent to the pharmacy listed in your chart. Please  your prep prescription from the pharmacy in advance.  Please  disregard the Suprep insert/or box instructions from pharmacy.   · Please select the blood thinner(s) you are currently taking and follow the instructions on when to stop taking the medication(s) prior to the procedure.         Not applicable  · If you obtained Coumadin through [facility], follow the instructions provided by the clinic.   · You may continue to take Aspirin until the day before your procedure or as directed by the scheduling nurse.   · If you are diabetic, see the attached instruction sheet regarding your daily medication. Continue to monitor your blood sugar while doing the bowel preparation. When you begin the clear-liquid diet, you may drink beverages with sugar as your source of glucose.   · If you are a female less than 60 years old, please be prepared to provide a urine sample on arrival.      Split Suprep Instructions for Colonoscopy    Instructions for the Day Before Procedure DATE: 2/1/18   · Upon awakening begin your clear liquid diet. DO NOT eat any solid foods.  · Drink at least six to eight (6-8) glasses of clear liquids until you begin your prep.     · At 6 p.m. Pour one (1) 6 oz bottle of Suprep liquid into the mixing container. Add cool drinking water to the 16 oz line on the container and mix. Drink all the liquid in the container. You must drink two (2) more 16 oz containers of clear liquids over the next hour. You may continue clear liquids after this step. It is not uncommon to experience some abdominal cramping, nausea and/or vomiting when taking the prep. If you have nausea and vomiting while taking the prep, stop drinking for 20 to 30 minutes then resume.                        Instructions for the Day of the Procedure DATE:2/2/18 at TIME 8:30am                              · Pour one (1) 6 oz bottle of Suprep liquid into the mixing container.  · Add cool drinking water to the 16 oz line on the container and mix.   · Drink all the liquid in the container.   · You  must drink two (2) more 16 oz containers of clear liquids over the next hour.  · You must finish the AM portion four (4) hours before procedure.  · You can continue clear liquids until  9:30am At 6 a.m. Take all of your morning medication except for diabetic and blood thinning. If you take your medications at a different time other than in the morning, please take medications at your regular scheduled times as ordered by your doctor.  · Before your appointment, if you are taking Coumadin/Warfarin, please go the 2nd floor lab prior to check-in at the Endoscopy lab.     If you have questions regarding the prep or need to reschedule please call KAMI Villasenor [phone] between the hours of 7 a.m. and 5 p.m.  For problems related to your prep between 5 p.m. and 7 a.m. call [phone] and ask to speak with fellow on call for Gastroenterology Department.     Notes: na

## 2018-02-02 NOTE — H&P
Short Stay Endoscopy History and Physical    PCP - Rupert Díaz MD    Procedure - Colonoscopy  ASA - per anesthesia  Mallampati - per anesthesia  History of Anesthesia problems - no  Family history Anesthesia problems - no   Plan of anesthesia - General / MAC    HPI:  This is a 72 y.o. male here for evaluation of : diarrhea, anemia.    EGD for anemia. Diarrhea.   Hx of recent chemotherapy.      ROS:  Constitutional: No fevers, chills,  + wt loss.  CV: No chest pain  Pulm: No cough, No shortness of breath  GI: see HPI  Derm: No rash    Medical History:  has a past medical history of Allergy; Anemia; Arthritis; Basal cell carcinoma; Cancer; Cardiomyopathy due to chemotherapy; Cataract; CHF (congestive heart failure); Coronary artery disease; Encounter for blood transfusion; Eye injuries (20 yrs); Hyperlipidemia; Hypertension; Hypertensive heart disease with heart failure (1/31/2013); Lymphoma (1996); Obstructive sleep apnea on CPAP; SCC (squamous cell carcinoma) (excised 12/2015); Sleep apnea; SQUAMOUS CELL CARCINOMA (excised 2/14/13); Squamous cell carcinoma (excised 8/4/2016); and Squamous cell carcinoma (excised 8/25/2016).    Surgical History:  has a past surgical history that includes Kidney stone surgery; Small intestine surgery (09/2013); Tonsillectomy; Eye surgery; Cataract extraction; and Cataract extraction w/  intraocular lens implant (Left, 01/12/2017).    Family History: family history includes Cancer in his father; Cataracts in his father and mother; Hypertension in his father, maternal grandfather, maternal grandmother, mother, paternal grandfather, and paternal grandmother; No Known Problems in his brother, maternal aunt, maternal uncle, paternal aunt, paternal uncle, and sister.. Otherwise no colon cancer, inflammatory bowel disease, or GI malignancies.    Social History:  reports that he quit smoking about 58 years ago. He has a 1.50 pack-year smoking history. He has never used smokeless  tobacco. He reports that he drinks about 1.2 oz of alcohol per week . He reports that he does not use drugs.    Review of patient's allergies indicates:   Allergen Reactions    Iodine and iodide containing products Itching     Contrast dye       Medications:   Prescriptions Prior to Admission   Medication Sig Dispense Refill Last Dose    acetaminophen (TYLENOL) 500 MG tablet Take 500 mg by mouth every 6 (six) hours as needed for Pain.   2/1/2018 at Unknown time    enalapril (VASOTEC) 5 MG tablet Take 0.5 tablets (2.5 mg total) by mouth once daily. 90 tablet 3 2/2/2018 at Unknown time    esomeprazole (NEXIUM) 20 MG capsule Take 20 mg by mouth before breakfast.   2/2/2018 at Unknown time    flurazepam (DALMANE) 30 MG capsule Take 1 capsule (30 mg total) by mouth nightly as needed for Insomnia. 30 capsule 3 2/1/2018 at Unknown time    furosemide (LASIX) 40 MG tablet One daily as needed for edema 30 tablet 11 2/1/2018 at Unknown time    hydrocodone-acetaminophen 5-325mg (NORCO) 5-325 mg per tablet Take 1 tablet by mouth every 8 (eight) hours as needed for Pain. 90 tablet 0 2/2/2018 at Unknown time    metoprolol tartrate (LOPRESSOR) 50 MG tablet Take 0.5 tablets (25 mg total) by mouth 2 (two) times daily. 180 tablet 3 2/2/2018 at Unknown time    mirtazapine (REMERON) 15 MG tablet Take 1 tablet (15 mg total) by mouth every evening. 90 tablet 3 2/1/2018 at Unknown time    potassium chloride SA (K-DUR,KLOR-CON) 20 MEQ tablet Take 1 tablet (20 mEq total) by mouth once daily. 60 tablet 11 2/2/2018 at Unknown time    tamsulosin (FLOMAX) 0.4 mg Cp24 Take 1 capsule (0.4 mg total) by mouth once daily. 90 capsule 3 2/2/2018 at Unknown time    ascorbic acid, vitamin C, (VITAMIN C) 1000 MG tablet Take 1,000 mg by mouth once daily. liposomal   More than a month at Unknown time    cyanocobalamin (VITAMIN B-12) 1000 MCG tablet Take 100 mcg by mouth once daily.   More than a month at Unknown time    fluticasone (FLONASE)  50 mcg/actuation nasal spray SPRAY ONCE IN EACH NOSTRIL ONCE DAILY 48 g 3 2018    lactobacillus rhamnosus GG (CULTURELLE) 10 billion cell capsule Take 1 capsule by mouth once daily.   More than a month at Unknown time    lidocaine-prilocaine (EMLA) cream    Taking    multivitamin capsule Take 1 capsule by mouth once daily.   More than a month at Unknown time    NON FORMULARY MEDICATION 300 mg once daily. Desiccated Liver   Taking    [] nystatin (MYCOSTATIN) 100,000 unit/mL suspension Take 4 mLs (400,000 Units total) by mouth 4 (four) times daily. 160 mL 0 Taking    sodium,potassium,mag sulfates (SUPREP BOWEL PREP KIT) 17.5-3.13-1.6 gram SolR As directed, dispense 1 kit. 1 Bottle 0     valacyclovir (VALTREX) 500 MG tablet Take 1 tablet (500 mg total) by mouth once daily. 90 tablet 3 Taking    water Liqd 150 mL with MILK OF MAGNESIA 400 mg/5 mL Susp 400 mg, diphenhydrAMINE 12.5 mg/5 mL Elix 60 mg, nystatin 100,000 unit/mL Susp 500,000 Units Take 5 mLs by mouth.   Taking         Physical Exam:    Vital Signs:   Vitals:    18 1223   BP: 132/63   Pulse: 86   Resp: 18   Temp: 98.1 °F (36.7 °C)       General Appearance: Well appearing in no acute distress  Eyes:    No scleral icterus  ENT: Neck supple, Lips, mucosa, and tongue normal; teeth and gums normal  Lungs: CTA bilaterally  Heart:  S1, S2 normal, no murmurs heard  Abdomen: Soft, non tender, non distended with positive bowel sounds. No hepatosplenomegaly, ascites, or mass.  Extremities: 2+ pulses, no clubbing, cyanosis or edema  Skin: No rash      Labs:  Lab Results   Component Value Date    WBC 3.26 (L) 2018    HGB 7.5 (L) 2018    HCT 24.8 (L) 2018     (L) 2018    CHOL 124 2014    TRIG 200 (H) 2014    HDL 24 (L) 2014    ALT 5 (L) 2018    AST 15 2018     2018    K 3.7 2018     2018    CREATININE 1.0 2018    BUN 11 2018    CO2 23 2018     TSH 0.806 07/11/2016    PSA 1.37 05/02/2013    INR 1.0 10/06/2014    GLUF 96 04/16/2007    HGBA1C 5.5 11/21/2006       I have explained the risks and benefits of endoscopy procedures to the patient including but not limited to bleeding, perforation, infection, and death.      Luis Beaulieu MD

## 2018-02-02 NOTE — ANESTHESIA RELEASE NOTE
"Anesthesia Release from PACU Note    Patient: Royce Francis    Procedure(s) Performed: Procedure(s) (LRB):  ESOPHAGOGASTRODUODENOSCOPY (EGD) (N/A)  COLONOSCOPY (N/A)    Anesthesia type: general    Post pain: Adequate analgesia    Post assessment: no apparent anesthetic complications and tolerated procedure well    Last Vitals:   Visit Vitals  BP (!) 118/57 (BP Location: Left arm, Patient Position: Lying)   Pulse 81   Temp 36.4 °C (97.6 °F) (Oral)   Resp 18   Ht 5' 10" (1.778 m)   Wt 78 kg (172 lb)   SpO2 98%   BMI 24.68 kg/m²       Post vital signs: stable    Level of consciousness: awake and alert     Nausea/Vomiting: no nausea/no vomiting    Complications: none    Airway Patency: patent    Respiratory: unassisted, spontaneous ventilation, room air    Cardiovascular: stable and blood pressure at baseline    Hydration: euvolemic  "

## 2018-02-02 NOTE — DISCHARGE INSTRUCTIONS

## 2018-02-02 NOTE — PROVATION PATIENT INSTRUCTIONS
Discharge Summary/Instructions after an Endoscopic Procedure  Patient Name: Royce Francis  Patient MRN: [id number]  Patient YOB: 1945 Friday, February 02, 2018  Josh Boyd MD  RESTRICTIONS:  During your procedure today, you received medications for sedation.  These   medications may affect your judgment, balance and coordination.  Therefore,   for 24 hours, you have the following restrictions:   - DO NOT drive a car, operate machinery, make legal/financial decisions,   sign important papers or drink alcohol.    ACTIVITY:  The following day: return to full activity including work, except no heavy   lifting, straining or running for 3 days if polyps were removed.  DIET:  Eat and drink normally unless instructed otherwise.     TREATMENT FOR COMMON SIDE EFFECTS:  - Mild abdominal pain, belching, bloating or excessive gas: rest, eat   lightly and use a heating pad.  - Sore Throat: treat with throat lozenges and/or gargle with warm salt   water.  SYMPTOMS TO WATCH FOR AND REPORT TO YOUR PHYSICIAN:  1. Abdominal pain or bloating, other than gas cramps.  2. Chest pain.  3. Back pain.  4. Chills or fever occurring within 24 hours after the procedure.  5. Rectal bleeding, which would show as bright red, maroon, or black stools.   (A tablespoon of blood from the rectum is not serious, especially if   hemorrhoids are present.)  6. Vomiting.  7. Weakness or dizziness.  8. Because air was used during the procedure, expelling large amounts of air   from your rectum or belching is normal.  9. If a bowel prep was taken, you may not have a bowel movement for 1-3   days.  This is normal.  GO DIRECTLY TO THE NEAREST EMERGENCY ROOM IF YOU HAVE ANY OF THE FOLLOWING:      Difficulty breathing  Chills and/or fever over 101 F   Persistent vomiting and/or vomiting blood   Severe abdominal pain   Severe chest pain   Black, tarry stools   Bleeding- more than one tablespoon   Any other symptom or condition that you may feel needs  urgent attention  Your doctor recommends these additional instructions:  If any biopsies were taken, your doctor s clinic will contact you in 1 to 2   weeks with any results.  You have a contact number available for emergencies.  The signs and symptoms   of potential delayed complications were discussed with you.  You may return   to normal activities tomorrow.  Written discharge instructions were   provided to you.   You are being discharged to home.   We are waiting for your pathology results.   Your physician has indicated that a repeat colonoscopy is not recommended   for surveillance.   The findings and recommendations were discussed with your designated   responsible adult.  For questions, problems or results please call your physician - Josh Boyd MD at Work:  [phone].  [facility] [place], EMERGENCY ROOM PHONE NUMBER: [phone]  IF A COMPLICATION OR EMERGENCY SITUATION ARISES AND YOU ARE UNABLE TO REACH   YOUR PHYSICIAN - GO DIRECTLY TO THE EMERGENCY ROOM.  Josh Boyd MD  2/2/2018 1:46:09 PM  This report has been verified and signed electronically.

## 2018-02-05 ENCOUNTER — PATIENT MESSAGE (OUTPATIENT)
Dept: FAMILY MEDICINE | Facility: CLINIC | Age: 73
End: 2018-02-05

## 2018-02-06 ENCOUNTER — LAB VISIT (OUTPATIENT)
Dept: LAB | Facility: HOSPITAL | Age: 73
End: 2018-02-06
Attending: INTERNAL MEDICINE
Payer: MEDICARE

## 2018-02-06 DIAGNOSIS — C82.90 FOLLICULAR LYMPHOMA: ICD-10-CM

## 2018-02-06 LAB
ALBUMIN SERPL BCP-MCNC: 2.5 G/DL
ALP SERPL-CCNC: 76 U/L
ALT SERPL W/O P-5'-P-CCNC: 7 U/L
ANION GAP SERPL CALC-SCNC: 10 MMOL/L
ANISOCYTOSIS BLD QL SMEAR: SLIGHT
AST SERPL-CCNC: 14 U/L
BASOPHILS # BLD AUTO: 0 K/UL
BASOPHILS NFR BLD: 0 %
BILIRUB SERPL-MCNC: 0.3 MG/DL
BUN SERPL-MCNC: 12 MG/DL
CALCIUM SERPL-MCNC: 8.9 MG/DL
CHLORIDE SERPL-SCNC: 105 MMOL/L
CO2 SERPL-SCNC: 25 MMOL/L
CREAT SERPL-MCNC: 0.9 MG/DL
DIFFERENTIAL METHOD: ABNORMAL
EOSINOPHIL # BLD AUTO: 0.1 K/UL
EOSINOPHIL NFR BLD: 1.9 %
ERYTHROCYTE [DISTWIDTH] IN BLOOD BY AUTOMATED COUNT: 15.2 %
EST. GFR  (AFRICAN AMERICAN): >60 ML/MIN/1.73 M^2
EST. GFR  (NON AFRICAN AMERICAN): >60 ML/MIN/1.73 M^2
GLUCOSE SERPL-MCNC: 99 MG/DL
HCT VFR BLD AUTO: 30.5 %
HGB BLD-MCNC: 9.4 G/DL
HYPOCHROMIA BLD QL SMEAR: ABNORMAL
LYMPHOCYTES # BLD AUTO: 1.6 K/UL
LYMPHOCYTES NFR BLD: 50 %
MCH RBC QN AUTO: 25.1 PG
MCHC RBC AUTO-ENTMCNC: 30.8 G/DL
MCV RBC AUTO: 82 FL
MONOCYTES # BLD AUTO: 0.4 K/UL
MONOCYTES NFR BLD: 12.9 %
NEUTROPHILS # BLD AUTO: 1.1 K/UL
NEUTROPHILS NFR BLD: 35.2 %
NRBC BLD-RTO: ABNORMAL /100 WBC
OVALOCYTES BLD QL SMEAR: ABNORMAL
PLATELET # BLD AUTO: 123 K/UL
PLATELET BLD QL SMEAR: ABNORMAL
PMV BLD AUTO: 9.7 FL
POLYCHROMASIA BLD QL SMEAR: ABNORMAL
POTASSIUM SERPL-SCNC: 4.2 MMOL/L
PROT SERPL-MCNC: 5.2 G/DL
RBC # BLD AUTO: 3.74 M/UL
SODIUM SERPL-SCNC: 140 MMOL/L
WBC # BLD AUTO: 3.1 K/UL

## 2018-02-06 PROCEDURE — 80053 COMPREHEN METABOLIC PANEL: CPT

## 2018-02-06 PROCEDURE — 85025 COMPLETE CBC W/AUTO DIFF WBC: CPT | Mod: PO

## 2018-02-06 PROCEDURE — 36415 COLL VENOUS BLD VENIPUNCTURE: CPT | Mod: PO

## 2018-02-07 ENCOUNTER — OFFICE VISIT (OUTPATIENT)
Dept: FAMILY MEDICINE | Facility: CLINIC | Age: 73
End: 2018-02-07
Payer: MEDICARE

## 2018-02-07 ENCOUNTER — HOSPITAL ENCOUNTER (OUTPATIENT)
Dept: RADIOLOGY | Facility: HOSPITAL | Age: 73
Discharge: HOME | End: 2018-02-07
Attending: INTERNAL MEDICINE
Payer: MEDICARE

## 2018-02-07 VITALS
OXYGEN SATURATION: 97 % | TEMPERATURE: 99 F | WEIGHT: 170.63 LBS | SYSTOLIC BLOOD PRESSURE: 120 MMHG | HEIGHT: 70 IN | HEART RATE: 97 BPM | BODY MASS INDEX: 24.43 KG/M2 | DIASTOLIC BLOOD PRESSURE: 60 MMHG

## 2018-02-07 DIAGNOSIS — R50.9 FEVER AND CHILLS: ICD-10-CM

## 2018-02-07 DIAGNOSIS — C83.30 LARGE CELL (DIFFUSE) NON-HODGKIN'S LYMPHOMA: ICD-10-CM

## 2018-02-07 DIAGNOSIS — N40.0 BPH WITHOUT OBSTRUCTION/LOWER URINARY TRACT SYMPTOMS: Chronic | ICD-10-CM

## 2018-02-07 DIAGNOSIS — R50.9 FEVER AND CHILLS: Primary | ICD-10-CM

## 2018-02-07 PROCEDURE — 99499 UNLISTED E&M SERVICE: CPT | Mod: S$GLB,,, | Performed by: INTERNAL MEDICINE

## 2018-02-07 PROCEDURE — 3008F BODY MASS INDEX DOCD: CPT | Mod: S$GLB,,, | Performed by: INTERNAL MEDICINE

## 2018-02-07 PROCEDURE — 1125F AMNT PAIN NOTED PAIN PRSNT: CPT | Mod: S$GLB,,, | Performed by: INTERNAL MEDICINE

## 2018-02-07 PROCEDURE — 1159F MED LIST DOCD IN RCRD: CPT | Mod: S$GLB,,, | Performed by: INTERNAL MEDICINE

## 2018-02-07 PROCEDURE — 71046 X-RAY EXAM CHEST 2 VIEWS: CPT | Mod: TC,FY,PO

## 2018-02-07 PROCEDURE — 99999 PR PBB SHADOW E&M-EST. PATIENT-LVL III: CPT | Mod: PBBFAC,,, | Performed by: INTERNAL MEDICINE

## 2018-02-07 PROCEDURE — 99214 OFFICE O/P EST MOD 30 MIN: CPT | Mod: S$GLB,,, | Performed by: INTERNAL MEDICINE

## 2018-02-07 PROCEDURE — 71046 X-RAY EXAM CHEST 2 VIEWS: CPT | Mod: 26,,, | Performed by: RADIOLOGY

## 2018-02-07 NOTE — PROGRESS NOTES
"Assessment & Plan  Problem List Items Addressed This Visit        Renal/    BPH without obstruction/lower urinary tract symptoms (Chronic)    Current Assessment & Plan     Continue with flomax.  See below             Oncology    Large cell (diffuse) non-Hodgkin's lymphoma    Current Assessment & Plan     Followed by Heme/Onc.  Not currently neutropenic.  See below            Other Visit Diagnoses     Fever and chills    -  Primary  -    Not currently neutropenic fever.  Recently had large Gi evaluation that has all been negative.  Symptoms are not consistent with influenza and he is far out the start of the symptoms to suggest that tamiflu will have effectiveness.  Discussed with patient and his wife that other viral etiologies remain high.  He is also having some increased urinary obstruction.  Will check blood cultures, UA C&S, and CXR.  His fever log shows that he may go several days without fever.  ? Atelectasis since he is mostly sedentary at this time from his chronic diarrhea/lack of appetite?    Relevant Orders    Urinalysis (Completed)    Urine culture    Blood culture (Completed)    X-Ray Chest PA And Lateral (Completed)            Health Maintenance reviewed, deferred.    Follow-up: Follow-up for pending lab evaluation.    ______________________________________________________________________    Chief Complaint  Chief Complaint   Patient presents with    Fever       HPI  Royce Francis is a 72 y.o. male with multiple medical diagnoses as listed in the medical history and problem list that presents for fever for 2+ weeks.  Pt is known to me with his last appointment 11/27/2017.      He discussed this with his oncologist and he was told to follow up with his PCP about this.  He denies any cough, ear pain, congestion.  Mild sore throat and runny nose.  He is having lots of diarrhea that he is being evaluated by GI for.  He reports he is having pain in the back around the "bottom of his lung."  This " resolved with tylenol.  He is also taking tylenol for temps at home.  Tmax was 101.2F on 1/19/18 and 101.3 on 2/4/18.  Chemo ended last month.  He is having some straining with making urine.  He had a CBC on 2/6/18 that showed leukopenia but an ANC of 1100.   There has been a slight lymphocytosis.  He is having chronic diarrhea that is being evaluated by GI.  No abdominal pain.  He recently had negative C Diff, stool culture, and shiga toxin eval.  Underwent endoscopy as well.        PAST MEDICAL HISTORY:  Past Medical History:   Diagnosis Date    Allergy     Anemia     Arthritis     Basal cell carcinoma     excised from upper back    Cancer     Cardiomyopathy due to chemotherapy     Cataract     CHF (congestive heart failure)     Coronary artery disease     D1    Encounter for blood transfusion     Eye injuries 20 yrs    ou fb several x's    Hyperlipidemia     Hypertension     Hypertensive heart disease with heart failure 1/31/2013    Lymphoma 1996    Obstructive sleep apnea on CPAP     SCC (squamous cell carcinoma) excised 12/2015    R proximal forearm    Sleep apnea     SQUAMOUS CELL CARCINOMA excised 2/14/13    L forearm    Squamous cell carcinoma excised 8/4/2016    IN SITU, Left thigh MOHS    Squamous cell carcinoma excised 8/25/2016    left helix, MOHS       PAST SURGICAL HISTORY:  Past Surgical History:   Procedure Laterality Date    CATARACT EXTRACTION      od dr yanes    CATARACT EXTRACTION W/  INTRAOCULAR LENS IMPLANT Left 01/12/2017    Dr. Yanes    COLONOSCOPY N/A 2/2/2018    Procedure: COLONOSCOPY;  Surgeon: Josh Boyd MD;  Location: [address]);  Service: Endoscopy;  Laterality: N/A;    EYE SURGERY      Rt cataract    KIDNEY STONE SURGERY      SMALL INTESTINE SURGERY  09/2013    TONSILLECTOMY         SOCIAL HISTORY:  Social History     Social History    Marital status:      Spouse name: N/A    Number of children: N/A    Years of education: N/A      Occupational History    retired      Social History Main Topics    Smoking status: Former Smoker     Packs/day: 1.50     Years: 1.00     Quit date: 1960    Smokeless tobacco: Never Used    Alcohol use 1.2 oz/week     1 Glasses of wine, 1 Shots of liquor per week      Comment: socially    Drug use: No    Sexual activity: Yes     Partners: Female      Comment:      Other Topics Concern    Not on file     Social History Narrative    No narrative on file       FAMILY HISTORY:  Family History   Problem Relation Age of Onset    Cataracts Mother     Hypertension Mother     Cataracts Father     Hypertension Father     Cancer Father      lung    Hypertension Maternal Grandmother     Hypertension Maternal Grandfather     Hypertension Paternal Grandmother     Hypertension Paternal Grandfather     No Known Problems Sister     No Known Problems Brother     No Known Problems Maternal Aunt     No Known Problems Maternal Uncle     No Known Problems Paternal Aunt     No Known Problems Paternal Uncle     Amblyopia Neg Hx     Blindness Neg Hx     Diabetes Neg Hx     Glaucoma Neg Hx     Macular degeneration Neg Hx     Retinal detachment Neg Hx     Strabismus Neg Hx     Stroke Neg Hx     Thyroid disease Neg Hx     Melanoma Neg Hx     Heart disease Neg Hx     Celiac disease Neg Hx     Cirrhosis Neg Hx     Colon cancer Neg Hx     Colon polyps Neg Hx     Crohn's disease Neg Hx     Cystic fibrosis Neg Hx     Esophageal cancer Neg Hx     Hemochromatosis Neg Hx     Inflammatory bowel disease Neg Hx     Irritable bowel syndrome Neg Hx     Liver cancer Neg Hx     Liver disease Neg Hx     Rectal cancer Neg Hx     Stomach cancer Neg Hx     Ulcerative colitis Neg Hx     Burt's disease Neg Hx        ALLERGIES AND MEDICATIONS: updated and reviewed.  Review of patient's allergies indicates:   Allergen Reactions    Iodine and iodide containing products Itching     Contrast dye     Current  Outpatient Prescriptions   Medication Sig Dispense Refill    acetaminophen (TYLENOL) 500 MG tablet Take 500 mg by mouth every 6 (six) hours as needed for Pain.      ascorbic acid, vitamin C, (VITAMIN C) 1000 MG tablet Take 1,000 mg by mouth once daily. liposomal      cyanocobalamin (VITAMIN B-12) 1000 MCG tablet Take 100 mcg by mouth once daily.      enalapril (VASOTEC) 5 MG tablet Take 0.5 tablets (2.5 mg total) by mouth once daily. 90 tablet 3    esomeprazole (NEXIUM) 20 MG capsule Take 20 mg by mouth before breakfast.      flurazepam (DALMANE) 30 MG capsule Take 1 capsule (30 mg total) by mouth nightly as needed for Insomnia. 30 capsule 3    fluticasone (FLONASE) 50 mcg/actuation nasal spray SPRAY ONCE IN EACH NOSTRIL ONCE DAILY 48 g 3    furosemide (LASIX) 40 MG tablet One daily as needed for edema 30 tablet 11    hydrocodone-acetaminophen 5-325mg (NORCO) 5-325 mg per tablet Take 1 tablet by mouth every 8 (eight) hours as needed for Pain. 90 tablet 0    lactobacillus rhamnosus GG (CULTURELLE) 10 billion cell capsule Take 1 capsule by mouth once daily.      lidocaine-prilocaine (EMLA) cream       metoprolol tartrate (LOPRESSOR) 50 MG tablet Take 0.5 tablets (25 mg total) by mouth 2 (two) times daily. 180 tablet 3    mirtazapine (REMERON) 15 MG tablet Take 1 tablet (15 mg total) by mouth every evening. 90 tablet 3    multivitamin capsule Take 1 capsule by mouth once daily.      NON FORMULARY MEDICATION 300 mg once daily. Desiccated Liver      potassium chloride SA (K-DUR,KLOR-CON) 20 MEQ tablet Take 1 tablet (20 mEq total) by mouth once daily. 60 tablet 11    sodium,potassium,mag sulfates (SUPREP BOWEL PREP KIT) 17.5-3.13-1.6 gram SolR As directed, dispense 1 kit. 1 Bottle 0    tamsulosin (FLOMAX) 0.4 mg Cp24 Take 1 capsule (0.4 mg total) by mouth once daily. 90 capsule 3    valacyclovir (VALTREX) 500 MG tablet Take 1 tablet (500 mg total) by mouth once daily. 90 tablet 3    water Liqd 150 mL  "with MILK OF MAGNESIA 400 mg/5 mL Susp 400 mg, diphenhydrAMINE 12.5 mg/5 mL Elix 60 mg, nystatin 100,000 unit/mL Susp 500,000 Units Take 5 mLs by mouth.       No current facility-administered medications for this visit.          ROS  Review of Systems   Constitutional: Positive for appetite change, chills, diaphoresis, fatigue and fever.   HENT: Positive for congestion and sore throat. Negative for ear pain.    Respiratory: Negative for cough and wheezing.    Cardiovascular: Negative for chest pain.   Gastrointestinal: Positive for abdominal pain and diarrhea. Negative for constipation, nausea and vomiting.   Genitourinary: Positive for difficulty urinating. Negative for dysuria and hematuria.   Skin: Negative for color change and rash.   Neurological: Negative for dizziness, light-headedness and headaches.           Physical Exam  Vitals:    02/07/18 1449   BP: 120/60   Pulse: 97   Temp: 98.5 °F (36.9 °C)   SpO2: 97%   Weight: 77.4 kg (170 lb 10.2 oz)   Height: 5' 10" (1.778 m)    Body mass index is 24.48 kg/m².  Weight: 77.4 kg (170 lb 10.2 oz)   Height: 5' 10" (177.8 cm)   Physical Exam   Constitutional: He is oriented to person, place, and time. He appears well-developed and well-nourished. No distress.   HENT:   Head: Normocephalic and atraumatic.   Right Ear: Tympanic membrane, external ear and ear canal normal.   Left Ear: Tympanic membrane, external ear and ear canal normal.   Nose: Mucosal edema and rhinorrhea present.   Mouth/Throat: Uvula is midline, oropharynx is clear and moist and mucous membranes are normal. No tonsillar exudate.   Eyes: Conjunctivae, EOM and lids are normal. Pupils are equal, round, and reactive to light. No scleral icterus.   Neck: Full passive range of motion without pain. Neck supple. No JVD present. Carotid bruit is not present. No thyromegaly present.   Cardiovascular: Normal rate, regular rhythm, normal heart sounds and intact distal pulses.  Exam reveals no S3, no S4 and no " friction rub.    No murmur heard.  Pulmonary/Chest: Effort normal and breath sounds normal. He has no wheezes. He has no rhonchi. He has no rales.   Abdominal: Soft. Bowel sounds are normal. There is no tenderness.   Musculoskeletal: He exhibits no edema or tenderness.   Lymphadenopathy:        Head (right side): No submental and no submandibular adenopathy present.        Head (left side): No submental and no submandibular adenopathy present.     He has no cervical adenopathy.        Right: No supraclavicular adenopathy present.        Left: No supraclavicular adenopathy present.   Neurological: He is alert and oriented to person, place, and time.   Motor grossly intact.  Sensory grossly intact.  Symmetric facial movements palate elevated symmetrically tongue midline   Skin: Skin is warm and dry. No rash noted.   Psychiatric: He has a normal mood and affect. His speech is normal and behavior is normal. Thought content normal.         Health Maintenance       Date Due Completion Date    TETANUS VACCINE 08/30/1961 ---    High Dose Statin 08/30/1964 ---    Lipid Panel 08/08/2019 8/8/2014    Colonoscopy 02/02/2025 2/2/2018

## 2018-02-08 ENCOUNTER — PATIENT MESSAGE (OUTPATIENT)
Dept: HEMATOLOGY/ONCOLOGY | Facility: CLINIC | Age: 73
End: 2018-02-08

## 2018-02-08 ENCOUNTER — TELEPHONE (OUTPATIENT)
Dept: HEMATOLOGY/ONCOLOGY | Facility: CLINIC | Age: 73
End: 2018-02-08

## 2018-02-08 DIAGNOSIS — D50.9 IRON DEFICIENCY ANEMIA, UNSPECIFIED IRON DEFICIENCY ANEMIA TYPE: ICD-10-CM

## 2018-02-08 NOTE — TELEPHONE ENCOUNTER
Discussed marrow result with wife.  Very little iron--will arrange Injectafer therapy.    Some atypia in maturation of erythroid precursors and megakaryocytes, but not of a degree for a diagnosis of myelodysplasia.

## 2018-02-08 NOTE — PROGRESS NOTES
Your CXR results are normal.      Sincerely,  Rupert Díaz  [email or web address]

## 2018-02-08 NOTE — PROGRESS NOTES
Patient, Royce Francis (MRN #[id number]), presented with a recent Platelet count less than 150 K/uL consistent with the definition of thrombocytopenia (ICD10 - D69.6).    Platelets   Date Value Ref Range Status   02/06/2018 123 (L) 150 - 350 K/uL Final     The patient's thrombocytopenia was monitored, evaluated, addressed and/or treated. This addendum to the medical record is made on 02/08/2018.

## 2018-02-09 ENCOUNTER — TELEPHONE (OUTPATIENT)
Dept: ENDOSCOPY | Facility: HOSPITAL | Age: 73
End: 2018-02-09

## 2018-02-09 ENCOUNTER — TELEPHONE (OUTPATIENT)
Dept: GASTROENTEROLOGY | Facility: CLINIC | Age: 73
End: 2018-02-09

## 2018-02-09 ENCOUNTER — PATIENT MESSAGE (OUTPATIENT)
Dept: HEMATOLOGY/ONCOLOGY | Facility: CLINIC | Age: 73
End: 2018-02-09

## 2018-02-09 RX ORDER — SODIUM CHLORIDE 0.9 % (FLUSH) 0.9 %
10 SYRINGE (ML) INJECTION
Status: CANCELLED | OUTPATIENT
Start: 2018-02-19

## 2018-02-09 RX ORDER — HEPARIN 100 UNIT/ML
500 SYRINGE INTRAVENOUS
Status: CANCELLED | OUTPATIENT
Start: 2018-02-12

## 2018-02-09 RX ORDER — HEPARIN 100 UNIT/ML
500 SYRINGE INTRAVENOUS
Status: CANCELLED | OUTPATIENT
Start: 2018-02-19

## 2018-02-09 RX ORDER — SODIUM CHLORIDE 0.9 % (FLUSH) 0.9 %
10 SYRINGE (ML) INJECTION
Status: CANCELLED | OUTPATIENT
Start: 2018-02-12

## 2018-02-09 NOTE — TELEPHONE ENCOUNTER
Called and spoke with patients wife and scheduled her 's injectafer apts starting next week   Post-procedure check    Patient seen and examined.     Patient doing well, comfortable, sleeping, tolerating some PO. Mother has no complains.    NAD, awake, alert  Strong cry  Ventral surface of tongue visualized, post-surgical changes and no active bleeding noted    A/P  2-day old baby with ankyloglossia s/p frenulectomy, doing well.  -ADAT  -no need to follow-up with [facility] unless further concerns arise

## 2018-02-10 ENCOUNTER — PATIENT MESSAGE (OUTPATIENT)
Dept: HEMATOLOGY/ONCOLOGY | Facility: CLINIC | Age: 73
End: 2018-02-10

## 2018-02-12 ENCOUNTER — PATIENT MESSAGE (OUTPATIENT)
Dept: FAMILY MEDICINE | Facility: CLINIC | Age: 73
End: 2018-02-12

## 2018-02-12 ENCOUNTER — LAB VISIT (OUTPATIENT)
Dept: LAB | Facility: HOSPITAL | Age: 73
End: 2018-02-12
Attending: INTERNAL MEDICINE
Payer: MEDICARE

## 2018-02-12 DIAGNOSIS — C82.90 FOLLICULAR LYMPHOMA: ICD-10-CM

## 2018-02-12 LAB
ALBUMIN SERPL BCP-MCNC: 2.5 G/DL
ALP SERPL-CCNC: 84 U/L
ALT SERPL W/O P-5'-P-CCNC: 7 U/L
ANION GAP SERPL CALC-SCNC: 8 MMOL/L
AST SERPL-CCNC: 16 U/L
BILIRUB SERPL-MCNC: 0.3 MG/DL
BUN SERPL-MCNC: 10 MG/DL
CALCIUM SERPL-MCNC: 8.7 MG/DL
CHLORIDE SERPL-SCNC: 101 MMOL/L
CO2 SERPL-SCNC: 25 MMOL/L
CREAT SERPL-MCNC: 0.9 MG/DL
EST. GFR  (AFRICAN AMERICAN): >60 ML/MIN/1.73 M^2
EST. GFR  (NON AFRICAN AMERICAN): >60 ML/MIN/1.73 M^2
GLUCOSE SERPL-MCNC: 96 MG/DL
POTASSIUM SERPL-SCNC: 3.9 MMOL/L
PROT SERPL-MCNC: 5.2 G/DL
SODIUM SERPL-SCNC: 134 MMOL/L

## 2018-02-12 PROCEDURE — 36415 COLL VENOUS BLD VENIPUNCTURE: CPT | Mod: PO

## 2018-02-12 PROCEDURE — 80053 COMPREHEN METABOLIC PANEL: CPT

## 2018-02-13 LAB
CLINICAL CYTOGENETICIST REVIEW: NORMAL
FMDS SPECIMEN: NORMAL
MDS FISH ADDITIONAL INFORMATION: NORMAL
MDS FISH DISCLAIMER: NORMAL
MDS FISH REASON FOR REFERRAL (BM): NORMAL
MDS FISH RELEASED BY: NORMAL
MDS FISH RESULT (BM): NORMAL
MDS FISH RESULT SUMMARY: NORMAL
MDS FISH RESULT TABLE: NORMAL
REF LAB TEST METHOD: NORMAL
SPECIMEN SOURCE: NORMAL

## 2018-02-14 ENCOUNTER — PATIENT MESSAGE (OUTPATIENT)
Dept: GASTROENTEROLOGY | Facility: CLINIC | Age: 73
End: 2018-02-14

## 2018-02-14 ENCOUNTER — PATIENT MESSAGE (OUTPATIENT)
Dept: HEMATOLOGY/ONCOLOGY | Facility: CLINIC | Age: 73
End: 2018-02-14

## 2018-02-14 LAB
CHROM BANDING METHOD: NORMAL
CHROMOSOME ANALYSIS BM ADDITIONAL INFORMATION: NORMAL
CHROMOSOME ANALYSIS BM RELEASED BY: NORMAL
CHROMOSOME ANALYSIS BM RESULT SUMMARY: NORMAL
CLINICAL CYTOGENETICIST REVIEW: NORMAL
KARYOTYP MAR: NORMAL
REASON FOR REFERRAL (NARRATIVE): NORMAL
REF LAB TEST METHOD: NORMAL
SPECIMEN SOURCE: NORMAL
SPECIMEN: NORMAL

## 2018-02-15 ENCOUNTER — PATIENT MESSAGE (OUTPATIENT)
Dept: HEMATOLOGY/ONCOLOGY | Facility: CLINIC | Age: 73
End: 2018-02-15

## 2018-02-15 ENCOUNTER — TELEPHONE (OUTPATIENT)
Dept: GASTROENTEROLOGY | Facility: CLINIC | Age: 73
End: 2018-02-15

## 2018-02-15 ENCOUNTER — PATIENT MESSAGE (OUTPATIENT)
Dept: GASTROENTEROLOGY | Facility: CLINIC | Age: 73
End: 2018-02-15

## 2018-02-15 ENCOUNTER — INFUSION (OUTPATIENT)
Dept: INFUSION THERAPY | Facility: HOSPITAL | Age: 73
End: 2018-02-15
Attending: INTERNAL MEDICINE
Payer: MEDICARE

## 2018-02-15 VITALS
SYSTOLIC BLOOD PRESSURE: 113 MMHG | HEART RATE: 87 BPM | RESPIRATION RATE: 16 BRPM | TEMPERATURE: 98 F | DIASTOLIC BLOOD PRESSURE: 57 MMHG

## 2018-02-15 DIAGNOSIS — C83.30 LARGE CELL (DIFFUSE) NON-HODGKIN'S LYMPHOMA: ICD-10-CM

## 2018-02-15 DIAGNOSIS — R39.11 URINARY HESITANCY: Primary | ICD-10-CM

## 2018-02-15 DIAGNOSIS — R63.4 WEIGHT LOSS: ICD-10-CM

## 2018-02-15 DIAGNOSIS — K52.9 CHRONIC DIARRHEA: Primary | ICD-10-CM

## 2018-02-15 DIAGNOSIS — K58.0 IRRITABLE BOWEL SYNDROME WITH DIARRHEA: ICD-10-CM

## 2018-02-15 DIAGNOSIS — D50.9 IRON DEFICIENCY ANEMIA, UNSPECIFIED IRON DEFICIENCY ANEMIA TYPE: Primary | ICD-10-CM

## 2018-02-15 PROCEDURE — 63600175 PHARM REV CODE 636 W HCPCS: Mod: JG | Performed by: INTERNAL MEDICINE

## 2018-02-15 PROCEDURE — 25000003 PHARM REV CODE 250: Performed by: INTERNAL MEDICINE

## 2018-02-15 PROCEDURE — 96365 THER/PROPH/DIAG IV INF INIT: CPT

## 2018-02-15 RX ORDER — HEPARIN 100 UNIT/ML
500 SYRINGE INTRAVENOUS
Status: DISCONTINUED | OUTPATIENT
Start: 2018-02-15 | End: 2018-02-15 | Stop reason: HOSPADM

## 2018-02-15 RX ORDER — SODIUM CHLORIDE 0.9 % (FLUSH) 0.9 %
10 SYRINGE (ML) INJECTION
Status: DISCONTINUED | OUTPATIENT
Start: 2018-02-15 | End: 2018-02-15 | Stop reason: HOSPADM

## 2018-02-15 RX ADMIN — SODIUM CHLORIDE: 0.9 INJECTION, SOLUTION INTRAVENOUS at 07:02

## 2018-02-15 RX ADMIN — FERRIC CARBOXYMALTOSE INJECTION 750 MG: 50 INJECTION, SOLUTION INTRAVENOUS at 07:02

## 2018-02-15 NOTE — TELEPHONE ENCOUNTER
----- Message from Daksha Larson sent at 2/15/2018  1:46 PM CST -----  Contact: Pt # [phone]  Pt states he was calling to speak to the nurse about a message that was sent to his [facility] chart for a sooner Appt . Pt would like a phone call back #[phone]

## 2018-02-15 NOTE — PLAN OF CARE
Problem: Patient Care Overview  Goal: Plan of Care Review  Outcome: Ongoing (interventions implemented as appropriate)  Pt tolerated #1 injectafor without adverse effects. VSS during 1hr post obs. Provided AVS & verbalized understanding of RTC date. DC with family via wheelchair.

## 2018-02-15 NOTE — TELEPHONE ENCOUNTER
Spoke with patient's wife.    She would like to schedule a sooner appointment for her .  Mr. Francis has complaints of continuing diarrhea, weight loss of 3 lbs./wk., feeling dizzy and weak.    No recommended f/u on file. Message routed to Dr. Richard.

## 2018-02-15 NOTE — PLAN OF CARE
Problem: Anemia (Adult)  Goal: Identify Related Risk Factors and Signs and Symptoms  Related risk factors and signs and symptoms are identified upon initiation of Human Response Clinical Practice Guideline (CPG)   Outcome: Ongoing (interventions implemented as appropriate)  Labs , hx, and medications reviewed. Assessment completed. Discussed plan of care with patient. Patient in agreement. VSS.  Chair reclined and warm blanket and snack offered. Will cont to monitor

## 2018-02-16 ENCOUNTER — DOCUMENTATION ONLY (OUTPATIENT)
Dept: HEMATOLOGY/ONCOLOGY | Facility: CLINIC | Age: 73
End: 2018-02-16

## 2018-02-16 ENCOUNTER — PATIENT MESSAGE (OUTPATIENT)
Dept: CARDIOLOGY | Facility: CLINIC | Age: 73
End: 2018-02-16

## 2018-02-16 ENCOUNTER — TELEPHONE (OUTPATIENT)
Dept: HEMATOLOGY/ONCOLOGY | Facility: CLINIC | Age: 73
End: 2018-02-16

## 2018-02-16 NOTE — TELEPHONE ENCOUNTER
I called and spoke with the patient and his wife.  Diarrhea is ongoing, watery, multiple times per day.  He is losing weight on a weekly basis.  Symptoms are worse when eating solids foods, but are still present when eating pureed foods.  Some cramping.  Denies abdominal pain or bloating.  He had EGD and colonoscopy with biopsies of the duodenum and colon that were normal.  Stool testing for C diff and bacterial infection is negative.  Unclear if the distal ileal stricture is causing these symptoms.  Also consider bacterial overgrowth or malabsorption.    I recommend further stool testing (orders placed) and course of Rifaximin 550 mg TID x 14 days.    Consider retrograde DBE with dilation of the stricture if no improvement with Rifaximin and stool testing unrevealing.

## 2018-02-17 ENCOUNTER — PATIENT MESSAGE (OUTPATIENT)
Dept: GASTROENTEROLOGY | Facility: CLINIC | Age: 73
End: 2018-02-17

## 2018-02-19 ENCOUNTER — TELEPHONE (OUTPATIENT)
Dept: HEMATOLOGY/ONCOLOGY | Facility: CLINIC | Age: 73
End: 2018-02-19

## 2018-02-19 ENCOUNTER — HOSPITAL ENCOUNTER (OUTPATIENT)
Dept: RADIOLOGY | Facility: HOSPITAL | Age: 73
Discharge: HOME | End: 2018-02-19
Attending: INTERNAL MEDICINE
Payer: MEDICARE

## 2018-02-19 DIAGNOSIS — C83.38 DIFFUSE LARGE B-CELL LYMPHOMA OF LYMPH NODES OF MULTIPLE REGIONS: ICD-10-CM

## 2018-02-19 DIAGNOSIS — D50.0 ANEMIA DUE TO CHRONIC BLOOD LOSS: Primary | ICD-10-CM

## 2018-02-19 LAB — POCT GLUCOSE: 106 MG/DL (ref 70–110)

## 2018-02-19 PROCEDURE — 78815 PET IMAGE W/CT SKULL-THIGH: CPT | Mod: 26,PS,, | Performed by: RADIOLOGY

## 2018-02-19 PROCEDURE — A9552 F18 FDG: HCPCS

## 2018-02-19 PROCEDURE — 86920 COMPATIBILITY TEST SPIN: CPT

## 2018-02-19 PROCEDURE — 78815 PET IMAGE W/CT SKULL-THIGH: CPT | Mod: TC

## 2018-02-19 RX ORDER — HYDROCODONE BITARTRATE AND ACETAMINOPHEN 500; 5 MG/1; MG/1
TABLET ORAL ONCE
Status: CANCELLED | OUTPATIENT
Start: 2018-02-19 | End: 2018-02-19

## 2018-02-19 RX ORDER — FUROSEMIDE 10 MG/ML
20 INJECTION INTRAMUSCULAR; INTRAVENOUS
Status: CANCELLED | OUTPATIENT
Start: 2018-02-19

## 2018-02-19 NOTE — TELEPHONE ENCOUNTER
----- Message from Flaca Neely RN sent at 2/19/2018 11:21 AM CST -----  Please schedule a blood transfusion. He comes tomorrow to see Dr RODAS. He will need a type and Cross.  ----- Message -----  From: Ye Lucia Jr., MD  Sent: 2/19/2018  11:07 AM  To: Khari AVILA Jr Staff    2 units RBCs with Lasix 20 mg iv between

## 2018-02-19 NOTE — TELEPHONE ENCOUNTER
Called and spoke with patients wife and assisted with scheduling her  for labs and blood transfusion tomorrow.

## 2018-02-20 ENCOUNTER — PATIENT MESSAGE (OUTPATIENT)
Dept: GASTROENTEROLOGY | Facility: CLINIC | Age: 73
End: 2018-02-20

## 2018-02-20 ENCOUNTER — INFUSION (OUTPATIENT)
Dept: INFUSION THERAPY | Facility: HOSPITAL | Age: 73
End: 2018-02-20
Attending: INTERNAL MEDICINE
Payer: MEDICARE

## 2018-02-20 ENCOUNTER — PATIENT MESSAGE (OUTPATIENT)
Dept: HEMATOLOGY/ONCOLOGY | Facility: CLINIC | Age: 73
End: 2018-02-20

## 2018-02-20 VITALS
HEART RATE: 86 BPM | RESPIRATION RATE: 16 BRPM | SYSTOLIC BLOOD PRESSURE: 140 MMHG | DIASTOLIC BLOOD PRESSURE: 64 MMHG | TEMPERATURE: 98 F

## 2018-02-20 DIAGNOSIS — D50.0 ANEMIA DUE TO CHRONIC BLOOD LOSS: ICD-10-CM

## 2018-02-20 LAB
BLD PROD TYP BPU: NORMAL
BLD PROD TYP BPU: NORMAL
BLOOD UNIT EXPIRATION DATE: NORMAL
BLOOD UNIT EXPIRATION DATE: NORMAL
BLOOD UNIT TYPE CODE: 5100
BLOOD UNIT TYPE CODE: 5100
BLOOD UNIT TYPE: NORMAL
BLOOD UNIT TYPE: NORMAL
CODING SYSTEM: NORMAL
CODING SYSTEM: NORMAL
DISPENSE STATUS: NORMAL
DISPENSE STATUS: NORMAL
NUM UNITS TRANS PACKED RBC: NORMAL
NUM UNITS TRANS PACKED RBC: NORMAL

## 2018-02-20 PROCEDURE — P9040 RBC LEUKOREDUCED IRRADIATED: HCPCS

## 2018-02-20 PROCEDURE — 25000003 PHARM REV CODE 250: Performed by: INTERNAL MEDICINE

## 2018-02-20 PROCEDURE — 36430 TRANSFUSION BLD/BLD COMPNT: CPT

## 2018-02-20 RX ORDER — FUROSEMIDE 10 MG/ML
20 INJECTION INTRAMUSCULAR; INTRAVENOUS
Status: DISCONTINUED | OUTPATIENT
Start: 2018-02-20 | End: 2018-02-20 | Stop reason: HOSPADM

## 2018-02-20 RX ORDER — HYDROCODONE BITARTRATE AND ACETAMINOPHEN 500; 5 MG/1; MG/1
TABLET ORAL ONCE
Status: COMPLETED | OUTPATIENT
Start: 2018-02-20 | End: 2018-02-20

## 2018-02-20 RX ADMIN — SODIUM CHLORIDE: 9 INJECTION, SOLUTION INTRAVENOUS at 09:02

## 2018-02-20 NOTE — TELEPHONE ENCOUNTER
----- Message from Flaca Neely RN sent at 2/20/2018  7:51 AM CST -----  Please reschedule this patient. They know you will call to reschedule. Thanks

## 2018-02-20 NOTE — TELEPHONE ENCOUNTER
Called pat at number listed in the chart. Patient is scheduled for an 8 am appt with Dr Lucia, left message stating Dr Lucia would not be in tomorrow, as he is out with the flu , and that his nurse Flaca would call him in the morning to reschedule. Left my information and asked him to call with any questions or concerns.

## 2018-02-20 NOTE — PLAN OF CARE
Problem: Anemia (Adult)  Goal: Identify Related Risk Factors and Signs and Symptoms  Related risk factors and signs and symptoms are identified upon initiation of Human Response Clinical Practice Guideline (CPG)   Outcome: Ongoing (interventions implemented as appropriate)  Pt here for 2 units PRBC. VSS. C/o diarrhea, dyspnea, weakness. Consent/labs/meds/allergies reviewed. PIV to left hand with blood return noted. All questions answered. Will continue to monitor.

## 2018-02-21 ENCOUNTER — OFFICE VISIT (OUTPATIENT)
Dept: HEMATOLOGY/ONCOLOGY | Facility: CLINIC | Age: 73
End: 2018-02-21
Payer: MEDICARE

## 2018-02-21 VITALS
TEMPERATURE: 98 F | WEIGHT: 164 LBS | HEIGHT: 70 IN | HEART RATE: 104 BPM | BODY MASS INDEX: 23.48 KG/M2 | DIASTOLIC BLOOD PRESSURE: 74 MMHG | SYSTOLIC BLOOD PRESSURE: 129 MMHG

## 2018-02-21 DIAGNOSIS — G89.4 CHRONIC PAIN SYNDROME: ICD-10-CM

## 2018-02-21 DIAGNOSIS — R53.1 WEAKNESS: ICD-10-CM

## 2018-02-21 DIAGNOSIS — M54.16 LUMBAR RADICULOPATHY: ICD-10-CM

## 2018-02-21 DIAGNOSIS — D50.0 IRON DEFICIENCY ANEMIA DUE TO CHRONIC BLOOD LOSS: ICD-10-CM

## 2018-02-21 DIAGNOSIS — C83.39 DIFFUSE LARGE B-CELL LYMPHOMA OF SOLID ORGAN EXCLUDING SPLEEN: Primary | ICD-10-CM

## 2018-02-21 DIAGNOSIS — C83.30 LARGE CELL (DIFFUSE) NON-HODGKIN'S LYMPHOMA: Primary | ICD-10-CM

## 2018-02-21 DIAGNOSIS — M96.1 FAILED BACK SYNDROME: ICD-10-CM

## 2018-02-21 PROCEDURE — 1125F AMNT PAIN NOTED PAIN PRSNT: CPT | Mod: S$GLB,,, | Performed by: INTERNAL MEDICINE

## 2018-02-21 PROCEDURE — 3008F BODY MASS INDEX DOCD: CPT | Mod: S$GLB,,, | Performed by: INTERNAL MEDICINE

## 2018-02-21 PROCEDURE — 99499 UNLISTED E&M SERVICE: CPT | Mod: S$GLB,,, | Performed by: INTERNAL MEDICINE

## 2018-02-21 PROCEDURE — 99999 PR PBB SHADOW E&M-EST. PATIENT-LVL IV: CPT | Mod: PBBFAC,,, | Performed by: INTERNAL MEDICINE

## 2018-02-21 PROCEDURE — 1159F MED LIST DOCD IN RCRD: CPT | Mod: S$GLB,,, | Performed by: INTERNAL MEDICINE

## 2018-02-21 PROCEDURE — 99214 OFFICE O/P EST MOD 30 MIN: CPT | Mod: S$GLB,,, | Performed by: INTERNAL MEDICINE

## 2018-02-21 RX ORDER — HYDROCODONE BITARTRATE AND ACETAMINOPHEN 5; 325 MG/1; MG/1
1 TABLET ORAL EVERY 8 HOURS PRN
Qty: 90 TABLET | Refills: 0 | Status: SHIPPED | OUTPATIENT
Start: 2018-02-21

## 2018-02-21 RX ORDER — PREDNISONE 20 MG/1
40 TABLET ORAL DAILY
Qty: 30 TABLET | Refills: 1 | Status: SHIPPED | OUTPATIENT
Start: 2018-02-21 | End: 2018-03-03

## 2018-02-21 RX ORDER — DICYCLOMINE HYDROCHLORIDE 10 MG/1
10 CAPSULE ORAL 3 TIMES DAILY
Qty: 90 CAPSULE | Refills: 0 | Status: SHIPPED | OUTPATIENT
Start: 2018-02-21 | End: 2019-02-21

## 2018-02-21 NOTE — PROGRESS NOTES
Subjective:       Patient ID: Royce Francis is a 72 y.o. male.    Chief Complaint: No chief complaint on file.    HPI Mr Francis returns to clinic for follow-up of multiple problems.  He is a patient of Dr. 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  He's had chemotherapy on multiple times.  His other issue has been cytopenias and his recent bone marrow examination from February 1, 2018 was consistent with myelodysplastic syndrome, showing dysplastic megakaryocytes and erythroid elements.    He underwent colonoscopy and EGD on February 2 which showed a single benign gastric polyp.  Colonoscopy was unremarkable.    He underwent PET/CT scan for follow-up on February 19 which showed progressive disease.  Specifically there is increased hypermetabolic lymphadenopathy in the pelvic sidewall and mesentery and multiple new areas of abnormal adenopathy in the abdomen and pelvis.  There is also new retroperitoneal metastasis, right chest wall metastasis and multiple liver lesions.    At the time of his last visit with Dr. Lucia in early February, he was quite weak, largely confined to a chair.  Today he reports that that has really not changed.  He's using a walker at home to get around.  His appetite remained very poor and he attributes some of that to persisting diarrhea which occurs whenever he eats.  He's using Imodium but has to be careful because sometimes it constipates him.    He's lost about 30 pounds over the last month or so.  He continues to have spasmodic abdominal pain which comes and goes and is typically relieved by taking deep breaths.  He also complains of some urinary hesitancy and dribbling and has urology appointment next week.  He has some chronic back pain related to DJD.  He is taking Vicodin for that.  In addition, he continues to sleep poorly in spite of 15 mg Remeron at night.      History:  originally diagnosed in 1996 with B-cell lymphoma,  initially follicular and   later diffuse.  He had multiple therapies for that.     In August 2017, he developed abdominal pain, anorexia and increasing diarrhea.    Computed tomography showed enlarged mesenteric lymph nodes and a large mass in   the right lower quadrant, which encased the distal ileum and extended into the   anterior abdominal wall.     He was treated with bendamustine and Rituxan and he received the sixth and last of this on   12/26/2017 and 12/27/2017.  Following that therapy he had resolution of his abdominal pain.    He had a history of cytopenias following previous treatment and in November 2014, during a hospitalization he developed pancytopenia.  A bone marrow   examination showed dysplastic changes and a cytogenetic study revealed 10 of 20   metaphases with a 20q deletion.  A FISH panel on the marrow material   confirmed the 20q deletion in 33% of cells.  In addition, 19% of cells had four   copies of 7q31.  The results suggested the presence of two abnormal clones, a 7q   clone related to lymphoma and 20q clone indicating an increased risk of   myelodysplastic disease.  Fortunately, after that examination, his blood counts   improved substantially, although he often had mild thrombocytopenia and anemia on follow-up exams.  Review of Systems   Constitutional: Positive for unexpected weight change. Negative for appetite change.   Eyes: Negative for visual disturbance.   Respiratory: Positive for shortness of breath. Negative for cough.    Cardiovascular: Negative for chest pain.   Gastrointestinal: Positive for abdominal pain and diarrhea.   Genitourinary: Positive for difficulty urinating. Negative for frequency.   Musculoskeletal: Positive for back pain.   Skin: Negative for rash.   Neurological: Negative for headaches.   Hematological: Negative for adenopathy.   Psychiatric/Behavioral: Negative for dysphoric mood. The patient is nervous/anxious.        Objective:      Physical Exam    Constitutional: He is oriented to person, place, and time. He appears well-developed. No distress.   Slightly ill-appearing   HENT:   Mouth/Throat: No oropharyngeal exudate.   Eyes: No scleral icterus.   Cardiovascular: Normal rate and regular rhythm.    Pulmonary/Chest: Effort normal and breath sounds normal. He has no wheezes.   Abdominal: Soft. Bowel sounds are normal. He exhibits no mass. There is tenderness (diffuse).   Lymphadenopathy:     He has no cervical adenopathy.   Neurological: He is alert and oriented to person, place, and time.   Psychiatric: He has a normal mood and affect. His behavior is normal. Thought content normal.   Vitals reviewed.      Assessment:     labs from February 19 shows a creatinine of 0.9, hepatic functions are normal, CBC shows a white blood cell count of 4480, hemoglobin 8.2 and platelet count 113,000.  1. Large cell (diffuse) non-Hodgkin's lymphoma    2. Iron deficiency anemia due to chronic blood loss        Plan:       I reviewed the scan results with the patient and his wife.     At this point his functional status is too poor to continue any additional antineoplastic therapy and I discussed that with the patient.  I recommended that we concentrate on improving his comfort level.  Plans are to increase his Remeron to 30 mg at night to see if that helps him with his sleep.  We will add some fentanyl to see if his spasmodic abdominal pain improves with that.  Lastly we will start 40 mg of prednisone a day to see if that helps his symptoms.    He will follow-up with urology next week as planned.  I recommended that we have the  speak with them about hospice options.

## 2018-02-22 ENCOUNTER — TELEPHONE (OUTPATIENT)
Dept: HEMATOLOGY/ONCOLOGY | Facility: CLINIC | Age: 73
End: 2018-02-22

## 2018-02-22 ENCOUNTER — INFUSION (OUTPATIENT)
Dept: INFUSION THERAPY | Facility: HOSPITAL | Age: 73
End: 2018-02-22
Attending: INTERNAL MEDICINE
Payer: MEDICARE

## 2018-02-22 ENCOUNTER — PATIENT MESSAGE (OUTPATIENT)
Dept: GASTROENTEROLOGY | Facility: CLINIC | Age: 73
End: 2018-02-22

## 2018-02-22 ENCOUNTER — PATIENT MESSAGE (OUTPATIENT)
Dept: HEMATOLOGY/ONCOLOGY | Facility: CLINIC | Age: 73
End: 2018-02-22

## 2018-02-22 VITALS
TEMPERATURE: 98 F | RESPIRATION RATE: 18 BRPM | SYSTOLIC BLOOD PRESSURE: 110 MMHG | HEART RATE: 86 BPM | DIASTOLIC BLOOD PRESSURE: 55 MMHG

## 2018-02-22 DIAGNOSIS — C83.30 LARGE CELL (DIFFUSE) NON-HODGKIN'S LYMPHOMA: ICD-10-CM

## 2018-02-22 DIAGNOSIS — D50.9 IRON DEFICIENCY ANEMIA, UNSPECIFIED IRON DEFICIENCY ANEMIA TYPE: Primary | ICD-10-CM

## 2018-02-22 PROCEDURE — 96365 THER/PROPH/DIAG IV INF INIT: CPT

## 2018-02-22 PROCEDURE — 25000003 PHARM REV CODE 250: Performed by: INTERNAL MEDICINE

## 2018-02-22 PROCEDURE — 63600175 PHARM REV CODE 636 W HCPCS: Mod: JG | Performed by: INTERNAL MEDICINE

## 2018-02-22 RX ORDER — SODIUM CHLORIDE 0.9 % (FLUSH) 0.9 %
10 SYRINGE (ML) INJECTION
Status: DISCONTINUED | OUTPATIENT
Start: 2018-02-22 | End: 2018-02-22 | Stop reason: HOSPADM

## 2018-02-22 RX ADMIN — SODIUM CHLORIDE: 9 INJECTION, SOLUTION INTRAVENOUS at 07:02

## 2018-02-22 RX ADMIN — FERRIC CARBOXYMALTOSE INJECTION 750 MG: 50 INJECTION, SOLUTION INTRAVENOUS at 07:02

## 2018-02-22 NOTE — TELEPHONE ENCOUNTER
"Called the patient to discuss hospice services on request from Dr Hernandez: The patient's wife informed that they were familiar with hospice services since her mother used these services and they were very satisfied and had a good experience. She however stated: "we are not ready yet". Offered services to her and provided her with contact information. She agreed to call when needed.   "

## 2018-02-22 NOTE — PLAN OF CARE
Problem: Patient Care Overview  Goal: Plan of Care Review  Outcome: Ongoing (interventions implemented as appropriate)  Patient tolerated 2nd dose of injectafer well today. Observed for 30 min post infusion. NAD noted upon discharge. PIV removed, catheter tip intact. AVS given. Discharged home, escorted in wheelchair by wife.

## 2018-02-23 ENCOUNTER — TELEPHONE (OUTPATIENT)
Dept: HEMATOLOGY/ONCOLOGY | Facility: CLINIC | Age: 73
End: 2018-02-23

## 2018-02-23 NOTE — TELEPHONE ENCOUNTER
No answer.  Left message that I was familiar with current status and disease progression.  МАРИНА Lucia

## 2018-02-24 ENCOUNTER — PATIENT MESSAGE (OUTPATIENT)
Dept: HEMATOLOGY/ONCOLOGY | Facility: CLINIC | Age: 73
End: 2018-02-24

## 2018-02-24 ENCOUNTER — TELEPHONE (OUTPATIENT)
Dept: HEMATOLOGY/ONCOLOGY | Facility: CLINIC | Age: 73
End: 2018-02-24

## 2018-02-24 NOTE — TELEPHONE ENCOUNTER
Spoke to wife.  I do not think any further cancer therapy would be of value after all the treatment he has received since 1996.  They will likely keep appointment in March to discuss this again.  Hospice is appropriate.    МАРИНА Lucia

## 2018-02-25 ENCOUNTER — PATIENT MESSAGE (OUTPATIENT)
Dept: HEMATOLOGY/ONCOLOGY | Facility: CLINIC | Age: 73
End: 2018-02-25

## 2018-02-26 ENCOUNTER — OFFICE VISIT (OUTPATIENT)
Dept: UROLOGY | Facility: CLINIC | Age: 73
DRG: 389 | End: 2018-02-26
Payer: MEDICARE

## 2018-02-26 VITALS
SYSTOLIC BLOOD PRESSURE: 111 MMHG | WEIGHT: 165 LBS | HEART RATE: 75 BPM | HEIGHT: 70 IN | BODY MASS INDEX: 23.62 KG/M2 | TEMPERATURE: 99 F | DIASTOLIC BLOOD PRESSURE: 55 MMHG

## 2018-02-26 DIAGNOSIS — N40.1 BENIGN NON-NODULAR PROSTATIC HYPERPLASIA WITH LOWER URINARY TRACT SYMPTOMS: ICD-10-CM

## 2018-02-26 DIAGNOSIS — R35.1 NOCTURIA: ICD-10-CM

## 2018-02-26 DIAGNOSIS — R39.9 LOWER URINARY TRACT SYMPTOMS: Primary | ICD-10-CM

## 2018-02-26 PROCEDURE — 3008F BODY MASS INDEX DOCD: CPT | Mod: S$GLB,,, | Performed by: UROLOGY

## 2018-02-26 PROCEDURE — 1126F AMNT PAIN NOTED NONE PRSNT: CPT | Mod: S$GLB,,, | Performed by: UROLOGY

## 2018-02-26 PROCEDURE — 99999 PR PBB SHADOW E&M-EST. PATIENT-LVL III: CPT | Mod: PBBFAC,,, | Performed by: UROLOGY

## 2018-02-26 PROCEDURE — 51798 US URINE CAPACITY MEASURE: CPT | Mod: S$GLB,,, | Performed by: UROLOGY

## 2018-02-26 PROCEDURE — 1159F MED LIST DOCD IN RCRD: CPT | Mod: S$GLB,,, | Performed by: UROLOGY

## 2018-02-26 PROCEDURE — 99203 OFFICE O/P NEW LOW 30 MIN: CPT | Mod: S$GLB,,, | Performed by: UROLOGY

## 2018-02-26 RX ORDER — FINASTERIDE 5 MG/1
5 TABLET, FILM COATED ORAL DAILY
Qty: 30 TABLET | Refills: 12 | Status: SHIPPED | OUTPATIENT
Start: 2018-02-26 | End: 2018-03-28

## 2018-02-26 NOTE — PROGRESS NOTES
Subjective:      Patient ID: Royce Francis is a 72 y.o. male.    Chief Complaint: Nocturia and Other (slow stream,dribbling)    Patient is a 72 y.o. male who is new to our clinic and referred by their PCP, Dr. Díaz for evaluation of BPH.     HPI  Benign Prostatic Hypertrophy  Patient complains of lower urinary tract symptoms. He reports incomplete emptying, intermittency, nocturia three times a night, straining and weak stream. He denies urgency. Patient states symptoms are of severe severity. Onset of symptoms was 5 years ago and was gradual in onset. He has no personal history of prostate cancer. He reports a history of kidney stones. He denies flank pain, gross hematuria and recurrent UTI.    He takes flomax since 2013, which helped somewhat at first but less so lately.       Review of Systems   All other systems reviewed and negative except pertinent positives noted in HPI.    Objective:     Physical Exam   Nursing note and vitals reviewed.  Constitutional: He is oriented to person, place, and time. Vital signs are normal. He appears well-developed and well-nourished. He is cooperative.   HENT:   Head: Normocephalic.   Neck: No tracheal deviation present.   Cardiovascular: Normal rate and intact distal pulses.    Pulmonary/Chest: Effort normal. No accessory muscle usage. No tachypnea. No respiratory distress.   Abdominal: Soft. Normal appearance. He exhibits no distension, no fluid wave, no ascites and no mass. There is no tenderness. There is no rebound and no CVA tenderness. No hernia. Hernia confirmed negative in the right inguinal area and confirmed negative in the left inguinal area.   Liver/spleen non-palpable.   Genitourinary: Prostate normal, testes normal and penis normal. Rectal exam shows no external hemorrhoid, no mass, no tenderness and anal tone normal. Prostate is not tender. Right testis shows no mass and no tenderness. Right testis is descended. Left testis shows no mass and no  tenderness. Left testis is descended. Circumcised.   Genitourinary Comments: Scrotum showed no rashes or lesions.  Anus/perineum without lesion.  Epididymis showed no masses or tenderness. No meatal stenosis, meatus in normal location. No penile discharge/plaques. Prostate smooth, no nodules, 30 grams.  Seminal vesicles non-palpable.  Normal sphincter tone.        Musculoskeletal: Normal range of motion.   Lymphadenopathy: No inguinal adenopathy noted on the right or left side.        Right: No inguinal adenopathy present.        Left: No inguinal adenopathy present.   Neurological: He is alert and oriented to person, place, and time. He has normal strength.   Skin: No bruising and no rash noted.     Psychiatric: He has a normal mood and affect. His speech is normal and behavior is normal. Thought content normal.     Assessment:     1. Lower urinary tract symptoms    2. Benign non-nodular prostatic hyperplasia with lower urinary tract symptoms    3. Nocturia      Plan:     1. BPH:  -A post void residual bladder scan was performed immediately after voiding. The patient's PVR was noted to be 150cc.  -Avoid bladder irritants including but not limited to caffeine, alcohol, smoking, spicy foods, acidic foods, tomato-based products, citrus, artificial sweeteners, chocolate, coffee or tea.  -urine culture reviewed recently, no evidence of infection  -prostate meds: flomax; continue flomax.  Will add finasteride.    -he is not likely a surgical candidate for an outlet procedure.  Will attempt finasteride for now.         2. Nocturia:  -Limit fluids 2 hours before bedtime.  Elevated legs 2 hours before bedtime.  No caffeine, cokes, teas after 3 pm in the afternoon.

## 2018-02-26 NOTE — LETTER
February 26, 2018      Sixto Hernandez MD  [address]  [facility] [address]           [facility]  [address]  [facility] [address]  Phone: [phone]          Patient: Royce Francis   MR Number: [id number]   YOB: 1945   Date of Visit: 2/26/2018       Dear Dr. Sixto Hernandez:    Thank you for referring Royce Francis to me for evaluation. Attached you will find relevant portions of my assessment and plan of care.    If you have questions, please do not hesitate to call me. I look forward to following Royce Francis along with you.    Sincerely,    Flako Arauz MD    Enclosure  CC:  No Recipients    If you would like to receive this communication electronically, please contact [email or web address] or [phone] to request more information on [facility] ? IO Link access.    For providers and/or their staff who would like to refer a patient to [facility], please contact us through our one-stop-shop provider referral line, [facility], at [phone].    If you feel you have received this communication in error or would no longer like to receive these types of communications, please e-mail [email or web address]

## 2018-02-27 ENCOUNTER — PATIENT MESSAGE (OUTPATIENT)
Dept: HEMATOLOGY/ONCOLOGY | Facility: CLINIC | Age: 73
End: 2018-02-27

## 2018-02-27 ENCOUNTER — HOSPITAL ENCOUNTER (EMERGENCY)
Facility: OTHER | Age: 73
Discharge: ANOTHER HOSPITAL | End: 2018-02-27
Attending: EMERGENCY MEDICINE
Payer: MEDICARE

## 2018-02-27 ENCOUNTER — TELEPHONE (OUTPATIENT)
Dept: HEMATOLOGY/ONCOLOGY | Facility: CLINIC | Age: 73
End: 2018-02-27

## 2018-02-27 ENCOUNTER — HOSPITAL ENCOUNTER (INPATIENT)
Facility: HOSPITAL | Age: 73
LOS: 1 days | Discharge: HOME | DRG: 389 | End: 2018-02-28
Attending: INTERNAL MEDICINE | Admitting: INTERNAL MEDICINE
Payer: MEDICARE

## 2018-02-27 VITALS
WEIGHT: 165 LBS | HEIGHT: 70 IN | BODY MASS INDEX: 23.62 KG/M2 | TEMPERATURE: 99 F | SYSTOLIC BLOOD PRESSURE: 133 MMHG | RESPIRATION RATE: 18 BRPM | OXYGEN SATURATION: 94 % | HEART RATE: 82 BPM | DIASTOLIC BLOOD PRESSURE: 66 MMHG

## 2018-02-27 DIAGNOSIS — E87.6 HYPOKALEMIA: ICD-10-CM

## 2018-02-27 DIAGNOSIS — R52 PAIN: ICD-10-CM

## 2018-02-27 DIAGNOSIS — K56.609 BOWEL OBSTRUCTION: ICD-10-CM

## 2018-02-27 DIAGNOSIS — C83.30 LARGE CELL (DIFFUSE) NON-HODGKIN'S LYMPHOMA: Primary | ICD-10-CM

## 2018-02-27 DIAGNOSIS — K56.609 SMALL BOWEL OBSTRUCTION: ICD-10-CM

## 2018-02-27 DIAGNOSIS — K56.609 SMALL BOWEL OBSTRUCTION: Primary | ICD-10-CM

## 2018-02-27 LAB
ALBUMIN SERPL-MCNC: 2.8 G/DL (ref 3.3–5.5)
ALBUMIN SERPL-MCNC: 3 G/DL (ref 3.3–5.5)
ALP SERPL-CCNC: 71 U/L (ref 42–141)
ALP SERPL-CCNC: 78 U/L (ref 42–141)
BILIRUB SERPL-MCNC: 0.5 MG/DL (ref 0.2–1.6)
BILIRUB SERPL-MCNC: 0.5 MG/DL (ref 0.2–1.6)
BILIRUBIN, POC UA: NEGATIVE
BLOOD, POC UA: NEGATIVE
BUN SERPL-MCNC: 7 MG/DL (ref 7–22)
CALCIUM SERPL-MCNC: 9.8 MG/DL (ref 8–10.3)
CHLORIDE SERPL-SCNC: 98 MMOL/L (ref 98–108)
CLARITY, POC UA: CLEAR
COLOR, POC UA: YELLOW
CREAT SERPL-MCNC: 0.9 MG/DL (ref 0.6–1.2)
GLUCOSE SERPL-MCNC: 97 MG/DL (ref 73–118)
GLUCOSE, POC UA: NEGATIVE
HCO3 UR-SCNC: 28.3 MMOL/L (ref 24–28)
KETONES, POC UA: NEGATIVE
LDH SERPL L TO P-CCNC: 1.57 MMOL/L (ref 0.5–2.2)
LEUKOCYTE EST, POC UA: NEGATIVE
NITRITE, POC UA: NEGATIVE
PCO2 BLDA: 37.7 MMHG (ref 35–45)
PH SMN: 7.48 [PH] (ref 7.35–7.45)
PH UR STRIP: 5.5 [PH]
PO2 BLDA: 24 MMHG (ref 40–60)
POC ALT (SGPT): 12 U/L (ref 10–47)
POC ALT (SGPT): 16 U/L (ref 10–47)
POC AMYLASE: 31 U/L (ref 14–97)
POC AST (SGOT): 26 U/L (ref 11–38)
POC AST (SGOT): 26 U/L (ref 11–38)
POC B-TYPE NATRIURETIC PEPTIDE: 88.3 PG/ML (ref 0–100)
POC BE: 5 MMOL/L
POC CARDIAC TROPONIN I: 0 NG/ML
POC GGT: 93 U/L (ref 5–65)
POC PTINR: 1.1 (ref 0.9–1.2)
POC PTWBT: 13.2 SEC (ref 9.7–14.3)
POC SATURATED O2: 47 % (ref 95–100)
POC TCO2: 27 MMOL/L (ref 18–33)
POC TCO2: 29 MMOL/L (ref 24–29)
POTASSIUM BLD-SCNC: 3.1 MMOL/L (ref 3.6–5.1)
PROTEIN, POC UA: NEGATIVE
PROTEIN, POC: 5.2 G/DL (ref 6.4–8.1)
PROTEIN, POC: 5.2 G/DL (ref 6.4–8.1)
SAMPLE: ABNORMAL
SAMPLE: NORMAL
SAMPLE: NORMAL
SODIUM BLD-SCNC: 143 MMOL/L (ref 128–145)
SPECIFIC GRAVITY, POC UA: 1.01
UROBILINOGEN, POC UA: 0.2 E.U./DL

## 2018-02-27 PROCEDURE — 25000003 PHARM REV CODE 250: Performed by: EMERGENCY MEDICINE

## 2018-02-27 PROCEDURE — 82803 BLOOD GASES ANY COMBINATION: CPT

## 2018-02-27 PROCEDURE — 87040 BLOOD CULTURE FOR BACTERIA: CPT

## 2018-02-27 PROCEDURE — 93005 ELECTROCARDIOGRAM TRACING: CPT | Mod: 59

## 2018-02-27 PROCEDURE — 96366 THER/PROPH/DIAG IV INF ADDON: CPT | Mod: 59

## 2018-02-27 PROCEDURE — 80053 COMPREHEN METABOLIC PANEL: CPT

## 2018-02-27 PROCEDURE — 85025 COMPLETE CBC W/AUTO DIFF WBC: CPT

## 2018-02-27 PROCEDURE — 84484 ASSAY OF TROPONIN QUANT: CPT

## 2018-02-27 PROCEDURE — 96361 HYDRATE IV INFUSION ADD-ON: CPT

## 2018-02-27 PROCEDURE — 96365 THER/PROPH/DIAG IV INF INIT: CPT

## 2018-02-27 PROCEDURE — 82150 ASSAY OF AMYLASE: CPT

## 2018-02-27 PROCEDURE — 25500020 PHARM REV CODE 255

## 2018-02-27 PROCEDURE — 96375 TX/PRO/DX INJ NEW DRUG ADDON: CPT

## 2018-02-27 PROCEDURE — 63600175 PHARM REV CODE 636 W HCPCS: Performed by: EMERGENCY MEDICINE

## 2018-02-27 PROCEDURE — 51702 INSERT TEMP BLADDER CATH: CPT

## 2018-02-27 PROCEDURE — 99285 EMERGENCY DEPT VISIT HI MDM: CPT | Mod: 25

## 2018-02-27 PROCEDURE — 83880 ASSAY OF NATRIURETIC PEPTIDE: CPT

## 2018-02-27 PROCEDURE — 36415 COLL VENOUS BLD VENIPUNCTURE: CPT

## 2018-02-27 PROCEDURE — 81003 URINALYSIS AUTO W/O SCOPE: CPT

## 2018-02-27 PROCEDURE — 93010 ELECTROCARDIOGRAM REPORT: CPT | Mod: ,,, | Performed by: INTERNAL MEDICINE

## 2018-02-27 PROCEDURE — 11000001 HC ACUTE MED/SURG PRIVATE ROOM

## 2018-02-27 RX ORDER — ENALAPRIL MALEATE 2.5 MG/1
2.5 TABLET ORAL DAILY
Status: DISCONTINUED | OUTPATIENT
Start: 2018-02-28 | End: 2018-02-28

## 2018-02-27 RX ORDER — SODIUM CHLORIDE 9 MG/ML
1000 INJECTION, SOLUTION INTRAVENOUS ONCE
Status: DISCONTINUED | OUTPATIENT
Start: 2018-02-27 | End: 2018-02-27

## 2018-02-27 RX ORDER — TAMSULOSIN HYDROCHLORIDE 0.4 MG/1
1 CAPSULE ORAL DAILY
Status: DISCONTINUED | OUTPATIENT
Start: 2018-02-28 | End: 2018-02-28 | Stop reason: HOSPADM

## 2018-02-27 RX ORDER — PREDNISONE 20 MG/1
40 TABLET ORAL DAILY
Status: DISCONTINUED | OUTPATIENT
Start: 2018-02-28 | End: 2018-02-28 | Stop reason: HOSPADM

## 2018-02-27 RX ORDER — PANTOPRAZOLE SODIUM 40 MG/1
40 TABLET, DELAYED RELEASE ORAL DAILY
Status: DISCONTINUED | OUTPATIENT
Start: 2018-02-28 | End: 2018-02-28 | Stop reason: HOSPADM

## 2018-02-27 RX ORDER — ONDANSETRON 2 MG/ML
4 INJECTION INTRAMUSCULAR; INTRAVENOUS
Status: COMPLETED | OUTPATIENT
Start: 2018-02-27 | End: 2018-02-27

## 2018-02-27 RX ORDER — HYDROMORPHONE HYDROCHLORIDE 2 MG/ML
INJECTION, SOLUTION INTRAMUSCULAR; INTRAVENOUS; SUBCUTANEOUS
Status: DISCONTINUED
Start: 2018-02-27 | End: 2018-02-27 | Stop reason: HOSPADM

## 2018-02-27 RX ORDER — POTASSIUM CHLORIDE 14.9 MG/ML
20 INJECTION INTRAVENOUS
Status: COMPLETED | OUTPATIENT
Start: 2018-02-27 | End: 2018-02-27

## 2018-02-27 RX ORDER — VALACYCLOVIR HYDROCHLORIDE 500 MG/1
500 TABLET, FILM COATED ORAL DAILY
Status: DISCONTINUED | OUTPATIENT
Start: 2018-02-28 | End: 2018-02-28 | Stop reason: HOSPADM

## 2018-02-27 RX ORDER — FINASTERIDE 5 MG/1
5 TABLET, FILM COATED ORAL DAILY
Status: DISCONTINUED | OUTPATIENT
Start: 2018-02-28 | End: 2018-02-28 | Stop reason: HOSPADM

## 2018-02-27 RX ORDER — HYDROMORPHONE HYDROCHLORIDE 2 MG/ML
1 INJECTION, SOLUTION INTRAMUSCULAR; INTRAVENOUS; SUBCUTANEOUS
Status: COMPLETED | OUTPATIENT
Start: 2018-02-27 | End: 2018-02-27

## 2018-02-27 RX ORDER — DIPHENHYDRAMINE HYDROCHLORIDE 50 MG/ML
50 INJECTION INTRAMUSCULAR; INTRAVENOUS
Status: COMPLETED | OUTPATIENT
Start: 2018-02-27 | End: 2018-02-27

## 2018-02-27 RX ORDER — SODIUM CHLORIDE 9 MG/ML
1000 INJECTION, SOLUTION INTRAVENOUS
Status: DISCONTINUED | OUTPATIENT
Start: 2018-02-27 | End: 2018-02-27

## 2018-02-27 RX ORDER — METHYLPREDNISOLONE SOD SUCC 125 MG
125 VIAL (EA) INJECTION
Status: COMPLETED | OUTPATIENT
Start: 2018-02-27 | End: 2018-02-27

## 2018-02-27 RX ADMIN — HYDROMORPHONE HYDROCHLORIDE 1 MG: 2 INJECTION INTRAMUSCULAR; INTRAVENOUS; SUBCUTANEOUS at 07:02

## 2018-02-27 RX ADMIN — IOHEXOL 75 ML: 350 INJECTION, SOLUTION INTRAVENOUS at 03:02

## 2018-02-27 RX ADMIN — SODIUM CHLORIDE 2244 ML: 0.9 INJECTION, SOLUTION INTRAVENOUS at 02:02

## 2018-02-27 RX ADMIN — DIPHENHYDRAMINE HYDROCHLORIDE 50 MG: 50 INJECTION, SOLUTION INTRAMUSCULAR; INTRAVENOUS at 03:02

## 2018-02-27 RX ADMIN — ONDANSETRON 4 MG: 2 INJECTION INTRAMUSCULAR; INTRAVENOUS at 02:02

## 2018-02-27 RX ADMIN — POTASSIUM CHLORIDE 20 MEQ: 200 INJECTION, SOLUTION INTRAVENOUS at 06:02

## 2018-02-27 RX ADMIN — METHYLPREDNISOLONE SODIUM SUCCINATE 125 MG: 125 INJECTION, POWDER, FOR SOLUTION INTRAMUSCULAR; INTRAVENOUS at 03:02

## 2018-02-27 RX ADMIN — HYDROMORPHONE HYDROCHLORIDE 1 MG: 2 INJECTION INTRAMUSCULAR; INTRAVENOUS; SUBCUTANEOUS at 02:02

## 2018-02-27 NOTE — ED PROVIDER NOTES
"Encounter Date: 2/27/2018       History     Chief Complaint   Patient presents with    Abdominal Pain     pt states "I started having stomach pains, nausea and vomiting this morning" pt reports having lymphoma and an intestinal tumor"     This is a 72-year-old male with a very extensive past medical history including B-cell lymphoma with intra-abdominal tumors who comes in complaining of nausea, vomiting and inability to tolerate PO.  He reports that he's had decreased oral intake for the past 2 weeks but over the past 2 days he's been vomiting and having abdominal pain.  He reports that he has a history of bowel obstructions and is concerned that he may have one again.  He denies any constipation or diarrhea and reports that he has not had any bowel movements.  He denies any fevers or chills.  He denies any chest pain or shortness of breath.  He reports that the abdominal pain is diffuse and constant.  He denies any exacerbating or alleviating factors.          Review of patient's allergies indicates:   Allergen Reactions    Iodine and iodide containing products Itching     Contrast dye     Past Medical History:   Diagnosis Date    Allergy     Anemia     Arthritis     Basal cell carcinoma     excised from upper back    Cancer     Cardiomyopathy due to chemotherapy     Cataract     CHF (congestive heart failure)     Coronary artery disease     D1    Encounter for blood transfusion     Eye injuries 20 yrs    ou fb several x's    Hyperlipidemia     Hypertension     Hypertensive heart disease with heart failure 1/31/2013    Lymphoma 1996    Obstructive sleep apnea on CPAP     SCC (squamous cell carcinoma) excised 12/2015    R proximal forearm    Sleep apnea     SQUAMOUS CELL CARCINOMA excised 2/14/13    L forearm    Squamous cell carcinoma excised 8/4/2016    IN SITU, Left thigh MOHS    Squamous cell carcinoma excised 8/25/2016    left helix, MOHS     Past Surgical History:   Procedure Laterality " Date    CATARACT EXTRACTION      od dr yanes    CATARACT EXTRACTION W/  INTRAOCULAR LENS IMPLANT Left 01/12/2017    Dr. Yanes    COLONOSCOPY N/A 2/2/2018    Procedure: COLONOSCOPY;  Surgeon: Josh Boyd MD;  Location: [facility] ([address] [place] [address]);  Service: Endoscopy;  Laterality: N/A;    EYE SURGERY      Rt cataract    KIDNEY STONE SURGERY      SMALL INTESTINE SURGERY  09/2013    TONSILLECTOMY       Family History   Problem Relation Age of Onset    Cataracts Mother     Hypertension Mother     Cataracts Father     Hypertension Father     Cancer Father      lung    Hypertension Maternal Grandmother     Hypertension Maternal Grandfather     Hypertension Paternal Grandmother     Hypertension Paternal Grandfather     No Known Problems Sister     No Known Problems Brother     No Known Problems Maternal Aunt     No Known Problems Maternal Uncle     No Known Problems Paternal Aunt     No Known Problems Paternal Uncle     Amblyopia Neg Hx     Blindness Neg Hx     Diabetes Neg Hx     Glaucoma Neg Hx     Macular degeneration Neg Hx     Retinal detachment Neg Hx     Strabismus Neg Hx     Stroke Neg Hx     Thyroid disease Neg Hx     Melanoma Neg Hx     Heart disease Neg Hx     Celiac disease Neg Hx     Cirrhosis Neg Hx     Colon cancer Neg Hx     Colon polyps Neg Hx     Crohn's disease Neg Hx     Cystic fibrosis Neg Hx     Esophageal cancer Neg Hx     Hemochromatosis Neg Hx     Inflammatory bowel disease Neg Hx     Irritable bowel syndrome Neg Hx     Liver cancer Neg Hx     Liver disease Neg Hx     Rectal cancer Neg Hx     Stomach cancer Neg Hx     Ulcerative colitis Neg Hx     Burt's disease Neg Hx      Social History   Substance Use Topics    Smoking status: Former Smoker     Packs/day: 1.50     Years: 1.00     Quit date: 1960    Smokeless tobacco: Never Used    Alcohol use 1.2 oz/week     1 Glasses of wine, 1 Shots of liquor per week      Comment: socially     Review of  Systems   Constitutional: Negative for chills and fever.   Respiratory: Negative for chest tightness and shortness of breath.    Cardiovascular: Negative for chest pain and palpitations.   Gastrointestinal: Positive for abdominal pain, nausea and vomiting.   Genitourinary: Negative for dysuria and flank pain.   Musculoskeletal: Negative for back pain and neck pain.   Neurological: Negative for weakness, numbness and headaches.   All other systems reviewed and are negative.      Physical Exam     Initial Vitals [02/27/18 1346]   BP Pulse Resp Temp SpO2   (!) 124/50 (!) 128 16 98.4 °F (36.9 °C) 98 %      MAP       74.67         Physical Exam    Constitutional: He appears well-developed and well-nourished.   Moderate distress   HENT:   Head: Normocephalic and atraumatic.   Dry mucous membranes   Eyes: Conjunctivae and EOM are normal. Pupils are equal, round, and reactive to light.   Neck: Normal range of motion. Neck supple.   Cardiovascular: Normal rate and regular rhythm.   Pulmonary/Chest: Breath sounds normal. He has no wheezes.   Abdominal:   Distended, decreased bowel sounds, diffusely TTP   Musculoskeletal: Normal range of motion. He exhibits no edema or tenderness.   Neurological: He is alert and oriented to person, place, and time. He has normal strength and normal reflexes. No cranial nerve deficit.   Skin: Skin is warm and dry. Capillary refill takes less than 2 seconds. There is pallor.   Psychiatric: He has a normal mood and affect. His behavior is normal.         ED Course   Procedures  Labs Reviewed - No data to display  EKG Readings: (Independently Interpreted)   Time: 1419  Rate: 109  Sinus tachycardia, non-specific T wave abnormality  Unchanged from prior            Medical Decision Making:   Initial Assessment:   This is a 72 year old male who comes in complaining of abdominal pain, intractable vomiting. On exam patient is afebrile but he is significantly tachycardic. He appears very uncomfortable  and has diffuse abdominal TTP. EKG, CBC, CMP, Lipase, UA, troponin, BNP, lactic acid and blood cultures were ordered. CXR and Flat and erect films were ordered. He was started 30 cc/kg of normal saline for fluid resuscitation and light of underlying concern for sepsis.   Differential Diagnosis:   Small bowel obstruction, sepsis, dehydration, renal insufficiency, lactic acidosis, BRENDA, bacteremia, malignancy, electrolyte abnormality, arrhythmia, mesenteric ischemia  Independently Interpreted Test(s):   I have ordered and independently interpreted X-rays - see summary below.       <> Summary of X-Ray Reading(s): CXR had no infiltrate or effusion. Flat and erect films were concerning for dilated loops of bowel and SBO.  Clinical Tests:   Lab Tests: Ordered and Reviewed       <> Summary of Lab: H/H were higher than baseline. Lactic acid was 1.57. Creatinine was at baseline. WBC was also at baseline. K was 3.1 and was repleated.       ED Management:  Patient x-ray was concerning for a bowel obstruction so a CT abdomen and pelvis was ordered.  CT is consistent with a small bowel obstruction.  An NG tube was placed.  Patient could not urinate so Whitman catheter was also placed.  Case was discussed with transfer center and patient will be transferred to [facility] main campus. Per transfer center patient was accepted by [facility] as primary service by Dr. Loomis. He was hemodynamically stable on my re-evaluation.                      Clinical Impression:   The encounter diagnosis was Pain.   1. Small Bowel Obstruction  2. Hypokalemia    Disposition:   Disposition: Transferred  Condition: Serious                        Lilia Broussard MD  02/27/18 1701       Lilia Broussard MD  02/27/18 1702       Lilia Broussard MD  02/27/18 9675

## 2018-02-27 NOTE — TELEPHONE ENCOUNTER
----- Message from Ye Lucia Jr., MD sent at 2/27/2018  9:47 AM CST -----  Contact: Selma-Spouse  Probably has a bowel obstruction, so will need to go to ED--will likely have tube put in stomach to try to stop vomiting.  AB  ----- Message -----  From: Flaca Neely RN  Sent: 2/27/2018   9:28 AM  To: Ye Lucia Jr., MD    Patient having severe abdominal pain and vomiting. Taking Norco and blacofen.   ----- Message -----  From: Naveed Bhandari  Sent: 2/27/2018   8:51 AM  To: Khari AVILA Jr Staff    Will like a call from Flaca regarding pt in severe stomach pains      Contact::[phone]    Patient instructed to go to the ER. Patient verbalized understanding.

## 2018-02-28 ENCOUNTER — PATIENT MESSAGE (OUTPATIENT)
Dept: HEMATOLOGY/ONCOLOGY | Facility: CLINIC | Age: 73
End: 2018-02-28

## 2018-02-28 VITALS
WEIGHT: 160.5 LBS | DIASTOLIC BLOOD PRESSURE: 65 MMHG | HEART RATE: 58 BPM | BODY MASS INDEX: 22.98 KG/M2 | RESPIRATION RATE: 20 BRPM | SYSTOLIC BLOOD PRESSURE: 138 MMHG | TEMPERATURE: 98 F | OXYGEN SATURATION: 95 % | HEIGHT: 70 IN

## 2018-02-28 PROBLEM — R33.9 URINARY RETENTION: Status: ACTIVE | Noted: 2018-02-28

## 2018-02-28 LAB
ALBUMIN SERPL BCP-MCNC: 2.2 G/DL
ALP SERPL-CCNC: 66 U/L
ALT SERPL W/O P-5'-P-CCNC: 10 U/L
ANION GAP SERPL CALC-SCNC: 8 MMOL/L
ANISOCYTOSIS BLD QL SMEAR: SLIGHT
ANISOCYTOSIS BLD QL SMEAR: SLIGHT
AST SERPL-CCNC: 14 U/L
BASOPHILS # BLD AUTO: ABNORMAL K/UL
BASOPHILS NFR BLD: 0 %
BASOPHILS NFR BLD: 0 %
BILIRUB SERPL-MCNC: 0.3 MG/DL
BUN SERPL-MCNC: 13 MG/DL
CALCIUM SERPL-MCNC: 8.6 MG/DL
CHLORIDE SERPL-SCNC: 107 MMOL/L
CO2 SERPL-SCNC: 24 MMOL/L
CREAT SERPL-MCNC: 0.8 MG/DL
DACRYOCYTES BLD QL SMEAR: ABNORMAL
DIFFERENTIAL METHOD: ABNORMAL
DIFFERENTIAL METHOD: ABNORMAL
EOSINOPHIL # BLD AUTO: ABNORMAL K/UL
EOSINOPHIL NFR BLD: 0 %
EOSINOPHIL NFR BLD: 0 %
ERYTHROCYTE [DISTWIDTH] IN BLOOD BY AUTOMATED COUNT: 18.4 %
ERYTHROCYTE [DISTWIDTH] IN BLOOD BY AUTOMATED COUNT: 18.5 %
EST. GFR  (AFRICAN AMERICAN): >60 ML/MIN/1.73 M^2
EST. GFR  (NON AFRICAN AMERICAN): >60 ML/MIN/1.73 M^2
GLUCOSE SERPL-MCNC: 137 MG/DL
HCT VFR BLD AUTO: 28.5 %
HCT VFR BLD AUTO: 29.8 %
HGB BLD-MCNC: 8.6 G/DL
HGB BLD-MCNC: 9.2 G/DL
HYPOCHROMIA BLD QL SMEAR: ABNORMAL
HYPOCHROMIA BLD QL SMEAR: ABNORMAL
IMM GRANULOCYTES # BLD AUTO: ABNORMAL K/UL
IMM GRANULOCYTES # BLD AUTO: ABNORMAL K/UL
IMM GRANULOCYTES NFR BLD AUTO: ABNORMAL %
IMM GRANULOCYTES NFR BLD AUTO: ABNORMAL %
LYMPHOCYTES # BLD AUTO: ABNORMAL K/UL
LYMPHOCYTES NFR BLD: 20.9 %
LYMPHOCYTES NFR BLD: 5 %
MAGNESIUM SERPL-MCNC: 1.2 MG/DL
MCH RBC QN AUTO: 25.7 PG
MCH RBC QN AUTO: 26.1 PG
MCHC RBC AUTO-ENTMCNC: 30.2 G/DL
MCHC RBC AUTO-ENTMCNC: 30.9 G/DL
MCV RBC AUTO: 85 FL
MCV RBC AUTO: 85 FL
MONOCYTES # BLD AUTO: ABNORMAL K/UL
MONOCYTES NFR BLD: 4 %
MONOCYTES NFR BLD: 8.3 %
MYELOCYTES NFR BLD MANUAL: 2 %
NEUTROPHILS NFR BLD: 62.5 %
NEUTROPHILS NFR BLD: 81 %
NEUTS BAND NFR BLD MANUAL: 8 %
NEUTS BAND NFR BLD MANUAL: 8.3 %
NRBC BLD-RTO: 0 /100 WBC
NRBC BLD-RTO: 0 /100 WBC
OVALOCYTES BLD QL SMEAR: ABNORMAL
OVALOCYTES BLD QL SMEAR: ABNORMAL
PHOSPHATE SERPL-MCNC: 4.1 MG/DL
PLATELET # BLD AUTO: 116 K/UL
PLATELET # BLD AUTO: 126 K/UL
PLATELET BLD QL SMEAR: ABNORMAL
PLATELET BLD QL SMEAR: ABNORMAL
PMV BLD AUTO: 8.8 FL
PMV BLD AUTO: 9.2 FL
POIKILOCYTOSIS BLD QL SMEAR: ABNORMAL
POIKILOCYTOSIS BLD QL SMEAR: SLIGHT
POLYCHROMASIA BLD QL SMEAR: ABNORMAL
POLYCHROMASIA BLD QL SMEAR: ABNORMAL
POTASSIUM SERPL-SCNC: 4 MMOL/L
PROT SERPL-MCNC: 4.4 G/DL
RBC # BLD AUTO: 3.35 M/UL
RBC # BLD AUTO: 3.52 M/UL
SODIUM SERPL-SCNC: 139 MMOL/L
WBC # BLD AUTO: 2.42 K/UL
WBC # BLD AUTO: 2.64 K/UL

## 2018-02-28 PROCEDURE — 99223 1ST HOSP IP/OBS HIGH 75: CPT | Mod: AI,GC,, | Performed by: INTERNAL MEDICINE

## 2018-02-28 PROCEDURE — 36415 COLL VENOUS BLD VENIPUNCTURE: CPT

## 2018-02-28 PROCEDURE — 80053 COMPREHEN METABOLIC PANEL: CPT

## 2018-02-28 PROCEDURE — 25000003 PHARM REV CODE 250: Performed by: INTERNAL MEDICINE

## 2018-02-28 PROCEDURE — 84100 ASSAY OF PHOSPHORUS: CPT

## 2018-02-28 PROCEDURE — 83735 ASSAY OF MAGNESIUM: CPT

## 2018-02-28 PROCEDURE — 63600175 PHARM REV CODE 636 W HCPCS: Performed by: INTERNAL MEDICINE

## 2018-02-28 RX ORDER — METOPROLOL TARTRATE 25 MG/1
25 TABLET, FILM COATED ORAL 2 TIMES DAILY
Status: DISCONTINUED | OUTPATIENT
Start: 2018-02-28 | End: 2018-02-28 | Stop reason: HOSPADM

## 2018-02-28 RX ORDER — LOPERAMIDE HYDROCHLORIDE 2 MG/1
2 CAPSULE ORAL
Status: DISCONTINUED | OUTPATIENT
Start: 2018-02-28 | End: 2018-02-28 | Stop reason: HOSPADM

## 2018-02-28 RX ORDER — MEPERIDINE HYDROCHLORIDE 50 MG/ML
15 INJECTION INTRAMUSCULAR; INTRAVENOUS; SUBCUTANEOUS EVERY 4 HOURS PRN
Status: DISCONTINUED | OUTPATIENT
Start: 2018-02-28 | End: 2018-02-28 | Stop reason: HOSPADM

## 2018-02-28 RX ORDER — ENOXAPARIN SODIUM 100 MG/ML
40 INJECTION SUBCUTANEOUS EVERY 24 HOURS
Status: DISCONTINUED | OUTPATIENT
Start: 2018-02-28 | End: 2018-02-28 | Stop reason: HOSPADM

## 2018-02-28 RX ORDER — MEPERIDINE HYDROCHLORIDE 50 MG/ML
15 INJECTION INTRAMUSCULAR; INTRAVENOUS; SUBCUTANEOUS
Status: DISCONTINUED | OUTPATIENT
Start: 2018-02-28 | End: 2018-02-28

## 2018-02-28 RX ORDER — DIAZEPAM 5 MG/5ML
3 SOLUTION ORAL ONCE
Status: DISCONTINUED | OUTPATIENT
Start: 2018-02-28 | End: 2018-02-28

## 2018-02-28 RX ORDER — SODIUM CHLORIDE 0.9 % (FLUSH) 0.9 %
3 SYRINGE (ML) INJECTION
Status: DISCONTINUED | OUTPATIENT
Start: 2018-02-28 | End: 2018-02-28 | Stop reason: HOSPADM

## 2018-02-28 RX ORDER — ENALAPRIL MALEATE 2.5 MG/1
2.5 TABLET ORAL DAILY
Status: DISCONTINUED | OUTPATIENT
Start: 2018-02-28 | End: 2018-02-28 | Stop reason: HOSPADM

## 2018-02-28 RX ORDER — RAMELTEON 8 MG/1
8 TABLET ORAL NIGHTLY
Status: DISCONTINUED | OUTPATIENT
Start: 2018-02-28 | End: 2018-02-28 | Stop reason: HOSPADM

## 2018-02-28 RX ORDER — ONDANSETRON 8 MG/1
8 TABLET, ORALLY DISINTEGRATING ORAL EVERY 8 HOURS PRN
Status: DISCONTINUED | OUTPATIENT
Start: 2018-02-28 | End: 2018-02-28 | Stop reason: HOSPADM

## 2018-02-28 RX ORDER — ACETAMINOPHEN 325 MG/1
650 TABLET ORAL EVERY 4 HOURS PRN
Status: DISCONTINUED | OUTPATIENT
Start: 2018-02-28 | End: 2018-02-28 | Stop reason: HOSPADM

## 2018-02-28 RX ORDER — HYDROCODONE BITARTRATE AND ACETAMINOPHEN 5; 325 MG/1; MG/1
1 TABLET ORAL EVERY 6 HOURS PRN
Status: DISCONTINUED | OUTPATIENT
Start: 2018-02-28 | End: 2018-02-28 | Stop reason: HOSPADM

## 2018-02-28 RX ORDER — DEXTROSE MONOHYDRATE AND SODIUM CHLORIDE 5; .9 G/100ML; G/100ML
INJECTION, SOLUTION INTRAVENOUS CONTINUOUS
Status: DISCONTINUED | OUTPATIENT
Start: 2018-02-28 | End: 2018-02-28 | Stop reason: HOSPADM

## 2018-02-28 RX ADMIN — HYDROCODONE BITARTRATE AND ACETAMINOPHEN 1 TABLET: 5; 325 TABLET ORAL at 09:02

## 2018-02-28 RX ADMIN — PREDNISONE 40 MG: 20 TABLET ORAL at 09:02

## 2018-02-28 RX ADMIN — TAMSULOSIN HYDROCHLORIDE 0.4 MG: 0.4 CAPSULE ORAL at 08:02

## 2018-02-28 RX ADMIN — DEXTROSE MONOHYDRATE AND SODIUM CHLORIDE: 5; .9 INJECTION, SOLUTION INTRAVENOUS at 01:02

## 2018-02-28 RX ADMIN — PANTOPRAZOLE SODIUM 40 MG: 40 TABLET, DELAYED RELEASE ORAL at 09:02

## 2018-02-28 RX ADMIN — RAMELTEON 8 MG: 8 TABLET, FILM COATED ORAL at 12:02

## 2018-02-28 RX ADMIN — METOPROLOL TARTRATE 25 MG: 25 TABLET ORAL at 08:02

## 2018-02-28 RX ADMIN — ENALAPRIL MALEATE 2.5 MG: 2.5 TABLET ORAL at 08:02

## 2018-02-28 RX ADMIN — HYDROCODONE BITARTRATE AND ACETAMINOPHEN 1 TABLET: 5; 325 TABLET ORAL at 12:02

## 2018-02-28 RX ADMIN — VALACYCLOVIR HYDROCHLORIDE 500 MG: 500 TABLET, FILM COATED ORAL at 09:02

## 2018-02-28 RX ADMIN — FINASTERIDE 5 MG: 5 TABLET, FILM COATED ORAL at 09:02

## 2018-02-28 NOTE — DISCHARGE SUMMARY
"[facility]  Hematology  Bone Marrow Transplant  Discharge Summary      Patient Name: Royce Francis  MRN: [id number]  Admission Date: 2/27/2018  Hospital Length of Stay: 1 days  Discharge Date and Time: 2/28/2018  5:10 PM  Attending Physician: No att. providers found   Discharging Provider: Sheri Herman MD  Primary Care Provider: Rupert Díaz MD    HPI: This is a 72 year old male with pmhx significant for DLBCL currently no longer on chemotherapy who presented to [facility] ED for one day of abdominal pain and nausea and vomiting. Patient reports that in addition to his chronic diarrhea, he had developed abdominal pain yesterday. Morning of presenting to the ED, the patient woke up from severe 20/10, mid abdomen and epigastric pain, described as "crab-pinching', worse with drinking water and better with avoiding food. He later developed two bouts of NBNB vomiting. Compared to his baseline chronic diarrhea, he had not had any bowel movements day of presentation to ED. Patient reports weight loss associated with diarrhea and decreased appetite. Patient denies any fevers or chills, melena, dysuria, SOB, or chest pain.   In ED of OSH, patient noted to have small bowel obstruction on X-ray and CT abdomen and NGT was placed. Patient reports significant improvement of abdominal pain and n/v with NG placement and IV dilaudid received at OSH. Patient was transferred to [facility] for higher level of care.           Oncologic hx from Dr. Hernandez's note:   He is a patient of Dr. 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  He's had chemotherapy on multiple times.  His other issue has been cytopenias and his recent bone marrow examination from February 1, 2018 was consistent with myelodysplastic syndrome, showing dysplastic megakaryocytes and erythroid elements.     He underwent colonoscopy and EGD on February 2 which showed a single benign gastric polyp. "  Colonoscopy was unremarkable.     He underwent PET/CT scan for follow-up on February 19 which showed progressive disease.  Specifically there is increased hypermetabolic lymphadenopathy in the pelvic sidewall and mesentery and multiple new areas of abnormal adenopathy in the abdomen and pelvis.  There is also new retroperitoneal metastasis, right chest wall metastasis and multiple liver lesions.     At the time of his last visit with Dr. Lucia in early February, he was quite weak, largely confined to a chair.  Today he reports that that has really not changed.  He's using a walker at home to get around.  His appetite remained very poor and he attributes some of that to persisting diarrhea which occurs whenever he eats.  He's using Imodium but has to be careful because sometimes it constipates him.    He's lost about 30 pounds over the last month or so.  He continues to have spasmodic abdominal pain which comes and goes and is typically relieved by taking deep breaths.  He also complains of some urinary hesitancy and dribbling and has urology appointment next week.  He has some chronic back pain related to DJD.  He is taking Vicodin for that.  In addition, he continues to sleep poorly in spite of 15 mg Remeron at night.        History:  originally diagnosed in 1996 with B-cell lymphoma, initially follicular and   later diffuse.  He had multiple therapies for that.     In August 2017, he developed abdominal pain, anorexia and increasing diarrhea.    Computed tomography showed enlarged mesenteric lymph nodes and a large mass in   the right lower quadrant, which encased the distal ileum and extended into the   anterior abdominal wall.     He was treated with bendamustine and Rituxan and he received the sixth and last of this on   12/26/2017 and 12/27/2017.  Following that therapy he had resolution of his abdominal pain.     He had a history of cytopenias following previous treatment and in November 2014, during a  hospitalization he developed pancytopenia.  A bone marrow   examination showed dysplastic changes and a cytogenetic study revealed 10 of 20   metaphases with a 20q deletion.  A FISH panel on the marrow material   confirmed the 20q deletion in 33% of cells.  In addition, 19% of cells had four   copies of 7q31.  The results suggested the presence of two abnormal clones, a 7q   clone related to lymphoma and 20q clone indicating an increased risk of   myelodysplastic disease.  Fortunately, after that examination, his blood counts   improved substantially, although he often had mild thrombocytopenia and anemia on follow-up exams.     * No surgery found *     Hospital Course: Following NG tube decompression patient's symptoms resolved. Abdominal Xray did not demonstrate findings concerning for persistent SBO. Coupled with clinical findings of multiple loose watery bowel movements consistent with his chronic diarrhea, it was felt his bowel obstruction had resolved. His diet was advanced and he tolerated PO intake without complication or ill effect. Discharged home with close follow up with his Oncologist Dr. Lucia to discuss treatment options and establish long term plan of care. He is also scheduled to follow up with [facility] in April for continued evaluation and treatment of his chronic diarrhea.        Significant Diagnostic Studies:     Pending Diagnostic Studies:     Procedure Component Value Units Date/Time    CT Abdomen Pelvis With Contrast [[id number]]     Order Status:  Sent Lab Status:  No result         Final Active Diagnoses:    Diagnosis Date Noted POA    PRINCIPAL PROBLEM:  Small bowel obstruction [K56.609] 10/31/2017 Yes    Urinary retention [R33.9] 02/28/2018 Unknown    Anemia [D64.9] 02/01/2018 Yes    BPH without obstruction/lower urinary tract symptoms [N40.0] 07/26/2017 Yes     Chronic    Essential hypertension [I10] 10/20/2016 Yes     Chronic    Chronic pain syndrome [G89.4] 03/14/2016  Yes     Chronic    Myelodysplastic syndrome [D46.9] 12/23/2014 Yes    Chronic diarrhea [K52.9] 11/18/2013 Yes     Chronic    Large cell (diffuse) non-Hodgkin's lymphoma [C83.30] 08/06/2013 Yes      Problems Resolved During this Admission:    Diagnosis Date Noted Date Resolved POA      Discharged Condition: good    Disposition: Home or Self Care    Follow Up:  Follow-up Information     Ye Lucia MD On 3/6/2018.    Specialties:  Hematology and Oncology, Hematology  Why:  follow up- 8:30am  Contact information:  [address]  [facility] [address]  [phone]                 Patient Instructions:     Ambulatory referral to Outpatient Case Management   Referral Priority: Routine Referral Type: Consultation   Referral Reason: Specialty Services Required    Number of Visits Requested: 1        Medications:  Reconciled Home Medications:   Discharge Medication List as of 2/28/2018  4:40 PM      CONTINUE these medications which have NOT CHANGED    Details   acetaminophen (TYLENOL) 500 MG tablet Take 500 mg by mouth every 6 (six) hours as needed for Pain., Historical Med      ascorbic acid, vitamin C, (VITAMIN C) 1000 MG tablet Take 1,000 mg by mouth once daily. liposomal, Historical Med      cyanocobalamin (VITAMIN B-12) 1000 MCG tablet Take 100 mcg by mouth once daily., Until Discontinued, Historical Med      dicyclomine (BENTYL) 10 MG capsule Take 1 capsule (10 mg total) by mouth 3 (three) times daily. For abdominal spasm, Starting Wed 2/21/2018, Until Thu 2/21/2019, Normal      enalapril (VASOTEC) 5 MG tablet Take 0.5 tablets (2.5 mg total) by mouth once daily., Starting Thu 1/25/2018, Normal      esomeprazole (NEXIUM) 20 MG capsule Take 20 mg by mouth before breakfast., Until Discontinued, Historical Med      finasteride (PROSCAR) 5 mg tablet Take 1 tablet (5 mg total) by mouth once daily., Starting Mon 2/26/2018, Until Wed 3/28/2018, Normal      flurazepam (DALMANE) 30 MG capsule Take 1 capsule (30 mg  total) by mouth nightly as needed for Insomnia., Starting Wed 1/24/2018, Normal      fluticasone (FLONASE) 50 mcg/actuation nasal spray SPRAY ONCE IN EACH NOSTRIL ONCE DAILY, Normal      furosemide (LASIX) 40 MG tablet One daily as needed for edema, Normal      hydrocodone-acetaminophen 5-325mg (NORCO) 5-325 mg per tablet Take 1 tablet by mouth every 8 (eight) hours as needed for Pain., Starting Wed 2/21/2018, Normal      lactobacillus rhamnosus GG (CULTURELLE) 10 billion cell capsule Take 1 capsule by mouth once daily., Until Discontinued, Historical Med      lidocaine-prilocaine (EMLA) cream Starting 8/5/2014, Until Discontinued, Historical Med      metoprolol tartrate (LOPRESSOR) 50 MG tablet Take 0.5 tablets (25 mg total) by mouth 2 (two) times daily., Starting Wed 10/4/2017, Normal      mirtazapine (REMERON) 15 MG tablet Take 1 tablet (15 mg total) by mouth every evening., Starting 5/12/2017, Until Discontinued, Normal      multivitamin capsule Take 1 capsule by mouth once daily., Until Discontinued, Historical Med      NON FORMULARY MEDICATION 300 mg once daily. Desiccated Liver, Until Discontinued, Historical Med      predniSONE (DELTASONE) 20 MG tablet Take 2 tablets (40 mg total) by mouth once daily., Starting Wed 2/21/2018, Until Sat 3/3/2018, Normal      rifAXIMin (XIFAXAN) 550 mg Tab Take 1 tablet (550 mg total) by mouth 3 (three) times daily., Starting Fri 2/16/2018, Until Fri 3/2/2018, Normal      tamsulosin (FLOMAX) 0.4 mg Cp24 Take 1 capsule (0.4 mg total) by mouth once daily., Starting Wed 7/26/2017, Normal      valacyclovir (VALTREX) 500 MG tablet Take 1 tablet (500 mg total) by mouth once daily., Starting Tue 9/5/2017, Normal      water Liqd 150 mL with MILK OF MAGNESIA 400 mg/5 mL Susp 400 mg, diphenhydrAMINE 12.5 mg/5 mL Elix 60 mg, nystatin 100,000 unit/mL Susp 500,000 Units Take 5 mLs by mouth., Historical Med         STOP taking these medications       sodium,potassium,mag sulfates (SUPREP  BOWEL PREP KIT) 17.5-3.13-1.6 gram SolR Comments:   Reason for Stopping:               Sheri Herman MD  Bone Marrow Transplant  [facility]

## 2018-02-28 NOTE — H&P
"[facility]  Hematology/Oncology  BMT  H&P    Patient Name: Royce Francis  MRN: [id number]  Admission Date: 2/27/2018  Code Status: Full Code   Attending Provider: Tracy Loomis MD  Primary Care Physician: Rupert Díaz MD  Principal Problem:Small bowel obstruction    Subjective:     HPI: This is a 72 year old male with pmhx significant for DLBCL currently no longer on chemotherapy who presented to [facility] ED for one day of abdominal pain and nausea and vomiting. Patient reports that in addition to his chronic diarrhea, he had developed abdominal pain yesterday. Morning of presenting to the ED, the patient woke up from severe 20/10, mid abdomen and epigastric pain, described as "crab-pinching', worse with drinking water and better with avoiding food. He later developed two bouts of NBNB vomiting. Compared to his baseline chronic diarrhea, he had not had any bowel movements day of presentation to ED. Patient reports weight loss associated with diarrhea and decreased appetite. Patient denies any fevers or chills, melena, dysuria, SOB, or chest pain.   In ED of OSH, patient noted to have small bowel obstruction on X-ray and CT abdomen and NGT was placed. Patient reports significant improvement of abdominal pain and n/v with NG placement and IV dilaudid received at OSH. Patient was transferred to [facility] for higher level of care.        Oncologic hx from Dr. Hernandez's note:   He is a patient of Dr. 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  He's had chemotherapy on multiple times.  His other issue has been cytopenias and his recent bone marrow examination from February 1, 2018 was consistent with myelodysplastic syndrome, showing dysplastic megakaryocytes and erythroid elements.     He underwent colonoscopy and EGD on February 2 which showed a single benign gastric polyp.  Colonoscopy was unremarkable.     He underwent PET/CT scan for follow-up " on February 19 which showed progressive disease.  Specifically there is increased hypermetabolic lymphadenopathy in the pelvic sidewall and mesentery and multiple new areas of abnormal adenopathy in the abdomen and pelvis.  There is also new retroperitoneal metastasis, right chest wall metastasis and multiple liver lesions.     At the time of his last visit with Dr. Lucia in early February, he was quite weak, largely confined to a chair.  Today he reports that that has really not changed.  He's using a walker at home to get around.  His appetite remained very poor and he attributes some of that to persisting diarrhea which occurs whenever he eats.  He's using Imodium but has to be careful because sometimes it constipates him.    He's lost about 30 pounds over the last month or so.  He continues to have spasmodic abdominal pain which comes and goes and is typically relieved by taking deep breaths.  He also complains of some urinary hesitancy and dribbling and has urology appointment next week.  He has some chronic back pain related to DJD.  He is taking Vicodin for that.  In addition, he continues to sleep poorly in spite of 15 mg Remeron at night.        History:  originally diagnosed in 1996 with B-cell lymphoma, initially follicular and   later diffuse.  He had multiple therapies for that.     In August 2017, he developed abdominal pain, anorexia and increasing diarrhea.    Computed tomography showed enlarged mesenteric lymph nodes and a large mass in   the right lower quadrant, which encased the distal ileum and extended into the   anterior abdominal wall.     He was treated with bendamustine and Rituxan and he received the sixth and last of this on   12/26/2017 and 12/27/2017.  Following that therapy he had resolution of his abdominal pain.     He had a history of cytopenias following previous treatment and in November 2014, during a hospitalization he developed pancytopenia.  A bone marrow   examination  showed dysplastic changes and a cytogenetic study revealed 10 of 20   metaphases with a 20q deletion.  A FISH panel on the marrow material   confirmed the 20q deletion in 33% of cells.  In addition, 19% of cells had four   copies of 7q31.  The results suggested the presence of two abnormal clones, a 7q   clone related to lymphoma and 20q clone indicating an increased risk of   myelodysplastic disease.  Fortunately, after that examination, his blood counts   improved substantially, although he often had mild thrombocytopenia and anemia on follow-up exams.     Oncology Treatment Plan:   Not currently on chemotherapy due to poor ECOG performance.     Medications:  Continuous Infusions:  Scheduled Meds:  PRN Meds:     Review of patient's allergies indicates:   Allergen Reactions    Iodine and iodide containing products Itching     Contrast dye        Past Medical History:   Diagnosis Date    Allergy     Anemia     Arthritis     Basal cell carcinoma     excised from upper back    Cancer     Cardiomyopathy due to chemotherapy     Cataract     CHF (congestive heart failure)     Coronary artery disease     D1    Encounter for blood transfusion     Eye injuries 20 yrs    ou fb several x's    Hyperlipidemia     Hypertension     Hypertensive heart disease with heart failure 1/31/2013    Lymphoma 1996    Obstructive sleep apnea on CPAP     SCC (squamous cell carcinoma) excised 12/2015    R proximal forearm    Sleep apnea     SQUAMOUS CELL CARCINOMA excised 2/14/13    L forearm    Squamous cell carcinoma excised 8/4/2016    IN SITU, Left thigh MOHS    Squamous cell carcinoma excised 8/25/2016    left helix, MOHS     Past Surgical History:   Procedure Laterality Date    CATARACT EXTRACTION      od dr yanes    CATARACT EXTRACTION W/  INTRAOCULAR LENS IMPLANT Left 01/12/2017    Dr. Yanes    COLONOSCOPY N/A 2/2/2018    Procedure: COLONOSCOPY;  Surgeon: Johs Boyd MD;  Location: [facility] ([address] [place] [address]);   Service: Endoscopy;  Laterality: N/A;    EYE SURGERY      Rt cataract    KIDNEY STONE SURGERY      SMALL INTESTINE SURGERY  09/2013    TONSILLECTOMY       Family History     Problem Relation (Age of Onset)    Cancer Father    Cataracts Mother, Father    Hypertension Mother, Father, Maternal Grandmother, Maternal Grandfather, Paternal Grandmother, Paternal Grandfather    No Known Problems Sister, Brother, Maternal Aunt, Maternal Uncle, Paternal Aunt, Paternal Uncle        Social History Main Topics    Smoking status: Former Smoker     Packs/day: 1.50     Years: 1.00     Quit date: 1960    Smokeless tobacco: Never Used    Alcohol use 1.2 oz/week     1 Glasses of wine, 1 Shots of liquor per week      Comment: socially    Drug use: No    Sexual activity: Yes     Partners: Female      Comment:        Review of Systems  Objective:     Vital Signs (Most Recent):  Temp: 98.6 °F (37 °C) (02/27/18 2252)  Pulse: 79 (02/27/18 2252)  Resp: 16 (02/27/18 2252)  BP: (!) 153/72 (02/27/18 2252)  SpO2: (!) 94 % (02/27/18 2252) Vital Signs (24h Range):  Temp:  [98.4 °F (36.9 °C)-98.7 °F (37.1 °C)] 98.6 °F (37 °C)  Pulse:  [] 79  Resp:  [16-18] 16  SpO2:  [94 %-98 %] 94 %  BP: (124-153)/(50-73) 153/72     Weight: 72.8 kg (160 lb 7.9 oz)  Body mass index is 23.03 kg/m².  Body surface area is 1.9 meters squared.    No intake or output data in the 24 hours ending 02/27/18 2312    Physical Exam  General: thin chronically-ill and hard of hearing male, NG in place,  appears to be in NAD.   Eyes: conjunctivae/corneas clear. PERRL. EOMI.  HENT: dry mucous membranes, AT NC.   Neck: supple, symmetrical, trachea midline, no JVD.  Cardiovascular: Heart: regular rate and rhythm, S1, S2 normal, no murmur, click, rub or gallop.  Lungs: clear to auscultation bilaterally and normal respiratory effort  Chest Wall: no tenderness  Abdomen/Rectal: Abdomen: scar of previous surgery, mildly distended. Hyperactive BS. No masses. No  rebound or guarding.  Rectal: not examined  Extremities: no edema, no redness or tenderness in the calves or thighs. Pulses: 2+ and symmetric  Skin: Skin color, texture, turgor normal. No rashes or lesions  Musculoskeletal: full range of motion of joints  Lymph Nodes: No cervical or supraclavicular adenopathy  Psych/Behavioral: Normal. Alert and oriented, appropriate affect.    Significant Labs:   Recent Results (from the past 24 hour(s))   POCT CMP    Collection Time: 02/27/18  2:07 PM   Result Value Ref Range    Albumin, POC 2.8 (L) 3.3 - 5.5 g/dL    Alkaline Phosphatase, POC 78 42 - 141 U/L    ALT (SGPT), POC 16 10 - 47 U/L    AST (SGOT), POC 26 11 - 38 U/L    POC BUN 7 7 - 22 mg/dL    Calcium, POC 9.8 8.0 - 10.3 mg/dL    POC Chloride 98 98 - 108 mmol/L    POC Creatinine 0.9 0.6 - 1.2 mg/dL    POC Glucose 97 73 - 118 mg/dL    POC Potassium 3.1 (L) 3.6 - 5.1 mmol/L    POC Sodium 143 128 - 145 mmol/L    Bilirubin 0.5 0.2 - 1.6 mg/dL    POC TCO2 27 18 - 33 mmol/L    Protein 5.2 (L) 6.4 - 8.1 g/dL   ISTAT PROCEDURE    Collection Time: 02/27/18  2:11 PM   Result Value Ref Range    POC PTWBT 13.2 9.7 - 14.3 sec    POC PTINR 1.1 0.9 - 1.2    Sample UNK    POCT Liver Panel    Collection Time: 02/27/18  2:11 PM   Result Value Ref Range    Albumin, POC 3.0 (L) 3.3 - 5.5 g/dL    Alkaline Phosphatase, POC 71 42 - 141 U/L    ALT (SGPT), POC 12 10 - 47 U/L    Amylase, POC 31 14 - 97 U/L    AST (SGOT), POC 26 11 - 38 U/L    POC GGT 93 (H) 5 - 65 U/L    Bilirubin 0.5 0.2 - 1.6 mg/dL    Protein 5.2 (L) 6.4 - 8.1 g/dL   Troponin ISTAT    Collection Time: 02/27/18  2:12 PM   Result Value Ref Range    POC Cardiac Troponin I 0.00 <0.09 ng/mL    Sample ZAHEER    ISTAT PROCEDURE    Collection Time: 02/27/18  2:13 PM   Result Value Ref Range    POC PH 7.484 (H) 7.35 - 7.45    POC PCO2 37.7 35 - 45 mmHg    POC PO2 24 (LL) 40 - 60 mmHg    POC HCO3 28.3 (H) 24 - 28 mmol/L    POC BE 5 -2 to 2 mmol/L    POC SATURATED O2 47 (L) 95 - 100 %    POC  Lactate 1.57 0.5 - 2.2 mmol/L    POC TCO2 29 24 - 29 mmol/L    Sample VENOUS    POCT B-type natriuretic peptide (BNP)    Collection Time: 02/27/18  2:19 PM   Result Value Ref Range    POC B-Type Natriuretic Peptide 88.3 0.0 - 100.0 pg/mL   POCT URINALYSIS W/O SCOPE    Collection Time: 02/27/18  5:44 PM   Result Value Ref Range    Glucose, UA Negative (NG)     Bilirubin, UA Negative (NG)     Ketones, UA Negative (NG)     Spec Grav UA 1.010     Blood, UA Negative (NG)     PH, UA 5.5     Protein, UA Negative (NG)     Urobilinogen, UA 0.2 E.U./dL    Nitrite, UA Negative (NG)     Leukocytes, UA Negative (NG)     Color, UA Yellow     Clarity, UA Clear    CBC auto differential    Collection Time: 02/27/18 11:26 PM   Result Value Ref Range    WBC 2.42 (L) 3.90 - 12.70 K/uL    RBC 3.52 (L) 4.60 - 6.20 M/uL    Hemoglobin 9.2 (L) 14.0 - 18.0 g/dL    Hematocrit 29.8 (L) 40.0 - 54.0 %    MCV 85 82 - 98 fL    MCH 26.1 (L) 27.0 - 31.0 pg    MCHC 30.9 (L) 32.0 - 36.0 g/dL    RDW 18.4 (H) 11.5 - 14.5 %    Platelets 126 (L) 150 - 350 K/uL    MPV 8.8 (L) 9.2 - 12.9 fL    Immature Granulocytes CANCELED 0.0 - 0.5 %    Immature Grans (Abs) Test Not Performed 0.00 - 0.04 K/uL    Lymph # Test Not Performed 1.0 - 4.8 K/uL    Mono # Test Not Performed 0.3 - 1.0 K/uL    Eos # Test Not Performed 0.0 - 0.5 K/uL    Baso # Test Not Performed 0.00 - 0.20 K/uL    nRBC 0 0 /100 WBC    Gran% 62.5 38.0 - 73.0 %    Lymph% 20.9 18.0 - 48.0 %    Mono% 8.3 4.0 - 15.0 %    Eosinophil% 0.0 0.0 - 8.0 %    Basophil% 0.0 0.0 - 1.9 %    Bands 8.3 %    Platelet Estimate Decreased (A)     Aniso Slight     Poik Moderate     Poly Occasional     Hypo Occasional     Ovalocytes Occasional     Tear Drop Cells Occasional     Differential Method Manual          Diagnostic Results:    CT abdomen 2/27/2018:  Small bowel obstruction due to a short segment of significant circumferential irregular bowel wall thickening where there is a bowel anastomosis, concerning for  neoplastic recurrence located in the midabdomen region.  The more distal bowel is nondistended.  Numerous mesenteric/omental metastatic implants.  Small lesions of the liver concerning for metastatic disease.  Pleural mass right lower anterior thoracic region concerning for metastatic implant.  Nonobstructive right renal stones.    RECIST SUMMARY: Date of prior examination for comparison 10/31/17    Lesion 1: Right pleural space, 4.1 cm, Series 2 Image 5, new from the prior study.  Lesion 2: Segment 2, 1.7 cm, Series 2 Image 11, not seen on the prior study.  Lesion 3: Segment 6, 1.9 cm , Series 2 Image 19, not seen on the prior study.  Lesion 4: Omentum , 6.1 cm, Series 2 Image 40, not seen on the prior study.          Assessment/Plan:     * Small bowel obstruction    Likely due to tumor recurrence and burden as evident on CT abdomen at site of previous SB resection.   Gut rest and IVF. Continue NG to suction.   NPO  Pain control.  Starting maintenance IVF while NPO.  Continue prednisone 40mg daily.         Urinary retention    Cintron catheter in place        Anemia    Stable  Daily CBC's.        BPH without obstruction/lower urinary tract symptoms    Had cintron placed today at OSH ED due to urinary retention  Continue cintron   Continue flomax and proscar.  Patient follows with Dr. Arauz.        Essential hypertension    Resume home lopressor.          Chronic pain syndrome    norco for moderate pain and meperidine for severe pain.        Chronic diarrhea    Continue loperamide prn for diarrhea.        Large cell (diffuse) non-Hodgkin's lymphoma    Follows with DR. Lucia.  Currently not on chemotherapy due to poor ECOG score.  Per chart review, discussions were to be held with family about transitioning to hospice.   Continue prednisone 40mg od.            Diet: NPO  dvt ppx: lovenox  Code: Full. Not to be continued on life support if illness is deemed futile per patient's living will.     Jhon Hernandes,  MD  Hematology/Oncology  [facility]

## 2018-02-28 NOTE — HOSPITAL COURSE
Following NG tube decompression patient's symptoms resolved. Abdominal Xray did not demonstrate findings concerning for persistent SBO. Coupled with clinical findings of multiple loose watery bowel movements consistent with his chronic diarrhea, it was felt his bowel obstruction had resolved. His diet was advanced and he tolerated PO intake without complication or ill effect. Discharged home with close follow up with his Oncologist Dr. Lucia to discuss treatment options and establish long term plan of care. He is also scheduled to follow up with Gastroenterology Jose Manuel in April for continued evaluation and treatment of his chronic diarrhea.

## 2018-02-28 NOTE — HPI
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 Patient reports that in addition to his chronic diarrhea, he had developed abdominal pain yesterday. Morning of presenting to the ED, the patient woke up from severe 20/10, mid abdomen and epigastric pain, described as "crab-pinching', worse with drinking water and better with avoiding food. He later developed two bouts of NBNB vomiting. Compared to his baseline chronic diarrhea, he had not had any bowel movements day of presentation to ED. Patient reports weight loss associated with diarrhea and decreased appetite. Patient denies any fevers or chills, melena, dysuria, SOB, or chest pain.   In ED of OSH, patient noted to have small bowel obstruction on X-ray and CT abdomen and NGT was placed. Patient reports significant improvement of abdominal pain and n/v with NG placement and IV dilaudid received at OSH. Patient was transferred to [facility] for higher level of care.           Oncologic hx from Dr. Hernandez's note:   He is a patient of Dr. 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  He's had chemotherapy on multiple times.  His other issue has been cytopenias and his recent bone marrow examination from February 1, 2018 was consistent with myelodysplastic syndrome, showing dysplastic megakaryocytes and erythroid elements.     He underwent colonoscopy and EGD on February 2 which showed a single benign gastric polyp.  Colonoscopy was unremarkable.     He underwent PET/CT scan for follow-up on February 19 which showed progressive disease.  Specifically there is increased hypermetabolic lymphadenopathy in the pelvic sidewall and mesentery and multiple new areas of abnormal adenopathy in the abdomen and pelvis.  There is also new retroperitoneal metastasis, right chest wall metastasis and multiple " liver lesions.     At the time of his last visit with Dr. Lucia in early February, he was quite weak, largely confined to a chair.  Today he reports that that has really not changed.  He's using a walker at home to get around.  His appetite remained very poor and he attributes some of that to persisting diarrhea which occurs whenever he eats.  He's using Imodium but has to be careful because sometimes it constipates him.    He's lost about 30 pounds over the last month or so.  He continues to have spasmodic abdominal pain which comes and goes and is typically relieved by taking deep breaths.  He also complains of some urinary hesitancy and dribbling and has urology appointment next week.  He has some chronic back pain related to DJD.  He is taking Vicodin for that.  In addition, he continues to sleep poorly in spite of 15 mg Remeron at night.        History:  originally diagnosed in 1996 with B-cell lymphoma, initially follicular and   later diffuse.  He had multiple therapies for that.     In August 2017, he developed abdominal pain, anorexia and increasing diarrhea.    Computed tomography showed enlarged mesenteric lymph nodes and a large mass in   the right lower quadrant, which encased the distal ileum and extended into the   anterior abdominal wall.     He was treated with bendamustine and Rituxan and he received the sixth and last of this on   12/26/2017 and 12/27/2017.  Following that therapy he had resolution of his abdominal pain.     He had a history of cytopenias following previous treatment and in November 2014, during a hospitalization he developed pancytopenia.  A bone marrow   examination showed dysplastic changes and a cytogenetic study revealed 10 of 20   metaphases with a 20q deletion.  A FISH panel on the marrow material   confirmed the 20q deletion in 33% of cells.  In addition, 19% of cells had four   copies of 7q31.  The results suggested the presence of two abnormal clones, a 7q   clone  related to lymphoma and 20q clone indicating an increased risk of   myelodysplastic disease.  Fortunately, after that examination, his blood counts   improved substantially, although he often had mild thrombocytopenia and anemia on follow-up exams.

## 2018-02-28 NOTE — PLAN OF CARE
MDR's with Dr Loomis.  Patient transferred from [facility] for a SBO.  NGT placed to wall suction.  Patient reports improvement of symptoms and a BM this am.  Plan to repeat a CT today.  If resolved, d/c NG and advance diet.  Possible d/c home this evening vs tomorrow if tolerating diet.  The patient's wife is at bedside and both agree with the plan of care.  No d/c needs anticipated.  F/u planned for 3/6.      Future Appointments  Date Time Provider Department Center   3/6/2018 8:30 AM Ye Lucia Jr., MD [facility] HEM ONC [facility]   4/16/2018 9:15 AM Fe Collier MD [facility] DERM [facility]   4/27/2018 4:30 PM Nino Richard MD [facility] GASTRO [facility]

## 2018-02-28 NOTE — NURSING
Pt discharged per BMT team at this time.  Vicky Pearce RN, removed pt's NGT and pt tolerated well.  Kavita Goyal RN, removed cintron catheter (10 mL of sterile water removed from balloon) and pt tolerated well.     O2- Pt on room air with oxygen saturation 95%.  Activity-Pt ambulates independently.  Devices- Pt not being sent home on any devices.   Tolerating-Pt tolerating PO diet and medication.  Elimination-Pt voiding and having bowel movements independently.  Self Care- Pt able to perform personal hygiene independently.  Teaching- Pt instructed on when to take home meds.     Pt's left wrist 20 gauge peripheral IV removed.  Cath tip intact.  Pt tolerated well.  Dressed site with gauze and secured with Coban.  Discharge instructions and AVS explained and given to pt and family.  All questions answered. Pt and family verbalized understanding.  Pt awaiting transport at this time.

## 2018-02-28 NOTE — ED NOTES
"Pt reached in throat to "adjust" the NG tube and displaced it, no resp issues- tube removed and replaced with fresh tube- noted gastric contents in tube, placed back on suction  "

## 2018-02-28 NOTE — HPI
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 Patient reports that in addition to his chronic diarrhea, he had developed abdominal pain yesterday. Morning of presenting to the ED, the patient woke up from severe 20/10, mid abdomen and epigastric pain, described as "crab-pinching', worse with drinking water and better with avoiding food. He later developed two bouts of NBNB vomiting. Compared to his baseline chronic diarrhea, he had not had any bowel movements day of presentation to ED. Patient reports weight loss associated with diarrhea and decreased appetite. Patient denies any fevers or chills, melena, dysuria, SOB, or chest pain.   In ED of OSH, patient noted to have small bowel obstruction on X-ray and CT abdomen and NGT was placed. Patient reports significant improvement of abdominal pain and n/v with NG placement and IV dilaudid received at OSH. Patient was transferred to [facility] for higher level of care.        Oncologic hx from Dr. Hernandez's note:   He is a patient of Dr. 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  He's had chemotherapy on multiple times.  His other issue has been cytopenias and his recent bone marrow examination from February 1, 2018 was consistent with myelodysplastic syndrome, showing dysplastic megakaryocytes and erythroid elements.     He underwent colonoscopy and EGD on February 2 which showed a single benign gastric polyp.  Colonoscopy was unremarkable.     He underwent PET/CT scan for follow-up on February 19 which showed progressive disease.  Specifically there is increased hypermetabolic lymphadenopathy in the pelvic sidewall and mesentery and multiple new areas of abnormal adenopathy in the abdomen and pelvis.  There is also new retroperitoneal metastasis, right chest wall metastasis and multiple liver " lesions.     At the time of his last visit with Dr. Lucia in early February, he was quite weak, largely confined to a chair.  Today he reports that that has really not changed.  He's using a walker at home to get around.  His appetite remained very poor and he attributes some of that to persisting diarrhea which occurs whenever he eats.  He's using Imodium but has to be careful because sometimes it constipates him.    He's lost about 30 pounds over the last month or so.  He continues to have spasmodic abdominal pain which comes and goes and is typically relieved by taking deep breaths.  He also complains of some urinary hesitancy and dribbling and has urology appointment next week.  He has some chronic back pain related to DJD.  He is taking Vicodin for that.  In addition, he continues to sleep poorly in spite of 15 mg Remeron at night.        History:  originally diagnosed in 1996 with B-cell lymphoma, initially follicular and   later diffuse.  He had multiple therapies for that.     In August 2017, he developed abdominal pain, anorexia and increasing diarrhea.    Computed tomography showed enlarged mesenteric lymph nodes and a large mass in   the right lower quadrant, which encased the distal ileum and extended into the   anterior abdominal wall.     He was treated with bendamustine and Rituxan and he received the sixth and last of this on   12/26/2017 and 12/27/2017.  Following that therapy he had resolution of his abdominal pain.     He had a history of cytopenias following previous treatment and in November 2014, during a hospitalization he developed pancytopenia.  A bone marrow   examination showed dysplastic changes and a cytogenetic study revealed 10 of 20   metaphases with a 20q deletion.  A FISH panel on the marrow material   confirmed the 20q deletion in 33% of cells.  In addition, 19% of cells had four   copies of 7q31.  The results suggested the presence of two abnormal clones, a 7q   clone related to  lymphoma and 20q clone indicating an increased risk of   myelodysplastic disease.  Fortunately, after that examination, his blood counts   improved substantially, although he often had mild thrombocytopenia and anemia on follow-up exams.

## 2018-02-28 NOTE — PROGRESS NOTES
02/28/18 0338   Vital Signs   Temp 96.7 °F (35.9 °C)   Temp src Axillary   Pulse (!) 58   Heart Rate Source Monitor   Resp 14   SpO2 97 %   O2 Device (Oxygen Therapy) room air   BP (!) 152/70   MAP (mmHg) 101   BP Location Right arm   BP Method Automatic   Patient Position Lying   Dr. Hernandes notified of patient's heart rate being bradycardic while patient is sleeping.

## 2018-02-28 NOTE — SUBJECTIVE & OBJECTIVE
Oncology Treatment Plan:   Not currently on chemotherapy due to poor ECOG performance.     Medications:  Continuous Infusions:  Scheduled Meds:  PRN Meds:     Review of patient's allergies indicates:   Allergen Reactions    Iodine and iodide containing products Itching     Contrast dye        Past Medical History:   Diagnosis Date    Allergy     Anemia     Arthritis     Basal cell carcinoma     excised from upper back    Cancer     Cardiomyopathy due to chemotherapy     Cataract     CHF (congestive heart failure)     Coronary artery disease     D1    Encounter for blood transfusion     Eye injuries 20 yrs    ou fb several x's    Hyperlipidemia     Hypertension     Hypertensive heart disease with heart failure 1/31/2013    Lymphoma 1996    Obstructive sleep apnea on CPAP     SCC (squamous cell carcinoma) excised 12/2015    R proximal forearm    Sleep apnea     SQUAMOUS CELL CARCINOMA excised 2/14/13    L forearm    Squamous cell carcinoma excised 8/4/2016    IN SITU, Left thigh MOHS    Squamous cell carcinoma excised 8/25/2016    left helix, MOHS     Past Surgical History:   Procedure Laterality Date    CATARACT EXTRACTION      od dr yanes    CATARACT EXTRACTION W/  INTRAOCULAR LENS IMPLANT Left 01/12/2017    Dr. Yanes    COLONOSCOPY N/A 2/2/2018    Procedure: COLONOSCOPY;  Surgeon: Josh Boyd MD;  Location: [facility] ([address] [place] [address]);  Service: Endoscopy;  Laterality: N/A;    EYE SURGERY      Rt cataract    KIDNEY STONE SURGERY      SMALL INTESTINE SURGERY  09/2013    TONSILLECTOMY       Family History     Problem Relation (Age of Onset)    Cancer Father    Cataracts Mother, Father    Hypertension Mother, Father, Maternal Grandmother, Maternal Grandfather, Paternal Grandmother, Paternal Grandfather    No Known Problems Sister, Brother, Maternal Aunt, Maternal Uncle, Paternal Aunt, Paternal Uncle        Social History Main Topics    Smoking status: Former Smoker     Packs/day: 1.50      Years: 1.00     Quit date: 1960    Smokeless tobacco: Never Used    Alcohol use 1.2 oz/week     1 Glasses of wine, 1 Shots of liquor per week      Comment: socially    Drug use: No    Sexual activity: Yes     Partners: Female      Comment:        Review of Systems  Objective:     Vital Signs (Most Recent):  Temp: 98.6 °F (37 °C) (02/27/18 2252)  Pulse: 79 (02/27/18 2252)  Resp: 16 (02/27/18 2252)  BP: (!) 153/72 (02/27/18 2252)  SpO2: (!) 94 % (02/27/18 2252) Vital Signs (24h Range):  Temp:  [98.4 °F (36.9 °C)-98.7 °F (37.1 °C)] 98.6 °F (37 °C)  Pulse:  [] 79  Resp:  [16-18] 16  SpO2:  [94 %-98 %] 94 %  BP: (124-153)/(50-73) 153/72     Weight: 72.8 kg (160 lb 7.9 oz)  Body mass index is 23.03 kg/m².  Body surface area is 1.9 meters squared.    No intake or output data in the 24 hours ending 02/27/18 2314    Physical Exam  General: thin chronically-ill and hard of hearing male, NG in place,  appears to be in NAD.   Eyes: conjunctivae/corneas clear. PERRL. EOMI.  HENT: dry mucous membranes, AT NC.   Neck: supple, symmetrical, trachea midline, no JVD.  Cardiovascular: Heart: regular rate and rhythm, S1, S2 normal, no murmur, click, rub or gallop.  Lungs: clear to auscultation bilaterally and normal respiratory effort  Chest Wall: no tenderness  Abdomen/Rectal: Abdomen: scar of previous surgery, mildly distended. Hyperactive BS. No masses. No rebound or guarding.  Rectal: not examined  Extremities: no edema, no redness or tenderness in the calves or thighs. Pulses: 2+ and symmetric  Skin: Skin color, texture, turgor normal. No rashes or lesions  Musculoskeletal: full range of motion of joints  Lymph Nodes: No cervical or supraclavicular adenopathy  Psych/Behavioral: Normal. Alert and oriented, appropriate affect.    Significant Labs:   Recent Results (from the past 24 hour(s))   POCT CMP    Collection Time: 02/27/18  2:07 PM   Result Value Ref Range    Albumin, POC 2.8 (L) 3.3 - 5.5 g/dL    Alkaline  Phosphatase, POC 78 42 - 141 U/L    ALT (SGPT), POC 16 10 - 47 U/L    AST (SGOT), POC 26 11 - 38 U/L    POC BUN 7 7 - 22 mg/dL    Calcium, POC 9.8 8.0 - 10.3 mg/dL    POC Chloride 98 98 - 108 mmol/L    POC Creatinine 0.9 0.6 - 1.2 mg/dL    POC Glucose 97 73 - 118 mg/dL    POC Potassium 3.1 (L) 3.6 - 5.1 mmol/L    POC Sodium 143 128 - 145 mmol/L    Bilirubin 0.5 0.2 - 1.6 mg/dL    POC TCO2 27 18 - 33 mmol/L    Protein 5.2 (L) 6.4 - 8.1 g/dL   ISTAT PROCEDURE    Collection Time: 02/27/18  2:11 PM   Result Value Ref Range    POC PTWBT 13.2 9.7 - 14.3 sec    POC PTINR 1.1 0.9 - 1.2    Sample UNK    POCT Liver Panel    Collection Time: 02/27/18  2:11 PM   Result Value Ref Range    Albumin, POC 3.0 (L) 3.3 - 5.5 g/dL    Alkaline Phosphatase, POC 71 42 - 141 U/L    ALT (SGPT), POC 12 10 - 47 U/L    Amylase, POC 31 14 - 97 U/L    AST (SGOT), POC 26 11 - 38 U/L    POC GGT 93 (H) 5 - 65 U/L    Bilirubin 0.5 0.2 - 1.6 mg/dL    Protein 5.2 (L) 6.4 - 8.1 g/dL   Troponin ISTAT    Collection Time: 02/27/18  2:12 PM   Result Value Ref Range    POC Cardiac Troponin I 0.00 <0.09 ng/mL    Sample ZAHEER    ISTAT PROCEDURE    Collection Time: 02/27/18  2:13 PM   Result Value Ref Range    POC PH 7.484 (H) 7.35 - 7.45    POC PCO2 37.7 35 - 45 mmHg    POC PO2 24 (LL) 40 - 60 mmHg    POC HCO3 28.3 (H) 24 - 28 mmol/L    POC BE 5 -2 to 2 mmol/L    POC SATURATED O2 47 (L) 95 - 100 %    POC Lactate 1.57 0.5 - 2.2 mmol/L    POC TCO2 29 24 - 29 mmol/L    Sample VENOUS    POCT B-type natriuretic peptide (BNP)    Collection Time: 02/27/18  2:19 PM   Result Value Ref Range    POC B-Type Natriuretic Peptide 88.3 0.0 - 100.0 pg/mL   POCT URINALYSIS W/O SCOPE    Collection Time: 02/27/18  5:44 PM   Result Value Ref Range    Glucose, UA Negative (NG)     Bilirubin, UA Negative (NG)     Ketones, UA Negative (NG)     Spec Grav UA 1.010     Blood, UA Negative (NG)     PH, UA 5.5     Protein, UA Negative (NG)     Urobilinogen, UA 0.2 E.U./dL    Nitrite, UA  Negative (NG)     Leukocytes, UA Negative (NG)     Color, UA Yellow     Clarity, UA Clear    CBC auto differential    Collection Time: 02/27/18 11:26 PM   Result Value Ref Range    WBC 2.42 (L) 3.90 - 12.70 K/uL    RBC 3.52 (L) 4.60 - 6.20 M/uL    Hemoglobin 9.2 (L) 14.0 - 18.0 g/dL    Hematocrit 29.8 (L) 40.0 - 54.0 %    MCV 85 82 - 98 fL    MCH 26.1 (L) 27.0 - 31.0 pg    MCHC 30.9 (L) 32.0 - 36.0 g/dL    RDW 18.4 (H) 11.5 - 14.5 %    Platelets 126 (L) 150 - 350 K/uL    MPV 8.8 (L) 9.2 - 12.9 fL    Immature Granulocytes CANCELED 0.0 - 0.5 %    Immature Grans (Abs) Test Not Performed 0.00 - 0.04 K/uL    Lymph # Test Not Performed 1.0 - 4.8 K/uL    Mono # Test Not Performed 0.3 - 1.0 K/uL    Eos # Test Not Performed 0.0 - 0.5 K/uL    Baso # Test Not Performed 0.00 - 0.20 K/uL    nRBC 0 0 /100 WBC    Gran% 62.5 38.0 - 73.0 %    Lymph% 20.9 18.0 - 48.0 %    Mono% 8.3 4.0 - 15.0 %    Eosinophil% 0.0 0.0 - 8.0 %    Basophil% 0.0 0.0 - 1.9 %    Bands 8.3 %    Platelet Estimate Decreased (A)     Aniso Slight     Poik Moderate     Poly Occasional     Hypo Occasional     Ovalocytes Occasional     Tear Drop Cells Occasional     Differential Method Manual          Diagnostic Results:    CT abdomen 2/27/2018:  Small bowel obstruction due to a short segment of significant circumferential irregular bowel wall thickening where there is a bowel anastomosis, concerning for neoplastic recurrence located in the midabdomen region.  The more distal bowel is nondistended.  Numerous mesenteric/omental metastatic implants.  Small lesions of the liver concerning for metastatic disease.  Pleural mass right lower anterior thoracic region concerning for metastatic implant.  Nonobstructive right renal stones.    RECIST SUMMARY: Date of prior examination for comparison 10/31/17    Lesion 1: Right pleural space, 4.1 cm, Series 2 Image 5, new from the prior study.  Lesion 2: Segment 2, 1.7 cm, Series 2 Image 11, not seen on the prior  study.  Lesion 3: Segment 6, 1.9 cm , Series 2 Image 19, not seen on the prior study.  Lesion 4: Omentum , 6.1 cm, Series 2 Image 40, not seen on the prior study.

## 2018-02-28 NOTE — PLAN OF CARE
Problem: Patient Care Overview  Goal: Plan of Care Review  Outcome: Ongoing (interventions implemented as appropriate)  Patient transferred from outside hospital by ambulance. Wife at bedside. Patient's vital signs are stable and patient is AAOx4. Fall precautions initiated. Patient arrive with cintron catheter for retention, PIV, and NG tube from outside hospital. NG tube verified by x-ray in records and confirmed by PH sample. NG tube placed to low intermittent suction. Pain managed with PRN medication. Skin is clean, dry, and intact. IVF initiated. Patient stable, will continue to monitor.

## 2018-02-28 NOTE — ASSESSMENT & PLAN NOTE
Had cintron placed today at OSH ED due to urinary retention  Continue cintron   Continue flomax and proscar.  Patient follows with Dr. Arauz.

## 2018-02-28 NOTE — ASSESSMENT & PLAN NOTE
Likely due to tumor recurrence as evident on CT abdomen at site of previous SB resection.   Gut rest and IVF. Continue NG to suction.   NPO  Pain control.  Starting maintenance IVF while NPO.

## 2018-02-28 NOTE — PROGRESS NOTES
Admit Assessment    Patient Identification  Royce Francis   :  1945  Admit Date:  2018  Attending Provider:  Tracy Loomis MD              Referral:   Pt has a diagnosis of Diffuse Large B Cell Lymphoma , and was admitted this hospital stay due to Small bowel obstruction [K56.609]  Small bowel obstruction [K56.609].  Oncology social worker is involved for psychosocial services.  Patient presents as a 72 y.o. year old  male.    Persons interviewed: Patient, Wife and Daughter    Living Situation:     Lives with his wife, Selma Francis at [address] [place] [address] , phone: [phone] (home); [phone] (her ph)         Current or Past Agencies and Description of Services/Supplies    DME  Agency Name: [facility]  (Wheelchair and rollator)       Home Health  None    IV Infusion  None    Nutrition: Oral    Outpatient Pharmacy:     [facility] [id number] [place] [address] -  [place] [facility]   [facility]  [place] [address]  Phone: [phone] Fax: [phone]      Patient Preference of agencies include: As stated above    Patient/Caregiver informed of right to choose providers or agencies.  Patient provides permission to release any necessary information to [facility] and to [facility] agencies as needed to facilitate patient care, treatment planning, and patient discharge planning.  Written and verbal resources provided.              Adjustment to Diagnosis and Treatment  Appropriate - the patient lives with his wife and they have very good support from their only daughter living on the [place]. The patient's wife stated that they have been coping well with his care at home and that he was mostly independent in all his ADL's. (The hospice program was discussed with the patient's wife on 18, but she declined it at that point)            History/Current Symptoms of Anxiety/Depression: No  History/Current Substance Use:   Social History      Social History Main Topics    Smoking status: Former Smoker     Packs/day: 1.50     Years: 1.00     Quit date:     Smokeless tobacco: Never Used    Alcohol use 1.2 oz/week     1 Glasses of wine, 1 Shots of liquor per week      Comment: socially    Drug use: No    Sexual activity: Yes     Partners: Female      Comment:        Indications of Abuse/Neglect: No      Financial:  Payor/Plan Subscr  Sex Relation Sub. Ins. ID Effective Group Num   1. [employer] MANAGE* SUBHA QUINTANILLA* 1945 Male  [id number]                                   [address] [address]                            Other identified concerns/needs: None at present    Plan:    Interventions/Referrals: Offered services to the patient and his family and provided them with contact information. Will follow as needed for supportive counseling and discharge arrangements.   Patient/caregiver engaged in treatment planning process.     providing psychosocial and supportive counseling, resources, education, assistance and discharge planning as appropriate.  Patient/caregiver state understanding of  available resources,  following, remains available.

## 2018-02-28 NOTE — ASSESSMENT & PLAN NOTE
Follows with DR. Lucia.  Currently not on chemotherapy due to poor ECOG score.  Discussions to be made with family about transitioning to hospice.   Continue prednisone 40mg od.

## 2018-03-01 ENCOUNTER — OUTPATIENT CASE MANAGEMENT (OUTPATIENT)
Dept: ADMINISTRATIVE | Facility: OTHER | Age: 73
End: 2018-03-01

## 2018-03-01 ENCOUNTER — TELEPHONE (OUTPATIENT)
Dept: HEMATOLOGY/ONCOLOGY | Facility: CLINIC | Age: 73
End: 2018-03-01

## 2018-03-01 NOTE — PROGRESS NOTES
Thank you for the referral. The following patient has been assigned to Kristie Gr RN with Outpatient Complex Care Management for high risk screening.    Reason for referral: Large cell (diffuse) non-Hodgkin's lymphoma    Please contact [facility] at [phone] with any questions.    Thank you,    Teagan Hughes, [facility]  Outpatient Complex Care Mgmt  Ext [phone]

## 2018-03-01 NOTE — TELEPHONE ENCOUNTER
RD returned pt's wife's phone call. Wife states pt has chronic diarrhea. Per wife, pt doesn't want to eat because of the diarrhea. He is feeling weak because he isn't eating. Pt has history of small intestine resections. Pt currently on antibiotic. Encouraged Imodium. Encouraged Pedialyte as oral rehydration solution. Wife states he has some at home. Reviewed foods that are recommended with diarrhea (white rice, white bread, white potato without skin, banana, low fat, limited vegetables, etc). Noted, pt with poor cancer prognosis. Answered questions and encouraged wife to reach out with any additional questions.

## 2018-03-02 ENCOUNTER — TELEPHONE (OUTPATIENT)
Dept: HEMATOLOGY/ONCOLOGY | Facility: CLINIC | Age: 73
End: 2018-03-02

## 2018-03-02 ENCOUNTER — PATIENT MESSAGE (OUTPATIENT)
Dept: GASTROENTEROLOGY | Facility: CLINIC | Age: 73
End: 2018-03-02

## 2018-03-03 ENCOUNTER — PATIENT MESSAGE (OUTPATIENT)
Dept: HEMATOLOGY/ONCOLOGY | Facility: CLINIC | Age: 73
End: 2018-03-03

## 2018-03-03 ENCOUNTER — PATIENT MESSAGE (OUTPATIENT)
Dept: GASTROENTEROLOGY | Facility: CLINIC | Age: 73
End: 2018-03-03

## 2018-03-03 NOTE — TELEPHONE ENCOUNTER
The patient's wife called to request hospice services: She was well aware of their program and requested a referral to [facility]. Referral made to Pat [phone] and order and documentation faxed to her at . They agreed to admit today. The patient's wife has contact information for me.

## 2018-03-04 ENCOUNTER — PATIENT MESSAGE (OUTPATIENT)
Dept: HEMATOLOGY/ONCOLOGY | Facility: CLINIC | Age: 73
End: 2018-03-04

## 2018-03-04 LAB
BACTERIA BLD CULT: NORMAL
BACTERIA BLD CULT: NORMAL

## 2018-03-05 ENCOUNTER — TELEPHONE (OUTPATIENT)
Dept: HEMATOLOGY/ONCOLOGY | Facility: CLINIC | Age: 73
End: 2018-03-05

## 2018-03-05 NOTE — TELEPHONE ENCOUNTER
I did manage to talk to him by phone and emphasized that the primary goal of therapy now if comfort. Fortunately, no more vomiting.   He should eat or drink whatever he wants and take whatever amount of pain medication he needs, regardless of whether it is sedating.  Most of time in bed or chair.  Sounds like he is getting good attention from hospice personnel.    МАРИНА Lucia

## 2018-03-05 NOTE — TELEPHONE ENCOUNTER
Left message.  I am told he is too weak to make appointment tomorrow and now has hospice care.  Will try to call again, but I often am not able to get through to him on the phone.     МАРИНА Lucia

## 2018-03-07 ENCOUNTER — OUTPATIENT CASE MANAGEMENT (OUTPATIENT)
Dept: ADMINISTRATIVE | Facility: OTHER | Age: 73
End: 2018-03-07

## 2018-03-07 NOTE — PROGRESS NOTES
3/7/18 - Observed on chart review that pt was admitted to [facility] per his request on 3/2/18. CM will d/c from [facility]. Julissa Zeng RN

## 2020-01-06 NOTE — TELEPHONE ENCOUNTER
----- Message from Josh Boyd MD sent at 2/9/2018  1:13 PM CST -----  Please let him know all the biopsies from his small and large intestine came back normal  
Spoke with patient wife results was given.  
No

## 2021-06-02 NOTE — DISCHARGE INSTRUCTIONS
Home Care Instructions Pain Management    1. DIET:    You resume your normal diet today.    2.  BATHING:    Sponge bath until follow-up, but keep area dry.    3.DRESSING:    Do not remove dressing, only reinforce; Keep area dry.    4. ACTIVITY LEVEL:    You may resume your normal activities 24 hours after your procedure, with the exception of bending, twisting, stretching or heavy lifting.    5. MEDICATIONS:    You may resume normal medications today. No blood thinner until follow-up.    6. SPECIAL INSTRUCTIONS:    Turn off machine when driving.    No soaking in hot tub, can use dry cold pack.    No heat applied to site.    Expect soreness at site for 24-48 hours.    Slight reddish/pink drainage is normal; if heavy bleeding occurs, please .    Call representative with any further questions.    Please return all equipment to follow-up appointment.    Stimulation may increase or decrease, depending on body position.    Run stimulator at lowest possible intensity, while maintaining pain control.    Objective of the spinal cord stimulator is to receive 50% or better pain relief.    PLEASE CALL YOUR DOCTOR FOR THE FOLLOWIN.  Redness or swelling around the injection site.  2.  Fever 101 degree or greater.  3.  Drainage (pus) from the injection site.  4.  If any continuous bleeding occurs (some dried blood over the incision is normal)    FOR EMERGENCIES:    If any unusual problems or difficulties occur during clinic hours, call [phone] or llxi [phone].    Follow up with your physician in 5 days (follow up appointment).     MODERATE

## 2023-01-01 NOTE — OR NURSING
Report received from Ciera MCCLURE. Wife at bedside. No distress noted. Call bell in reach.   
Statement Selected

## 2024-08-29 NOTE — PROGRESS NOTES
8/29/2024  Bi Whitley Jr.   1940   [id number]        Psychiatry Progress Note       SUBJECTIVE:   Bi Whitley Jr. is a 84 y.o. male with a past medical history of prostate cancer and Francois esophagus who presented with progressive right sided weakness and new onset seizures. Symptoms gradually worsened and he presented to an outside facility. Had seizure like activity with rhythmic jerking of RUE and facial twitching upon examination by vascular neurology. He was subsequently intubated. CT head and neck showed no acute intracranial abnormalities specifically no early signs of ischemia and no intracranial hemorrhage. No LVO, no hemodynamically significant stenosis but limited due to motion artifact. Patient transferred straight to cath lab. Cerebral angiogram revealed no evidence of LVO or flow limiting stenosis. Patient extubated after procedure.  Psychiatry consulted due to hyperactivity and agitation.    Seen at the bedside with family present. AAOx4 and attentive to conversation. Displays some impairments in sustained attention. Able to spell WORLD forward, but not backwards. Impairment in recent memory as well. Reports euthymic mood at this time, but does endorse depressed mood for past several years and reports multiple stressors. Recently retired, wife with recent surgery, plans to move to [facility] after living in [place] for about 40 years. Reports poor sleep. Denies changes in energy or appetite. Denies suicidal ideations. Reported that he had some confusion overnight upon initial awakening but no agitation or aggression. No evidence of psychosis at this time. Goal-directed thought process. Denies homicidal ideations, auditory/visual hallucinations, or paranoia.       Current Medications:   Scheduled Meds:    aspirin  300 mg Rectal Daily    atorvastatin  40 mg Oral QHS    famotidine (PF)  20 mg Intravenous BID    levETIRAcetam (Keppra) IV (PEDS and ADULTS)  500 mg Intravenous Q12H     Pt transferred to [facility] Zeng RN for OPCM assessment.    mirtazapine  15 mg Oral QHS    mupirocin   Nasal BID      PRN Meds:   Current Facility-Administered Medications:     bisacodyL, 10 mg, Rectal, Daily PRN    dextrose 10%, 12.5 g, Intravenous, PRN    dextrose 10%, 25 g, Intravenous, PRN    glucagon (human recombinant), 1 mg, Intramuscular, PRN    hydrALAZINE, 10 mg, Intravenous, Q6H PRN    labetalol, 10 mg, Intravenous, Q6H PRN    ondansetron, 4 mg, Intravenous, Q8H PRN    sodium chloride 0.9%, 10 mL, Intravenous, PRN   Psychotherapeutics (From admission, onward)      Start     Stop Route Frequency Ordered    08/28/24 2100  mirtazapine tablet 15 mg         -- Oral Nightly 08/28/24 1222            Allergies:   Review of patient's allergies indicates:  No Known Allergies     OBJECTIVE:   Vitals   Vitals:    08/29/24 0500   BP: (!) 144/71   Pulse: 89   Resp: 19   Temp:         Labs/Imaging/Studies:   Recent Results (from the past 36 hour(s))   POCT glucose    Collection Time: 08/28/24 12:31 AM   Result Value Ref Range    POCT Glucose 88 70 - 110 mg/dL   Magnesium    Collection Time: 08/28/24  4:07 AM   Result Value Ref Range    Magnesium Level 2.00 1.60 - 2.60 mg/dL   Phosphorus    Collection Time: 08/28/24  4:07 AM   Result Value Ref Range    Phosphorus Level 2.2 (L) 2.3 - 4.7 mg/dL   Comprehensive metabolic panel    Collection Time: 08/28/24  4:07 AM   Result Value Ref Range    Sodium 144 136 - 145 mmol/L    Potassium 3.8 3.5 - 5.1 mmol/L    Chloride 114 (H) 98 - 107 mmol/L    CO2 21 (L) 23 - 31 mmol/L    Glucose 93 82 - 115 mg/dL    Blood Urea Nitrogen 9.5 8.4 - 25.7 mg/dL    Creatinine 0.80 0.73 - 1.18 mg/dL    Calcium 9.2 8.8 - 10.0 mg/dL    Protein Total 5.8 5.8 - 7.6 gm/dL    Albumin 3.3 (L) 3.4 - 4.8 g/dL    Globulin 2.5 2.4 - 3.5 gm/dL    Albumin/Globulin Ratio 1.3 1.1 - 2.0 ratio    Bilirubin Total 1.0 <=1.5 mg/dL    ALP 37 (L) 40 - 150 unit/L    ALT 46 0 - 55 unit/L     (H) 5 - 34 unit/L    eGFR >60 mL/min/1.73/m2    Anion Gap 9.0 mEq/L     BUN/Creatinine Ratio 12    CBC with Differential    Collection Time: 08/28/24  4:07 AM   Result Value Ref Range    WBC 6.97 4.50 - 11.50 x10(3)/mcL    RBC 3.88 (L) 4.70 - 6.10 x10(6)/mcL    Hgb 13.3 (L) 14.0 - 18.0 g/dL    Hct 37.5 (L) 42.0 - 52.0 %    MCV 96.6 (H) 80.0 - 94.0 fL    MCH 34.3 (H) 27.0 - 31.0 pg    MCHC 35.5 33.0 - 36.0 g/dL    RDW 12.4 11.5 - 17.0 %    Platelet 164 130 - 400 x10(3)/mcL    MPV 11.7 (H) 7.4 - 10.4 fL    Neut % 72.7 %    Lymph % 12.9 %    Mono % 11.8 %    Eos % 1.6 %    Basophil % 0.7 %    Lymph # 0.90 0.6 - 4.6 x10(3)/mcL    Neut # 5.07 2.1 - 9.2 x10(3)/mcL    Mono # 0.82 0.1 - 1.3 x10(3)/mcL    Eos # 0.11 0 - 0.9 x10(3)/mcL    Baso # 0.05 <=0.2 x10(3)/mcL    IG# 0.02 0 - 0.04 x10(3)/mcL    IG% 0.3 %    NRBC% 0.0 %   POCT glucose    Collection Time: 08/28/24  5:31 AM   Result Value Ref Range    POCT Glucose 96 70 - 110 mg/dL   CV Ultrasound Bilateral Doppler Carotid    Collection Time: 08/28/24  8:52 AM   Result Value Ref Range    Right CCA prox sys 58 cm/s    Right CCA prox jennings 4 cm/s    Right CCA dist sys 74 cm/s    Right CCA dist jennings 9 cm/s    Right ICA prox sys 28 cm/s    Right ICA prox jennings 0 cm/s    Right ICA mid sys 46 cm/s    Right ICA mid jennings 8 cm/s    Right ICA dist sys 54 cm/s    Right ICA dist jennings 10 cm/s    Right ECA sys 62 cm/s    Right ECA jennings 0 cm/s    Right vertebral sys 48 cm/s    Right vertebral jennings 5 cm/s    Right ICA/CCA ratio 0.73     Right Highest ICA 54.00     Right Highest EDV 10.00     Right Highest CCA 74     Left CCA prox sys 105 cm/s    Left CCA prox jennings 12 cm/s    Left CCA dist sys 73 cm/s    Left CCA dist jennings 12 cm/s    Left ICA prox sys 39 cm/s    Left ICA prox jennings 8 cm/s    Left ICA mid sys 44 cm/s    Left ICA mid jennings 7 cm/s    Left ICA dist sys 28 cm/s    Left ICA dist jennings 6 cm/s    Left ECA sys 70 cm/s    Left ECA jennings 0 cm/s    Left vertebral sys 39 cm/s    Left vertebral jennings 8 cm/s    Left ICA/CCA ratio 0.60     Left Highest ICA 44.00      LT Highest EDV 8.00     Left Highest     Echo Saline Bubble? Yes    Collection Time: 08/28/24 10:09 AM   Result Value Ref Range    Matias's Biplane MOD Ejection Fraction 57 %    A2C EF 52 %    A4C EF 59 %    LVOT stroke volume 56.68 cm3    LVIDd 5.30 3.5 - 6.0 cm    LV Systolic Volume 44.10 mL    LVIDs 3.30 2.1 - 4.0 cm    LV ESV A2C 41.60 mL    LV Diastolic Volume 135.00 mL    LV ESV A4C 38.40 mL    LV EDV A2C [id number] mL    LV EDV A4C 77.00 mL    Left Ventricular End Systolic Volume by Teichholz Method 44.10 mL    Left Ventricular End Diastolic Volume by Teichholz Method 135.00 mL    IVS 1.00 0.6 - 1.1 cm    LVOT diameter 1.90 cm    LVOT area 2.8 cm2    FS 38 28 - 44 %    Left Ventricle Relative Wall Thickness 0.38 cm    Posterior Wall 1.00 0.6 - 1.1 cm    LV mass 200.40 g    MV Peak E Roberto 0.57 m/s    MV Peak A Roberto 0.78 m/s    TR Max Roberto 2.2 m/s    E/A ratio 0.73     E wave deceleration time 288.00 msec    LVOT peak roberto 0.81 m/s    Left Ventricular Outflow Tract Mean Velocity 0.50 cm/s    Left Ventricular Outflow Tract Mean Gradient 1.00 mmHg    TAPSE 2.33 cm    LA size 4.30 cm    LA volume (mod) 43.00 cm3    AV mean gradient 4 mmHg    AV peak gradient 7 mmHg    Ao peak roberto 1.32 m/s    Ao VTI 31.30 cm    LVOT peak VTI 20.00 cm    AV valve area 1.81 cm²    AV Velocity Ratio 0.61     AV index (prosthetic) 0.64     GALINA by Velocity Ratio 1.74 cm²    MV mean gradient 1 mmHg    MV peak gradient 3 mmHg    MV stenosis pressure 1/2 time 95.00 ms    MV valve area p 1/2 method 2.32 cm2    MV valve area by continuity eq 2.19 cm2    MV VTI 25.9 cm    Triscuspid Valve Regurgitation Peak Gradient 19 mmHg    LA area A4C 17.00 cm2    LA area A2C 16.30 cm2    Mitral Valve Heart Rate 54 bpm    TV resting pulmonary artery pressure 22 mmHg    RV TB RVSP 5 mmHg    Est. RA pres 3 mmHg    Sinus 3.6 cm    BSA 1.96 m2    LV Systolic Volume Index 22.8 mL/m2    LV Diastolic Volume Index 69.95 mL/m2    LV Mass Index 104  g/m2    LA Volume Index (Mod) 22.3 mL/m2    ZLVIDS -0.15     ZLVIDD -0.30    Magnesium    Collection Time: 08/29/24  4:19 AM   Result Value Ref Range    Magnesium Level 1.80 1.60 - 2.60 mg/dL   Phosphorus    Collection Time: 08/29/24  4:19 AM   Result Value Ref Range    Phosphorus Level 2.2 (L) 2.3 - 4.7 mg/dL   Comprehensive metabolic panel    Collection Time: 08/29/24  4:19 AM   Result Value Ref Range    Sodium 144 136 - 145 mmol/L    Potassium 3.8 3.5 - 5.1 mmol/L    Chloride 114 (H) 98 - 107 mmol/L    CO2 18 (L) 23 - 31 mmol/L    Glucose 78 (L) 82 - 115 mg/dL    Blood Urea Nitrogen 11.8 8.4 - 25.7 mg/dL    Creatinine 0.86 0.73 - 1.18 mg/dL    Calcium 9.3 8.8 - 10.0 mg/dL    Protein Total 6.1 5.8 - 7.6 gm/dL    Albumin 3.4 3.4 - 4.8 g/dL    Globulin 2.7 2.4 - 3.5 gm/dL    Albumin/Globulin Ratio 1.3 1.1 - 2.0 ratio    Bilirubin Total 0.8 <=1.5 mg/dL    ALP 42 40 - 150 unit/L    ALT 54 0 - 55 unit/L     (H) 5 - 34 unit/L    eGFR >60 mL/min/1.73/m2    Anion Gap 12.0 mEq/L    BUN/Creatinine Ratio 14    CBC with Differential    Collection Time: 08/29/24  4:19 AM   Result Value Ref Range    WBC 7.82 4.50 - 11.50 x10(3)/mcL    RBC 4.06 (L) 4.70 - 6.10 x10(6)/mcL    Hgb 13.8 (L) 14.0 - 18.0 g/dL    Hct 40.0 (L) 42.0 - 52.0 %    MCV 98.5 (H) 80.0 - 94.0 fL    MCH 34.0 (H) 27.0 - 31.0 pg    MCHC 34.5 33.0 - 36.0 g/dL    RDW 12.7 11.5 - 17.0 %    Platelet 178 130 - 400 x10(3)/mcL    MPV 11.7 (H) 7.4 - 10.4 fL    Neut % 71.8 %    Lymph % 13.6 %    Mono % 11.6 %    Eos % 1.7 %    Basophil % 0.9 %    Lymph # 1.06 0.6 - 4.6 x10(3)/mcL    Neut # 5.62 2.1 - 9.2 x10(3)/mcL    Mono # 0.91 0.1 - 1.3 x10(3)/mcL    Eos # 0.13 0 - 0.9 x10(3)/mcL    Baso # 0.07 <=0.2 x10(3)/mcL    IG# 0.03 0 - 0.04 x10(3)/mcL    IG% 0.4 %    NRBC% 0.0 %   POCT glucose    Collection Time: 08/29/24  6:21 AM   Result Value Ref Range    POCT Glucose 62 (L) 70 - 110 mg/dL   POCT glucose    Collection Time: 08/29/24  7:12 AM   Result Value Ref Range     POCT Glucose 123 (H) 70 - 110 mg/dL          Psychiatric Mental Status Exam:  General Appearance: appears stated age, dressed in hospital garb, lying in bed, in no acute distress  Arousal: alert with clear sensorium  Behavior: cooperative, polite, appropriate eye-contact, under good behavioral control  Movements and Motor Activity: no tics, no tremors, no akathisia, no dystonia, no evidence of tardive dyskinesia  Orientation: oriented to person, place, and time  Speech: normal rate, rhythm, volume, tone and pitch  Mood: Euthymic  Affect: normal, euthymic, reactive, full-range, mood-congruent, appropriate to situation and context  Thought Process: goal-directed  Associations: intact, no loosening of associations  Thought Content and Perceptions: no suicidal or homicidal ideation, no auditory or visual hallucinations, no paranoid ideation, no ideas of reference, no evidence of delusions or psychosis  Recent and Remote Memory: mild impairments noted, registers 3/3 objects, recalls 1/3 objects at 5 minutes; per interview/observation with patient  Attention and Concentration: able to spell WORLD forwards, unable to spell WORLD backwards; per interview/observation with patient  Fund of Knowledge: grossly intact; based on history, vocabulary, fund of knowledge, syntax, grammar, and content  Insight: adequate; based on understanding of severity of illness and HPI  Judgment: adequate; based on patient's behavior and HPI    ASSESSMENT/PLAN:   Problems Addressed/Diagnoses:  Delirium F05  Depressive Disorder NOS    Past Medical History:   Diagnosis Date    Francois esophagus     Prostate cancer         Plan:  Medication management  Remeron 15mg PO QHS for insomnia/depression  Recommend delirium precautions  PEC not recommended at this time.   Psych will sign-off        Thad Magallon

## (undated) DEVICE — PACK DRAPE UNIVERSAL CONVERTOR

## (undated) DEVICE — ELECTRODE BLD EXT INSUL 1

## (undated) DEVICE — SOL 9P NACL IRR PIC IL

## (undated) DEVICE — CLOSURE SKIN STERI STRIP 1/2X4

## (undated) DEVICE — DRESSING AQUACEL FOAM 5 X 5

## (undated) DEVICE — SYR 10CC LUER LOCK

## (undated) DEVICE — SUT VICRYL PLUS 3-0 SH 18IN

## (undated) DEVICE — SUT MCRYL PLUS 4-0 PS2 27IN

## (undated) DEVICE — BINDER ABDOMINAL 9 30-45

## (undated) DEVICE — DRAPE SURG W/TWL 17 5/8X23

## (undated) DEVICE — DRAPE INCISE IOBAN 2 23X17IN

## (undated) DEVICE — NDL HYPO REG 25G X 1 1/2

## (undated) DEVICE — ELECTRODE REM PLYHSV RETURN 9

## (undated) DEVICE — DRESSING TRANS 4X4 TEGADERM

## (undated) DEVICE — DRAPE C-ARMOR EQUIPMENT COVER

## (undated) DEVICE — SEE MEDLINE ITEM 152622

## (undated) DEVICE — DEVICE FIXATE SUT BAND DUAL PK

## (undated) DEVICE — STAPLER SKIN ROTATING HEAD

## (undated) DEVICE — APPLICATOR CHLORAPREP ORN 26ML

## (undated) DEVICE — NDL 18GA X1 1/2 REG BEVEL

## (undated) DEVICE — FIXATE SUTURING DEVICE

## (undated) DEVICE — SYR 10CC LOSS OF RESISTANCE

## (undated) DEVICE — SYR GLASS 5CC LUER LOK

## (undated) DEVICE — DRUG BACITRACIN ZINC

## (undated) DEVICE — GAUZE SPONGE 4X4 12PLY

## (undated) DEVICE — SPONGE/BRUSH SCRUB SURG PCMX

## (undated) DEVICE — SYRINGE 0.9% NACL 10MIL PREFIL

## (undated) DEVICE — SEE MEDLINE ITEM 152742

## (undated) DEVICE — DECANTER VIAL ASEPTIC TRANSFER

## (undated) DEVICE — SUT D SPECIAL VICRYL 2-0